# Patient Record
Sex: MALE | Race: WHITE | NOT HISPANIC OR LATINO | Employment: OTHER | URBAN - METROPOLITAN AREA
[De-identification: names, ages, dates, MRNs, and addresses within clinical notes are randomized per-mention and may not be internally consistent; named-entity substitution may affect disease eponyms.]

---

## 2017-01-09 RX ORDER — ATORVASTATIN CALCIUM 10 MG/1
TABLET, FILM COATED ORAL
Qty: 30 TABLET | Refills: 11 | Status: SHIPPED | OUTPATIENT
Start: 2017-01-09 | End: 2018-01-14 | Stop reason: SDUPTHER

## 2017-02-17 ENCOUNTER — OFFICE VISIT (OUTPATIENT)
Dept: OPHTHALMOLOGY | Facility: CLINIC | Age: 67
End: 2017-02-17
Payer: MEDICARE

## 2017-02-17 ENCOUNTER — CLINICAL SUPPORT (OUTPATIENT)
Dept: OPHTHALMOLOGY | Facility: CLINIC | Age: 67
End: 2017-02-17
Payer: MEDICARE

## 2017-02-17 DIAGNOSIS — H47.012 NAION (NON-ARTERITIC ANTERIOR ISCHEMIC OPTIC NEUROPATHY), LEFT EYE: Primary | ICD-10-CM

## 2017-02-17 DIAGNOSIS — H53.412 CENTRAL SCOTOMA, LEFT EYE: ICD-10-CM

## 2017-02-17 PROCEDURE — 92083 EXTENDED VISUAL FIELD XM: CPT | Mod: PBBFAC | Performed by: OPHTHALMOLOGY

## 2017-02-17 PROCEDURE — 99212 OFFICE O/P EST SF 10 MIN: CPT | Mod: PBBFAC | Performed by: OPHTHALMOLOGY

## 2017-02-17 PROCEDURE — 99213 OFFICE O/P EST LOW 20 MIN: CPT | Mod: S$PBB,,, | Performed by: OPHTHALMOLOGY

## 2017-02-17 PROCEDURE — 99999 PR PBB SHADOW E&M-EST. PATIENT-LVL II: CPT | Mod: PBBFAC,,, | Performed by: OPHTHALMOLOGY

## 2017-02-17 NOTE — MR AVS SNAPSHOT
[facility] - Ophthalmology  [address]  [facility] [address]  Phone: [phone]  Fax: [phone]                  Anton Christiansen   2017 11:30 AM   Office Visit    Description:  Male : 1950   Provider:  Ismael Brewster MD   Department:  [facility] - Ophthalmology           Reason for Visit     Concerns About Ocular Health           Diagnoses this Visit        Comments    NAION (non-arteritic anterior ischemic optic neuropathy), left eye    -  Primary     Central scotoma, left eye                To Do List           Goals (5 Years of Data)     None      Follow-Up and Disposition     Return if symptoms worsen or fail to improve.      [facility] Call     [facility] On Call Nurse Care Line -  Assistance  Registered nurses in the [facility] On Call Center provide clinical advisement, health education, appointment booking, and other advisory services.  Call for this free service at [phone].             Medications           Message regarding Medications     Verify the changes and/or additions to your medication regime listed below are the same as discussed with your clinician today.  If any of these changes or additions are incorrect, please notify your healthcare provider.             Verify that the below list of medications is an accurate representation of the medications you are currently taking.  If none reported, the list may be blank. If incorrect, please contact your healthcare provider. Carry this list with you in case of emergency.           Current Medications     ascorbic acid (VITAMIN C) 500 MG tablet Take 500 mg by mouth once daily.    atorvastatin (LIPITOR) 10 MG tablet take 1 tablet by mouth once daily    budesonide (RINOCORT AQUA) 32 mcg/actuation nasal spray 1 spray (32 mcg total) by Nasal route once daily.    carvedilol (COREG) 12.5 MG tablet Take 1 tablet (12.5 mg total) by mouth 2 (two) times daily.    cholecalciferol, vitamin D3, 3,000 unit Tab Take 1 tablet  by mouth once daily.     fish oil-omega-3 fatty acids 300-1,000 mg capsule Take 2 g by mouth once daily.    saw palmetto 80 MG capsule Take 80 mg by mouth 2 (two) times daily.    vitamin E 100 UNIT capsule Take 100 Units by mouth once daily.           Clinical Reference Information           Allergies as of 2/17/2017     Cephalexin    Ace Inhibitors    Cialis [Tadalafil]    Proscar [Finasteride]    Vicodin [Hydrocodone-acetaminophen]      Immunizations Administered on Date of Encounter - 2/17/2017     None      Orders Placed During Today's Visit      Normal Orders This Visit    Arroyo Visual Field - OU - Extended - Both Eyes       Instructions    Return as needed.       Language Assistance Services     ATTENTION: Language assistance services are available, free of charge. Please call [phone].      ATENCIÓN: Si johannla shannon, tiene a pearl disposición servicios gratuitos de asistencia lingüística. Llame al [phone].     CHÚ Ý: N?u b?n nói Ti?ng Vi?t, có các d?ch v? h? tr? ngôn ng? mi?n phí dành cho b?n. G?i s? [phone].         [facility] complies with applicable Federal civil rights laws and does not discriminate on the basis of race, color, national origin, age, disability, or sex.

## 2017-02-17 NOTE — PROGRESS NOTES
HPI     Concerns About Ocular Health    Additional comments: NAION           Comments   DLS:12/06/2016 Narcisa  Patient here for 2 months follow up NAION and Review HVF.  Pt states OU vision seem to be gradually improving. OS shadow seem to be   getting better.  No pain.    I have personally interviewed the patient, reviewed the history and   examined the patient and agree with the technician's exam.       Last edited by Ismael Brewster MD on 2/17/2017 11:57 AM. (History)        ROS     Positive for: Genitourinary, Musculoskeletal, Cardiovascular, Eyes    Negative for: Constitutional, Gastrointestinal, Neurological, Skin, HENT,   Endocrine, Respiratory, Psychiatric, Allergic/Imm, Heme/Lymph    Last edited by Ismael Brewster MD on 2/17/2017 11:57 AM. (History)        Assessment /Plan     For exam results, see Encounter Report.    NAION (non-arteritic anterior ischemic optic neuropathy), left eye  -     Arroyo Visual Field - OU - Extended - Both Eyes    Central scotoma, left eye  -     Arroyo Visual Field - OU - Extended - Both Eyes      Mr. Christiansen is still noting gradual slight improvement in his visual quality in his left eye. The acute NAION event has ended. He will return to me as requested.

## 2017-02-20 NOTE — PROGRESS NOTES
Rel/fix/coop. Good -[facility]    Assessment /Plan     For exam results, see Encounter Report.    There are no diagnoses linked to this encounter.

## 2017-03-29 ENCOUNTER — TELEPHONE (OUTPATIENT)
Dept: INFECTIOUS DISEASES | Facility: CLINIC | Age: 67
End: 2017-03-29

## 2017-03-29 DIAGNOSIS — N41.1 CHRONIC PROSTATITIS: ICD-10-CM

## 2017-03-29 DIAGNOSIS — E78.5 HYPERLIPIDEMIA, UNSPECIFIED HYPERLIPIDEMIA TYPE: Chronic | ICD-10-CM

## 2017-03-29 DIAGNOSIS — R97.20 ELEVATED PSA: ICD-10-CM

## 2017-03-29 DIAGNOSIS — I10 ESSENTIAL HYPERTENSION: Primary | Chronic | ICD-10-CM

## 2017-03-29 DIAGNOSIS — N20.0 KIDNEY STONE: Chronic | ICD-10-CM

## 2017-03-29 NOTE — TELEPHONE ENCOUNTER
----- Message from Kimberly Culp LPN sent at 3/28/2017  8:50 PM CDT -----      ----- Message -----     From: Candy Nunez MA     Sent: 3/28/2017   3:28 PM       To: Lyndsey Piedra MA    Pt wants to do blood work before he comes to his appointment on 4/10

## 2017-04-10 ENCOUNTER — OFFICE VISIT (OUTPATIENT)
Dept: OPTOMETRY | Facility: CLINIC | Age: 67
End: 2017-04-10
Payer: MEDICARE

## 2017-04-10 ENCOUNTER — LAB VISIT (OUTPATIENT)
Dept: LAB | Facility: HOSPITAL | Age: 67
End: 2017-04-10
Attending: INTERNAL MEDICINE
Payer: MEDICARE

## 2017-04-10 ENCOUNTER — OFFICE VISIT (OUTPATIENT)
Dept: INFECTIOUS DISEASES | Facility: CLINIC | Age: 67
End: 2017-04-10
Payer: MEDICARE

## 2017-04-10 VITALS
DIASTOLIC BLOOD PRESSURE: 90 MMHG | HEART RATE: 64 BPM | SYSTOLIC BLOOD PRESSURE: 153 MMHG | BODY MASS INDEX: 25.11 KG/M2 | HEIGHT: 69 IN | WEIGHT: 169.56 LBS | TEMPERATURE: 99 F

## 2017-04-10 DIAGNOSIS — I10 ESSENTIAL HYPERTENSION: Chronic | ICD-10-CM

## 2017-04-10 DIAGNOSIS — N20.0 KIDNEY STONE: Chronic | ICD-10-CM

## 2017-04-10 DIAGNOSIS — Z13.5 GLAUCOMA SCREENING: ICD-10-CM

## 2017-04-10 DIAGNOSIS — I25.10 ARTERIOSCLEROTIC CORONARY ARTERY DISEASE: Primary | ICD-10-CM

## 2017-04-10 DIAGNOSIS — H52.13 MYOPIC ASTIGMATISM OF BOTH EYES: ICD-10-CM

## 2017-04-10 DIAGNOSIS — Z96.1 PSEUDOPHAKIA OF BOTH EYES: ICD-10-CM

## 2017-04-10 DIAGNOSIS — H47.012 NAION (NON-ARTERITIC ANTERIOR ISCHEMIC OPTIC NEUROPATHY), LEFT EYE: ICD-10-CM

## 2017-04-10 DIAGNOSIS — N41.1 CHRONIC PROSTATITIS: ICD-10-CM

## 2017-04-10 DIAGNOSIS — H52.203 MYOPIC ASTIGMATISM OF BOTH EYES: ICD-10-CM

## 2017-04-10 DIAGNOSIS — H47.012 NAION (NON-ARTERITIC ANTERIOR ISCHEMIC OPTIC NEUROPATHY), LEFT EYE: Primary | ICD-10-CM

## 2017-04-10 DIAGNOSIS — E78.5 HYPERLIPIDEMIA, UNSPECIFIED HYPERLIPIDEMIA TYPE: Chronic | ICD-10-CM

## 2017-04-10 DIAGNOSIS — R97.20 ELEVATED PSA: ICD-10-CM

## 2017-04-10 LAB
ALBUMIN SERPL BCP-MCNC: 3.8 G/DL
ALP SERPL-CCNC: 84 U/L
ALT SERPL W/O P-5'-P-CCNC: 32 U/L
ANION GAP SERPL CALC-SCNC: 9 MMOL/L
AST SERPL-CCNC: 27 U/L
BASOPHILS # BLD AUTO: 0.05 K/UL
BASOPHILS NFR BLD: 0.9 %
BILIRUB SERPL-MCNC: 1 MG/DL
BUN SERPL-MCNC: 14 MG/DL
CALCIUM SERPL-MCNC: 9.9 MG/DL
CHLORIDE SERPL-SCNC: 106 MMOL/L
CHOLEST/HDLC SERPL: 3.8 {RATIO}
CO2 SERPL-SCNC: 24 MMOL/L
COMPLEXED PSA SERPL-MCNC: 5.8 NG/ML
CREAT SERPL-MCNC: 1 MG/DL
DIFFERENTIAL METHOD: NORMAL
EOSINOPHIL # BLD AUTO: 0.2 K/UL
EOSINOPHIL NFR BLD: 3.9 %
ERYTHROCYTE [DISTWIDTH] IN BLOOD BY AUTOMATED COUNT: 13.7 %
ERYTHROCYTE [SEDIMENTATION RATE] IN BLOOD BY WESTERGREN METHOD: 11 MM/HR
EST. GFR  (AFRICAN AMERICAN): >60 ML/MIN/1.73 M^2
EST. GFR  (NON AFRICAN AMERICAN): >60 ML/MIN/1.73 M^2
GLUCOSE SERPL-MCNC: 95 MG/DL
HCT VFR BLD AUTO: 42 %
HDL/CHOLESTEROL RATIO: 26.2 %
HDLC SERPL-MCNC: 145 MG/DL
HDLC SERPL-MCNC: 38 MG/DL
HGB BLD-MCNC: 14.2 G/DL
LDLC SERPL CALC-MCNC: 79.6 MG/DL
LYMPHOCYTES # BLD AUTO: 1.6 K/UL
LYMPHOCYTES NFR BLD: 27.3 %
MCH RBC QN AUTO: 30 PG
MCHC RBC AUTO-ENTMCNC: 33.8 %
MCV RBC AUTO: 89 FL
MONOCYTES # BLD AUTO: 0.7 K/UL
MONOCYTES NFR BLD: 12.7 %
NEUTROPHILS # BLD AUTO: 3.2 K/UL
NEUTROPHILS NFR BLD: 55 %
NONHDLC SERPL-MCNC: 107 MG/DL
PLATELET # BLD AUTO: 248 K/UL
PMV BLD AUTO: 10.5 FL
POTASSIUM SERPL-SCNC: 4.9 MMOL/L
PROT SERPL-MCNC: 7.1 G/DL
RBC # BLD AUTO: 4.74 M/UL
SODIUM SERPL-SCNC: 139 MMOL/L
TRIGL SERPL-MCNC: 137 MG/DL
WBC # BLD AUTO: 5.83 K/UL

## 2017-04-10 PROCEDURE — 99213 OFFICE O/P EST LOW 20 MIN: CPT | Mod: PBBFAC,27 | Performed by: INTERNAL MEDICINE

## 2017-04-10 PROCEDURE — 99999 PR PBB SHADOW E&M-EST. PATIENT-LVL III: CPT | Mod: PBBFAC,,, | Performed by: INTERNAL MEDICINE

## 2017-04-10 PROCEDURE — 92015 DETERMINE REFRACTIVE STATE: CPT | Mod: ,,, | Performed by: OPTOMETRIST

## 2017-04-10 PROCEDURE — 99215 OFFICE O/P EST HI 40 MIN: CPT | Mod: S$PBB,,, | Performed by: INTERNAL MEDICINE

## 2017-04-10 PROCEDURE — 99999 PR PBB SHADOW E&M-EST. PATIENT-LVL III: CPT | Mod: PBBFAC,,, | Performed by: OPTOMETRIST

## 2017-04-10 PROCEDURE — 92014 COMPRE OPH EXAM EST PT 1/>: CPT | Mod: S$PBB,,, | Performed by: OPTOMETRIST

## 2017-04-10 NOTE — MR AVS SNAPSHOT
[facility] - Infectious Diseases  [address] [address]  Phone: [phone]  Fax: [phone]                  Anton Christiansen   4/10/2017 11:30 AM   Office Visit    Description:  Male : 1950   Provider:  Ken Norris MD   Department:  [facility] - Infectious Diseases           Reason for Visit     Annual Exam           Diagnoses this Visit        Comments    Arteriosclerotic coronary artery disease    -  Primary            To Do List           Future Appointments        Provider Department Dept Phone    2017 1:30 PM AUDIOGRAM, AUDIO [facility] Audiology [phone]    2017 2:45 PM Crow Baum III, MD [facility] Otorhinolaryngology [phone]    2017 10:20 AM Thomas Quinteros MD [facility] - Urology 4th Floor [phone]    2017 9:00 AM Susanne Stewart MD [facility] - Dermatology [phone]      Goals (5 Years of Data)     None      [facility]     [facility] On Call Nurse Care Line -  Assistance  Unless otherwise directed by your provider, please contact [facility], our nurse care line that is available for  assistance.     Registered nurses in the [facility] Call Center provide: appointment scheduling, clinical advisement, health education, and other advisory services.  Call: [phone] (toll free)               Medications           Message regarding Medications     Verify the changes and/or additions to your medication regime listed below are the same as discussed with your clinician today.  If any of these changes or additions are incorrect, please notify your healthcare provider.             Verify that the below list of medications is an accurate representation of the medications you are currently taking.  If none reported, the list may be blank. If incorrect, please contact your healthcare provider. Carry this list with you in case of emergency.           Current Medications     ascorbic acid (VITAMIN C) 500 MG tablet  "Take 500 mg by mouth once daily.    atorvastatin (LIPITOR) 10 MG tablet take 1 tablet by mouth once daily    budesonide (RINOCORT AQUA) 32 mcg/actuation nasal spray 1 spray (32 mcg total) by Nasal route once daily.    carvedilol (COREG) 12.5 MG tablet Take 1 tablet (12.5 mg total) by mouth 2 (two) times daily.    cholecalciferol, vitamin D3, 3,000 unit Tab Take 1 tablet by mouth once daily.     fish oil-omega-3 fatty acids 300-1,000 mg capsule Take 2 g by mouth once daily.    saw palmetto 80 MG capsule Take 80 mg by mouth 2 (two) times daily.    vitamin E 100 UNIT capsule Take 100 Units by mouth once daily.           Clinical Reference Information           Your Vitals Were     BP Pulse Temp Height Weight BMI    153/90 64 98.6 °F (37 °C) (Oral) 5' 9" (1.753 m) 76.9 kg (169 lb 8.5 oz) 25.04 kg/m2      Blood Pressure          Most Recent Value    BP  (!)  153/90      Allergies as of 4/10/2017     Cephalexin    Ace Inhibitors    Cialis [Tadalafil]    Proscar [Finasteride]    Vicodin [Hydrocodone-acetaminophen]      Immunizations Administered on Date of Encounter - 4/10/2017     None      Orders Placed During Today's Visit     Future Labs/Procedures Expected by Expires    EKG 12-lead  As directed 4/10/2018      Language Assistance Services     ATTENTION: Language assistance services are available, free of charge. Please call [phone].      ATENCIÓN: Si habla español, tiene a pearl disposición servicios gratuitos de asistencia lingüística. Llame al [phone].     [facility] Ý: N?u b?n nói Ti?ng Vi?t, có các d?ch v? h? tr? ngôn ng? mi?n phí dành cho b?n. G?i s? [phone].         [facility] complies with applicable Federal civil rights laws and does not discriminate on the basis of race, color, national origin, age, disability, or sex.        "

## 2017-04-10 NOTE — PROGRESS NOTES
HPI     Patient states he has been seeing pretty well out of his right eye. He   says he has been having problems with his optic nerve in his left eye   which prevents him from seeing as well. (+)occasional floaters OS. Patient   is being followed by Dr. Brewster for NAION        Last edited by Tracey Westbrook on 4/10/2017  8:16 AM.         Assessment /Plan     For exam results, see Encounter Report.    NAION (non-arteritic anterior ischemic optic neuropathy), left eye  -Stable, slight subjective improvement  -Pt requests at home test to check if vision is getting better   - and amsler (not necessary)    Pseudophakia of both eyes  -Clear, centered    Glaucoma screening  -Monitor with annual eye exam and IOP check    Myopic astigmatism of both eyes  Eyeglass Final Rx     Eyeglass Final Rx      Sphere Cylinder Axis Add   Right [place] +0.75 180 +2.50   Left -0.50 +1.25 175 +2.50       Type:  PAL    Expiration Date:  4/11/2018                  RTC 1 yr

## 2017-04-10 NOTE — PROGRESS NOTES
Subjective:      Patient ID: Anton Christiansen is a 66 y.o. male.    Chief Complaint:Annual Exam      History of Present Illness    Hypertension  Pt still on carvedilol 12.5 mg bid. He checks his BP at home and gets 120s-130s/ 70s. Has higher readings in office.  Cuff brought in last year to validate accuracy of home readings.        Elevated PSA  Followed by Dr. Quinteros for elevated PSA and renal stones. Will schedule visit with him.    Lab Results        PSA 5.8 4/ 10/ 2017    PSA 6.0 (H) 05/20/2014    PSA 5.81 (H) 06/04/2013    PSA 10.78 (H) 05/28/2013      Arteriosclerotic coronary artery disease  Had positive coronary calcium score in 2008 placing him at 25-50%ile. Has been on low dose atorvastatin since with great lipid control:  Lab Results   Component Value Date    CHOL 145 04/10/2017    CHOL 131 03/23/2016    CHOL 168 04/08/2015     Lab Results   Component Value Date    HDL 38 (L) 04/10/2017    HDL 34 (L) 03/23/2016    HDL 41 04/08/2015     Lab Results   Component Value Date    LDLCALC 79.6 04/10/2017    LDLCALC 80.2 03/23/2016    LDLCALC 100.4 04/08/2015     Lab Results   Component Value Date    TRIG 137 04/10/2017    TRIG 84 03/23/2016    TRIG 133 04/08/2015     Lab Results   Component Value Date    CHOLHDL 26.2 04/10/2017    CHOLHDL 26.0 03/23/2016    CHOLHDL 24.4 04/08/2015          Recurrent nephrolithiasis  Had problems with renal stones necessitating hospitalization last year. Last renal stone study 9/18/15   showed:  Stable 0.5-cm distal right ureter calculus with persistent proximal right dilatation and worsening right perinephric fat stranding.   Last sx of colic were 2015.  He is hydrating regularly and consistently        The laboratory studies were reviewed and discussed with the pt.  Last colonoscopy was 2014 and a rescreening interval of 3-5 years was recommended.          Review of Systems   Constitution: Negative for chills, decreased appetite, fever, weakness, malaise/fatigue, night sweats,  weight gain and weight loss.   HENT: Negative for congestion, ear pain, headaches, hearing loss, hoarse voice, sore throat and tinnitus.    Eyes: Negative for blurred vision, redness and visual disturbance.   Cardiovascular: Negative for chest pain, leg swelling and palpitations.   Respiratory: Negative for cough, hemoptysis, shortness of breath and sputum production.    Hematologic/Lymphatic: Negative for adenopathy. Does not bruise/bleed easily.   Skin: Negative for dry skin, itching, rash and suspicious lesions.   Musculoskeletal: Negative for back pain, joint pain, myalgias and neck pain.   Gastrointestinal: Negative for abdominal pain, constipation, diarrhea, heartburn, nausea and vomiting.   Genitourinary: Negative for dysuria, flank pain, frequency, hematuria, hesitancy and urgency.   Neurological: Negative for dizziness, numbness and paresthesias.   Psychiatric/Behavioral: Negative for depression and memory loss. The patient does not have insomnia and is not nervous/anxious.      Objective:   Physical Exam   Constitutional: He is oriented to person, place, and time. He appears well-developed and well-nourished.   HENT:   Head: Normocephalic and atraumatic.   Right Ear: External ear normal.   Left Ear: External ear normal.   Mouth/Throat: Oropharynx is clear and moist.   Eyes: Conjunctivae and EOM are normal. Pupils are equal, round, and reactive to light.   Neck: Normal range of motion. Neck supple. No thyromegaly present.   Cardiovascular: Normal rate, regular rhythm and normal heart sounds.    No murmur heard.  Pulmonary/Chest: Effort normal and breath sounds normal. He has no wheezes. He has no rales.   Abdominal: Soft. Bowel sounds are normal. He exhibits no mass. There is no tenderness. There is no rebound.   Musculoskeletal: Normal range of motion.   Lymphadenopathy:     He has no cervical adenopathy.   Neurological: He is alert and oriented to person, place, and time.   Skin: Skin is warm and dry.    Many seborrheic keratoses noted on back/chest   Psychiatric: He has a normal mood and affect. His behavior is normal.   Vitals reviewed.    Assessment:       1. Arteriosclerotic coronary artery disease    2. Essential hypertension    3. Kidney stone    4. NAION (non-arteritic anterior ischemic optic neuropathy), left eye          Plan:        1. Continue presenet meds   2. See Victor M Quinteros and Terry in f/u   3. EKG today

## 2017-04-10 NOTE — MR AVS SNAPSHOT
[facility]  [address]  [facility] [address]  Phone: [phone]  Fax: [phone]                  Anton Christiansen   4/10/2017 8:00 AM   Office Visit    Description:  Male : 1950   Provider:  Regino Dias OD   Department:  [facility]           Reason for Visit     Concerns About Ocular Health           Diagnoses this Visit        Comments    NAION (non-arteritic anterior ischemic optic neuropathy), left eye    -  Primary     Pseudophakia of both eyes         Glaucoma screening         Myopic astigmatism of both eyes                To Do List           Future Appointments        Provider Department Dept Phone    4/10/2017 11:30 AM Ken Norris MD [facility] - Infectious Diseases [phone]      Goals (5 Years of Data)     None      Follow-Up and Disposition     Return in about 1 year (around 4/10/2018).      [facility] On Call     [facility] On Call Nurse Care Line -  Assistance  Unless otherwise directed by your provider, please contact [facility], our nurse care line that is available for  assistance.     Registered nurses in the [facility] Call Center provide: appointment scheduling, clinical advisement, health education, and other advisory services.  Call: [phone] (toll free)               Medications           Message regarding Medications     Verify the changes and/or additions to your medication regime listed below are the same as discussed with your clinician today.  If any of these changes or additions are incorrect, please notify your healthcare provider.             Verify that the below list of medications is an accurate representation of the medications you are currently taking.  If none reported, the list may be blank. If incorrect, please contact your healthcare provider. Carry this list with you in case of emergency.           Current Medications     ascorbic acid (VITAMIN C) 500 MG tablet Take 500 mg by mouth once daily.     atorvastatin (LIPITOR) 10 MG tablet take 1 tablet by mouth once daily    budesonide (RINOCORT AQUA) 32 mcg/actuation nasal spray 1 spray (32 mcg total) by Nasal route once daily.    carvedilol (COREG) 12.5 MG tablet Take 1 tablet (12.5 mg total) by mouth 2 (two) times daily.    cholecalciferol, vitamin D3, 3,000 unit Tab Take 1 tablet by mouth once daily.     fish oil-omega-3 fatty acids 300-1,000 mg capsule Take 2 g by mouth once daily.    saw palmetto 80 MG capsule Take 80 mg by mouth 2 (two) times daily.    vitamin E 100 UNIT capsule Take 100 Units by mouth once daily.           Clinical Reference Information           Allergies as of 4/10/2017     Cephalexin    Ace Inhibitors    Cialis [Tadalafil]    Proscar [Finasteride]    Vicodin [Hydrocodone-acetaminophen]      Immunizations Administered on Date of Encounter - 4/10/2017     None      Language Assistance Services     ATTENTION: Language assistance services are available, free of charge. Please call [phone].      ATENCIÓN: Si luis maunel ortega, tiene a pearl disposición servicios gratuitos de asistencia lingüística. Llame al [phone].     [facility] Ý: N?u b?n nói Ti?ng Vi?t, có các d?ch v? h? tr? ngôn ng? mi?n phí dành cho b?n. G?i s? [phone].         [facility] complies with applicable Federal civil rights laws and does not discriminate on the basis of race, color, national origin, age, disability, or sex.

## 2017-05-11 ENCOUNTER — OFFICE VISIT (OUTPATIENT)
Dept: UROLOGY | Facility: CLINIC | Age: 67
End: 2017-05-11
Payer: MEDICARE

## 2017-05-11 VITALS
SYSTOLIC BLOOD PRESSURE: 155 MMHG | WEIGHT: 169.56 LBS | HEART RATE: 68 BPM | HEIGHT: 69 IN | BODY MASS INDEX: 25.11 KG/M2 | DIASTOLIC BLOOD PRESSURE: 80 MMHG

## 2017-05-11 DIAGNOSIS — N52.9 ED (ERECTILE DYSFUNCTION) OF ORGANIC ORIGIN: ICD-10-CM

## 2017-05-11 DIAGNOSIS — R97.20 ELEVATED PSA: ICD-10-CM

## 2017-05-11 DIAGNOSIS — N30.10 IC (INTERSTITIAL CYSTITIS): ICD-10-CM

## 2017-05-11 DIAGNOSIS — N41.1 CHRONIC PROSTATITIS: Primary | ICD-10-CM

## 2017-05-11 PROCEDURE — 99213 OFFICE O/P EST LOW 20 MIN: CPT | Mod: PBBFAC | Performed by: UROLOGY

## 2017-05-11 PROCEDURE — 99215 OFFICE O/P EST HI 40 MIN: CPT | Mod: S$PBB,,, | Performed by: UROLOGY

## 2017-05-11 PROCEDURE — 99999 PR PBB SHADOW E&M-EST. PATIENT-LVL III: CPT | Mod: PBBFAC,,, | Performed by: UROLOGY

## 2017-05-11 RX ORDER — SILDENAFIL 100 MG/1
100 TABLET, FILM COATED ORAL DAILY PRN
Qty: 4 TABLET | Refills: 12 | Status: SHIPPED | OUTPATIENT
Start: 2017-05-11 | End: 2018-06-12

## 2017-05-11 NOTE — MR AVS SNAPSHOT
[facility] Urology 4th Floor  [address]  [facility] [address]  Phone: [phone]                  Anton TUCKER Елена   2017 3:20 PM   Office Visit    Description:  Male : 1950   Provider:  Thomas Quinteros MD   Department:  [facility] Urolog 4th Floor           Reason for Visit     Follow-up           Diagnoses this Visit        Comments    Chronic prostatitis    -  Primary     IC (interstitial cystitis)         ED (erectile dysfunction) of organic origin         Elevated PSA                To Do List           Future Appointments        Provider Department Dept Phone    2017 1:30 PM AUDIOGRAM, AUDIO [facility] Audiology [phone]    2017 2:45 PM Crow Baum III, MD [facility] Otorhinolaryngology [phone]    2017 9:00 AM Susanne Stewart MD [facility] Dermatology [phone]      Goals (5 Years of Data)     None      Follow-Up and Disposition     Return in about 1 year (around 2018) for PSA.       These Medications        Disp Refills Start End    sildenafil (VIAGRA) 100 MG tablet 4 tablet 12 2017 6/10/2017    Take 1 tablet (100 mg total) by mouth daily as needed for Erectile Dysfunction. - Oral    Pharmacy: [facility]  [place] [address]  #: [phone]         [facility]     [facility] Nurse Care Line -  Assistance  Unless otherwise directed by your provider, please contact [facility], our nurse care line that is available for  assistance.     Registered nurses in the [facility] Center provide: appointment scheduling, clinical advisement, health education, and other advisory services.  Call: [phone] (toll free)               Medications           Message regarding Medications     Verify the changes and/or additions to your medication regime listed below are the same as discussed with your clinician today.  If any of these changes or additions are incorrect, please notify your  "healthcare provider.        START taking these NEW medications        Refills    sildenafil (VIAGRA) 100 MG tablet 12    Sig: Take 1 tablet (100 mg total) by mouth daily as needed for Erectile Dysfunction.    Class: Normal    Route: Oral           Verify that the below list of medications is an accurate representation of the medications you are currently taking.  If none reported, the list may be blank. If incorrect, please contact your healthcare provider. Carry this list with you in case of emergency.           Current Medications     ascorbic acid (VITAMIN C) 500 MG tablet Take 500 mg by mouth once daily.    atorvastatin (LIPITOR) 10 MG tablet take 1 tablet by mouth once daily    carvedilol (COREG) 12.5 MG tablet Take 1 tablet (12.5 mg total) by mouth 2 (two) times daily.    cholecalciferol, vitamin D3, 3,000 unit Tab Take 1 tablet by mouth once daily.     fish oil-omega-3 fatty acids 300-1,000 mg capsule Take 2 g by mouth once daily.    saw palmetto 80 MG capsule Take 80 mg by mouth 2 (two) times daily.    vitamin E 100 UNIT capsule Take 100 Units by mouth once daily.    budesonide (RINOCORT AQUA) 32 mcg/actuation nasal spray 1 spray (32 mcg total) by Nasal route once daily.    sildenafil (VIAGRA) 100 MG tablet Take 1 tablet (100 mg total) by mouth daily as needed for Erectile Dysfunction.           Clinical Reference Information           Your Vitals Were     BP Pulse Height Weight BMI    155/80 68 5' 9" (1.753 m) 76.9 kg (169 lb 8.5 oz) 25.04 kg/m2      Blood Pressure          Most Recent Value    BP  (!)  155/80      Allergies as of 5/11/2017     Cephalexin    Ace Inhibitors    Cialis [Tadalafil]    Proscar [Finasteride]    Vicodin [Hydrocodone-acetaminophen]      Immunizations Administered on Date of Encounter - 5/11/2017     None      Orders Placed During Today's Visit     Future Labs/Procedures Expected by Expires    Prostate Specific Antigen, Diagnostic  5/11/2017 5/11/2018      Language Assistance " Services     ATTENTION: Language assistance services are available, free of charge. Please call [phone].      ATENCIÓN: Si habla español, tiene a pearl disposición servicios gratuitos de asistencia lingüística. Llame al [phone].     CHÚ Ý: N?u b?n nói Ti?ng Vi?t, có các d?ch v? h? tr? ngôn ng? mi?n phí dành cho b?n. G?i s? [phone].         [facility] 4th Floor complies with applicable Federal civil rights laws and does not discriminate on the basis of race, color, national origin, age, disability, or sex.

## 2017-05-11 NOTE — PROGRESS NOTES
Anton Christiansen is a 66 y.o. man who is here for the follow up.  Is interested in ED treatment.    Hx of elevated PSA, kidney stones, recurrent prostatitis.  He reports that ever since he has been avoiding caffeine, soda and tea, his urinary symptoms including urgency and discomfort got much better.  He asks whether or not orange juice is irritating his bladder.    hx of calcium oxalate stone.    Past Medical History:   Diagnosis Date    Allergy     seasonal    Arteriosclerotic coronary artery disease 4/10/2017    Basal cell carcinoma 07/14/15    right dorsal hand    BCC (basal cell carcinoma of skin) 4/2014    nasal tip s/p Mohs with forehead flap    Cataract     Family history of colon cancer     Hyperlipidemia     Hypertension     Nephrolithiasis     Prostatitis, chronic     Special screening for malignant neoplasms, colon 6/19/2014     Past Surgical History:   Procedure Laterality Date    CATARACT EXTRACTION W/  INTRAOCULAR LENS IMPLANT  12/20/12    OD    COLONOSCOPY  [id number]    Division and Inset  4/29/14    D&I Forehead Flap    EYE SURGERY      Pilonoidal Cyst      SKIN SURGERY  MOHS Repair    nose    TONSILLECTOMY       Social History   Substance Use Topics    Smoking status: Never Smoker    Smokeless tobacco: Never Used    Alcohol use No     Family History   Problem Relation Age of Onset    Cancer Mother 70     colon    Hypertension Mother     Cancer Father     No Known Problems Sister     No Known Problems Brother     No Known Problems Maternal Aunt     No Known Problems Maternal Uncle     No Known Problems Paternal Aunt     No Known Problems Paternal Uncle     No Known Problems Maternal Grandmother     No Known Problems Maternal Grandfather     No Known Problems Paternal Grandmother     No Known Problems Paternal Grandfather     Glaucoma Neg Hx     Macular degeneration Neg Hx     Amblyopia Neg Hx     Blindness Neg Hx     Cataracts Neg Hx     Diabetes Neg Hx      Retinal detachment Neg Hx     Strabismus Neg Hx     Stroke Neg Hx     Thyroid disease Neg Hx     Melanoma Neg Hx     Psoriasis Neg Hx     Lupus Neg Hx     Eczema Neg Hx     Acne Neg Hx      Allergy:  Review of patient's allergies indicates:   Allergen Reactions    Cephalexin Diarrhea    Ace inhibitors Other (See Comments)     Cough    Cialis [tadalafil] Other (See Comments)     Muscle pain      Proscar [finasteride] Other (See Comments)     Decreased libido     Vicodin [hydrocodone-acetaminophen] Nausea And Vomiting     Outpatient Encounter Prescriptions as of 5/11/2017   Medication Sig Dispense Refill    ascorbic acid (VITAMIN C) 500 MG tablet Take 500 mg by mouth once daily.      atorvastatin (LIPITOR) 10 MG tablet take 1 tablet by mouth once daily 30 tablet 11    carvedilol (COREG) 12.5 MG tablet Take 1 tablet (12.5 mg total) by mouth 2 (two) times daily. 60 tablet 11    cholecalciferol, vitamin D3, 3,000 unit Tab Take 1 tablet by mouth once daily.       fish oil-omega-3 fatty acids 300-1,000 mg capsule Take 2 g by mouth once daily.      saw palmetto 80 MG capsule Take 80 mg by mouth 2 (two) times daily.      vitamin E 100 UNIT capsule Take 100 Units by mouth once daily.      budesonide (RINOCORT AQUA) 32 mcg/actuation nasal spray 1 spray (32 mcg total) by Nasal route once daily. 8.6 g 3    sildenafil (VIAGRA) 100 MG tablet Take 1 tablet (100 mg total) by mouth daily as needed for Erectile Dysfunction. 4 tablet 12     No facility-administered encounter medications on file as of 5/11/2017.      Review of Systems   General ROS: GENERAL:  No weight gain or loss  SKIN:  No rashes or lacerations  HEAD:  No headaches  EYES:  No exophthalmos, jaundice or blindness  EARS:  No dizziness, tinnitus or hearing loss  NOSE:  No changes in smell  MOUTH & THROAT:  No dyskinetic movements or obvious goiter  CHEST:  No shortness of breath, hyperventilation or cough  CARDIOVASCULAR:  No tachycardia or chest  pain  ABDOMEN:  No nausea, vomiting, pain, constipation or diarrhea  URINARY:  No frequency, dysuria or sexual dysfunction  ENDOCRINE:  No polydipsia, polyuria  MUSCULOSKELETAL:  No pain or stiffness of the joints  NEUROLOGIC:  No weakness, sensory changes, seizures, confusion, memory loss, tremor or other abnormal movements  Physical Exam     Vitals:    05/11/17 1528   BP: (!) 155/80   Pulse: 68     General Appearance:  Alert, cooperative, no distress, appears stated age   Head:  Normocephalic, without obvious abnormality, atraumatic   Eyes:  PERRL, conjunctiva/corneas clear, EOM's intact, fundi benign, both eyes   Ears:  Normal TM's and external ear canals, both ears   Nose: Nares normal, septum midline, mucosa normal, no drainage or sinus tenderness   Throat: Lips, mucosa, and tongue normal; teeth and gums normal   Neck: Supple, symmetrical, trachea midline, no adenopathy, thyroid: not enlarged, symmetric, no tenderness/mass/nodules, no carotid bruit or JVD   Back:   Symmetric, no curvature, ROM normal, no CVA tenderness   Lungs:   Clear to auscultation bilaterally, respirations unlabored   Chest Wall:  No tenderness or deformity   Heart:  Regular rate and rhythm, S1, S2 normal, no murmur, rub or gallop   Abdomen:   Soft, non-tender, bowel sounds active all four quadrants,  no masses, no organomegaly of liver and spleen  No hernia noted   Genitalia:  Scrotum: no rash or lesion  Normal symmetric epididymis without masses  Normal vas palpated  Normal size, symmetric testicles with no masses   Normal urethral meatus with no discharge  Normal circumcised penis with no lesion   Rectal:  Normal perineum and anus upon inspection.  Normal tone, no masses or tenderness;   Extremities: Extremities normal, atraumatic, no cyanosis or edema   Pulses: 2+ and symmetric   Skin: Skin color, texture, turgor normal, no rashes or lesions   Lymph nodes: Cervical, supraclavicular, and axillary nodes normal   Neurologic: Normal      Prostate 40 grams smooth with no nodule or tenderness.    LABS:  Lab Results   Component Value Date    PSA 6.0 (H) 05/20/2014    PSA 5.81 (H) 06/04/2013    PSA 10.78 (H) 05/28/2013    PSA 5.55 (H) 12/05/2012    PSA 6.48 (H) 05/28/2012    PSA 4.08 (H) 05/24/2012    PSA 6.29 (H) 03/29/2012    PSA 5.42 (H) 11/17/2011    PSA 4.8 (H) 09/12/2011    PSA 5.5 (H) 05/17/2011    PSADIAG 5.8 (H) 04/10/2017    PSADIAG 5.6 (H) 06/14/2016    PSADIAG 5.7 (H) 03/23/2016    PSADIAG 5.4 (H) 06/15/2015    PSADIAG 10.2 (H) 04/08/2015     Results for orders placed or performed in visit on 04/10/17   Prostate Specific Antigen, Diagnostic   Result Value Ref Range    PSA DIAGNOSTIC 5.8 (H) 0.00 - 4.00 ng/mL   Results for orders placed or performed in visit on 06/14/16   Prostate Specific Antigen, Diagnostic   Result Value Ref Range    PSA DIAGNOSTIC 5.6 (H) 0.00 - 4.00 ng/mL   Results for orders placed or performed in visit on 03/23/16   Prostate Specific Antigen, Diagnostic   Result Value Ref Range    PSA DIAGNOSTIC 5.7 (H) 0.00 - 4.00 ng/mL     Lab Results   Component Value Date    CREATININE 1.0 04/10/2017    CREATININE 1.0 10/04/2016    CREATININE 1.0 03/23/2016     No results found for this or any previous visit.  Urine Culture, Routine   Date Value Ref Range Status   09/20/2015 No growth  Final     Radiology:  KUB   No stone seen    Assessment and Plan:  Anton TUCKER was seen today for follow-up.    Diagnoses and all orders for this visit:    Chronic prostatitis    IC (interstitial cystitis)    ED (erectile dysfunction) of organic origin  -     sildenafil (VIAGRA) 100 MG tablet; Take 1 tablet (100 mg total) by mouth daily as needed for Erectile Dysfunction.    Elevated PSA  -     Prostate Specific Antigen, Diagnostic; Future    avoid citric acid in order to minimize his bladder symptoms.  I encouraged high amount of citric acid intake in the past in order to minimize his kidney stone.  Gave him IC smart diet guideline and its  brochure.    Trial of viagra recommended.  Ciaslis resulted in muscle pain.  He can use [facility] in order to minimize the cost of pills.  I explained how erection occurs, common causes of ED, treatment options including oral mediations, vacuum erection devices(DARI), injection therapy, MUSE, and penile prostheses.     His PSA is stable. Will see him in 1 year with PSA.    I spent 40 minutes with the patient of which more than half was spent in direct consultation with the patient in regards to our treatment and plan.    Follow-up:  Return in about 1 year (around 5/11/2018) for PSA.

## 2017-06-05 ENCOUNTER — CLINICAL SUPPORT (OUTPATIENT)
Dept: AUDIOLOGY | Facility: CLINIC | Age: 67
End: 2017-06-05
Payer: MEDICARE

## 2017-06-05 ENCOUNTER — INITIAL CONSULT (OUTPATIENT)
Dept: OTOLARYNGOLOGY | Facility: CLINIC | Age: 67
End: 2017-06-05
Payer: MEDICARE

## 2017-06-05 VITALS
WEIGHT: 171.06 LBS | DIASTOLIC BLOOD PRESSURE: 84 MMHG | TEMPERATURE: 98 F | HEART RATE: 64 BPM | HEIGHT: 68 IN | BODY MASS INDEX: 25.92 KG/M2 | SYSTOLIC BLOOD PRESSURE: 171 MMHG

## 2017-06-05 DIAGNOSIS — H90.3 SENSORINEURAL HEARING LOSS, BILATERAL: Primary | ICD-10-CM

## 2017-06-05 DIAGNOSIS — H90.3 SENSORINEURAL HEARING LOSS, BILATERAL: ICD-10-CM

## 2017-06-05 DIAGNOSIS — H61.23 BILATERAL IMPACTED CERUMEN: Primary | ICD-10-CM

## 2017-06-05 PROCEDURE — 99999 PR PBB SHADOW E&M-EST. PATIENT-LVL III: CPT | Mod: PBBFAC,,, | Performed by: OTOLARYNGOLOGY

## 2017-06-05 PROCEDURE — 99213 OFFICE O/P EST LOW 20 MIN: CPT | Mod: PBBFAC,27 | Performed by: OTOLARYNGOLOGY

## 2017-06-05 PROCEDURE — 99213 OFFICE O/P EST LOW 20 MIN: CPT | Mod: 25,S$PBB,, | Performed by: OTOLARYNGOLOGY

## 2017-06-05 PROCEDURE — 69210 REMOVE IMPACTED EAR WAX UNI: CPT | Mod: S$PBB,,, | Performed by: OTOLARYNGOLOGY

## 2017-06-05 PROCEDURE — 99999 PR PBB SHADOW E&M-EST. PATIENT-LVL I: CPT | Mod: PBBFAC,,,

## 2017-06-05 PROCEDURE — 69210 REMOVE IMPACTED EAR WAX UNI: CPT | Mod: 50,PBBFAC | Performed by: OTOLARYNGOLOGY

## 2017-06-05 NOTE — PROGRESS NOTES
Subjective:       Patient ID: Anton Christiansen is a 66 y.o. male.    Chief Complaint: No chief complaint on file.    HPI: Mr. Ortega is a 66 year old CM who was referred here by Dr. Ken Norris for an ear cleaning procedure.  The patient admits to trying to flush out the cerumen from his ears unsuccessfully with a bulb syringe.  He denies any significant perceived hearing loss.    He denies a family history of hearing loss or personal history of ear trauma ear surgery or ear infections.  He denies a history of significant noise exposure or  service.    PMH: High blood pressure, basal cell carcinoma  PSH: Excision of basal cell carcinoma from nose 4/2015, status post cataract extraction  Family history: High blood pressure, problems with anesthesia, unspecified cancer  ALLERGIES: Cephalexin, ACE inhibitors, Cialis, proscar, Vicodin  Occupation: Retired; [employer] volunteer  Review of Systems   Ears: Positive for ringing in ear.    Nose:  Positive for postnasal drip.    Eyes:  Positive for visual change.   Other:  Positive for kidney problem, bladder problem, prostate disease and rash.    His medical problem list includes optic neuropathy of the left eye, hypertension, hyperlipidemia, kidney stone, chronic prostatitis, meningioma, Mohs defect of ala nasi, interstitial cystitis, meningioma      The patient completed an audiometric study performed by the [facility] audiology service after's ears were cleaned.  The study is duplicated below and the results are reviewed with the patient in detail  Objective:       Blood pressure 171/84 pulse 64 temperature 98.0 height 5 feet 8 inches weight 171 pounds  Gen.: Alert and oriented gentleman in no acute distress  Physical Exam   Constitutional: He is oriented to person, place, and time. He appears well-developed and well-nourished.   HENT:   Head: Normocephalic.   Right Ear: Hearing, tympanic membrane and ear canal normal. No drainage. No  foreign bodies. No mastoid tenderness. Tympanic membrane is not perforated. No decreased hearing is noted.   Left Ear: Hearing, tympanic membrane and ear canal normal. No drainage. No foreign bodies. No mastoid tenderness. Tympanic membrane is not perforated. No decreased hearing is noted.   Ears:    Nose: No nose lacerations, nasal deformity, septal deviation or nasal septal hematoma. No epistaxis. Right sinus exhibits no maxillary sinus tenderness and no frontal sinus tenderness. Left sinus exhibits no maxillary sinus tenderness and no frontal sinus tenderness.   Mouth/Throat: Uvula is midline, oropharynx is clear and moist and mucous membranes are normal. He does not have dentures. No oral lesions. No trismus in the jaw. No uvula swelling or dental caries. No oropharyngeal exudate or tonsillar abscesses.   Neck: No thyromegaly present.   Pulmonary/Chest: Effort normal. No stridor.   Lymphadenopathy:     He has no cervical adenopathy.   Neurological: He is alert and oriented to person, place, and time.   Skin: Rash noted.   Psychiatric: His behavior is normal.       Assessment:       1. Bilateral impacted cerumen    2. Sensorineural hearing loss, bilateral        Plan:     Large cerumen impactions removed from both eacs with suction  Audiometry reviewed; AD > AS sloping SNHL  Pt. is a candidate for hearing amplification for one or both ears   copy of audiogram/Judi's card/Rx to obtain hearing aid(s) provided  Yearly ear cleaning/audiometric monitoring encouraged

## 2017-06-05 NOTE — PATIENT INSTRUCTIONS
Large cerumen impactions removed from both eacs with suction  Audiometry reviewed; AD > AS sloping SNHL  Pt. is a candidate for hearing amplification for one or both ears   copy of audiogram/Judi's card/Rx to obtain hearing aid(s) provided  Yearly ear cleaning/audiometric monitoring encouraged

## 2017-06-05 NOTE — LETTER
June 6, 2017      Ken Norris MD  [address]  [facility] [address]           [facility] - Otorhinolaryngology  [address]  [facility] [address]  Phone: [phone]  Fax: [phone]          Patient: Anton Christiansen   MR Number: [id number]   YOB: 1950   Date of Visit: 6/5/2017       Dear Dr. Ken Norris:    Thank you for referring Anton Christiansen to me for evaluation. Attached you will find relevant portions of my assessment and plan of care.    If you have questions, please do not hesitate to call me. I look forward to following Anton Christiansen along with you.    Sincerely,    Crow Baum III, MD    Enclosure  CC:  No Recipients    If you would like to receive this communication electronically, please contact [email or web address] or [phone] to request more information on [facility] [employer] access.    For providers and/or their staff who would like to refer a patient to [facility], please contact us through our one-stop-shop provider referral line, [facility], at [phone].    If you feel you have received this communication in error or would no longer like to receive these types of communications, please e-mail [email or web address]

## 2017-06-07 ENCOUNTER — CLINICAL SUPPORT (OUTPATIENT)
Dept: AUDIOLOGY | Facility: CLINIC | Age: 67
End: 2017-06-07

## 2017-06-07 DIAGNOSIS — H90.3 SENSORINEURAL HEARING LOSS, BILATERAL: Primary | ICD-10-CM

## 2017-06-07 PROCEDURE — 99499 UNLISTED E&M SERVICE: CPT | Mod: S$GLB,,, | Performed by: OTOLARYNGOLOGY

## 2017-06-07 NOTE — PROGRESS NOTES
Mr. Christiansen was seen for a hearing aid consultation.  I discussed styles and technology in hearing aids.  I gave him a brochure on [employer] OPN hearing aids.  He seems interested in the basic digital aid but wants to discuss his options with his wife.  He will call when he is ready to order.

## 2017-07-21 ENCOUNTER — OFFICE VISIT (OUTPATIENT)
Dept: DERMATOLOGY | Facility: CLINIC | Age: 67
End: 2017-07-21
Payer: MEDICARE

## 2017-07-21 DIAGNOSIS — D18.00 ANGIOMA: ICD-10-CM

## 2017-07-21 DIAGNOSIS — Z12.83 SKIN CANCER SCREENING: ICD-10-CM

## 2017-07-21 DIAGNOSIS — D22.9 MULTIPLE BENIGN NEVI: ICD-10-CM

## 2017-07-21 DIAGNOSIS — L82.1 SK (SEBORRHEIC KERATOSIS): ICD-10-CM

## 2017-07-21 DIAGNOSIS — L57.0 AK (ACTINIC KERATOSIS): Primary | ICD-10-CM

## 2017-07-21 PROCEDURE — 1159F MED LIST DOCD IN RCRD: CPT | Mod: ,,, | Performed by: DERMATOLOGY

## 2017-07-21 PROCEDURE — 99999 PR PBB SHADOW E&M-EST. PATIENT-LVL II: CPT | Mod: PBBFAC,,, | Performed by: DERMATOLOGY

## 2017-07-21 PROCEDURE — 99212 OFFICE O/P EST SF 10 MIN: CPT | Mod: PBBFAC | Performed by: DERMATOLOGY

## 2017-07-21 PROCEDURE — 17000 DESTRUCT PREMALG LESION: CPT | Mod: S$PBB,,, | Performed by: DERMATOLOGY

## 2017-07-21 PROCEDURE — 17000 DESTRUCT PREMALG LESION: CPT | Mod: PBBFAC | Performed by: DERMATOLOGY

## 2017-07-21 PROCEDURE — 99213 OFFICE O/P EST LOW 20 MIN: CPT | Mod: 25,S$PBB,, | Performed by: DERMATOLOGY

## 2017-07-21 NOTE — PROGRESS NOTES
Subjective:       Patient ID:  Anton Christiansen is a 66 y.o. male who presents for   Chief Complaint   Patient presents with    Skin Check     UBSE     HPI  Interested in upper body skin check today.  Pt had a BCC excised from R dorsal hand in 8/15 by Dr. Haynes.  Also had a BCC of nasal tip s/p Mohs excision in 4/2014 with subsequent paramedian forehead flap repair.  No new or concerning spots on skin.    Review of Systems   Constitutional: Negative for fever, chills, weight loss, weight gain, fatigue, night sweats and malaise.   Skin: Positive for activity-related sunscreen use. Negative for daily sunscreen use and recent sunburn.   Hematologic/Lymphatic: Does not bruise/bleed easily.        Objective:    Physical Exam   Constitutional: He appears well-developed and well-nourished. No distress.   Neurological: He is alert and oriented to person, place, and time. He is not disoriented.   Psychiatric: He has a normal mood and affect.   Skin:   Areas Examined (abnormalities noted in diagram):   Head / Face Inspection Performed  Neck Inspection Performed  Chest / Axilla Inspection Performed  Abdomen Inspection Performed  Back Inspection Performed  RUE Inspected  LUE Inspection Performed                   Diagram Legend     Erythematous scaling macule/papule c/w actinic keratosis       Vascular papule c/w angioma      Pigmented verrucoid papule/plaque c/w seborrheic keratosis      Yellow umbilicated papule c/w sebaceous hyperplasia      Irregularly shaped tan macule c/w lentigo     1-2 mm smooth white papules consistent with Milia      Movable subcutaneous cyst with punctum c/w epidermal inclusion cyst      Subcutaneous movable cyst c/w pilar cyst      Firm pink to brown papule c/w dermatofibroma      Pedunculated fleshy papule(s) c/w skin tag(s)      Evenly pigmented macule c/w junctional nevus     Mildly variegated pigmented, slightly irregular-bordered macule c/w mildly atypical nevus      Flesh colored to  evenly pigmented papule c/w intradermal nevus       Pink pearly papule/plaque c/w basal cell carcinoma      Erythematous hyperkeratotic cursted plaque c/w SCC      Surgical scar with no sign of skin cancer recurrence      Open and closed comedones      Inflammatory papules and pustules      Verrucoid papule consistent consistent with wart     Erythematous eczematous patches and plaques     Dystrophic onycholytic nail with subungual debris c/w onychomycosis     Umbilicated papule    Erythematous-base heme-crusted tan verrucoid plaque consistent with inflamed seborrheic keratosis     Erythematous Silvery Scaling Plaque c/w Psoriasis     See annotation      Assessment / Plan:        AK (actinic keratosis)  Cryosurgery Procedure Note    Verbal consent from the patient is obtained and the patient is aware of the precancerous quality and need for treatment of these lesions. Liquid nitrogen cryosurgery is applied to the 1 actinic keratoses, as detailed in the physical exam, to produce a freeze injury. The patient is aware that blisters may form and is instructed on wound care with gentle cleansing and use of vaseline ointment to keep moist until healed. The patient is supplied a handout on cryosurgery and is instructed to call if lesions do not completely resolve.    SK (seborrheic keratosis)  These are benign inherited growths without a malignant potential. Reassurance given to patient. No treatment is necessary.   Treatment of benign, asymptomatic lesions may be considered cosmetic.  Warned about risk of hypo- or hyperpigmentation with treatment and risk of recurrence.    Angioma  This is a benign vascular lesion. Reassurance given. No treatment required. Treatment of benign, asymptomatic lesions may be considered cosmetic.    Multiple benign nevi  Benign-appearing on exam today. Counseled pt to monitor mole(s) and return to clinic if any changes noted or symptoms (bleeding, itching, pain, etc) noted. Brochure  provided.    Skin cancer screening  Upper body skin examination performed today including at least 6 points as noted in physical examination. No lesions suspicious for malignancy noted.  Patient instructed in importance of daily broad spectrum sunscreen use with spf at least 30. Sun avoidance and topical protection/protective clothing discussed.      Return in about 1 year (around 7/21/2018) for skin check or sooner for any concerns.

## 2017-07-21 NOTE — PATIENT INSTRUCTIONS
SEBORRHEIC KERATOSES        What causes seborrheic keratoses?    Seborrheic keratoses are harmless, common skin growths that first appear during adult life.  As time goes by, more growths appear.  Some persons have a very large number of them.  Seborrheic keratoses appear on both covered and uncovered parts of the body; they are not caused by sunlight.  The tendency to develop seborrheic keratoses is inherited.    Seborrheic keratoses are harmless and never become malignant.  They begin as slightly raised, light brown spots.  Gradually they thicken and take on a rough wartlike surface.  They slowly darken and may turn black.  These color changes are harmless.  Seborrheic keratoses are superficial and look as if they were stuck on the skin.  Persons who have had several seborrheic keratoses can usually recognize this type of benign growth.  However, if you are concerned or unsure about any growth, consult me.    Treatment    Seborrheic keratoses can easily be removed in the office.  The only reason for removing a seborrheic keratosis is your wish to get rid of it.      CRYOSURGERY      Your doctor has used a method called cryosurgery to treat your skin condition. Cryosurgery refers to the use of very cold substances to treat a variety of skin conditions such as warts, pre-skin cancers, molluscum contagiosum, sun spots, and several benign growths. The substance we use in cryosurgery is liquid nitrogen and is so cold (-195 degrees Celsius) that is burns when administered.     Following treatment in the office, the skin may immediately burn and become red. You may find the area around the lesion is affected as well. It is sometimes necessary to treat not only the lesion, but a small area of the surrounding normal skin to achieve a good response.     A blister, and even a blood filled blister, may form after treatment.   This is a normal response. If the blister is painful, it is acceptable to sterilize a needle and with  rubbing alcohol and gently pop the blister. It is important that you gently wash the area with soap and warm water as the blister fluid may contain wart virus if a wart was treated. Do no remove the roof of the blister.     The area treated can take anywhere from 1-3 weeks to heal. Healing time depends on the kind skin lesion treated, the location, and how aggressively the lesion was treated. It is recommended that the areas treated are covered with Vaseline or bacitracin ointment and a band-aid. If a band-aid is not practical, just ointment applied several times per day will do. Keeping these areas moist will speed the healing time.    Treatment with liquid nitrogen can leave a scar. In dark skin, it may be a light or dark scar, in light skin it may be a white or pink scar. These will generally fade with time, but may never go away completely.     If you have any concerns after your treatment, please feel free to call the office.       [address] [place] [address]/ [phone] FAX/ [email or web address]    Summer Sun Protection      The [facility] would like to remind you of the importance of sun protection all year round and particularly during the summer when the suns rays are the strongest. It has been proven that both acute and chronic sun exposure damages our cells and leads to skin cancer. Beyond skin cancer, the sun causes 90% of the symptoms of pre-mature skin aging, including wrinkles, lentigines (brown spots), and thin, easily bruised skin. Proper sun protection can help prevent these unwanted conditions.    Many patients report that the dont go in the sun. It has been shown that the average person receives 18 hours of incidental sun exposure per week during activities such as walking through parking lots, driving, or sitting next to windows. This accumulates to several bad sunburns per year!    In choosing sunscreen, you want one that protects against  both UVA and UVB rays. It is recommended that you use one of SPF 30 or higher. It is important to apply the sunscreen about 20 minutes prior to sun exposure. Most sunscreens are chemical sunscreens and a reaction must take place in the skin so that they are effective. If they are applied and then you are immediately exposed to the sun or start sweating, this reaction has not had time to take place and you are therefore unprotected. Sunscreen needs to be reapplied every 2 hours if you are participating in water sports or sweating. We recommend Elta MD or Neutrogena Ultra Sheer Dry Touch SPF 55 for daily use; however there are many options and it is most important for you to find one that you will use on a consistent basis.    If you have sensitive skin, you may do best with a sunscreen that contains only physical blockers such as titanium dioxide or zinc oxide. These are typically thicker and harder to apply, however they afford very good protection. Neutrogena Sensitive Skin, Blue Lizard Sensitive Skin (pink top) or Neutrogena Pure and Free are popular ones.     Aside from sunscreen, clothes with UV protection, wide brimmed hats, and sunglasses are other means of sun protection that we recommend.                        [facility] - DERMATOLOGY  [address]  [place] [address]  Dept: [phone]  Dept Fax: [phone]

## 2017-08-01 ENCOUNTER — PATIENT MESSAGE (OUTPATIENT)
Dept: INFECTIOUS DISEASES | Facility: CLINIC | Age: 67
End: 2017-08-01

## 2017-08-02 ENCOUNTER — PATIENT MESSAGE (OUTPATIENT)
Dept: INFECTIOUS DISEASES | Facility: CLINIC | Age: 67
End: 2017-08-02

## 2017-08-14 ENCOUNTER — PATIENT MESSAGE (OUTPATIENT)
Dept: INFECTIOUS DISEASES | Facility: CLINIC | Age: 67
End: 2017-08-14

## 2017-09-11 ENCOUNTER — HOSPITAL ENCOUNTER (OUTPATIENT)
Dept: RADIOLOGY | Facility: HOSPITAL | Age: 67
Discharge: HOME | End: 2017-09-11
Attending: NEUROLOGICAL SURGERY
Payer: MEDICARE

## 2017-09-11 DIAGNOSIS — D32.9 MENINGIOMA: ICD-10-CM

## 2017-09-11 LAB
CREAT SERPL-MCNC: 0.8 MG/DL (ref 0.5–1.4)
SAMPLE: NORMAL

## 2017-09-11 PROCEDURE — A9585 GADOBUTROL INJECTION: HCPCS | Performed by: NEUROLOGICAL SURGERY

## 2017-09-11 PROCEDURE — 25500020 PHARM REV CODE 255: Performed by: NEUROLOGICAL SURGERY

## 2017-09-11 PROCEDURE — 70553 MRI BRAIN STEM W/O & W/DYE: CPT | Mod: TC

## 2017-09-11 PROCEDURE — 70553 MRI BRAIN STEM W/O & W/DYE: CPT | Mod: 26,GC,, | Performed by: RADIOLOGY

## 2017-09-11 RX ORDER — GADOBUTROL 604.72 MG/ML
9 INJECTION INTRAVENOUS
Status: COMPLETED | OUTPATIENT
Start: 2017-09-11 | End: 2017-09-11

## 2017-09-11 RX ADMIN — GADOBUTROL 9 ML: 604.72 INJECTION INTRAVENOUS at 09:09

## 2017-09-12 ENCOUNTER — OFFICE VISIT (OUTPATIENT)
Dept: NEUROSURGERY | Facility: CLINIC | Age: 67
End: 2017-09-12
Payer: MEDICARE

## 2017-09-12 VITALS
HEIGHT: 69 IN | SYSTOLIC BLOOD PRESSURE: 169 MMHG | TEMPERATURE: 98 F | WEIGHT: 168.63 LBS | HEART RATE: 58 BPM | DIASTOLIC BLOOD PRESSURE: 92 MMHG | BODY MASS INDEX: 24.98 KG/M2

## 2017-09-12 DIAGNOSIS — D32.9 MENINGIOMA: Primary | ICD-10-CM

## 2017-09-12 PROCEDURE — 1159F MED LIST DOCD IN RCRD: CPT | Mod: ,,, | Performed by: NEUROLOGICAL SURGERY

## 2017-09-12 PROCEDURE — 99213 OFFICE O/P EST LOW 20 MIN: CPT | Mod: S$PBB,,, | Performed by: NEUROLOGICAL SURGERY

## 2017-09-12 PROCEDURE — 99999 PR PBB SHADOW E&M-EST. PATIENT-LVL III: CPT | Mod: PBBFAC,,, | Performed by: NEUROLOGICAL SURGERY

## 2017-09-12 PROCEDURE — 3080F DIAST BP >= 90 MM HG: CPT | Mod: ,,, | Performed by: NEUROLOGICAL SURGERY

## 2017-09-12 PROCEDURE — 99213 OFFICE O/P EST LOW 20 MIN: CPT | Mod: PBBFAC | Performed by: NEUROLOGICAL SURGERY

## 2017-09-12 PROCEDURE — 1126F AMNT PAIN NOTED NONE PRSNT: CPT | Mod: ,,, | Performed by: NEUROLOGICAL SURGERY

## 2017-09-12 PROCEDURE — 3077F SYST BP >= 140 MM HG: CPT | Mod: ,,, | Performed by: NEUROLOGICAL SURGERY

## 2017-09-12 NOTE — PROGRESS NOTES
"Subjective:    I, Destinee Brown, am scribing for, and in the presence of, Dr. Twin Neslon.     Patient ID: Anton Christiansen is a 66 y.o. male.    Chief Complaint: Follow-up    HPI Pt is a 65 yo male with a meningioma who presents today for 9 month FU. On 9/12/2017 the pt denies any current symptoms and states that he is doing well.     Review of Systems   Constitutional: Negative for activity change, fatigue and fever.   HENT: Negative for facial swelling.    Eyes: Negative.    Respiratory: Negative.    Cardiovascular: Negative.    Gastrointestinal: Negative for diarrhea, nausea and vomiting.   Genitourinary: Negative.    Musculoskeletal: Negative for back pain, joint swelling and myalgias.   Neurological: Negative for seizures, weakness, numbness and headaches.   Psychiatric/Behavioral: Negative.        Past Medical History:   Diagnosis Date    Allergy     seasonal    Arteriosclerotic coronary artery disease 4/10/2017    Basal cell carcinoma 07/14/15    right dorsal hand    BCC (basal cell carcinoma of skin) 4/2014    nasal tip s/p Mohs with forehead flap    Cataract     Family history of colon cancer     Hyperlipidemia     Hypertension     Nephrolithiasis     Prostatitis, chronic     Special screening for malignant neoplasms, colon 6/19/2014       Objective:     BP (!) 169/92   Pulse (!) 58   Temp 97.7 °F (36.5 °C) (Oral)   Ht 5' 9" (1.753 m)   Wt 76.5 kg (168 lb 10.4 oz)   BMI 24.91 kg/m²     Physical Exam   Constitutional: He is oriented to person, place, and time. He appears well-developed and well-nourished.   HENT:   Head: Normocephalic and atraumatic.   Neck: Neck supple.   Neurological: He is alert and oriented to person, place, and time. No cranial nerve deficit. He displays a negative Romberg sign. GCS eye subscore is 4. GCS verbal subscore is 5. GCS motor subscore is 6.       Imaging:  MRI Brain W WO Contrast 9/11/2017 shows stable 0.8 cm homogeneously enhancing extra-axial lesion " along the floor of the left anterior cranial fossa, most compatible with a meningioma.    MRI Brain 10/13/2016 shows extra-axial enhancing lesion along the left floor the intracranial fossa most compatible with meningioma.    I have personally reviewed the images with the pt.      I, Dr. Twin Nelson, personally performed the services described in this documentation as scribed by Destinee Brown in my presence, and it is both accurate and complete.    Assessment:       Meningioma.      Plan:   I have reviewed the MRI of brain with patient, informing the patient that there are no new changes since last MRI of brain October 2016. I will schedule the patient a 2 year FU with MRI of brain.

## 2017-09-12 NOTE — PATIENT INSTRUCTIONS
I have reviewed the MRI of brain with patient, informing the patient that there are no new changes since last MRI of brain October 2016. I will schedule the patient a 2 year FU with MRI of brain.

## 2017-09-13 ENCOUNTER — PATIENT MESSAGE (OUTPATIENT)
Dept: ADMINISTRATIVE | Facility: OTHER | Age: 67
End: 2017-09-13

## 2017-10-26 ENCOUNTER — PATIENT MESSAGE (OUTPATIENT)
Dept: DERMATOLOGY | Facility: CLINIC | Age: 67
End: 2017-10-26

## 2017-11-06 RX ORDER — CARVEDILOL 12.5 MG/1
TABLET ORAL
Qty: 60 TABLET | Refills: 11 | OUTPATIENT
Start: 2017-11-06

## 2017-11-17 RX ORDER — CARVEDILOL 12.5 MG/1
TABLET ORAL
Qty: 60 TABLET | Refills: 11 | Status: SHIPPED | OUTPATIENT
Start: 2017-11-17 | End: 2018-11-18 | Stop reason: SDUPTHER

## 2018-01-03 ENCOUNTER — OFFICE VISIT (OUTPATIENT)
Dept: DERMATOLOGY | Facility: CLINIC | Age: 68
End: 2018-01-03
Payer: MEDICARE

## 2018-01-03 DIAGNOSIS — L57.0 AK (ACTINIC KERATOSIS): Primary | ICD-10-CM

## 2018-01-03 DIAGNOSIS — L82.1 SK (SEBORRHEIC KERATOSIS): ICD-10-CM

## 2018-01-03 PROCEDURE — 17000 DESTRUCT PREMALG LESION: CPT | Mod: PBBFAC | Performed by: DERMATOLOGY

## 2018-01-03 PROCEDURE — 17003 DESTRUCT PREMALG LES 2-14: CPT | Mod: PBBFAC | Performed by: DERMATOLOGY

## 2018-01-03 PROCEDURE — 99999 PR PBB SHADOW E&M-EST. PATIENT-LVL II: CPT | Mod: PBBFAC,,, | Performed by: DERMATOLOGY

## 2018-01-03 PROCEDURE — 99212 OFFICE O/P EST SF 10 MIN: CPT | Mod: PBBFAC,25 | Performed by: DERMATOLOGY

## 2018-01-03 PROCEDURE — 17003 DESTRUCT PREMALG LES 2-14: CPT | Mod: S$PBB,,, | Performed by: DERMATOLOGY

## 2018-01-03 PROCEDURE — 99212 OFFICE O/P EST SF 10 MIN: CPT | Mod: 25,S$PBB,, | Performed by: DERMATOLOGY

## 2018-01-03 PROCEDURE — 17000 DESTRUCT PREMALG LESION: CPT | Mod: S$PBB,,, | Performed by: DERMATOLOGY

## 2018-01-03 NOTE — PROGRESS NOTES
Subjective:       Patient ID:  Anton Christiansen is a 67 y.o. male who presents for   Chief Complaint   Patient presents with    Spot     Face x 1 year, rough and sore, no prevoius tx     Spot  - Initial  Affected locations: face  Duration: 1 year  Signs and Symptoms: rough and sore.  Severity: mild  Timing: constant  Aggravated by: nothing  Relieving factors/Treatments tried: nothing  Improvement on treatment: no relief      Pt states he's noticed these recurrent scaly lesions on his face for about a year.    Pt had a BCC excised from R dorsal hand in 8/15 by Dr. Haynes.  Also had a BCC of nasal tip s/p Mohs excision in 4/2014 with subsequent paramedian forehead flap repair.    Review of Systems   Constitutional: Negative for fever, chills and fatigue.   Skin: Positive for daily sunscreen use.   Hematologic/Lymphatic: Does not bruise/bleed easily.        Objective:    Physical Exam   Constitutional: He appears well-developed and well-nourished.   Neurological: He is alert and oriented to person, place, and time.   Psychiatric: He has a normal mood and affect.   Skin:   Areas Examined (abnormalities noted in diagram):   Head / Face Inspection Performed            Pt declined skin exam of any other areas today.    Diagram Legend     Erythematous scaling macule/papule c/w actinic keratosis       Vascular papule c/w angioma      Pigmented verrucoid papule/plaque c/w seborrheic keratosis      Yellow umbilicated papule c/w sebaceous hyperplasia      Irregularly shaped tan macule c/w lentigo     1-2 mm smooth white papules consistent with Milia      Movable subcutaneous cyst with punctum c/w epidermal inclusion cyst      Subcutaneous movable cyst c/w pilar cyst      Firm pink to brown papule c/w dermatofibroma      Pedunculated fleshy papule(s) c/w skin tag(s)      Evenly pigmented macule c/w junctional nevus     Mildly variegated pigmented, slightly irregular-bordered macule c/w mildly atypical nevus      Flesh  colored to evenly pigmented papule c/w intradermal nevus       Pink pearly papule/plaque c/w basal cell carcinoma      Erythematous hyperkeratotic cursted plaque c/w SCC      Surgical scar with no sign of skin cancer recurrence      Open and closed comedones      Inflammatory papules and pustules      Verrucoid papule consistent consistent with wart     Erythematous eczematous patches and plaques     Dystrophic onycholytic nail with subungual debris c/w onychomycosis     Umbilicated papule    Erythematous-base heme-crusted tan verrucoid plaque consistent with inflamed seborrheic keratosis     Erythematous Silvery Scaling Plaque c/w Psoriasis     See annotation      Assessment / Plan:        AK (actinic keratosis)  Cryosurgery Procedure Note    Verbal consent from the patient is obtained and the patient is aware of the precancerous quality and need for treatment of these lesions. Liquid nitrogen cryosurgery is applied to the 3 actinic keratoses, as detailed in the physical exam, to produce a freeze injury. The patient is aware that blisters may form and is instructed on wound care with gentle cleansing and use of vaseline ointment to keep moist until healed. The patient is supplied a handout on cryosurgery and is instructed to call in 1 month if lesions do not completely resolve.    SK (seborrheic keratosis)  These are benign inherited growths without a malignant potential. Reassurance given to patient. No treatment is necessary.   Treatment of benign, asymptomatic lesions may be considered cosmetic.  Warned about risk of hypo- or hyperpigmentation with treatment and risk of recurrence.    rtc 6 months for skin check or sooner for any concerns

## 2018-01-15 RX ORDER — ATORVASTATIN CALCIUM 10 MG/1
TABLET, FILM COATED ORAL
Qty: 30 TABLET | Refills: 11 | Status: SHIPPED | OUTPATIENT
Start: 2018-01-15 | End: 2019-01-14 | Stop reason: SDUPTHER

## 2018-04-02 ENCOUNTER — TELEPHONE (OUTPATIENT)
Dept: INFECTIOUS DISEASES | Facility: CLINIC | Age: 68
End: 2018-04-02

## 2018-04-02 DIAGNOSIS — I25.10 ARTERIOSCLEROTIC CORONARY ARTERY DISEASE: Primary | ICD-10-CM

## 2018-04-02 DIAGNOSIS — E78.5 HYPERLIPIDEMIA, UNSPECIFIED HYPERLIPIDEMIA TYPE: Chronic | ICD-10-CM

## 2018-04-02 DIAGNOSIS — I10 ESSENTIAL HYPERTENSION: Chronic | ICD-10-CM

## 2018-04-02 DIAGNOSIS — R97.20 ELEVATED PSA: ICD-10-CM

## 2018-04-02 NOTE — TELEPHONE ENCOUNTER
----- Message from Lyndsey Piedra MA sent at 4/2/2018  2:02 PM CDT -----  Contact: pt [phone]  04/10 annual exam, please add orders.      ----- Message -----  From: Zoila Elam  Sent: 4/2/2018   1:45 PM  To: Myrna TUCKER Staff    Pt called requesting to schedule his annual ck with Dr Norris.  Pt stated that he normally does labs prior to his apt, orders are needed to schedule labs.    Pt requested for Dr Norris's nurse to schedule both labs and apt anytime next week in the morning.  Pt can be reached at [phone]    Thanks  flora

## 2018-04-10 ENCOUNTER — HOSPITAL ENCOUNTER (OUTPATIENT)
Dept: CARDIOLOGY | Facility: CLINIC | Age: 68
Discharge: HOME | End: 2018-04-10
Payer: MEDICARE

## 2018-04-10 ENCOUNTER — OFFICE VISIT (OUTPATIENT)
Dept: INFECTIOUS DISEASES | Facility: CLINIC | Age: 68
End: 2018-04-10
Payer: MEDICARE

## 2018-04-10 VITALS
HEART RATE: 61 BPM | DIASTOLIC BLOOD PRESSURE: 93 MMHG | HEIGHT: 69 IN | BODY MASS INDEX: 25.5 KG/M2 | WEIGHT: 172.19 LBS | SYSTOLIC BLOOD PRESSURE: 174 MMHG | TEMPERATURE: 98 F

## 2018-04-10 DIAGNOSIS — I25.10 ARTERIOSCLEROTIC CORONARY ARTERY DISEASE: ICD-10-CM

## 2018-04-10 DIAGNOSIS — I10 ESSENTIAL HYPERTENSION: Chronic | ICD-10-CM

## 2018-04-10 DIAGNOSIS — R97.20 ELEVATED PSA: ICD-10-CM

## 2018-04-10 DIAGNOSIS — E78.00 PURE HYPERCHOLESTEROLEMIA: Chronic | ICD-10-CM

## 2018-04-10 DIAGNOSIS — N41.1 CHRONIC PROSTATITIS: ICD-10-CM

## 2018-04-10 DIAGNOSIS — N20.0 KIDNEY STONE: Chronic | ICD-10-CM

## 2018-04-10 DIAGNOSIS — E04.1 THYROID NODULE: Primary | ICD-10-CM

## 2018-04-10 PROCEDURE — 99215 OFFICE O/P EST HI 40 MIN: CPT | Mod: S$PBB,,, | Performed by: INTERNAL MEDICINE

## 2018-04-10 PROCEDURE — 93005 ELECTROCARDIOGRAM TRACING: CPT | Mod: PBBFAC | Performed by: INTERNAL MEDICINE

## 2018-04-10 PROCEDURE — 99213 OFFICE O/P EST LOW 20 MIN: CPT | Mod: PBBFAC | Performed by: INTERNAL MEDICINE

## 2018-04-10 PROCEDURE — 93010 ELECTROCARDIOGRAM REPORT: CPT | Mod: S$PBB,,, | Performed by: INTERNAL MEDICINE

## 2018-04-10 PROCEDURE — 99999 PR PBB SHADOW E&M-EST. PATIENT-LVL III: CPT | Mod: PBBFAC,,, | Performed by: INTERNAL MEDICINE

## 2018-04-10 NOTE — PROGRESS NOTES
Subjective:      Patient ID: Anton Christiansen is a 67 y.o. male.    Chief Complaint:  Follow up on multiple medical problems      History of Present Illness    Hypertension  Pt still on carvedilol 12.5 mg bid. He checks his BP at home and gets 120s-130s/ 70s. Has higher readings in office.  Cuff brought in last year to validate accuracy of home readings.        Elevated PSA  Followed by Dr. Quinteros for elevated PSA and renal stones. Will schedule visit with him.    Lab Results   PSA 11.7 4/10/2018    PSA 5.8 4/10/2017    PSA 6.0 (H) 05/20/2014    PSA 5.81 (H) 06/04/2013    PSA 10.78 (H) 05/28/2013     Hypercholesterolemia  On atorvastatin 10 mg. Lipid control is good:    Lab Results   Component Value Date    CHOL 157 04/10/2018    CHOL 145 04/10/2017    CHOL 131 03/23/2016     Lab Results   Component Value Date    HDL 40 04/10/2018    HDL 38 (L) 04/10/2017    HDL 34 (L) 03/23/2016     Lab Results   Component Value Date    LDLCALC 86.6 04/10/2018    LDLCALC 79.6 04/10/2017    LDLCALC 80.2 03/23/2016     Lab Results   Component Value Date    TRIG 152 (H) 04/10/2018    TRIG 137 04/10/2017    TRIG 84 03/23/2016     Lab Results   Component Value Date    CHOLHDL 25.5 04/10/2018    CHOLHDL 26.2 04/10/2017    CHOLHDL 26.0 03/23/2016             Recurrent nephrolithiasis  Had problems with renal stones necessitating hospitalization last year. Last renal stone study 9/18/15 showed:  Stable 0.5-cm distal right ureter calculus with persistent proximal right dilatation and worsening right perinephric fat stranding.   Last sx of colic were 2015.  He is hydrating regularly and consistently        The laboratory studies were reviewed and discussed with the pt.  Last colonoscopy was 2014 and a rescreening interval of 3-5 years (2019)was recommended.      Review of Systems   Constitution: Negative for chills, decreased appetite, fever, weakness, malaise/fatigue, night sweats, weight gain and weight loss.   HENT: Negative for  congestion, ear pain, hearing loss, hoarse voice, sore throat and tinnitus.    Eyes: Negative for blurred vision, redness and visual disturbance.   Cardiovascular: Negative for chest pain, leg swelling and palpitations.   Respiratory: Negative for cough, hemoptysis, shortness of breath and sputum production.    Hematologic/Lymphatic: Negative for adenopathy. Does not bruise/bleed easily.   Skin: Negative for dry skin, itching, rash and suspicious lesions.   Musculoskeletal: Positive for back pain. Negative for joint pain, myalgias and neck pain.   Gastrointestinal: Negative for abdominal pain, constipation, diarrhea, heartburn, nausea and vomiting.   Genitourinary: Negative for dysuria, flank pain, frequency, hematuria, hesitancy and urgency.   Neurological: Negative for dizziness, headaches, numbness and paresthesias.   Psychiatric/Behavioral: Negative for depression and memory loss. The patient does not have insomnia and is not nervous/anxious.      Objective:   Physical Exam   Constitutional: He is oriented to person, place, and time. He appears well-developed and well-nourished.   HENT:   Head: Normocephalic and atraumatic.   Right Ear: External ear normal.   Left Ear: External ear normal.   Mouth/Throat: Oropharynx is clear and moist.   Eyes: Conjunctivae and EOM are normal. Pupils are equal, round, and reactive to light.   Neck: Normal range of motion. Neck supple. No thyromegaly present.   Right thyroid nodule noted.   Cardiovascular: Normal rate, regular rhythm and normal heart sounds.    No murmur heard.  Pulmonary/Chest: Effort normal and breath sounds normal. He has no wheezes. He has no rales.   Abdominal: Soft. Bowel sounds are normal. He exhibits no mass. There is no tenderness. There is no rebound.   Musculoskeletal: Normal range of motion.   Lymphadenopathy:     He has no cervical adenopathy.   Neurological: He is alert and oriented to person, place, and time.   Skin: Skin is warm and dry.    Psychiatric: He has a normal mood and affect. His behavior is normal.   Vitals reviewed.    Assessment:       1. Thyroid nodule    2. Elevated PSA    3. Chronic prostatitis    4. Kidney stone    5. Arteriosclerotic coronary artery disease    6. Pure hypercholesterolemia    7. Essential hypertension          Plan:        1. Continue present meds   2. appt with Dr. Quinteros re rise in PSA   3. Thyroid ultrasound and possibly refer to endocrine

## 2018-04-12 ENCOUNTER — HOSPITAL ENCOUNTER (OUTPATIENT)
Dept: RADIOLOGY | Facility: HOSPITAL | Age: 68
Discharge: HOME | End: 2018-04-12
Attending: INTERNAL MEDICINE
Payer: MEDICARE

## 2018-04-12 DIAGNOSIS — E04.1 THYROID NODULE: ICD-10-CM

## 2018-04-12 PROCEDURE — 76536 US EXAM OF HEAD AND NECK: CPT | Mod: 26,,, | Performed by: RADIOLOGY

## 2018-04-12 PROCEDURE — 76536 US EXAM OF HEAD AND NECK: CPT | Mod: TC

## 2018-04-16 ENCOUNTER — TELEPHONE (OUTPATIENT)
Dept: ENDOCRINOLOGY | Facility: CLINIC | Age: 68
End: 2018-04-16

## 2018-04-16 DIAGNOSIS — E04.2 GOITER, NONTOXIC, MULTINODULAR: Primary | ICD-10-CM

## 2018-04-19 ENCOUNTER — LAB VISIT (OUTPATIENT)
Dept: LAB | Facility: HOSPITAL | Age: 68
End: 2018-04-19
Attending: INTERNAL MEDICINE
Payer: MEDICARE

## 2018-04-19 DIAGNOSIS — E04.2 GOITER, NONTOXIC, MULTINODULAR: ICD-10-CM

## 2018-04-19 LAB — TSH SERPL DL<=0.005 MIU/L-ACNC: 2.52 UIU/ML

## 2018-04-19 PROCEDURE — 84443 ASSAY THYROID STIM HORMONE: CPT

## 2018-04-19 PROCEDURE — 36415 COLL VENOUS BLD VENIPUNCTURE: CPT

## 2018-04-24 ENCOUNTER — OFFICE VISIT (OUTPATIENT)
Dept: ENDOCRINOLOGY | Facility: CLINIC | Age: 68
End: 2018-04-24
Payer: MEDICARE

## 2018-04-24 VITALS
BODY MASS INDEX: 25.14 KG/M2 | HEIGHT: 69 IN | DIASTOLIC BLOOD PRESSURE: 80 MMHG | SYSTOLIC BLOOD PRESSURE: 154 MMHG | WEIGHT: 169.75 LBS

## 2018-04-24 DIAGNOSIS — E04.2 MULTIPLE THYROID NODULES: Primary | ICD-10-CM

## 2018-04-24 DIAGNOSIS — E78.00 PURE HYPERCHOLESTEROLEMIA: Chronic | ICD-10-CM

## 2018-04-24 PROCEDURE — 99999 PR PBB SHADOW E&M-EST. PATIENT-LVL III: CPT | Mod: PBBFAC,,, | Performed by: INTERNAL MEDICINE

## 2018-04-24 PROCEDURE — 99204 OFFICE O/P NEW MOD 45 MIN: CPT | Mod: S$PBB,,, | Performed by: INTERNAL MEDICINE

## 2018-04-24 PROCEDURE — 99213 OFFICE O/P EST LOW 20 MIN: CPT | Mod: PBBFAC | Performed by: INTERNAL MEDICINE

## 2018-04-24 NOTE — LETTER
April 24, 2018      Ken Norris MD  [address]  [facility] [address]           [facility] Endo/Diab/Metab  [address]  [facility] [address]  Phone: [phone]  Fax: [phone]          Patient: Anton Christiansen   MR Number: [id number]   YOB: 1950   Date of Visit: 4/24/2018       Dear Dr. Ken Norris:    Thank you for referring Anton Christiansen to me for evaluation. Attached you will find relevant portions of my assessment and plan of care.    If you have questions, please do not hesitate to call me. I look forward to following Anton Christiansen along with you.    Sincerely,    Zelalem Haney MD    Enclosure  CC:  No Recipients    If you would like to receive this communication electronically, please contact [email or web address] or [phone] to request more information on [employer] access.    For providers and/or their staff who would like to refer a patient to [facility], please contact us through our one-stop-shop provider referral line, [facility], at [phone].    If you feel you have received this communication in error or would no longer like to receive these types of communications, please e-mail [email or web address]

## 2018-05-08 ENCOUNTER — PATIENT MESSAGE (OUTPATIENT)
Dept: OPTOMETRY | Facility: CLINIC | Age: 68
End: 2018-05-08

## 2018-05-21 ENCOUNTER — OFFICE VISIT (OUTPATIENT)
Dept: OPTOMETRY | Facility: CLINIC | Age: 68
End: 2018-05-21
Payer: MEDICARE

## 2018-05-21 DIAGNOSIS — Z96.1 PSEUDOPHAKIA OF BOTH EYES: ICD-10-CM

## 2018-05-21 DIAGNOSIS — H52.13 MYOPIC ASTIGMATISM OF BOTH EYES: ICD-10-CM

## 2018-05-21 DIAGNOSIS — Z13.5 GLAUCOMA SCREENING: ICD-10-CM

## 2018-05-21 DIAGNOSIS — H47.012 NAION (NON-ARTERITIC ANTERIOR ISCHEMIC OPTIC NEUROPATHY), LEFT EYE: Primary | ICD-10-CM

## 2018-05-21 DIAGNOSIS — H52.203 MYOPIC ASTIGMATISM OF BOTH EYES: ICD-10-CM

## 2018-05-21 PROCEDURE — 99999 PR PBB SHADOW E&M-EST. PATIENT-LVL III: CPT | Mod: PBBFAC,,, | Performed by: OPTOMETRIST

## 2018-05-21 PROCEDURE — 92014 COMPRE OPH EXAM EST PT 1/>: CPT | Mod: S$PBB,,, | Performed by: OPTOMETRIST

## 2018-05-21 PROCEDURE — 99213 OFFICE O/P EST LOW 20 MIN: CPT | Mod: PBBFAC | Performed by: OPTOMETRIST

## 2018-05-21 PROCEDURE — 92015 DETERMINE REFRACTIVE STATE: CPT | Mod: ,,, | Performed by: OPTOMETRIST

## 2018-05-21 NOTE — PROGRESS NOTES
HPI     Concerns About Ocular Health    Additional comments: Eye exam            Comments   Patient is in for continued eye care, annual eye exam. DEE 04/10/2017.  Annual follow up NAION Left eye, pt feels Eye has more of a 'cloud' and   getting worse  Scratched OD, needs new refraction    (-) blurry vision  (-) flashes of light   (-) floaters   (-) pain          Last edited by Regino Dias, OD on 5/21/2018 10:11 AM. (History)            Assessment /Plan     For exam results, see Encounter Report.    NAION (non-arteritic anterior ischemic optic neuropathy), left eye  -     [place] Visual Field - OU - Extended - Both Eyes; Future  -     Ambulatory Referral to Ophthalmology  -Visual progression since last annual  -[facility] same day    Glaucoma screening  -Monitor with annual eye exam and IOP check    Pseudophakia of both eyes  -clear, centered    Myopic astigmatism of both eyes  Eyeglass Final Rx     Eyeglass Final Rx       Sphere Cylinder Axis Dist VA Add    Right [place] +0.75 180 20/20 +2.50    Left [place] +0.75 175 20/400 +2.50    Type:  PAL    Expiration Date:  5/22/2019                  RTC 1 yr

## 2018-05-24 ENCOUNTER — OFFICE VISIT (OUTPATIENT)
Dept: OPHTHALMOLOGY | Facility: CLINIC | Age: 68
End: 2018-05-24
Payer: MEDICARE

## 2018-05-24 ENCOUNTER — CLINICAL SUPPORT (OUTPATIENT)
Dept: OPHTHALMOLOGY | Facility: CLINIC | Age: 68
End: 2018-05-24
Payer: MEDICARE

## 2018-05-24 DIAGNOSIS — H47.012 NAION (NON-ARTERITIC ANTERIOR ISCHEMIC OPTIC NEUROPATHY), LEFT EYE: ICD-10-CM

## 2018-05-24 DIAGNOSIS — H53.412 CENTRAL SCOTOMA, LEFT EYE: Primary | ICD-10-CM

## 2018-05-24 PROCEDURE — 92083 EXTENDED VISUAL FIELD XM: CPT | Mod: PBBFAC | Performed by: OPHTHALMOLOGY

## 2018-05-24 PROCEDURE — 92083 EXTENDED VISUAL FIELD XM: CPT | Mod: 26,S$PBB,, | Performed by: OPHTHALMOLOGY

## 2018-05-24 PROCEDURE — 92014 COMPRE OPH EXAM EST PT 1/>: CPT | Mod: S$PBB,,, | Performed by: OPHTHALMOLOGY

## 2018-05-24 PROCEDURE — 99999 PR PBB SHADOW E&M-EST. PATIENT-LVL II: CPT | Mod: PBBFAC,,, | Performed by: OPHTHALMOLOGY

## 2018-05-24 PROCEDURE — 99212 OFFICE O/P EST SF 10 MIN: CPT | Mod: PBBFAC | Performed by: OPHTHALMOLOGY

## 2018-05-24 PROCEDURE — 92083 EXTENDED VISUAL FIELD XM: CPT | Mod: PBBFAC

## 2018-05-24 NOTE — PROGRESS NOTES
HPI     Pt was last seen with Dr. Brewster on 2/17/17.     Pt was referred back to Dr. Brewster from Dr. Dias to evaluate the OS. Pt   states that he still has the cloud in OS but since last visit it has   gotten lighter and he can see through it but still cant distinguish   letters. Pt reports that when he seen Dr. Dias (5/21/18) he was told he   didn't read as well as his previous visit. Pt states OD is doing well and   vision is stable. Pt denies any signs of flashes or floaters OU at this   time. No pain or discomfort OU. Pt confirms using tears PRN OU.     I have personally interviewed the patient, reviewed the history and   examined the patient and agree with the technician's exam.      Last edited by Ismael Brewster MD on 5/24/2018 10:19 AM. (History)            Assessment /Plan     For exam results, see Encounter Report.    Central scotoma, left eye    NAION (non-arteritic anterior ischemic optic neuropathy), left eye      I found no difference in optic disc appearance or visual field defect in Mr. Christiansen's left eye today compared to earlier studies. No further diagnostic testing is indicated. Mr. Christiansen will return to Dr. Dias for continued eye care and to me as requested.

## 2018-06-01 ENCOUNTER — HOSPITAL ENCOUNTER (OUTPATIENT)
Dept: ENDOCRINOLOGY | Facility: CLINIC | Age: 68
Discharge: HOME | End: 2018-06-01
Attending: INTERNAL MEDICINE
Payer: MEDICARE

## 2018-06-01 DIAGNOSIS — E04.2 MULTIPLE THYROID NODULES: ICD-10-CM

## 2018-06-01 PROCEDURE — 10022 US FINE NEEDLE ASPIRATION WITH IMAGING: CPT | Mod: ,,, | Performed by: INTERNAL MEDICINE

## 2018-06-01 PROCEDURE — 88173 CYTOPATH EVAL FNA REPORT: CPT | Mod: 26,,, | Performed by: PATHOLOGY

## 2018-06-01 PROCEDURE — 76942 ECHO GUIDE FOR BIOPSY: CPT | Mod: ,,, | Performed by: INTERNAL MEDICINE

## 2018-06-01 PROCEDURE — 88173 CYTOPATH EVAL FNA REPORT: CPT | Performed by: PATHOLOGY

## 2018-06-05 ENCOUNTER — PATIENT MESSAGE (OUTPATIENT)
Dept: ENDOCRINOLOGY | Facility: CLINIC | Age: 68
End: 2018-06-05

## 2018-06-05 DIAGNOSIS — E04.1 THYROID NODULE: Primary | ICD-10-CM

## 2018-06-05 NOTE — TELEPHONE ENCOUNTER
Please call Mr Christiansen,     Let him know the aspiration results demonstrate insufficient for biopsy. This occurs about 20% of the time.   Please let him know we usually recommend to repeat the biopsy in 4 - 6 weeks.     I will reorder the fine  Needle aspiration.   Thank you.   SD

## 2018-06-05 NOTE — TELEPHONE ENCOUNTER
Spoke to patient and let him know that Dr Braun would like him to know that the aspiration results demonstrate insufficient for biopsy. This occurs about 20% of the time.   Dr Braun said that they  usually recommend to repeat the biopsy in 4 - 6 weeks.      I will reorder the fine  Needle aspiration.   Thank you.

## 2018-06-06 ENCOUNTER — PATIENT MESSAGE (OUTPATIENT)
Dept: ENDOCRINOLOGY | Facility: CLINIC | Age: 68
End: 2018-06-06

## 2018-06-11 ENCOUNTER — PATIENT MESSAGE (OUTPATIENT)
Dept: UROLOGY | Facility: CLINIC | Age: 68
End: 2018-06-11

## 2018-06-12 ENCOUNTER — OFFICE VISIT (OUTPATIENT)
Dept: UROLOGY | Facility: CLINIC | Age: 68
End: 2018-06-12
Payer: MEDICARE

## 2018-06-12 ENCOUNTER — PATIENT MESSAGE (OUTPATIENT)
Dept: ENDOCRINOLOGY | Facility: CLINIC | Age: 68
End: 2018-06-12

## 2018-06-12 VITALS
DIASTOLIC BLOOD PRESSURE: 88 MMHG | BODY MASS INDEX: 25.77 KG/M2 | HEIGHT: 69 IN | SYSTOLIC BLOOD PRESSURE: 157 MMHG | HEART RATE: 69 BPM | WEIGHT: 174 LBS

## 2018-06-12 DIAGNOSIS — R97.20 ELEVATED PSA: ICD-10-CM

## 2018-06-12 DIAGNOSIS — E04.2 MULTIPLE THYROID NODULES: Primary | ICD-10-CM

## 2018-06-12 DIAGNOSIS — N41.1 CHRONIC PROSTATITIS: ICD-10-CM

## 2018-06-12 DIAGNOSIS — N20.0 KIDNEY STONE: Primary | Chronic | ICD-10-CM

## 2018-06-12 PROCEDURE — 99213 OFFICE O/P EST LOW 20 MIN: CPT | Mod: PBBFAC | Performed by: UROLOGY

## 2018-06-12 PROCEDURE — 99999 PR PBB SHADOW E&M-EST. PATIENT-LVL III: CPT | Mod: PBBFAC,,, | Performed by: UROLOGY

## 2018-06-12 PROCEDURE — 99214 OFFICE O/P EST MOD 30 MIN: CPT | Mod: S$PBB,,, | Performed by: UROLOGY

## 2018-06-12 RX ORDER — LIDOCAINE HYDROCHLORIDE 20 MG/ML
JELLY TOPICAL ONCE
Status: CANCELLED | OUTPATIENT
Start: 2018-06-12 | End: 2018-06-12

## 2018-06-12 RX ORDER — LIDOCAINE HYDROCHLORIDE 10 MG/ML
10 INJECTION INFILTRATION; PERINEURAL ONCE
Status: CANCELLED | OUTPATIENT
Start: 2018-06-12 | End: 2018-06-12

## 2018-06-12 RX ORDER — CIPROFLOXACIN 500 MG/1
500 TABLET ORAL 2 TIMES DAILY
Qty: 6 TABLET | Refills: 0 | Status: SHIPPED | OUTPATIENT
Start: 2018-06-12 | End: 2018-06-15

## 2018-06-12 NOTE — PROGRESS NOTES
Anton Christiansen is a 67 y.o. man who is here for evaluation of elevated PSA.  His recent PSA was elevated to 11.7 from 5.7 last year.  Hx of elevated PSA, kidney stones, recurrent prostatitis, and ED.  He reports that ever since he has been avoiding caffeine, soda and tea, his urinary symptoms including urgency and discomfort got much better.   hx of calcium oxalate stone.  No family hx of prostate cancer reported.    Past Medical History:   Diagnosis Date    Allergy     seasonal    Arteriosclerotic coronary artery disease 4/10/2017    Basal cell carcinoma 07/14/15    right dorsal hand    BCC (basal cell carcinoma of skin) 4/2014    nasal tip s/p Mohs with forehead flap    Cataract     Family history of colon cancer     Hyperlipidemia     Hypertension     Multiple thyroid nodules 4/24/2018    Nephrolithiasis     Prostatitis, chronic     Special screening for malignant neoplasms, colon 6/19/2014     Past Surgical History:   Procedure Laterality Date    CATARACT EXTRACTION W/  INTRAOCULAR LENS IMPLANT  12/20/12    OD    COLONOSCOPY  [id number]    Division and Inset  4/29/14    D&I Forehead Flap    EYE SURGERY      Pilonoidal Cyst      SKIN SURGERY  MOHS Repair    nose    TONSILLECTOMY       Social History   Substance Use Topics    Smoking status: Never Smoker    Smokeless tobacco: Never Used    Alcohol use No     Family History   Problem Relation Age of Onset    Cancer Mother 70        colon    Hypertension Mother     Cancer Father     No Known Problems Sister     No Known Problems Brother     No Known Problems Maternal Aunt     No Known Problems Maternal Uncle     No Known Problems Paternal Aunt     No Known Problems Paternal Uncle     No Known Problems Maternal Grandmother     No Known Problems Maternal Grandfather     No Known Problems Paternal Grandmother     No Known Problems Paternal Grandfather     Glaucoma Neg Hx     Macular degeneration Neg Hx     Amblyopia Neg Hx      Blindness Neg Hx     Cataracts Neg Hx     Diabetes Neg Hx     Retinal detachment Neg Hx     Strabismus Neg Hx     Stroke Neg Hx     Thyroid disease Neg Hx     Melanoma Neg Hx     Psoriasis Neg Hx     Lupus Neg Hx     Eczema Neg Hx     Acne Neg Hx     Thyroid cancer Neg Hx      Allergy:  Review of patient's allergies indicates:   Allergen Reactions    Cephalexin Diarrhea    Ace inhibitors Other (See Comments)     Cough    Cialis [tadalafil] Other (See Comments)     Muscle pain      Proscar [finasteride] Other (See Comments)     Decreased libido     Vicodin [hydrocodone-acetaminophen] Nausea And Vomiting     Outpatient Encounter Prescriptions as of 6/12/2018   Medication Sig Dispense Refill    ascorbic acid (VITAMIN C) 500 MG tablet Take 500 mg by mouth once daily.      atorvastatin (LIPITOR) 10 MG tablet take 1 tablet by mouth once daily 30 tablet 11    budesonide (RINOCORT AQUA) 32 mcg/actuation nasal spray 1 spray (32 mcg total) by Nasal route once daily. 8.6 g 3    carvedilol (COREG) 12.5 MG tablet take 1 tablet by mouth twice a day 60 tablet 11    cholecalciferol, vitamin D3, 3,000 unit Tab Take 1 tablet by mouth once daily.       ciprofloxacin HCl (CIPRO) 500 MG tablet Take 1 tablet (500 mg total) by mouth 2 (two) times daily. 6 tablet 0    fish oil-omega-3 fatty acids 300-1,000 mg capsule Take 2 g by mouth once daily.      saw palmetto 80 MG capsule Take 80 mg by mouth 2 (two) times daily.      sodium phosphates (FLEET ENEMA) 19-7 gram/118 mL Enem Place 1 enema rectally once. 1 enema 1    vitamin E 100 UNIT capsule Take 100 Units by mouth once daily.      [DISCONTINUED] sildenafil (VIAGRA) 100 MG tablet Take 1 tablet (100 mg total) by mouth daily as needed for Erectile Dysfunction. 4 tablet 12     No facility-administered encounter medications on file as of 6/12/2018.      Review of Systems   General ROS: GENERAL:  No weight gain or loss  SKIN:  No rashes or lacerations  HEAD:  No  headaches  EYES:  No exophthalmos, jaundice or blindness  EARS:  No dizziness, tinnitus or hearing loss  NOSE:  No changes in smell  MOUTH & THROAT:  No dyskinetic movements or obvious goiter  CHEST:  No shortness of breath, hyperventilation or cough  CARDIOVASCULAR:  No tachycardia or chest pain  ABDOMEN:  No nausea, vomiting, pain, constipation or diarrhea  URINARY:  No frequency, dysuria or sexual dysfunction  ENDOCRINE:  No polydipsia, polyuria  MUSCULOSKELETAL:  No pain or stiffness of the joints  NEUROLOGIC:  No weakness, sensory changes, seizures, confusion, memory loss, tremor or other abnormal movements  Physical Exam     Vitals:    06/12/18 1404   BP: (!) 157/88   Pulse: 69     General Appearance:  Alert, cooperative, no distress, appears stated age   Head:  Normocephalic, without obvious abnormality, atraumatic   Eyes:  PERRL, conjunctiva/corneas clear, EOM's intact, fundi benign, both eyes   Ears:  Normal TM's and external ear canals, both ears   Nose: Nares normal, septum midline, mucosa normal, no drainage or sinus tenderness   Throat: Lips, mucosa, and tongue normal; teeth and gums normal   Neck: Supple, symmetrical, trachea midline, no adenopathy, thyroid: not enlarged, symmetric, no tenderness/mass/nodules, no carotid bruit or JVD   Back:   Symmetric, no curvature, ROM normal, no CVA tenderness   Lungs:   Clear to auscultation bilaterally, respirations unlabored   Chest Wall:  No tenderness or deformity   Heart:  Regular rate and rhythm, S1, S2 normal, no murmur, rub or gallop   Abdomen:   Soft, non-tender, bowel sounds active all four quadrants,  no masses, no organomegaly of liver and spleen  No hernia noted   Genitalia:  Scrotum: no rash or lesion  Normal symmetric epididymis without masses  Normal vas palpated  Normal size, symmetric testicles with no masses   Normal urethral meatus with no discharge  Normal circumcised penis with no lesion   Rectal:  Normal perineum and anus upon  inspection.  Normal tone, no masses or tenderness;   Extremities: Extremities normal, atraumatic, no cyanosis or edema   Pulses: 2+ and symmetric   Skin: Skin color, texture, turgor normal, no rashes or lesions   Lymph nodes: Cervical, supraclavicular, and axillary nodes normal   Neurologic: Normal     Prostate 40 grams smooth with no nodule or tenderness.    LABS:  Lab Results   Component Value Date    PSA 6.0 (H) 05/20/2014    PSA 5.81 (H) 06/04/2013    PSA 10.78 (H) 05/28/2013    PSADIAG 11.7 (H) 04/10/2018    PSADIAG 11.7 (H) 04/10/2018    PSADIAG 5.8 (H) 04/10/2017     Results for orders placed or performed in visit on 04/10/18   Prostate Specific Antigen, Diagnostic   Result Value Ref Range    PSA DIAGNOSTIC 11.7 (H) 0.00 - 4.00 ng/mL   Prostate Specific Antigen, Diagnostic   Result Value Ref Range    PSA DIAGNOSTIC 11.7 (H) 0.00 - 4.00 ng/mL   Results for orders placed or performed in visit on 04/10/17   Prostate Specific Antigen, Diagnostic   Result Value Ref Range    PSA DIAGNOSTIC 5.8 (H) 0.00 - 4.00 ng/mL     Lab Results   Component Value Date    CREATININE 0.9 04/10/2018    CREATININE 1.0 04/10/2017    CREATININE 1.0 10/04/2016     No results found for this or any previous visit.  Urine Culture, Routine   Date Value Ref Range Status   09/20/2015 No growth  Final     Radiology:  [facility]   No stone seen    Assessment and Plan:  Anton TUCKER was seen today for annual exam.    Diagnoses and all orders for this visit:    Kidney stone    Elevated PSA  -     Prostate Specific Antigen, Diagnostic; Future  -     ciprofloxacin HCl (CIPRO) 500 MG tablet; Take 1 tablet (500 mg total) by mouth 2 (two) times daily.  -     sodium phosphates (FLEET ENEMA) 19-7 gram/118 mL Enem; Place 1 enema rectally once.  -     Transrectal Ultrasound w/ Biopsy; Future  -     Tissue Specimen To Pathology, Urology; Standing    Chronic prostatitis    Other orders  -     lidocaine HCL 2% jelly; Apply topically once.  -     lidocaine HCL 10 mg/ml  (1%) injection 10 mL; 10 mLs by Infiltration route once.    his PSA is much elevated from his baseline.  Per his request, will repeat his PSA today.  Will schedule him for TRUS bx of prostate.  The patient will be scheduled for a prostate biopsy.  The risks and benefits of the procedure were discussed with the patient in detail.  The risks include but are not limited to bleeding, infection, pain, bloody ejaculation, and need for further procedures.  The patient was told to stop all blood thinners at least one week prior to the procedure.  The patient will do a fleets enema the AM of the biopsy and take antibiotics beginning the PM prior to the procedure.      I spent 25 minutes with the patient of which more than half was spent in direct consultation with the patient in regards to our treatment and plan.    Follow-up:  Follow-up for TRUS bx of prostate.

## 2018-06-13 ENCOUNTER — PATIENT MESSAGE (OUTPATIENT)
Dept: ENDOCRINOLOGY | Facility: CLINIC | Age: 68
End: 2018-06-13

## 2018-06-26 ENCOUNTER — HOSPITAL ENCOUNTER (OUTPATIENT)
Dept: UROLOGY | Facility: HOSPITAL | Age: 68
Discharge: HOME | End: 2018-06-26
Attending: UROLOGY
Payer: MEDICARE

## 2018-06-26 VITALS
BODY MASS INDEX: 25.01 KG/M2 | RESPIRATION RATE: 18 BRPM | HEIGHT: 69 IN | HEART RATE: 64 BPM | WEIGHT: 168.88 LBS | TEMPERATURE: 98 F | DIASTOLIC BLOOD PRESSURE: 100 MMHG | SYSTOLIC BLOOD PRESSURE: 187 MMHG

## 2018-06-26 DIAGNOSIS — N13.8 BPH WITH URINARY OBSTRUCTION: Primary | ICD-10-CM

## 2018-06-26 DIAGNOSIS — N40.1 BPH WITH URINARY OBSTRUCTION: Primary | ICD-10-CM

## 2018-06-26 DIAGNOSIS — R97.20 ELEVATED PSA: ICD-10-CM

## 2018-06-26 PROCEDURE — 55700 PR BIOPSY OF PROSTATE,NEEDLE/PUNCH: CPT | Mod: ,,, | Performed by: UROLOGY

## 2018-06-26 PROCEDURE — 88305 TISSUE EXAM BY PATHOLOGIST: CPT | Mod: 26,,, | Performed by: PATHOLOGY

## 2018-06-26 PROCEDURE — 55700 HC BIOPSY OF PROSTATE - NEEDLE OR PUNCH: CPT

## 2018-06-26 PROCEDURE — 76872 US TRANSRECTAL: CPT | Mod: 26,,, | Performed by: UROLOGY

## 2018-06-26 PROCEDURE — 88341 IMHCHEM/IMCYTCHM EA ADD ANTB: CPT | Mod: 26,,, | Performed by: PATHOLOGY

## 2018-06-26 PROCEDURE — 88342 IMHCHEM/IMCYTCHM 1ST ANTB: CPT | Mod: 26,,, | Performed by: PATHOLOGY

## 2018-06-26 PROCEDURE — 76872 US TRANSRECTAL: CPT

## 2018-06-26 PROCEDURE — 76942 ECHO GUIDE FOR BIOPSY: CPT

## 2018-06-26 PROCEDURE — 88305 TISSUE EXAM BY PATHOLOGIST: CPT | Performed by: PATHOLOGY

## 2018-06-26 PROCEDURE — 76942 ECHO GUIDE FOR BIOPSY: CPT | Mod: 26,59,, | Performed by: UROLOGY

## 2018-06-26 RX ORDER — GENTAMICIN SULFATE 40 MG/ML
160 INJECTION, SOLUTION INTRAMUSCULAR; INTRAVENOUS
Status: DISCONTINUED | OUTPATIENT
Start: 2018-06-26 | End: 2020-03-10

## 2018-06-26 RX ORDER — DUTASTERIDE 0.5 MG/1
0.5 CAPSULE, LIQUID FILLED ORAL DAILY
Qty: 30 CAPSULE | Refills: 11 | Status: SHIPPED | OUTPATIENT
Start: 2018-06-26 | End: 2018-09-19

## 2018-06-26 RX ORDER — LIDOCAINE HYDROCHLORIDE 20 MG/ML
JELLY TOPICAL
Status: COMPLETED | OUTPATIENT
Start: 2018-06-26 | End: 2018-06-26

## 2018-06-26 RX ORDER — LIDOCAINE HYDROCHLORIDE 10 MG/ML
10 INJECTION INFILTRATION; PERINEURAL ONCE
Status: COMPLETED | OUTPATIENT
Start: 2018-06-26 | End: 2018-06-26

## 2018-06-26 RX ADMIN — LIDOCAINE HYDROCHLORIDE 10 ML: 10 INJECTION INFILTRATION; PERINEURAL at 09:06

## 2018-06-26 RX ADMIN — LIDOCAINE HYDROCHLORIDE 20 ML: 20 JELLY TOPICAL at 09:06

## 2018-06-26 RX ADMIN — GENTAMICIN SULFATE 160 MG: 40 INJECTION, SOLUTION INTRAMUSCULAR; INTRAVENOUS at 09:06

## 2018-06-26 NOTE — INTERVAL H&P NOTE
The patient has been examined and the H&P has been reviewed:    Lab Results   Component Value Date    PSA 6.0 (H) 05/20/2014    PSA 5.81 (H) 06/04/2013    PSA 10.78 (H) 05/28/2013    PSADIAG 10.2 (H) 06/12/2018    PSADIAG 11.7 (H) 04/10/2018    PSADIAG 11.7 (H) 04/10/2018       I concur with the findings and no changes have occurred since H&P was written.        There are no hospital problems to display for this patient.

## 2018-06-26 NOTE — H&P (VIEW-ONLY)
Anton Christiansen is a 67 y.o. man who is here for evaluation of elevated PSA.  His recent PSA was elevated to 11.7 from 5.7 last year.  Hx of elevated PSA, kidney stones, recurrent prostatitis, and ED.  He reports that ever since he has been avoiding caffeine, soda and tea, his urinary symptoms including urgency and discomfort got much better.   hx of calcium oxalate stone.  No family hx of prostate cancer reported.    Past Medical History:   Diagnosis Date    Allergy     seasonal    Arteriosclerotic coronary artery disease 4/10/2017    Basal cell carcinoma 07/14/15    right dorsal hand    BCC (basal cell carcinoma of skin) 4/2014    nasal tip s/p Mohs with forehead flap    Cataract     Family history of colon cancer     Hyperlipidemia     Hypertension     Multiple thyroid nodules 4/24/2018    Nephrolithiasis     Prostatitis, chronic     Special screening for malignant neoplasms, colon 6/19/2014     Past Surgical History:   Procedure Laterality Date    CATARACT EXTRACTION W/  INTRAOCULAR LENS IMPLANT  12/20/12    OD    COLONOSCOPY  [id number]    Division and Inset  4/29/14    D&I Forehead Flap    EYE SURGERY      Pilonoidal Cyst      SKIN SURGERY  MOHS Repair    nose    TONSILLECTOMY       Social History   Substance Use Topics    Smoking status: Never Smoker    Smokeless tobacco: Never Used    Alcohol use No     Family History   Problem Relation Age of Onset    Cancer Mother 70        colon    Hypertension Mother     Cancer Father     No Known Problems Sister     No Known Problems Brother     No Known Problems Maternal Aunt     No Known Problems Maternal Uncle     No Known Problems Paternal Aunt     No Known Problems Paternal Uncle     No Known Problems Maternal Grandmother     No Known Problems Maternal Grandfather     No Known Problems Paternal Grandmother     No Known Problems Paternal Grandfather     Glaucoma Neg Hx     Macular degeneration Neg Hx     Amblyopia Neg Hx      Blindness Neg Hx     Cataracts Neg Hx     Diabetes Neg Hx     Retinal detachment Neg Hx     Strabismus Neg Hx     Stroke Neg Hx     Thyroid disease Neg Hx     Melanoma Neg Hx     Psoriasis Neg Hx     Lupus Neg Hx     Eczema Neg Hx     Acne Neg Hx     Thyroid cancer Neg Hx      Allergy:  Review of patient's allergies indicates:   Allergen Reactions    Cephalexin Diarrhea    Ace inhibitors Other (See Comments)     Cough    Cialis [tadalafil] Other (See Comments)     Muscle pain      Proscar [finasteride] Other (See Comments)     Decreased libido     Vicodin [hydrocodone-acetaminophen] Nausea And Vomiting     Outpatient Encounter Prescriptions as of 6/12/2018   Medication Sig Dispense Refill    ascorbic acid (VITAMIN C) 500 MG tablet Take 500 mg by mouth once daily.      atorvastatin (LIPITOR) 10 MG tablet take 1 tablet by mouth once daily 30 tablet 11    budesonide (RINOCORT AQUA) 32 mcg/actuation nasal spray 1 spray (32 mcg total) by Nasal route once daily. 8.6 g 3    carvedilol (COREG) 12.5 MG tablet take 1 tablet by mouth twice a day 60 tablet 11    cholecalciferol, vitamin D3, 3,000 unit Tab Take 1 tablet by mouth once daily.       ciprofloxacin HCl (CIPRO) 500 MG tablet Take 1 tablet (500 mg total) by mouth 2 (two) times daily. 6 tablet 0    fish oil-omega-3 fatty acids 300-1,000 mg capsule Take 2 g by mouth once daily.      saw palmetto 80 MG capsule Take 80 mg by mouth 2 (two) times daily.      sodium phosphates (FLEET ENEMA) 19-7 gram/118 mL Enem Place 1 enema rectally once. 1 enema 1    vitamin E 100 UNIT capsule Take 100 Units by mouth once daily.      [DISCONTINUED] sildenafil (VIAGRA) 100 MG tablet Take 1 tablet (100 mg total) by mouth daily as needed for Erectile Dysfunction. 4 tablet 12     No facility-administered encounter medications on file as of 6/12/2018.      Review of Systems   General ROS: GENERAL:  No weight gain or loss  SKIN:  No rashes or lacerations  HEAD:  No  headaches  EYES:  No exophthalmos, jaundice or blindness  EARS:  No dizziness, tinnitus or hearing loss  NOSE:  No changes in smell  MOUTH & THROAT:  No dyskinetic movements or obvious goiter  CHEST:  No shortness of breath, hyperventilation or cough  CARDIOVASCULAR:  No tachycardia or chest pain  ABDOMEN:  No nausea, vomiting, pain, constipation or diarrhea  URINARY:  No frequency, dysuria or sexual dysfunction  ENDOCRINE:  No polydipsia, polyuria  MUSCULOSKELETAL:  No pain or stiffness of the joints  NEUROLOGIC:  No weakness, sensory changes, seizures, confusion, memory loss, tremor or other abnormal movements  Physical Exam     Vitals:    06/12/18 1404   BP: (!) 157/88   Pulse: 69     General Appearance:  Alert, cooperative, no distress, appears stated age   Head:  Normocephalic, without obvious abnormality, atraumatic   Eyes:  PERRL, conjunctiva/corneas clear, EOM's intact, fundi benign, both eyes   Ears:  Normal TM's and external ear canals, both ears   Nose: Nares normal, septum midline, mucosa normal, no drainage or sinus tenderness   Throat: Lips, mucosa, and tongue normal; teeth and gums normal   Neck: Supple, symmetrical, trachea midline, no adenopathy, thyroid: not enlarged, symmetric, no tenderness/mass/nodules, no carotid bruit or JVD   Back:   Symmetric, no curvature, ROM normal, no CVA tenderness   Lungs:   Clear to auscultation bilaterally, respirations unlabored   Chest Wall:  No tenderness or deformity   Heart:  Regular rate and rhythm, S1, S2 normal, no murmur, rub or gallop   Abdomen:   Soft, non-tender, bowel sounds active all four quadrants,  no masses, no organomegaly of liver and spleen  No hernia noted   Genitalia:  Scrotum: no rash or lesion  Normal symmetric epididymis without masses  Normal vas palpated  Normal size, symmetric testicles with no masses   Normal urethral meatus with no discharge  Normal circumcised penis with no lesion   Rectal:  Normal perineum and anus upon  inspection.  Normal tone, no masses or tenderness;   Extremities: Extremities normal, atraumatic, no cyanosis or edema   Pulses: 2+ and symmetric   Skin: Skin color, texture, turgor normal, no rashes or lesions   Lymph nodes: Cervical, supraclavicular, and axillary nodes normal   Neurologic: Normal     Prostate 40 grams smooth with no nodule or tenderness.    LABS:  Lab Results   Component Value Date    PSA 6.0 (H) 05/20/2014    PSA 5.81 (H) 06/04/2013    PSA 10.78 (H) 05/28/2013    PSADIAG 11.7 (H) 04/10/2018    PSADIAG 11.7 (H) 04/10/2018    PSADIAG 5.8 (H) 04/10/2017     Results for orders placed or performed in visit on 04/10/18   Prostate Specific Antigen, Diagnostic   Result Value Ref Range    PSA DIAGNOSTIC 11.7 (H) 0.00 - 4.00 ng/mL   Prostate Specific Antigen, Diagnostic   Result Value Ref Range    PSA DIAGNOSTIC 11.7 (H) 0.00 - 4.00 ng/mL   Results for orders placed or performed in visit on 04/10/17   Prostate Specific Antigen, Diagnostic   Result Value Ref Range    PSA DIAGNOSTIC 5.8 (H) 0.00 - 4.00 ng/mL     Lab Results   Component Value Date    CREATININE 0.9 04/10/2018    CREATININE 1.0 04/10/2017    CREATININE 1.0 10/04/2016     No results found for this or any previous visit.  Urine Culture, Routine   Date Value Ref Range Status   09/20/2015 No growth  Final     Radiology:  [facility]   No stone seen    Assessment and Plan:  Anton TUCKER was seen today for annual exam.    Diagnoses and all orders for this visit:    Kidney stone    Elevated PSA  -     Prostate Specific Antigen, Diagnostic; Future  -     ciprofloxacin HCl (CIPRO) 500 MG tablet; Take 1 tablet (500 mg total) by mouth 2 (two) times daily.  -     sodium phosphates (FLEET ENEMA) 19-7 gram/118 mL Enem; Place 1 enema rectally once.  -     Transrectal Ultrasound w/ Biopsy; Future  -     Tissue Specimen To Pathology, Urology; Standing    Chronic prostatitis    Other orders  -     lidocaine HCL 2% jelly; Apply topically once.  -     lidocaine HCL 10 mg/ml  (1%) injection 10 mL; 10 mLs by Infiltration route once.    his PSA is much elevated from his baseline.  Per his request, will repeat his PSA today.  Will schedule him for TRUS bx of prostate.  The patient will be scheduled for a prostate biopsy.  The risks and benefits of the procedure were discussed with the patient in detail.  The risks include but are not limited to bleeding, infection, pain, bloody ejaculation, and need for further procedures.  The patient was told to stop all blood thinners at least one week prior to the procedure.  The patient will do a fleets enema the AM of the biopsy and take antibiotics beginning the PM prior to the procedure.      I spent 25 minutes with the patient of which more than half was spent in direct consultation with the patient in regards to our treatment and plan.    Follow-up:  Follow-up for TRUS bx of prostate.

## 2018-06-26 NOTE — PROCEDURES
Procedure Date:  06/26/2018    Procedure:                                                                        Transrectal Ultrasound of the Prostate                                       Transrectal Ultrasound Guided Prostate Biopsy                                - With Pudendal Nerve Block                                                                                      Pre-OP Diagnosis:                                                                 Elevated PSA                                              Post-OP Diagnosis:                                                                Awaiting final pathology                                                Anesthesia:                                                                       Anesthesia Administered:                                                     Intrarectal instillation of 10 mL 2% lidocaine (Xylocaine) jelly.       Findings:                                                                         --- Transrectal Ultrasound of the Prostate ---                               Prostate measurements.                                                                                               PSA:   Lab Results   Component Value Date    PSA 6.0 (H) 05/20/2014    PSADIAG 10.2 (H) 06/12/2018            - Volume:100 gm.           PSA Density: 0.10                                                         no calcification in the prostate                          Description of Procedure:                                                         Informed Consent:                                                            - Risks, benefits and alternatives of procedure discussed with               patient and informed consent obtained.                                       Patient Position:                                                            - Left lateral.                                                              --- Transrectal Ultrasound of  the Prostate ---                               Instruments:                                                                 - [place].                                                           --- Transrectal U/S Biopsy ---                                               Instruments:                                                                 - Spring-loaded gun used for biopsy.                                         Procedure Details:                                                           Biopsy Core Details:                                                         - Twelve core(s) in total.                                                   - Cores Taken From: Right apex x 2, right mid prostate x 2, right            base x 2, left apex x 2, left mid prostate x 2 and left base x 2.            Estimated Blood Loss:                                                        - Minimal.                                                                   Pathology Specimen:                                                          - The specimen sent.                                                    Complications:                                                                    No immediate complications.                                             Post-OP Plan:                                                                                            Patient was discharged home in a stable condition.         Medications: finish off cipro given.         Will call him with the bx result.          If fever or unable to void, RTC or emergency room immediately.                                           RTC 6 months with PSA.        Start Avodart daily.

## 2018-06-26 NOTE — PATIENT INSTRUCTIONS

## 2018-06-29 ENCOUNTER — TELEPHONE (OUTPATIENT)
Dept: UROLOGY | Facility: CLINIC | Age: 68
End: 2018-06-29

## 2018-06-29 DIAGNOSIS — R97.20 ELEVATED PSA: Primary | ICD-10-CM

## 2018-06-29 NOTE — TELEPHONE ENCOUNTER
TRUS bx of prostate on 6/26/18 showed:    PSA 6.0 (H) 05/20/2014      PSADIAG 10.2 (H) 06/12/2018             - Volume:100 gm.           PSA Density: 0.10                                                         no calcification in the prostate      SPECIMEN  1) Prostate biopsy, left apex.  2) Prostate biopsy, left middle.  3) Prostate biopsy, left base.  4) Prostate biopsy, right apex.  5) Prostate biopsy, right middle.  6) Prostate biopsy, right base.    Supplemental Diagnosis  1. PROSTATE CORE BIOPSY SHOWING A SINGLE FOCUS SUSPICIOUS FOR ADENOCARCINOMA.  IMMUNOSTAINS FOR P63, HIGH MOLECULAR WEIGHT KERATIN AND AMACAR ARE NONCONTRIBUTORY  TO THE ABSENCE OF THIS SMALL FOCUS IN DEEPER LEVELS. CONTROLS STAIN APPROPRIATELY. DR. WALLS HAS REVIEWED THIS SPECIMEN AND CONCURS WITH THE DIAGNOSIS.    FINAL PATHOLOGIC DIAGNOSIS  1. DIAGNOSIS PENDING IMMUNOSTAINS  2-6. PROSTATE CORE BIOPSIES SHOWING BENIGN PROSTATIC TISSUE    Elevated PSA  -     MRI Prostate W W/O Contrast; Future; Expected date: 06/29/2018  -     Prostate Specific Antigen, Diagnostic; Future; Expected date: 06/29/2018      The result given to pt.  I will follow him with PSA and MRI of prostate in 3 months for consideration of UroNav fusion bx of prostate

## 2018-07-25 ENCOUNTER — OFFICE VISIT (OUTPATIENT)
Dept: DERMATOLOGY | Facility: CLINIC | Age: 68
End: 2018-07-25
Payer: MEDICARE

## 2018-07-25 DIAGNOSIS — Z12.83 SKIN CANCER SCREENING: ICD-10-CM

## 2018-07-25 DIAGNOSIS — L82.1 SK (SEBORRHEIC KERATOSIS): ICD-10-CM

## 2018-07-25 DIAGNOSIS — D18.00 ANGIOMA: ICD-10-CM

## 2018-07-25 DIAGNOSIS — D22.9 MULTIPLE BENIGN NEVI: ICD-10-CM

## 2018-07-25 DIAGNOSIS — L57.0 AK (ACTINIC KERATOSIS): Primary | ICD-10-CM

## 2018-07-25 PROCEDURE — 99212 OFFICE O/P EST SF 10 MIN: CPT | Mod: PBBFAC | Performed by: DERMATOLOGY

## 2018-07-25 PROCEDURE — 99999 PR PBB SHADOW E&M-EST. PATIENT-LVL II: CPT | Mod: PBBFAC,,, | Performed by: DERMATOLOGY

## 2018-07-25 PROCEDURE — 17003 DESTRUCT PREMALG LES 2-14: CPT | Mod: PBBFAC | Performed by: DERMATOLOGY

## 2018-07-25 PROCEDURE — 99213 OFFICE O/P EST LOW 20 MIN: CPT | Mod: 25,S$PBB,, | Performed by: DERMATOLOGY

## 2018-07-25 PROCEDURE — 17003 DESTRUCT PREMALG LES 2-14: CPT | Mod: S$PBB,,, | Performed by: DERMATOLOGY

## 2018-07-25 PROCEDURE — 17000 DESTRUCT PREMALG LESION: CPT | Mod: S$PBB,,, | Performed by: DERMATOLOGY

## 2018-07-25 PROCEDURE — 17000 DESTRUCT PREMALG LESION: CPT | Mod: PBBFAC | Performed by: DERMATOLOGY

## 2018-07-25 NOTE — PROGRESS NOTES
Subjective:       Patient ID:  Anton Christiansen is a 67 y.o. male who presents for   Chief Complaint   Patient presents with    Skin Check     ubse     HPI  Pt had a BCC excised from R dorsal hand in 8/15 by Dr. Haynes.  Also had a BCC of nasal tip s/p Mohs excision in 4/2014 with subsequent paramedian forehead flap repair.    Interested in upper body skin check today.  Denies any new, changing, or symptomatic lesions on the skin.    Review of Systems   Constitutional: Negative for fever, chills, fatigue and malaise.   Skin: Positive for activity-related sunscreen use. Negative for daily sunscreen use and tendency to form keloidal scars.   Hematologic/Lymphatic: Does not bruise/bleed easily.        Objective:    Physical Exam   Constitutional: He appears well-developed and well-nourished. No distress.   Neurological: He is alert and oriented to person, place, and time. He is not disoriented.   Psychiatric: He has a normal mood and affect.   Skin:   Areas Examined (abnormalities noted in diagram):   Scalp / Hair Palpated and Inspected  Head / Face Inspection Performed  Neck Inspection Performed  Chest / Axilla Inspection Performed  Abdomen Inspection Performed  Back Inspection Performed  RUE Inspected  LUE Inspection Performed  Nails and Digits Inspection Performed                   Diagram Legend     Erythematous scaling macule/papule c/w actinic keratosis       Vascular papule c/w angioma      Pigmented verrucoid papule/plaque c/w seborrheic keratosis      Yellow umbilicated papule c/w sebaceous hyperplasia      Irregularly shaped tan macule c/w lentigo     1-2 mm smooth white papules consistent with Milia      Movable subcutaneous cyst with punctum c/w epidermal inclusion cyst      Subcutaneous movable cyst c/w pilar cyst      Firm pink to brown papule c/w dermatofibroma      Pedunculated fleshy papule(s) c/w skin tag(s)      Evenly pigmented macule c/w junctional nevus     Mildly variegated pigmented,  slightly irregular-bordered macule c/w mildly atypical nevus      Flesh colored to evenly pigmented papule c/w intradermal nevus       Pink pearly papule/plaque c/w basal cell carcinoma      Erythematous hyperkeratotic cursted plaque c/w SCC      Surgical scar with no sign of skin cancer recurrence      Open and closed comedones      Inflammatory papules and pustules      Verrucoid papule consistent consistent with wart     Erythematous eczematous patches and plaques     Dystrophic onycholytic nail with subungual debris c/w onychomycosis     Umbilicated papule    Erythematous-base heme-crusted tan verrucoid plaque consistent with inflamed seborrheic keratosis     Erythematous Silvery Scaling Plaque c/w Psoriasis     See annotation      Assessment / Plan:        AK (actinic keratosis)  Cryosurgery Procedure Note    Verbal consent from the patient is obtained including, but not limited to, risk of hypopigmentation/hyperpigmentation, scar, recurrence of lesion. The patient is aware of the precancerous quality and need for treatment of these lesions. Liquid nitrogen cryosurgery is applied to the 4 actinic keratoses, as detailed in the physical exam, to produce a freeze injury. The patient is aware that blisters may form and is instructed on wound care with gentle cleansing and use of vaseline ointment to keep moist until healed. The patient is supplied a handout on cryosurgery and is instructed to call if lesions do not completely resolve.    Angioma  This is a benign vascular lesion. Reassurance given. No treatment required. Treatment of benign, asymptomatic lesions may be considered cosmetic.    SK (seborrheic keratosis)  These are benign inherited growths without a malignant potential. Reassurance given to patient. No treatment is necessary.   Treatment of benign, asymptomatic lesions may be considered cosmetic.  Warned about risk of hypo- or hyperpigmentation with treatment and risk of recurrence.    Multiple benign  nevi  Benign-appearing on exam today. Counseled pt to monitor mole(s) and return to clinic if any changes noted or symptoms (bleeding, itching, pain, etc) noted. Brochure provided.    Skin cancer screening  Upper body skin examination performed today including at least 6 points as noted in physical examination. No lesions suspicious for malignancy noted.  Patient instructed in importance of daily broad spectrum sunscreen use with spf at least 30. Sun avoidance and topical protection/protective clothing discussed.      Follow-up in about 1 year (around 7/25/2019) for skin check or sooner for any concerns.

## 2018-08-28 ENCOUNTER — PATIENT MESSAGE (OUTPATIENT)
Dept: ADMINISTRATIVE | Facility: OTHER | Age: 68
End: 2018-08-28

## 2018-08-30 ENCOUNTER — PATIENT MESSAGE (OUTPATIENT)
Dept: ADMINISTRATIVE | Facility: OTHER | Age: 68
End: 2018-08-30

## 2018-09-11 ENCOUNTER — PATIENT OUTREACH (OUTPATIENT)
Dept: OTHER | Facility: OTHER | Age: 68
End: 2018-09-11

## 2018-09-11 NOTE — PROGRESS NOTES
"Last 5 Patient Entered Readings                                      Current 30 Day Average: 129/78     Recent Readings 9/10/2018 9/10/2018 9/10/2018 9/9/2018 9/9/2018    SBP (mmHg) 132 142 143 118 129    DBP (mmHg) 75 82 86 74 80    Pulse 66 68 69 61 60          Digital Medicine: Health  Introduction    Mr. Anton Christiansen is a 67 y.o. male who is newly enrolled in the Digital Medicine Hypertension Program.     Patient prefers a 30 days supply      HYPERTENSION:  Explained that we expect patient to obtain several blood pressures per week at random times of day.   Our goal is to get  BP to consistently below 130/80mmHg and make the process convenient so patient can avoid extra trips to the office. Getting your blood pressure below 130/80mmHg (definition of control) will reduce your risk for heart attack, kidney failure, stroke and death (as well as kidney failure, eye disease, & dementia).     Patient is meeting the goal already. Current BP average 129/78 mmHg.  When asked what the patient thinks is causing BP to be elevated, he states: "white coat syndrome."  Instructed patient not to allow anyone else to use phone and BP cuff.       Discussed appropriate BP measuring technique:  Before taking your blood pressure, find a quiet place. You will need to listen for your heartbeat.  Roll up the sleeve on your left arm or remove any tight-sleeved clothing, if needed. (It's best to take your blood pressure from your left arm if you are right-handed.You can use the other arm if you have been told by your health care provider to do so.)  Rest in a chair next to a table for 5 to 10 minutes. (Your left arm should rest comfortably at heart level.)  Sit up straight with your back against the chair, legs uncrossed and on the ground.  Rest your forearm on the table with the palm of your hand facing up.  You should not talk, read the newspaper, or watch television during this process.  Take BP medication(s) 1 hour or more " "before taking BP reading(s).       Lifestyle Assessment:    Current Dietary Habits(i.e. low sodium, food labels, dining out): Pt reports that his wife cooks for him and she is aware that he must monitor his salt intake.  Pt does admit that they go out to eat "at least x1-2/wk."  Will send resources to e-mail.    Exercise: Pt reports "going to the gym x3/wk, walks and does weight machines."  Alcohol/Tobacco: Deferred.  Medication Adherence: has been compliant with the medicaiton regimen  Other goals: Deferred.    Reviewed AHA/AACE recommendations:  Limit sodium intake to <2000mg/day. Pt acknowledged.     Reviewed the importance of self-monitoring, medication adherence, and that the health  can be used as a resource for lifestyle modifications to help reduce or maintain a healthy lifestyle.  Reviewed that the Digital Medicine team is not available for emergencies and instructed the patient to call 911 or [facility] ([phone] or [phone]) if one arises.  Patient and I agreed that he will continue to monitor blood pressure and sodium intake and continue to remain adherent to medications.   I will plan to follow-up with the patient in 3 weeks.     Tera PARSONS Digital Medicine Health   [phone]  "

## 2018-09-11 NOTE — LETTER
Gisel Bird, PharmD  [address] [place] [address]     Dear Anton Christiansen,    Welcome to the [facility] Digital [facility] Program!           My name is Gisel Bird PharmD and I am your dedicated Digital Medicine clinician.  As an expert in medication management, I will help ensure that the medications you are taking continue to provide you with the intended benefits.        I am Tera Cervantes and I will be your health  for the duration of the program.  My  job is to help you identify lifestyle changes to improve your blood pressure control.  We will talk about nutrition, exercise, and other ways that you may be able to adjust your current habits to better your health. Together, we will work to improve your overall health and encourage you to meet your goals for a healthier lifestyle.    What we expect from YOU:    You will need to take blood pressure readings multiple times a week and no less than one reading per week.   It is important that you take your measurements at different times during the day, when possible.     What you should expect from your Digital Medicine Care Team:   We will provide you with education about high blood pressure, including lifestyle changes that could help you to control your blood pressure.   We will review your weekly readings and provide you with monthly blood pressure progress reports after you have been in the program for more than 30 days.   We will send monthly progress reports on your blood pressure control to your physician so they can follow along with your progress as well.    You will be able to reach me by phone at [phone] or through your [facility] account by clicking my name under Care Team on the right side of the home screen.    I look forward to working with you to achieve your blood pressure goals!    Sincerely,  Gisel Bird PharmD  Your personal clinician    Please visit  [email or web address] to learn more about high blood pressure and what you can do lower your blood pressure.                                                                                           Anton Christiansen  [address] [address]

## 2018-09-17 ENCOUNTER — PATIENT MESSAGE (OUTPATIENT)
Dept: INFECTIOUS DISEASES | Facility: CLINIC | Age: 68
End: 2018-09-17

## 2018-09-17 DIAGNOSIS — R40.0 DROWSINESS: Primary | ICD-10-CM

## 2018-09-19 ENCOUNTER — OFFICE VISIT (OUTPATIENT)
Dept: SLEEP MEDICINE | Facility: CLINIC | Age: 68
End: 2018-09-19
Payer: MEDICARE

## 2018-09-19 VITALS
SYSTOLIC BLOOD PRESSURE: 159 MMHG | WEIGHT: 170.81 LBS | BODY MASS INDEX: 25.3 KG/M2 | DIASTOLIC BLOOD PRESSURE: 89 MMHG | HEIGHT: 69 IN

## 2018-09-19 DIAGNOSIS — G47.30 SLEEP APNEA, UNSPECIFIED TYPE: Primary | ICD-10-CM

## 2018-09-19 PROCEDURE — 99213 OFFICE O/P EST LOW 20 MIN: CPT | Mod: PBBFAC,PO | Performed by: PSYCHIATRY & NEUROLOGY

## 2018-09-19 PROCEDURE — 99999 PR PBB SHADOW E&M-EST. PATIENT-LVL III: CPT | Mod: PBBFAC,,, | Performed by: PSYCHIATRY & NEUROLOGY

## 2018-09-19 PROCEDURE — 99214 OFFICE O/P EST MOD 30 MIN: CPT | Mod: S$PBB,ICN,, | Performed by: PSYCHIATRY & NEUROLOGY

## 2018-09-19 NOTE — PATIENT INSTRUCTIONS
SLEEP LAB ([facility] or [facility]) will contact you to schedulethe sleep study. Their number is [phone] (ext 2). Please call them if you do not hear from them in 10 business days from now.  The [facility] is located on 7th floor of the [facility]; [facility] is located in [facility].    SLEEP CLINIC (my assistant) will call you when the sleep study results are ready - if you have not heard from us by 2 weeks from the date of the study, please call [phone] (ext 1) or you can use [facility] to contact me.    You are advised to abstain from driving should you feel sleepy or drowsy.
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
## 2018-09-19 NOTE — LETTER
September 23, 2018      Ken Norris MD  [address]  [facility] [address]           [facility]  [address] [address]  Phone: [phone]  Fax: [phone]          Patient: Anton Christiansen   MR Number: [id number]   YOB: 1950   Date of Visit: 9/19/2018       Dear Dr. Ken Norris:    Thank you for referring Anton Christiansen to me for evaluation. Attached you will find relevant portions of my assessment and plan of care.    If you have questions, please do not hesitate to call me. I look forward to following Anton Christiansen along with you.    Sincerely,    Sonam Strong MD    Enclosure  CC:  No Recipients    If you would like to receive this communication electronically, please contact [email or web address] or [phone] to request more information on [facility] access.    For providers and/or their staff who would like to refer a patient to [facility], please contact us through our one-stop-shop provider referral line, [facility], at [phone].    If you feel you have received this communication in error or would no longer like to receive these types of communications, please e-mail [email or web address]

## 2018-09-21 ENCOUNTER — TELEPHONE (OUTPATIENT)
Dept: SLEEP MEDICINE | Facility: OTHER | Age: 68
End: 2018-09-21

## 2018-09-26 ENCOUNTER — PATIENT OUTREACH (OUTPATIENT)
Dept: OTHER | Facility: OTHER | Age: 68
End: 2018-09-26
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
## 2018-09-27 ENCOUNTER — HOSPITAL ENCOUNTER (OUTPATIENT)
Dept: SLEEP MEDICINE | Facility: OTHER | Age: 68
Discharge: HOME | End: 2018-09-27
Attending: PSYCHIATRY & NEUROLOGY
Payer: MEDICARE

## 2018-09-27 DIAGNOSIS — G47.30 SLEEP APNEA, UNSPECIFIED TYPE: ICD-10-CM

## 2018-09-27 DIAGNOSIS — G47.33 OSA (OBSTRUCTIVE SLEEP APNEA): ICD-10-CM

## 2018-09-27 PROCEDURE — 95810 POLYSOM 6/> YRS 4/> PARAM: CPT | Mod: 26,,, | Performed by: PSYCHIATRY & NEUROLOGY

## 2018-09-27 PROCEDURE — 95810 POLYSOM 6/> YRS 4/> PARAM: CPT

## 2018-09-28 NOTE — PROGRESS NOTES
[facility]. The procedure was explained to the patient, and all questions were asked an answered prior to the start of the study.The patient did not meet split night criteria.    A significant number of sleep disordered breathing events were noted during the night. Snoring was noted to be mild to moderate. PLM's appeared with arouslas and position changes. All sleep stages appeared to have been noted. The patient stated he had a hard time trying to sleep with all of the wires. An end of the night instruction sheet was giving to the patient upon leaving the lab.

## 2018-10-02 ENCOUNTER — PATIENT OUTREACH (OUTPATIENT)
Dept: OTHER | Facility: OTHER | Age: 68
End: 2018-10-02

## 2018-10-02 NOTE — PROGRESS NOTES
"Last 5 Patient Entered Readings                                      Current 30 Day Average: 127/74     Recent Readings 10/1/2018 10/1/2018 10/1/2018 9/30/2018 9/30/2018    SBP (mmHg) 130 136 139 117 115    DBP (mmHg) 68 69 73 69 69    Pulse 69 69 67 59 59          Digital Medicine: Health  Follow Up    Patient reports doing a sleep study.  He states "he has been sleeping much better" and attributes his lower BP readings to him being more rested.    Lifestyle Modifications:    1.Dietary Modifications (Sodium intake <2,000mg/day, food labels, dining out):   Pt states he is still "doing what he always has done, not eating a lot of salt." I encouraged pt to keep up the good work.    2.Physical Activity:   Pt reports staying within his normal routine of going to the gym x3/wk.    3.Medication Therapy:   Patient has been compliant with the medication regimen.    4.Patient has the following medication side effects/concerns:  None.  (Frequency/Alleviating factors/Precipitating factors, etc.)     Follow up with Mr. Anton Christiansen completed. No further questions or concerns. Will continue to follow up to achieve health goals.  Patient is currently at goal, 127/74 mmHg does not exceed <130/80 mmHg.  "

## 2018-10-03 ENCOUNTER — TELEPHONE (OUTPATIENT)
Dept: SLEEP MEDICINE | Facility: CLINIC | Age: 68
End: 2018-10-03

## 2018-10-03 NOTE — TELEPHONE ENCOUNTER
Scheduled patient for follow up with Jack to discuss sleep results in more detail. Scheduled for 10/5.

## 2018-10-05 ENCOUNTER — OFFICE VISIT (OUTPATIENT)
Dept: SLEEP MEDICINE | Facility: CLINIC | Age: 68
End: 2018-10-05
Payer: MEDICARE

## 2018-10-05 VITALS
DIASTOLIC BLOOD PRESSURE: 98 MMHG | HEART RATE: 72 BPM | WEIGHT: 173.06 LBS | SYSTOLIC BLOOD PRESSURE: 150 MMHG | BODY MASS INDEX: 25.63 KG/M2 | HEIGHT: 69 IN

## 2018-10-05 DIAGNOSIS — G47.33 OSA (OBSTRUCTIVE SLEEP APNEA): Primary | ICD-10-CM

## 2018-10-05 PROCEDURE — 99213 OFFICE O/P EST LOW 20 MIN: CPT | Mod: PBBFAC | Performed by: INTERNAL MEDICINE

## 2018-10-05 PROCEDURE — 99214 OFFICE O/P EST MOD 30 MIN: CPT | Mod: S$PBB,,, | Performed by: INTERNAL MEDICINE

## 2018-10-05 PROCEDURE — 99999 PR PBB SHADOW E&M-EST. PATIENT-LVL III: CPT | Mod: PBBFAC,,, | Performed by: INTERNAL MEDICINE
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
## 2018-10-05 NOTE — PATIENT INSTRUCTIONS
Sleep Hygiene Practices    1. Try going to bed only when you are drowsy.    ?    2. If you are unable to fall asleep or stay asleep, leave your bedroom and engage in a quiet activity elsewhere. Do not permit yourself to fall asleep outside the bedroom. Return to bed when and only when you are sleepy. Repeat this process as often as necessary throughout night.    3. Maintain regular wake-up time, even on days off work & weekends    4. Use your bedroom for sleep and sex    5. Avoid napping during the daytime. If daytime sleepiness becomes overwhelming, limit nap time to a single nap of less than 1hr, no later than 3pm.    6. Distract your mind. Avoid clock watching. Lying in bed unable to sleep and frustrated needs to be avoided. Try reading or watching a videotape or listening to books on tape. It may be necessary to go into another room to do these.    7. Avoid caffeine within 4-6hrs of bedtime    8. Avoid use of nicotine close to bedtime    9. do not drink alcoholic beverages within 4-6hrs of bedtime    10. While a light snack before bedtime can help promote sound sleep, avoid large meals.    11. Obtain regular exercise, but avoid strenuous exercise within 4hrs of bedtime    12. Minimize light, noise, and extremes in temperature in the bedroom.    13. Precautions: The patient was advised to abstain from driving should they feel sleepy or drowsy.  ?    Will advise to start using nasal saline every day one - two squirt each nostril if that does not work  Use Flonase 1-2 sprays in each nostril at bedtime to help with nasal congestion. You need to use every day for at least a month to determine if it will be beneficial for you. Watch for nose bleeds. If this occurs, stop the medication and call the office.    [phone] OPTION 3 EXT 13528 . [place]      * This information is provided had been directly obtained from the American Academy of [employer] wellness booklet on sleep hygiene. ([facility]  [place], [address] [place] [address]

## 2018-10-12 ENCOUNTER — HOSPITAL ENCOUNTER (OUTPATIENT)
Dept: RADIOLOGY | Facility: HOSPITAL | Age: 68
Discharge: HOME | End: 2018-10-12
Attending: UROLOGY
Payer: MEDICARE

## 2018-10-12 DIAGNOSIS — R97.20 ELEVATED PSA: ICD-10-CM

## 2018-10-12 LAB
CREAT SERPL-MCNC: 1 MG/DL (ref 0.5–1.4)
SAMPLE: NORMAL

## 2018-10-12 PROCEDURE — A9585 GADOBUTROL INJECTION: HCPCS | Performed by: UROLOGY

## 2018-10-12 PROCEDURE — 25500020 PHARM REV CODE 255: Performed by: UROLOGY

## 2018-10-12 PROCEDURE — 72197 MRI PELVIS W/O & W/DYE: CPT | Mod: TC

## 2018-10-12 PROCEDURE — 72197 MRI PELVIS W/O & W/DYE: CPT | Mod: 26,,, | Performed by: RADIOLOGY

## 2018-10-12 RX ORDER — GADOBUTROL 604.72 MG/ML
8 INJECTION INTRAVENOUS
Status: COMPLETED | OUTPATIENT
Start: 2018-10-12 | End: 2018-10-12

## 2018-10-12 RX ADMIN — GADOBUTROL 8 ML: 604.72 INJECTION INTRAVENOUS at 08:10

## 2018-10-19 ENCOUNTER — OFFICE VISIT (OUTPATIENT)
Dept: UROLOGY | Facility: CLINIC | Age: 68
End: 2018-10-19
Payer: MEDICARE

## 2018-10-19 VITALS
HEIGHT: 69 IN | BODY MASS INDEX: 25.62 KG/M2 | SYSTOLIC BLOOD PRESSURE: 174 MMHG | WEIGHT: 173 LBS | DIASTOLIC BLOOD PRESSURE: 93 MMHG | HEART RATE: 56 BPM

## 2018-10-19 DIAGNOSIS — N52.9 ED (ERECTILE DYSFUNCTION) OF ORGANIC ORIGIN: ICD-10-CM

## 2018-10-19 DIAGNOSIS — R97.20 ELEVATED PSA: Primary | ICD-10-CM

## 2018-10-19 PROCEDURE — 99215 OFFICE O/P EST HI 40 MIN: CPT | Mod: S$PBB,,, | Performed by: UROLOGY

## 2018-10-19 PROCEDURE — 99999 PR PBB SHADOW E&M-EST. PATIENT-LVL IV: CPT | Mod: PBBFAC,,, | Performed by: UROLOGY

## 2018-10-19 PROCEDURE — 99214 OFFICE O/P EST MOD 30 MIN: CPT | Mod: PBBFAC | Performed by: UROLOGY

## 2018-10-19 RX ORDER — GENTAMICIN SULFATE 40 MG/ML
160 INJECTION, SOLUTION INTRAMUSCULAR; INTRAVENOUS ONCE
Status: COMPLETED | OUTPATIENT
Start: 2018-10-19 | End: 2018-11-20

## 2018-10-19 RX ORDER — CIPROFLOXACIN 500 MG/1
500 TABLET ORAL 2 TIMES DAILY
Qty: 6 TABLET | Refills: 0 | Status: SHIPPED | OUTPATIENT
Start: 2018-10-19 | End: 2018-10-22

## 2018-10-19 RX ORDER — LIDOCAINE HYDROCHLORIDE 10 MG/ML
10 INJECTION INFILTRATION; PERINEURAL ONCE
Status: CANCELLED | OUTPATIENT
Start: 2018-10-19 | End: 2018-10-19

## 2018-10-19 RX ORDER — DIAZEPAM 5 MG/1
5 TABLET ORAL
Qty: 3 TABLET | Refills: 0 | Status: SHIPPED | OUTPATIENT
Start: 2018-10-19 | End: 2020-09-23

## 2018-10-19 RX ORDER — LIDOCAINE HYDROCHLORIDE 20 MG/ML
JELLY TOPICAL ONCE
Status: CANCELLED | OUTPATIENT
Start: 2018-10-19 | End: 2018-10-19

## 2018-10-19 NOTE — PATIENT INSTRUCTIONS
The patient will be scheduled for a prostate biopsy.    The risks and benefits of the procedure were discussed with the patient in detail.  The risks include but are not limited to bleeding, infection, pain, bloody ejaculation, and need for further procedures.    The patient was told to stop all blood thinners at least one week prior to the procedure.    The patient will do a fleets enema the AM of the biopsy and take antibiotics beginning the PM prior to the procedure.

## 2018-10-19 NOTE — LETTER
October 19, 2018      Ken Norris MD  [address]  [facility] [address]           [facility] - Urology 4th Floor  [address]  [facility] [address]  Phone: [phone]          Patient: Anton Christiansen   MR Number: [id number]   YOB: 1950   Date of Visit: 10/19/2018       Dear Dr. Ken Norris:    Thank you for referring Anton Christiansen to me for evaluation. Attached you will find relevant portions of my assessment and plan of care.    If you have questions, please do not hesitate to call me. I look forward to following Anton Christiansen along with you.    Sincerely,    Thomas Quinteros MD    Enclosure  CC:  No Recipients    If you would like to receive this communication electronically, please contact [email or web address] or [phone] to request more information on [employer] Link access.    For providers and/or their staff who would like to refer a patient to [facility], please contact us through our one-stop-shop provider referral line, [facility], at [phone].    If you feel you have received this communication in error or would no longer like to receive these types of communications, please e-mail [email or web address]

## 2018-10-19 NOTE — PROGRESS NOTES
CC:  MRI of prostate, elevated PSA.    Anton Christiansen is a 68 y.o. man who is here for evaluation of elevated PSA.  His recent PSA was elevated to 11.7 from 5.7 last year.  Hx of elevated PSA, kidney stones, recurrent prostatitis, and ED.  He reports that ever since he has been avoiding caffeine, soda and tea, his urinary symptoms including urgency and discomfort got much better.   hx of calcium oxalate stone.  No family hx of prostate cancer reported.    Findings:        6/26/18                                                                 --- Transrectal Ultrasound of the Prostate ---                               Prostate measurements.                                                                                               PSA:   Lab Results   Component Value Date    PSA 6.0 (H) 05/20/2014    PSADIAG 10.2 (H) 06/12/2018            - Volume:100 gm.           PSA Density: 0.10                                                         no calcification in the prostate        Pathology  SPECIMEN  1) Prostate biopsy, left apex.  2) Prostate biopsy, left middle.  3) Prostate biopsy, left base.  4) Prostate biopsy, right apex.  5) Prostate biopsy, right middle.  6) Prostate biopsy, right base.  Supplemental Diagnosis  1. PROSTATE CORE BIOPSY SHOWING A SINGLE FOCUS SUSPICIOUS FOR ADENOCARCINOMA.  IMMUNOSTAINS FOR P63, HIGH MOLECULAR WEIGHT KERATIN AND AMACAR ARE NONCONTRIBUTORY  TO THE ABSENCE OF THIS SMALL FOCUS IN DEEPER LEVELS. CONTROLS STAIN APPROPRIATELY.     Following his prostate bx, I started him on Avodart for his LUTS.  He reports that his voiding has improved.  He is here with PSA and MRI of prostate.    Past Medical History:   Diagnosis Date    Allergy     seasonal    Arteriosclerotic coronary artery disease 4/10/2017    Basal cell carcinoma 07/14/15    right dorsal hand    BCC (basal cell carcinoma of skin) 4/2014    nasal tip s/p Mohs with forehead flap    Cataract     Family history of colon  cancer     Hyperlipidemia     Hypertension     Multiple thyroid nodules 4/24/2018    Nephrolithiasis     Prostatitis, chronic     Special screening for malignant neoplasms, colon 6/19/2014     Past Surgical History:   Procedure Laterality Date    CATARACT EXTRACTION W/  INTRAOCULAR LENS IMPLANT  12/20/12    OD    CLOSURE, MOHS PROCEDURE DEFECT Left 4/8/2014    Performed by German Velásquez III, MD at [facility] OR 2ND FLR    COLONOSCOPY  [id number]    colonoscopy N/A 6/19/2014    Performed by Stefan Weiner MD at [facility] ENDO (4TH FLR)    CYSTOSCOPY WITH STENT PLACEMENT Right 9/20/2015    Performed by Sofiya Marie MD at [facility] OR 1ST FLR    Division and Inset  4/29/14    D&I Forehead Flap    EYE SURGERY      INSERTION, IOL PROSTHESIS Left 1/8/2013    Performed by Miley San MD at [facility] OR 1ST FLR    INSERTION, IOL PROSTHESIS Right 12/20/2012    Performed by Miley San MD at [facility] OR 1ST FLR    PHACOEMULSIFICATION, CATARACT Left 1/8/2013    Performed by Miley San MD at [facility] OR 1ST FLR    PHACOEMULSIFICATION, CATARACT Right 12/20/2012    Performed by Miley San MD at [facility] OR 1ST FLR    Pilonoidal Cyst      PYELOGRAM-RETROGRADE Right 9/20/2015    Performed by Sofiya Marie MD at [facility] OR 1ST FLR    REVISION, PROCEDURE INVOLVING FLAP GRAFT Bilateral 4/29/2014    Performed by German Velásquez III, MD at [facility] OR 2ND FLR    SKIN SURGERY  MOHS Repair    nose    TONSILLECTOMY       Social History     Tobacco Use    Smoking status: Never Smoker    Smokeless tobacco: Never Used   Substance Use Topics    Alcohol use: No    Drug use: No     Family History   Problem Relation Age of Onset    Cancer Mother 70        colon    Hypertension Mother     Cancer Father     No Known Problems Sister     No Known Problems Brother     No Known Problems Maternal Aunt     No Known Problems Maternal Uncle     No Known Problems Paternal Aunt     No Known Problems Paternal Uncle     No  Known Problems Maternal Grandmother     No Known Problems Maternal Grandfather     No Known Problems Paternal Grandmother     No Known Problems Paternal Grandfather     Glaucoma Neg Hx     Macular degeneration Neg Hx     Amblyopia Neg Hx     Blindness Neg Hx     Cataracts Neg Hx     Diabetes Neg Hx     Retinal detachment Neg Hx     Strabismus Neg Hx     Stroke Neg Hx     Thyroid disease Neg Hx     Melanoma Neg Hx     Psoriasis Neg Hx     Lupus Neg Hx     Eczema Neg Hx     Acne Neg Hx     Thyroid cancer Neg Hx      Allergy:  Review of patient's allergies indicates:   Allergen Reactions    Cephalexin Diarrhea    Ace inhibitors Other (See Comments)     Cough    Cialis [tadalafil] Other (See Comments)     Muscle pain      Proscar [finasteride] Other (See Comments)     Decreased libido     Vicodin [hydrocodone-acetaminophen] Nausea And Vomiting     Outpatient Encounter Medications as of 10/19/2018   Medication Sig Dispense Refill    ascorbic acid (VITAMIN C) 500 MG tablet Take 500 mg by mouth once daily.      atorvastatin (LIPITOR) 10 MG tablet take 1 tablet by mouth once daily 30 tablet 11    carvedilol (COREG) 12.5 MG tablet take 1 tablet by mouth twice a day 60 tablet 11    cholecalciferol, vitamin D3, 3,000 unit Tab Take 1 tablet by mouth once daily.       dutasteride (AVODART) 0.5 mg capsule Take 1 capsule (0.5 mg total) by mouth once daily. 30 capsule 11    fish oil-omega-3 fatty acids 300-1,000 mg capsule Take 2 g by mouth once daily.      saw palmetto 80 MG capsule Take 80 mg by mouth 2 (two) times daily.      vitamin E 100 UNIT capsule Take 100 Units by mouth once daily.       Facility-Administered Encounter Medications as of 10/19/2018   Medication Dose Route Frequency Provider Last Rate Last Dose    gentamicin injection 160 mg  160 mg Intramuscular Q12H Thomas Quinteros MD   160 mg at 06/26/18 0950     Review of Systems   General ROS: GENERAL:  No weight gain or loss  SKIN:  No  rashes or lacerations  HEAD:  No headaches  EYES:  No exophthalmos, jaundice or blindness  EARS:  No dizziness, tinnitus or hearing loss  NOSE:  No changes in smell  MOUTH & THROAT:  No dyskinetic movements or obvious goiter  CHEST:  No shortness of breath, hyperventilation or cough  CARDIOVASCULAR:  No tachycardia or chest pain  ABDOMEN:  No nausea, vomiting, pain, constipation or diarrhea  URINARY:  No frequency, dysuria or sexual dysfunction  ENDOCRINE:  No polydipsia, polyuria  MUSCULOSKELETAL:  No pain or stiffness of the joints  NEUROLOGIC:  No weakness, sensory changes, seizures, confusion, memory loss, tremor or other abnormal movements  Physical Exam     Vitals:    10/19/18 0817   BP: (!) 174/93   Pulse: (!) 56     General Appearance:  Alert, cooperative, no distress, appears stated age   Head:  Normocephalic, without obvious abnormality, atraumatic   Eyes:  PERRL, conjunctiva/corneas clear, EOM's intact, fundi benign, both eyes   Ears:  Normal TM's and external ear canals, both ears   Nose: Nares normal, septum midline, mucosa normal, no drainage or sinus tenderness   Throat: Lips, mucosa, and tongue normal; teeth and gums normal   Neck: Supple, symmetrical, trachea midline, no adenopathy, thyroid: not enlarged, symmetric, no tenderness/mass/nodules, no carotid bruit or JVD   Back:   Symmetric, no curvature, ROM normal, no CVA tenderness   Lungs:   Clear to auscultation bilaterally, respirations unlabored   Chest Wall:  No tenderness or deformity   Heart:  Regular rate and rhythm, S1, S2 normal, no murmur, rub or gallop   Abdomen:   Soft, non-tender, bowel sounds active all four quadrants,  no masses, no organomegaly of liver and spleen  No hernia noted   Genitalia:  Scrotum: no rash or lesion  Normal symmetric epididymis without masses  Normal vas palpated  Normal size, symmetric testicles with no masses   Normal urethral meatus with no discharge  Normal circumcised penis with no lesion   Rectal:  Normal  perineum and anus upon inspection.  Normal tone, no masses or tenderness;   Extremities: Extremities normal, atraumatic, no cyanosis or edema   Pulses: 2+ and symmetric   Skin: Skin color, texture, turgor normal, no rashes or lesions   Lymph nodes: Cervical, supraclavicular, and axillary nodes normal   Neurologic: Normal     Prostate 40 grams smooth with no nodule or tenderness.    LABS:  Lab Results   Component Value Date    PSA 6.0 (H) 05/20/2014    PSA 5.81 (H) 06/04/2013    PSA 10.78 (H) 05/28/2013    PSADIAG 6.8 (H) 10/12/2018    PSADIAG 10.2 (H) 06/12/2018    PSADIAG 11.7 (H) 04/10/2018    PSADIAG 11.7 (H) 04/10/2018     Results for orders placed or performed in visit on 10/12/18   Prostate Specific Antigen, Diagnostic   Result Value Ref Range    PSA DIAGNOSTIC 6.8 (H) 0.00 - 4.00 ng/mL   Results for orders placed or performed in visit on 06/12/18   Prostate Specific Antigen, Diagnostic   Result Value Ref Range    PSA DIAGNOSTIC 10.2 (H) 0.00 - 4.00 ng/mL   Results for orders placed or performed in visit on 04/10/18   Prostate Specific Antigen, Diagnostic   Result Value Ref Range    PSA DIAGNOSTIC 11.7 (H) 0.00 - 4.00 ng/mL     Lab Results   Component Value Date    CREATININE 0.9 04/10/2018    CREATININE 1.0 04/10/2017    CREATININE 1.0 10/04/2016     No results found for this or any previous visit.  Urine Culture, Routine   Date Value Ref Range Status   09/20/2015 No growth  Final     Radiology:  MRI of prostate 10/12/18    FINDINGS:  Prostate: The prostate measures 6.1 x 5.1 x 5.2cm corresponding to a computed volume of 84.85cc.    Peripheral zone: Focal areas of highT1 and low T2 signal intensity compatible with post biopsy hemorrhage limiting assessment.    Lesion#1    Location: side:Left region:[place] zone:Posterior peripheral zone laterally.    Greatest dimension: 0.9cm    T2-WI: T2 SI:Homogeneous moderate hypointensity; score 4 or 5.    DWI/ADC: DWI:Focal moderately hypointense on ADC and moderately  hyperintense on high b-value DWI; score 4.    DCE: DCE:Negative    Extraprostatic extension: Absent    PI-RADS assessment category: 4    Location: side:Right region:[place] zone:Posterior peripheral zone laterally.    Greatest dimension: 0.7cm    T2-WI: T2 SI:Linear wedge-shaped hypointensity; score 2.    DWI/ADC: DWI:Focal moderately hypointense on ADC and moderately hyperintense on high b-value DWI; score 4.    DCE: DCE:Negative.    Extraprostatic extension: Absent    PI-RADS assessment category: 4    Transition zone:    TZ abnormalities:Benign prostatic hyperplasia without focal    abnormalities suspicious for cancer.    Neurovascular bundle: Unremarkable.    Seminal vesicles:    SV invasion:Unremarkable    Adjacent Organ Involvement: There is no focal bladder wall thickening. There is no rectal involvement.    Lymphadenopathy: none    Other Findings: Limited intrapelvic evaluation shows no significant abnormalities.    Assessment and Plan:  Anton TUCKER was seen today for results and prostate check.    Diagnoses and all orders for this visit:    Elevated PSA    ED (erectile dysfunction) of organic origin    I reviewed his MRI of prostate and its findings in detail.  Since his MRI showed PIRADS 4 to 5 lesion, will do the prostate bx to confirm the presence and type of possible cancer.  Will plan UroNav bx of prostate.    The risks and benefits of the procedure were discussed with the patient in detail.  The risks include but are not limited to bleeding, infection, pain, bloody ejaculation, and need for further procedures.    The patient was told to stop all blood thinners at least one week prior to the procedure.    The patient will do a fleets enema the AM of the biopsy and take antibiotics beginning the PM prior to the procedure.      I spent 40 minutes with the patient of which more than half was spent in direct consultation with the patient in regards to our treatment and plan.    Follow-up:  Follow-up for UroNav bx  of prostate.

## 2018-10-24 ENCOUNTER — PATIENT MESSAGE (OUTPATIENT)
Dept: SLEEP MEDICINE | Facility: CLINIC | Age: 68
End: 2018-10-24

## 2018-11-05 ENCOUNTER — PATIENT MESSAGE (OUTPATIENT)
Dept: SLEEP MEDICINE | Facility: CLINIC | Age: 68
End: 2018-11-05

## 2018-11-06 ENCOUNTER — TELEPHONE (OUTPATIENT)
Dept: SLEEP MEDICINE | Facility: CLINIC | Age: 68
End: 2018-11-06

## 2018-11-06 ENCOUNTER — PATIENT OUTREACH (OUTPATIENT)
Dept: OTHER | Facility: OTHER | Age: 68
End: 2018-11-06

## 2018-11-06 NOTE — TELEPHONE ENCOUNTER
Called [facility] and spoke with Dawood. She states they do not have any record of order from Dr. Santiago.     Reprinted PSG, office note prior to PSG, patient demopgraphics and insurance info, and order  for supplies. Confirmed fax # with Lucy and sent to  [phone].    Message sent via patient portal advising Mr. Christiansen of this.

## 2018-11-06 NOTE — TELEPHONE ENCOUNTER
----- Message from Lee Santiago MD sent at 11/6/2018 10:27 AM CST -----  Hi All,          Kindly call for [facility] [[id number]] [facility] equipment, there contact info is [phone] and fax if [phone]  Address is [address] [place] [address]. To touch base why they have not contacted the patient for his CPAP machine, [facility] had send the papers to them since they don't take care of Medicare. If they need any thing on our end kindly get it resolved ASAP.       Thanks,          Lee Santiago MD, FACP  Phone: [phone]  Fax: [phone]

## 2018-11-06 NOTE — PROGRESS NOTES
"Last 5 Patient Entered Readings                                      Current 30 Day Average: 123/71     Recent Readings 11/5/2018 11/5/2018 11/5/2018 11/4/2018 11/4/2018    SBP (mmHg) 118 122 117 120 118    DBP (mmHg) 66 66 68 64 61    Pulse 60 61 61 62 61            Digital Medicine: Health  Follow Up    11/6: Left voicemail to follow up with Mr. Anton Christiansen.  Current BP average 123/71 mmHg is at goal, <130/80 mmHg.  Pt called back      Digital Medicine: Health  Follow Up    Pt reports he has white coat syndrome and is going to have a Biopsy next week, he states "his doctor told him to take valium to help with the anxiety".     Pt reports "he been doing some deep breathing when taking his BP readings and he seems to think it has helped lower the readings." I encouraged pt to continue doing that as the deep breathing is a very helpful relaxation technique.    Pt is wondering about reducing/getting off of some medications.  I will reach out to [facility] to see about contacting pt on this matter.  I encouraged pt to keep up his medication regimen and keep up lifestyle changes.  Pt reports that he is "ok with waiting" to see if he can reduce medications.       Lifestyle Modifications:    1.Dietary Modifications (Sodium intake <2,000mg/day, food labels, dining out):   Pt reports he is trying to eat a little less sugar and starting to portion his meals.  I encouraged pt that was great idea and would only help him be healthier.  Pt denies needing any other help with nutrition at this time.    2.Physical Activity:   Pt reports continuing to keep up with his exercise routine. Pt denies needing any other help with activity at this time.    3.Medication Therapy:   Patient has been compliant with the medication regimen.    4.Patient has the following medication side effects/concerns: None  (Frequency/Alleviating factors/Precipitating factors, etc.)     Follow up with Mr. Anton Christiansen completed. No " further questions or concerns. Will continue to follow up to achieve health goals.  Patient is currently at goal, 123/71 mmHg does not exceed <130/80 mmHg.

## 2018-11-19 RX ORDER — CARVEDILOL 12.5 MG/1
TABLET ORAL
Qty: 60 TABLET | Refills: 11 | Status: SHIPPED | OUTPATIENT
Start: 2018-11-19 | End: 2019-12-01 | Stop reason: SDUPTHER

## 2018-11-20 ENCOUNTER — PROCEDURE VISIT (OUTPATIENT)
Dept: UROLOGY | Facility: CLINIC | Age: 68
End: 2018-11-20
Payer: MEDICARE

## 2018-11-20 VITALS
HEART RATE: 69 BPM | HEIGHT: 69 IN | RESPIRATION RATE: 18 BRPM | WEIGHT: 171.06 LBS | BODY MASS INDEX: 25.34 KG/M2 | TEMPERATURE: 98 F | SYSTOLIC BLOOD PRESSURE: 177 MMHG | DIASTOLIC BLOOD PRESSURE: 88 MMHG

## 2018-11-20 DIAGNOSIS — R97.20 ELEVATED PSA: ICD-10-CM

## 2018-11-20 PROCEDURE — 96372 THER/PROPH/DIAG INJ SC/IM: CPT | Mod: PBBFAC

## 2018-11-20 PROCEDURE — 88305 TISSUE EXAM BY PATHOLOGIST: CPT | Mod: 26,,, | Performed by: PATHOLOGY

## 2018-11-20 PROCEDURE — 76872 US TRANSRECTAL: CPT | Mod: PBBFAC | Performed by: UROLOGY

## 2018-11-20 PROCEDURE — 76942 ECHO GUIDE FOR BIOPSY: CPT | Mod: PBBFAC,59 | Performed by: UROLOGY

## 2018-11-20 PROCEDURE — 55700 PR BIOPSY OF PROSTATE,NEEDLE/PUNCH: CPT | Mod: PBBFAC | Performed by: UROLOGY

## 2018-11-20 PROCEDURE — 76942 ECHO GUIDE FOR BIOPSY: CPT | Mod: 26,59,S$PBB, | Performed by: UROLOGY

## 2018-11-20 PROCEDURE — 76872 US TRANSRECTAL: CPT | Mod: 26,S$PBB,, | Performed by: UROLOGY

## 2018-11-20 PROCEDURE — 88305 TISSUE EXAM BY PATHOLOGIST: CPT | Mod: 59 | Performed by: PATHOLOGY

## 2018-11-20 PROCEDURE — 55700 TRANSRECTAL ULTRASOUND W/ BIOPSY: CPT | Mod: PBBFAC | Performed by: UROLOGY

## 2018-11-20 RX ORDER — LIDOCAINE HYDROCHLORIDE 10 MG/ML
10 INJECTION INFILTRATION; PERINEURAL ONCE
Status: COMPLETED | OUTPATIENT
Start: 2018-11-20 | End: 2018-11-20

## 2018-11-20 RX ORDER — LIDOCAINE HYDROCHLORIDE 20 MG/ML
JELLY TOPICAL
Status: COMPLETED | OUTPATIENT
Start: 2018-11-20 | End: 2018-11-20

## 2018-11-20 RX ORDER — LIDOCAINE HYDROCHLORIDE 20 MG/ML
JELLY TOPICAL ONCE
Status: DISCONTINUED | OUTPATIENT
Start: 2018-11-20 | End: 2020-03-10

## 2018-11-20 RX ADMIN — GENTAMICIN SULFATE 160 MG: 40 INJECTION, SOLUTION INTRAMUSCULAR; INTRAVENOUS at 01:11

## 2018-11-20 RX ADMIN — LIDOCAINE HYDROCHLORIDE 10 ML: 10 INJECTION INFILTRATION; PERINEURAL at 01:11

## 2018-11-20 RX ADMIN — LIDOCAINE HYDROCHLORIDE: 20 JELLY TOPICAL at 12:11

## 2018-11-20 NOTE — PATIENT INSTRUCTIONS

## 2018-11-20 NOTE — PROCEDURES
Transrectal Ultrasound w/ Biopsy  Date/Time: 11/20/2018 1:23 PM  Performed by: Thomas Quinteros MD  Authorized by: Thomas Quinteros MD     Total Biopsies:  0     Procedure Date:  11/20/2018    Procedure:   UroNav MRI/US fusion biopsy of the prostate                                                                     Transrectal Ultrasound of the Prostate                                       Transrectal Ultrasound Guided Prostate Biopsy                                - With Pudendal Nerve Block                                                                                      Pre-OP Diagnosis:                                                                 Elevated PSA, prostate lesions                                              Post-OP Diagnosis:                                                                Awaiting final pathology                                                Anesthesia:                                                                       Anesthesia Administered:                                                     Intrarectal instillation of 10 mL 2% lidocaine (Xylocaine) jelly.       Findings:                                                                         --- Transrectal Ultrasound of the Prostate ---                               Prostate measurements.                                                                                               PSA: Lab Results       Component                Value               Date                       PSA                      6.0 (H)             05/20/2014                 PSADIAG                  6.8 (H)             10/12/2018                   - Volume:73 gm.           PSA Density: 0.09                                                         no calcification in the prostate    Lesion #1: 0.9 cm left apex lateral peripheral zone lesion presumably represents recently found prostatic adenocarcinoma on biopsy.    Lesion #2: 0.7 cm right apex lateral  peripheral zone lesion.  This could be related to post biopsy hemorrhage however an additional focus of malignancy cannot be excluded.                           Description of Procedure:                                                         Informed Consent:                                                            - Risks, benefits and alternatives of procedure discussed with               patient and informed consent obtained.                                       Patient Position:                                                            - Left lateral.                                                              --- Transrectal Ultrasound of the Prostate ---                               Instruments:                                                                 - [place].                                                           --- Transrectal U/S Biopsy ---                                               Instruments:                                                                 - Spring-loaded gun used for biopsy.                                         Procedure Details:                                                           MRI imaging of the prostate was preloaded to the UroNav machine.         US of prostate was performed and its image was super-imposed with that of MRI.  The target lesions identified and the biopsies were obtained using US guided UroNav guidance.    Biopsy Core Details:                                                         - Twelve core(s) in total.                                                   - Cores Taken From: Right apex x 2, right mid prostate x 2, right            base x 2, left apex x 2, left mid prostate x 2 and left base x 2.  Lesion; 4 cores obtained from each lesion             Estimated Blood Loss:                                                        - Minimal.                                                                   Pathology Specimen:                                                           - The specimen sent.                                                    Complications:                                                                    No immediate complications.                                             Post-OP Plan:                                                                                            Patient was discharged home in a stable condition.         Medications: finish off cipro given.         Will call him with the bx result.          If fever or unable to void, RTC or emergency room immediately.                                           RTC 6 months with PSA.             Addendum:  Both lesions noted quite peripherally at each apex.

## 2018-11-21 ENCOUNTER — PATIENT MESSAGE (OUTPATIENT)
Dept: NEUROLOGY | Facility: CLINIC | Age: 68
End: 2018-11-21

## 2018-11-27 ENCOUNTER — TELEPHONE (OUTPATIENT)
Dept: UROLOGY | Facility: CLINIC | Age: 68
End: 2018-11-27

## 2018-11-27 DIAGNOSIS — C61 PROSTATE CANCER: Primary | ICD-10-CM

## 2018-11-28 NOTE — TELEPHONE ENCOUNTER
UroNav bx of prostate 11/20/18    Findings:                                                                         --- Transrectal Ultrasound of the Prostate ---                               Prostate measurements.                                                                                                          PSA: Lab Results       Component                Value               Date                       PSA                      6.0 (H)             05/20/2014                 PSADIAG                  6.8 (H)             10/12/2018                   - Volume:73 gm.           PSA Density: 0.09                                                         no calcification in the prostate     Lesion #1: 0.9 cm left apex lateral peripheral zone lesion presumably represents recently found prostatic adenocarcinoma on biopsy.     Lesion #2: 0.7 cm right apex lateral peripheral zone lesion.  This could be related to post biopsy hemorrhage however an additional focus of malignancy cannot be excluded.                         SPECIMEN  1) Prostate, left apex.  2) Prostate, left middle.  3) Prostate, left base.  4) Prostate, right apex.  5) Prostate, right middle.  6) Prostate, right base.  7) Prostate, target lesion #1.  8) Prostate, target lesion #2.  FINAL PATHOLOGIC DIAGNOSIS  PROSTATE BIOPSIES 1, 2, 3, 4, 5, 6, 7, AND 8:  NO CARCINOMA IDENTIFIED    Prostate cancer  -     Prostate Specific Antigen, Diagnostic; Future; Expected date: 11/27/2018    The result info left in his cell phone message.  Will see him in 6 months with PSA.

## 2018-11-29 ENCOUNTER — PATIENT MESSAGE (OUTPATIENT)
Dept: UROLOGY | Facility: CLINIC | Age: 68
End: 2018-11-29

## 2018-12-04 ENCOUNTER — PATIENT OUTREACH (OUTPATIENT)
Dept: OTHER | Facility: OTHER | Age: 68
End: 2018-12-04

## 2018-12-04 NOTE — PROGRESS NOTES
"Last 5 Patient Entered Readings                                      Current 30 Day Average: 121/69     Recent Readings 12/3/2018 12/3/2018 12/3/2018 12/2/2018 12/2/2018    SBP (mmHg) 128 133 135 117 129    DBP (mmHg) 73 71 72 60 69    Pulse 62 61 60 65 65          Digital Medicine: Health  Follow Up    Patient reports with the cold weather, "he was taking his BP readings with a long sleeve shirt on". I encouraged pt to not take his readings over a shirt and make sure that the cuff is on his bare arm.  Pt verbally understood.      Lifestyle Modifications:    1.Dietary Modifications (Sodium intake <2,000mg/day, food labels, dining out):   Pt reports no changes in diet. Pt denies needing any other help with nutrition at this time.    2.Physical Activity:   Pt reports no changes in activity. Pt denies needing any other help with exercise at this time.    3.Medication Therapy:   Patient has been compliant with the medication regimen.    4.Patient has the following medication side effects/concerns: None  (Frequency/Alleviating factors/Precipitating factors, etc.)     Follow up with Mr. Anton Christiansen completed. No further questions or concerns. Will continue to follow up to achieve health goals.  Patient is currently at goal, 121/69 mmHg does not exceed <130/80 mmHg.  BP is controlled.       "

## 2019-01-04 ENCOUNTER — OFFICE VISIT (OUTPATIENT)
Dept: SLEEP MEDICINE | Facility: CLINIC | Age: 69
End: 2019-01-04
Payer: MEDICARE

## 2019-01-04 VITALS
HEIGHT: 69 IN | DIASTOLIC BLOOD PRESSURE: 85 MMHG | BODY MASS INDEX: 25.8 KG/M2 | WEIGHT: 174.19 LBS | SYSTOLIC BLOOD PRESSURE: 148 MMHG | HEART RATE: 59 BPM

## 2019-01-04 DIAGNOSIS — G47.33 OSA (OBSTRUCTIVE SLEEP APNEA): Primary | ICD-10-CM

## 2019-01-04 PROCEDURE — 99214 PR OFFICE/OUTPT VISIT, EST, LEVL IV, 30-39 MIN: ICD-10-PCS | Mod: S$PBB,,, | Performed by: INTERNAL MEDICINE

## 2019-01-04 PROCEDURE — 99213 OFFICE O/P EST LOW 20 MIN: CPT | Mod: PBBFAC | Performed by: INTERNAL MEDICINE

## 2019-01-04 PROCEDURE — 99999 PR PBB SHADOW E&M-EST. PATIENT-LVL III: CPT | Mod: PBBFAC,,, | Performed by: INTERNAL MEDICINE

## 2019-01-04 PROCEDURE — 99214 OFFICE O/P EST MOD 30 MIN: CPT | Mod: S$PBB,,, | Performed by: INTERNAL MEDICINE

## 2019-01-04 PROCEDURE — 99999 PR PBB SHADOW E&M-EST. PATIENT-LVL III: ICD-10-PCS | Mod: PBBFAC,,, | Performed by: INTERNAL MEDICINE

## 2019-01-04 RX ORDER — DUTASTERIDE 0.5 MG/1
CAPSULE, LIQUID FILLED ORAL
Refills: 10 | COMMUNITY
Start: 2018-12-16 | End: 2019-06-11 | Stop reason: SDUPTHER

## 2019-01-04 NOTE — PATIENT INSTRUCTIONS
Sleep Hygiene Practices    1. Try going to bed only when you are drowsy.    ?    2. If you are unable to fall asleep or stay asleep, leave your bedroom and engage in a quiet activity elsewhere. Do not permit yourself to fall asleep outside the bedroom. Return to bed when and only when you are sleepy. Repeat this process as often as necessary throughout night.    3. Maintain regular wake-up time, even on days off work & weekends    4. Use your bedroom for sleep and sex    5. Avoid napping during the daytime. If daytime sleepiness becomes overwhelming, limit nap time to a single nap of less than 1hr, no later than 3pm.    6. Distract your mind. Avoid clock watching. Lying in bed unable to sleep and frustrated needs to be avoided. Try reading or watching a videotape or listening to books on tape. It may be necessary to go into another room to do these.    7. Avoid caffeine within 4-6hrs of bedtime    8. Avoid use of nicotine close to bedtime    9. do not drink alcoholic beverages within 4-6hrs of bedtime    10. While a light snack before bedtime can help promote sound sleep, avoid large meals.    11. Obtain regular exercise, but avoid strenuous exercise within 4hrs of bedtime    12. Minimize light, noise, and extremes in temperature in the bedroom.    13. Precautions: The patient was advised to abstain from driving should they feel sleepy or drowsy.  ?      Will advise to start using nasal saline every day one - two squirt each nostril if that does not work  Use Flonase 1-2 sprays in each nostril at bedtime to help with nasal congestion. You need to use every day for at least a month to determine if it will be beneficial for you. Watch for nose bleeds. If this occurs, stop the medication and call the office.      * This information is provided had been directly obtained from the American Academy of [employer] wellness booklet on sleep hygiene. ([facility], [address] [place] [address]
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
## 2019-01-08 ENCOUNTER — PATIENT OUTREACH (OUTPATIENT)
Dept: OTHER | Facility: OTHER | Age: 69
End: 2019-01-08

## 2019-01-08 NOTE — PROGRESS NOTES
"Last 5 Patient Entered Readings                                      Current 30 Day Average: 121/72     Recent Readings 1/5/2019 1/5/2019 1/5/2019 1/3/2019 1/3/2019    SBP (mmHg) 123 117 110 132 139    DBP (mmHg) 68 65 62 75 75    Pulse 59 59 57 62 62        Digital Medicine: Health  Follow Up    Encounter was brief  Patient states he recently started using a "CPAP machine" to help him sleep at night. He is still getting used to the machine and its use.   BP is well controlled.    Lifestyle Modifications:    1.Dietary Modifications (Sodium intake <2,000mg/day, food labels, dining out):   Pt denies needing any other help with nutrition at this time.    2.Physical Activity:   Pt denies needing any other help with activity at this time.    3.Medication Therapy:   Patient has been compliant with the medication regimen.    4.Patient has the following medication side effects/concerns: None  (Frequency/Alleviating factors/Precipitating factors, etc.)     Follow up with Mr. Anton Christiansen completed. No further questions or concerns. Will continue to follow up to achieve health goals.  Patient is currently at goal, 121/72 mmHg does not exceed <130/80 mmHg.        "

## 2019-01-14 ENCOUNTER — PATIENT OUTREACH (OUTPATIENT)
Dept: OTHER | Facility: OTHER | Age: 69
End: 2019-01-14

## 2019-01-14 RX ORDER — ATORVASTATIN CALCIUM 10 MG/1
TABLET, FILM COATED ORAL
Qty: 30 TABLET | Refills: 0 | Status: SHIPPED | OUTPATIENT
Start: 2019-01-14 | End: 2019-02-08 | Stop reason: SDUPTHER

## 2019-01-14 NOTE — PROGRESS NOTES
Last 5 Patient Entered Readings                                      Current 30 Day Average: 124/70     Recent Readings 1/12/2019 1/12/2019 1/12/2019 1/9/2019 1/9/2019    SBP (mmHg) 130 131 131 123 122    DBP (mmHg) 73 73 73 72 69    Pulse 56 56 56 59 59        Patient's BP average is controlled based on 2017 ACC/AHA HTN guidelines of goal BP <130/80.      Mr. Christiansen is doing well. He has had SBPs in the 130s occasionally, but overall average is at goal. He has no other questions or concerns.     Patient's health , Tera Cervantes, will be following up every 4-6 weeks. I will continue to monitor regularly and will follow up in 4-6 months, sooner if BP begins to trend upward or downward.    Patient has my contact information and knows to call with any concerns or clinical changes.     Current HTN regimen:  Hypertension Medications             carvedilol (COREG) 12.5 MG tablet TAKE 1 TABLET BY MOUTH TWICE DAILY

## 2019-02-12 RX ORDER — ATORVASTATIN CALCIUM 10 MG/1
TABLET, FILM COATED ORAL
Qty: 90 TABLET | Refills: 3 | Status: SHIPPED | OUTPATIENT
Start: 2019-02-12 | End: 2020-01-31

## 2019-02-19 ENCOUNTER — PATIENT OUTREACH (OUTPATIENT)
Dept: OTHER | Facility: OTHER | Age: 69
End: 2019-02-19

## 2019-02-19 NOTE — PROGRESS NOTES
"Last 5 Patient Entered Readings                                      Current 30 Day Average: 122/69     Recent Readings 2/14/2019 2/14/2019 2/14/2019 2/9/2019 2/9/2019    SBP (mmHg) 115 120 123 128 125    DBP (mmHg) 69 72 71 71 74    Pulse 63 63 63 58 59          Digital Medicine: Health  Follow Up    Encounter was brief  B is well controlled.   Patient reports continuing to get used to his CPAP machine and states "he is sleeping better".    Lifestyle Modifications:    1.Dietary Modifications (Sodium intake <2,000mg/day, food labels, dining out):   Pt denies needing any other help with nutrition at this time.    2.Physical Activity:   Pt denies needing any other help with activity at this time.    3.Medication Therapy:   Patient has been compliant with the medication regimen.    4.Patient has the following medication side effects/concerns: None  (Frequency/Alleviating factors/Precipitating factors, etc.)     Follow up with Mr. Anton Christiansen completed. No further questions or concerns. Will continue to follow up to achieve health goals.  Patient is currently at goal, 122/69 mmHg does not exceed <130/80 mmHg.\    "

## 2019-03-07 ENCOUNTER — TELEPHONE (OUTPATIENT)
Dept: INFECTIOUS DISEASES | Facility: CLINIC | Age: 69
End: 2019-03-07

## 2019-03-07 DIAGNOSIS — D32.9 MENINGIOMA: ICD-10-CM

## 2019-03-07 DIAGNOSIS — E78.00 PURE HYPERCHOLESTEROLEMIA: Chronic | ICD-10-CM

## 2019-03-07 DIAGNOSIS — R97.20 ELEVATED PSA: ICD-10-CM

## 2019-03-07 DIAGNOSIS — I25.10 ARTERIOSCLEROTIC CORONARY ARTERY DISEASE: Primary | ICD-10-CM

## 2019-03-07 DIAGNOSIS — I10 ESSENTIAL HYPERTENSION: Chronic | ICD-10-CM

## 2019-03-07 NOTE — TELEPHONE ENCOUNTER
----- Message from Lyndsey Piedra MA sent at 3/7/2019  4:20 PM CST -----  Contact: pt  Add orders for annual exam       ----- Message -----  From: Olya Beverly  Sent: 3/7/2019   4:17 PM  To: Myrna TUCKER Staff    Pt needs labs scheduled for yearly physical   Mon thru thurs   In morning     At main     Thanks

## 2019-04-05 ENCOUNTER — OFFICE VISIT (OUTPATIENT)
Dept: SLEEP MEDICINE | Facility: CLINIC | Age: 69
End: 2019-04-05
Payer: MEDICARE

## 2019-04-05 VITALS
HEIGHT: 69 IN | DIASTOLIC BLOOD PRESSURE: 91 MMHG | BODY MASS INDEX: 25.83 KG/M2 | SYSTOLIC BLOOD PRESSURE: 161 MMHG | WEIGHT: 174.38 LBS | HEART RATE: 59 BPM

## 2019-04-05 DIAGNOSIS — G47.33 OSA (OBSTRUCTIVE SLEEP APNEA): Primary | ICD-10-CM

## 2019-04-05 PROCEDURE — 99214 PR OFFICE/OUTPT VISIT, EST, LEVL IV, 30-39 MIN: ICD-10-PCS | Mod: S$PBB,,, | Performed by: INTERNAL MEDICINE

## 2019-04-05 PROCEDURE — 99999 PR PBB SHADOW E&M-EST. PATIENT-LVL III: ICD-10-PCS | Mod: PBBFAC,,, | Performed by: INTERNAL MEDICINE

## 2019-04-05 PROCEDURE — 99214 OFFICE O/P EST MOD 30 MIN: CPT | Mod: S$PBB,,, | Performed by: INTERNAL MEDICINE

## 2019-04-05 PROCEDURE — 99213 OFFICE O/P EST LOW 20 MIN: CPT | Mod: PBBFAC | Performed by: INTERNAL MEDICINE

## 2019-04-05 PROCEDURE — 99999 PR PBB SHADOW E&M-EST. PATIENT-LVL III: CPT | Mod: PBBFAC,,, | Performed by: INTERNAL MEDICINE

## 2019-04-05 NOTE — PATIENT INSTRUCTIONS
RemZzzs Padded Nasal CPAP Mask Liners (30-day Supply)    Contour CPAPMax Pillow 2.0      Sleep Hygiene Practices    1. Try going to bed only when you are drowsy.    ?    2. If you are unable to fall asleep or stay asleep, leave your bedroom and engage in a quiet activity elsewhere. Do not permit yourself to fall asleep outside the bedroom. Return to bed when and only when you are sleepy. Repeat this process as often as necessary throughout night.    3. Maintain regular wake-up time, even on days off work & weekends    4. Use your bedroom for sleep and sex    5. Avoid napping during the daytime. If daytime sleepiness becomes overwhelming, limit nap time to a single nap of less than 1hr, no later than 3pm.    6. Distract your mind. Avoid clock watching. Lying in bed unable to sleep and frustrated needs to be avoided. Try reading or watching a videotape or listening to books on tape. It may be necessary to go into another room to do these.    7. Avoid caffeine within 4-6hrs of bedtime    8. Avoid use of nicotine close to bedtime    9. do not drink alcoholic beverages within 4-6hrs of bedtime    10. While a light snack before bedtime can help promote sound sleep, avoid large meals.    11. Obtain regular exercise, but avoid strenuous exercise within 4hrs of bedtime    12. Minimize light, noise, and extremes in temperature in the bedroom.    13. Precautions: The patient was advised to abstain from driving should they feel sleepy or drowsy.  ?  suggested to use Claritin when not using  Flonase.     * This information is provided had been directly obtained from the American Academy of Sleep [employer] wellness booklet on sleep hygiene. ([facility], [address] [place] [address]
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
## 2019-04-11 ENCOUNTER — OFFICE VISIT (OUTPATIENT)
Dept: INFECTIOUS DISEASES | Facility: CLINIC | Age: 69
End: 2019-04-11
Payer: MEDICARE

## 2019-04-11 ENCOUNTER — CLINICAL SUPPORT (OUTPATIENT)
Dept: INFECTIOUS DISEASES | Facility: CLINIC | Age: 69
End: 2019-04-11
Payer: MEDICARE

## 2019-04-11 ENCOUNTER — HOSPITAL ENCOUNTER (OUTPATIENT)
Dept: CARDIOLOGY | Facility: CLINIC | Age: 69
Discharge: HOME | End: 2019-04-11
Attending: INTERNAL MEDICINE
Payer: MEDICARE

## 2019-04-11 ENCOUNTER — TELEPHONE (OUTPATIENT)
Dept: DERMATOLOGY | Facility: CLINIC | Age: 69
End: 2019-04-11

## 2019-04-11 VITALS
BODY MASS INDEX: 26.02 KG/M2 | HEART RATE: 60 BPM | DIASTOLIC BLOOD PRESSURE: 91 MMHG | TEMPERATURE: 98 F | WEIGHT: 175.69 LBS | SYSTOLIC BLOOD PRESSURE: 152 MMHG | HEIGHT: 69 IN

## 2019-04-11 DIAGNOSIS — E78.00 PURE HYPERCHOLESTEROLEMIA: Chronic | ICD-10-CM

## 2019-04-11 DIAGNOSIS — I25.10 ARTERIOSCLEROTIC CORONARY ARTERY DISEASE: ICD-10-CM

## 2019-04-11 DIAGNOSIS — N20.0 KIDNEY STONE: Chronic | ICD-10-CM

## 2019-04-11 DIAGNOSIS — I10 ESSENTIAL HYPERTENSION: Chronic | ICD-10-CM

## 2019-04-11 DIAGNOSIS — G47.33 OSA (OBSTRUCTIVE SLEEP APNEA): ICD-10-CM

## 2019-04-11 DIAGNOSIS — R97.20 ELEVATED PSA: Primary | ICD-10-CM

## 2019-04-11 DIAGNOSIS — Z00.00 PREVENTATIVE HEALTH CARE: ICD-10-CM

## 2019-04-11 DIAGNOSIS — E04.2 MULTIPLE THYROID NODULES: ICD-10-CM

## 2019-04-11 DIAGNOSIS — D32.9 MENINGIOMA: ICD-10-CM

## 2019-04-11 DIAGNOSIS — N41.1 CHRONIC PROSTATITIS: ICD-10-CM

## 2019-04-11 DIAGNOSIS — Z12.11 COLON CANCER SCREENING: ICD-10-CM

## 2019-04-11 PROCEDURE — 99215 PR OFFICE/OUTPT VISIT, EST, LEVL V, 40-54 MIN: ICD-10-PCS | Mod: S$PBB,,, | Performed by: INTERNAL MEDICINE

## 2019-04-11 PROCEDURE — 99215 OFFICE O/P EST HI 40 MIN: CPT | Mod: S$PBB,,, | Performed by: INTERNAL MEDICINE

## 2019-04-11 PROCEDURE — 99999 PR PBB SHADOW E&M-EST. PATIENT-LVL III: ICD-10-PCS | Mod: PBBFAC,,, | Performed by: INTERNAL MEDICINE

## 2019-04-11 PROCEDURE — 93010 ELECTROCARDIOGRAM REPORT: CPT | Mod: S$PBB,,, | Performed by: INTERNAL MEDICINE

## 2019-04-11 PROCEDURE — 93010 EKG 12-LEAD: ICD-10-PCS | Mod: S$PBB,,, | Performed by: INTERNAL MEDICINE

## 2019-04-11 PROCEDURE — 99999 PR PBB SHADOW E&M-EST. PATIENT-LVL III: CPT | Mod: PBBFAC,,, | Performed by: INTERNAL MEDICINE

## 2019-04-11 PROCEDURE — 93005 ELECTROCARDIOGRAM TRACING: CPT | Mod: PBBFAC | Performed by: INTERNAL MEDICINE

## 2019-04-11 PROCEDURE — 99213 OFFICE O/P EST LOW 20 MIN: CPT | Mod: PBBFAC | Performed by: INTERNAL MEDICINE

## 2019-04-11 PROCEDURE — 90750 HZV VACC RECOMBINANT IM: CPT | Mod: PBBFAC

## 2019-04-11 NOTE — TELEPHONE ENCOUNTER
----- Message from Lyndsey Piedra MA sent at 4/11/2019 11:08 AM CDT -----  Patient seen Dr. Norris today, he needs a follow-up with Dr. Stewart. Please call patient to schedule this.

## 2019-04-11 NOTE — PROGRESS NOTES
Mr. Christiansen received initial dose Shingrix vaccine and tolerated it well. He left the unit in NAD.

## 2019-04-11 NOTE — PROGRESS NOTES
Subjective:      Patient ID: Anton Christiansen is a 68 y.o. male.    Chief Complaint:   Followup on multiple medical problems         History of Present Illness    Thyroid nodules  Pt saw Dr. Haney who did FNA. It had QNS to analyze. He will see Dr. Haney again to discuss repeat FNA. He is not anxious to do it again.    Hypertension  Pt still on carvedilol 12.5 mg bid. He checks his BP at home and gets 120s-130s/ 70s. Has higher readings in office.  Cuff brought in last year to validate accuracy of home readings.        Elevated PSA  Followed by Dr. Quinteros for elevated PSA and renal stones. Has had drop in PSA since on avodart:        PSA                               4.2                                                                  4/11/19   PSA                               6.8                                                                 10/12/18   PSA                             10.2                                                                 6/12/2018      PSA 11.7 4/10/2018     PSA 5.8 4/10/2017     PSA 6.0 (H) 05/20/2014     PSA 5.81 (H) 06/04/2013     PSA 10.78 (H) 05/28/2013      Hypercholesterolemia  On atorvastatin 10 mg. Lipid control is good:    Lab Results   Component Value Date    CHOL 142 04/11/2019    CHOL 157 04/10/2018    CHOL 145 04/10/2017     Lab Results   Component Value Date    HDL 39 (L) 04/11/2019    HDL 40 04/10/2018    HDL 38 (L) 04/10/2017     Lab Results   Component Value Date    LDLCALC 77.2 04/11/2019    LDLCALC 86.6 04/10/2018    LDLCALC 79.6 04/10/2017     Lab Results   Component Value Date    TRIG 129 04/11/2019    TRIG 152 (H) 04/10/2018    TRIG 137 04/10/2017     Lab Results   Component Value Date    CHOLHDL 27.5 04/11/2019    CHOLHDL 25.5 04/10/2018    CHOLHDL 26.2 04/10/2017        ASHA   was dx with ASHA and has done much better since using CPAP. Daytime drowsiness has resolved     Recurrent nephrolithiasis  Had problems with renal stones necessitating hospitalization  last year. Last renal stone study 9/18/15 showed:  Stable 0.5-cm distal right ureter calculus with persistent proximal right dilatation and worsening right perinephric fat stranding.   Last sx of colic were 2015.  He is hydrating regularly and consistently        The laboratory studies were reviewed and discussed with the pt.  Last colonoscopy was 2014 and a rescreening interval of 5 years (2019)was recommended.      Review of Systems   Constitution: Negative for chills, decreased appetite, fever, malaise/fatigue, night sweats, weight gain and weight loss.   HENT: Negative for congestion, ear pain, hearing loss, hoarse voice, sore throat and tinnitus.    Eyes: Negative for blurred vision, redness and visual disturbance.   Cardiovascular: Negative for chest pain, leg swelling and palpitations.   Respiratory: Negative for cough, hemoptysis, shortness of breath and sputum production.    Hematologic/Lymphatic: Negative for adenopathy. Does not bruise/bleed easily.   Skin: Negative for dry skin, itching, rash and suspicious lesions.   Musculoskeletal: Negative for back pain, joint pain, myalgias and neck pain.   Gastrointestinal: Negative for abdominal pain, constipation, diarrhea, heartburn, nausea and vomiting.   Genitourinary: Negative for dysuria, flank pain, frequency, hematuria, hesitancy and urgency.   Neurological: Negative for dizziness, headaches, numbness, paresthesias and weakness.   Psychiatric/Behavioral: Negative for depression and memory loss. The patient does not have insomnia and is not nervous/anxious.      Objective:   Physical Exam   Constitutional: He is oriented to person, place, and time. He appears well-developed and well-nourished.   Weight gain   HENT:   Head: Normocephalic and atraumatic.   Right Ear: External ear normal.   Left Ear: External ear normal.   Mouth/Throat: Oropharynx is clear and moist.   Eyes: Pupils are equal, round, and reactive to light. Conjunctivae and EOM are normal.    Neck: Normal range of motion. Neck supple. No thyromegaly present.   Cardiovascular: Normal rate, regular rhythm and normal heart sounds.   No murmur heard.  Pulmonary/Chest: Effort normal and breath sounds normal. He has no wheezes. He has no rales.   Abdominal: Soft. Bowel sounds are normal. He exhibits no mass. There is no tenderness. There is no rebound.   Musculoskeletal: Normal range of motion.   Lymphadenopathy:     He has no cervical adenopathy.   Neurological: He is alert and oriented to person, place, and time.   Skin: Skin is warm and dry.   Psychiatric: He has a normal mood and affect. His behavior is normal.   Vitals reviewed.    Assessment:       1. Elevated PSA    2. Preventative health care    3. Colon cancer screening    4. Essential hypertension    5. Pure hypercholesterolemia    6. Chronic prostatitis    7. Kidney stone    8. Multiple thyroid nodules    9. ASHA (obstructive sleep apnea)          Plan:        1. Screening colonoscopy   2.  See Dr. KOFFI Stewart and [place] in follow up   3. Shingrix series to start today   4. Continue present meds   5. Weight loss attempt by caloric restriction

## 2019-04-16 ENCOUNTER — PATIENT OUTREACH (OUTPATIENT)
Dept: OTHER | Facility: OTHER | Age: 69
End: 2019-04-16

## 2019-04-16 NOTE — PROGRESS NOTES
"Last 5 Patient Entered Readings                                      Current 30 Day Average: 122/70     Recent Readings 4/15/2019 4/15/2019 4/15/2019 4/14/2019 4/14/2019    SBP (mmHg) 124 124 130 127 146    DBP (mmHg) 74 73 78 74 75    Pulse 56 56 56 64 63          Digital Medicine: Health  Follow Up    Encounter was brief.  BP is well controlled.  Patient reports he saw his doctor for his yearly check up last week and reports "everything was good".     Lifestyle Modifications:    1.Dietary Modifications (Sodium intake <2,000mg/day, food labels, dining out):   Pt denies needing any other help with nutrition at this time.    2.Physical Activity:   Pt denies needing any other help with activity at this time.    3.Medication Therapy:   Patient has been compliant with the medication regimen.    4.Patient has the following medication side effects/concerns: None  (Frequency/Alleviating factors/Precipitating factors, etc.)     Follow up with Mr. Anton Christiansen completed. No further questions or concerns. Will continue to follow up to achieve health goals.  Patient is currently at goal, 122/70 mmHg does not exceed <130/80 mmHg.    "

## 2019-04-18 DIAGNOSIS — Z12.11 SPECIAL SCREENING FOR MALIGNANT NEOPLASMS, COLON: Primary | ICD-10-CM

## 2019-04-18 RX ORDER — POLYETHYLENE GLYCOL 3350, SODIUM SULFATE ANHYDROUS, SODIUM BICARBONATE, SODIUM CHLORIDE, POTASSIUM CHLORIDE 236; 22.74; 6.74; 5.86; 2.97 G/4L; G/4L; G/4L; G/4L; G/4L
4 POWDER, FOR SOLUTION ORAL ONCE
Qty: 4000 ML | Refills: 0 | Status: SHIPPED | OUTPATIENT
Start: 2019-04-18 | End: 2019-04-18

## 2019-04-22 ENCOUNTER — PATIENT MESSAGE (OUTPATIENT)
Dept: ADMINISTRATIVE | Facility: OTHER | Age: 69
End: 2019-04-22

## 2019-05-13 ENCOUNTER — PATIENT MESSAGE (OUTPATIENT)
Dept: ADMINISTRATIVE | Facility: OTHER | Age: 69
End: 2019-05-13

## 2019-05-15 ENCOUNTER — OFFICE VISIT (OUTPATIENT)
Dept: DERMATOLOGY | Facility: CLINIC | Age: 69
End: 2019-05-15
Payer: MEDICARE

## 2019-05-15 DIAGNOSIS — D48.5 NEOPLASM OF UNCERTAIN BEHAVIOR OF SKIN: Primary | ICD-10-CM

## 2019-05-15 DIAGNOSIS — D22.9 MULTIPLE BENIGN NEVI: ICD-10-CM

## 2019-05-15 DIAGNOSIS — Z12.83 SKIN CANCER SCREENING: ICD-10-CM

## 2019-05-15 DIAGNOSIS — D18.00 ANGIOMA: ICD-10-CM

## 2019-05-15 DIAGNOSIS — L82.1 SK (SEBORRHEIC KERATOSIS): ICD-10-CM

## 2019-05-15 DIAGNOSIS — L57.0 AK (ACTINIC KERATOSIS): ICD-10-CM

## 2019-05-15 DIAGNOSIS — D48.9 NEOPLASM OF UNCERTAIN BEHAVIOR: ICD-10-CM

## 2019-05-15 PROCEDURE — 11102 TANGNTL BX SKIN SINGLE LES: CPT | Mod: S$PBB,,, | Performed by: DERMATOLOGY

## 2019-05-15 PROCEDURE — 99999 PR PBB SHADOW E&M-EST. PATIENT-LVL III: CPT | Mod: PBBFAC,,, | Performed by: DERMATOLOGY

## 2019-05-15 PROCEDURE — 17000 DESTRUCT PREMALG LESION: CPT | Mod: 59,S$PBB,, | Performed by: DERMATOLOGY

## 2019-05-15 PROCEDURE — 99213 PR OFFICE/OUTPT VISIT, EST, LEVL III, 20-29 MIN: ICD-10-PCS | Mod: 25,S$PBB,, | Performed by: DERMATOLOGY

## 2019-05-15 PROCEDURE — 88305 TISSUE EXAM BY PATHOLOGIST: CPT | Performed by: PATHOLOGY

## 2019-05-15 PROCEDURE — 17000 DESTRUCT PREMALG LESION: CPT | Mod: PBBFAC | Performed by: DERMATOLOGY

## 2019-05-15 PROCEDURE — 17000 PR DESTRUCTION(LASER SURGERY,CRYOSURGERY,CHEMOSURGERY),PREMALIGNANT LESIONS,FIRST LESION: ICD-10-PCS | Mod: 59,S$PBB,, | Performed by: DERMATOLOGY

## 2019-05-15 PROCEDURE — 99213 OFFICE O/P EST LOW 20 MIN: CPT | Mod: 25,S$PBB,, | Performed by: DERMATOLOGY

## 2019-05-15 PROCEDURE — 11102 PR TANGENTIAL BIOPSY, SKIN, SINGLE LESION: ICD-10-PCS | Mod: S$PBB,,, | Performed by: DERMATOLOGY

## 2019-05-15 PROCEDURE — 99213 OFFICE O/P EST LOW 20 MIN: CPT | Mod: PBBFAC | Performed by: DERMATOLOGY

## 2019-05-15 PROCEDURE — 88305 TISSUE SPECIMEN TO PATHOLOGY, DERMATOLOGY: ICD-10-PCS | Mod: 26,,, | Performed by: PATHOLOGY

## 2019-05-15 PROCEDURE — 99999 PR PBB SHADOW E&M-EST. PATIENT-LVL III: ICD-10-PCS | Mod: PBBFAC,,, | Performed by: DERMATOLOGY

## 2019-05-15 PROCEDURE — 88305 TISSUE EXAM BY PATHOLOGIST: CPT | Mod: 26,,, | Performed by: PATHOLOGY

## 2019-05-15 PROCEDURE — 11102 TANGNTL BX SKIN SINGLE LES: CPT | Mod: 59,PBBFAC | Performed by: DERMATOLOGY

## 2019-05-15 NOTE — PATIENT INSTRUCTIONS
Summer Sun Protection      The [facility] would like to remind you of the importance of sun protection all year round and particularly during the summer when the suns rays are the strongest. It has been proven that both acute and chronic sun exposure damages our cells and leads to skin cancer. Beyond skin cancer, the sun causes 90% of the symptoms of pre-mature skin aging, including wrinkles, lentigines (brown spots), and thin, easily bruised skin. Proper sun protection can help prevent these unwanted conditions.    Many patients report that the dont go in the sun. It has been shown that the average person receives 18 hours of incidental sun exposure per week during activities such as walking through parking lots, driving, or sitting next to windows. This accumulates to several bad sunburns per year!    In choosing sunscreen, you want one that protects against both UVA and UVB rays. It is recommended that you use one of SPF 30 or higher. It is important to apply the sunscreen about 20 minutes prior to sun exposure. Most sunscreens are chemical sunscreens and a reaction must take place in the skin so that they are effective. If they are applied and then you are immediately exposed to the sun or start sweating, this reaction has not had time to take place and you are therefore unprotected. Sunscreen needs to be reapplied every 2 hours if you are participating in water sports or sweating. We recommend Elta MD or Neutrogena Ultra Sheer Dry Touch SPF 55 for daily use; however there are many options and it is most important for you to find one that you will use on a consistent basis.    If you have sensitive skin, you may do best with a sunscreen that contains only physical blockers such as titanium dioxide or zinc oxide. These are typically thicker and harder to apply, however they afford very good protection. Neutrogena Sensitive Skin, Blue Lizard Sensitive Skin (pink top) or Neutrogena Pure  and Free are popular ones.     Aside from sunscreen, clothes with UV protection, wide brimmed hats, and sunglasses are other means of sun protection that we recommend.                        [facility] - DERMATOLOGY  [address]  [place] [address]  Dept: [phone]  Dept Fax: [phone]                                                                               CRYOSURGERY      Your doctor has used a method called cryosurgery to treat your skin condition. Cryosurgery refers to the use of very cold substances to treat a variety of skin conditions such as warts, pre-skin cancers, molluscum contagiosum, sun spots, and several benign growths. The substance we use in cryosurgery is liquid nitrogen and is so cold (-195 degrees Celsius) that is burns when administered.     Following treatment in the office, the skin may immediately burn and become red. You may find the area around the lesion is affected as well. It is sometimes necessary to treat not only the lesion, but a small area of the surrounding normal skin to achieve a good response.     A blister, and even a blood filled blister, may form after treatment.   This is a normal response. If the blister is painful, it is acceptable to sterilize a needle and with rubbing alcohol and gently pop the blister. It is important that you gently wash the area with soap and warm water as the blister fluid may contain wart virus if a wart was treated. Do no remove the roof of the blister.     The area treated can take anywhere from 1-3 weeks to heal. Healing time depends on the kind skin lesion treated, the location, and how aggressively the lesion was treated. It is recommended that the areas treated are covered with Vaseline or bacitracin ointment and a band-aid. If a band-aid is not practical, just ointment applied several times per day will do. Keeping these areas moist will speed the healing time.    Treatment with liquid  "nitrogen can leave a scar. In dark skin, it may be a light or dark scar, in light skin it may be a white or pink scar. These will generally fade with time, but may never go away completely.     If you have any concerns after your treatment, please feel free to call the office.       [address] [place] [address]/ [phone] FAX/ [email or web address]     Shave Biopsy Wound Care    Your doctor has performed a shave biopsy today.  A band aid and vaseline ointment has been placed over the site.  This should remain in place for 24 hours.  It is recommended that you keep the area dry for the first 24 hours.  After 24 hours, you may remove the band aid and wash the area with warm soap and water and apply Vaseline jelly.  Many patients prefer to use Neosporin or Bacitracin ointment.  This is acceptable; however, know that you can develop an allergy to this medication even if you have used it safely for years.  It is important to keep the area moist.  Letting it dry out and get air slows healing time, and will worsen the scar.  Band aid is optional after first 24 hours.      If you notice increasing redness, tenderness, pain, or yellow drainage at the biopsy site, please notify your doctor.  These are signs of an infection.    If your biopsy site is bleeding, apply firm pressure for 15 minutes straight.  Repeat for another 15 minutes, if it is still bleeding.   If the surgical site continues to bleed, then please contact your doctor.      For [facility] users:   You will receive a [facility] notification after the pathologist has finished reviewing your biopsy specimen. Pathology results, however, will not be released online so you will see a "no content" message. Once your dermatologist reviews and clinically correlates your biopsy results, you will either receive a letter in the mail with the results of a phone call from your doctor's office if further explanation or treatment is warranted. "       [address] [place] [address]/ [phone] FAX/ [email or web address]

## 2019-05-15 NOTE — PROGRESS NOTES
Subjective:       Patient ID:  Anton Christiansen is a 68 y.o. male who presents for   Chief Complaint   Patient presents with    Skin Check     Pt here today for UBSE, h/o BCC     HPI  Pt had a BCC excised from R dorsal hand in 8/15 by Dr. Haynes.  Also had a BCC of nasal tip s/p Mohs excision in 4/2014 with subsequent paramedian forehead flap repair.     Interested in upper body skin check today.  Denies any new, changing, or symptomatic lesions on the skin.  Pt has noticed a soreness of L nipple when he presses on it x a couple weeks to a month. No discharge. No itching or rash in that area.    Review of Systems   Constitutional: Negative for fever, chills, weight loss, weight gain, fatigue, night sweats and malaise.   Skin: Positive for activity-related sunscreen use. Negative for daily sunscreen use.   Hematologic/Lymphatic: Bruises/bleeds easily.        Objective:    Physical Exam   Constitutional: He appears well-developed and well-nourished. No distress.   Neurological: He is alert and oriented to person, place, and time. He is not disoriented.   Psychiatric: He has a normal mood and affect.   Skin:   Areas Examined (abnormalities noted in diagram):   Scalp / Hair Palpated and Inspected  Head / Face Inspection Performed  Neck Inspection Performed  Chest / Axilla Inspection Performed  Abdomen Inspection Performed  Back Inspection Performed  RUE Inspected  LUE Inspection Performed  Nails and Digits Inspection Performed                       Diagram Legend     Erythematous scaling macule/papule c/w actinic keratosis       Vascular papule c/w angioma      Pigmented verrucoid papule/plaque c/w seborrheic keratosis      Yellow umbilicated papule c/w sebaceous hyperplasia      Irregularly shaped tan macule c/w lentigo     1-2 mm smooth white papules consistent with Milia      Movable subcutaneous cyst with punctum c/w epidermal inclusion cyst      Subcutaneous movable cyst c/w pilar cyst      Firm pink to brown  papule c/w dermatofibroma      Pedunculated fleshy papule(s) c/w skin tag(s)      Evenly pigmented macule c/w junctional nevus     Mildly variegated pigmented, slightly irregular-bordered macule c/w mildly atypical nevus      Flesh colored to evenly pigmented papule c/w intradermal nevus       Pink pearly papule/plaque c/w basal cell carcinoma      Erythematous hyperkeratotic cursted plaque c/w SCC      Surgical scar with no sign of skin cancer recurrence      Open and closed comedones      Inflammatory papules and pustules      Verrucoid papule consistent consistent with wart     Erythematous eczematous patches and plaques     Dystrophic onycholytic nail with subungual debris c/w onychomycosis     Umbilicated papule    Erythematous-base heme-crusted tan verrucoid plaque consistent with inflamed seborrheic keratosis     Erythematous Silvery Scaling Plaque c/w Psoriasis     See annotation      Assessment / Plan:      Neoplasm of uncertain behavior of skin  Shave biopsy procedure note:    Shave biopsy performed after verbal consent including risk of infection, scar, recurrence, need for additional treatment of site. Area prepped with alcohol, anesthetized with approximately 1.0cc of 1% lidocaine with epinephrine. Lesional tissue shaved with razor blade. Hemostasis achieved with application of aluminum chloride followed by hyfrecation. No complications. Dressing applied. Wound care explained.    -     Tissue Specimen To Pathology, Dermatology  Pathology Orders:     Normal Orders This Visit    Tissue Specimen To Pathology, Dermatology     Questions:    Directional Terms:  Other(comment)    Clinical Information:  r/o verruca vs. SCC vs. other Comment - shave    Specific Site:  L neck          AK (actinic keratosis)  Cryosurgery Procedure Note    Verbal consent from the patient is obtained including, but not limited to, risk of hypopigmentation/hyperpigmentation, scar, recurrence of lesion. The patient is aware of the  precancerous quality and need for treatment of these lesions. Liquid nitrogen cryosurgery is applied to the 1 actinic keratoses, as detailed in the physical exam, to produce a freeze injury. The patient is aware that blisters may form and is instructed on wound care with gentle cleansing and use of vaseline ointment to keep moist until healed. The patient is supplied a handout on cryosurgery and is instructed to call if lesions do not completely resolve.    SK (seborrheic keratosis)  These are benign inherited growths without a malignant potential. Reassurance given to patient. No treatment is necessary.   Treatment of benign, asymptomatic lesions may be considered cosmetic.  Warned about risk of hypo- or hyperpigmentation with treatment and risk of recurrence.    Angioma  This is a benign vascular lesion. Reassurance given. No treatment required. Treatment of benign, asymptomatic lesions may be considered cosmetic.    Multiple benign nevi  Benign-appearing on exam today. Counseled pt to monitor mole(s) and return to clinic if any changes noted or symptoms (bleeding, itching, pain, etc) noted. Brochure provided.    Neoplasm of uncertain behavior- L nipple area  Counseled pt that I don't see any dermatologic concerns in the affected area on his L nipple/areola, and I don't feel any SC masses, but I warned him that males can get breast cancer too, so I suggested he relay his concerns to his PCP if they persist so he can be adequately screened for cancer.    Skin cancer screening  Upper body skin examination performed today including at least 6 points as noted in physical examination. Suspicious lesions noted above.  Patient instructed in importance of daily broad spectrum sunscreen use with spf at least 30. Sun avoidance and topical protection/protective clothing discussed.    Follow up in about 1 year (around 5/15/2020) for skin check or sooner pending biopsy results.

## 2019-05-20 ENCOUNTER — ANESTHESIA (OUTPATIENT)
Dept: ENDOSCOPY | Facility: HOSPITAL | Age: 69
End: 2019-05-20
Payer: MEDICARE

## 2019-05-20 ENCOUNTER — ANESTHESIA EVENT (OUTPATIENT)
Dept: ENDOSCOPY | Facility: HOSPITAL | Age: 69
End: 2019-05-20
Payer: MEDICARE

## 2019-05-20 ENCOUNTER — HOSPITAL ENCOUNTER (OUTPATIENT)
Facility: HOSPITAL | Age: 69
Discharge: HOME | End: 2019-05-20
Attending: INTERNAL MEDICINE | Admitting: INTERNAL MEDICINE
Payer: MEDICARE

## 2019-05-20 VITALS
DIASTOLIC BLOOD PRESSURE: 72 MMHG | HEART RATE: 70 BPM | OXYGEN SATURATION: 100 % | TEMPERATURE: 98 F | SYSTOLIC BLOOD PRESSURE: 158 MMHG | WEIGHT: 175 LBS | BODY MASS INDEX: 27.47 KG/M2 | HEIGHT: 67 IN | RESPIRATION RATE: 18 BRPM

## 2019-05-20 DIAGNOSIS — Z12.11 COLON CANCER SCREENING: Primary | ICD-10-CM

## 2019-05-20 PROCEDURE — E9220 PRA ENDO ANESTHESIA: HCPCS | Mod: PT,,, | Performed by: NURSE ANESTHETIST, CERTIFIED REGISTERED

## 2019-05-20 PROCEDURE — 27201012 HC FORCEPS, HOT/COLD, DISP: Performed by: INTERNAL MEDICINE

## 2019-05-20 PROCEDURE — 45380 COLONOSCOPY AND BIOPSY: CPT | Mod: 59,,, | Performed by: INTERNAL MEDICINE

## 2019-05-20 PROCEDURE — 25000003 PHARM REV CODE 250: Performed by: INTERNAL MEDICINE

## 2019-05-20 PROCEDURE — 45380 PR COLONOSCOPY,BIOPSY: ICD-10-PCS | Mod: 59,,, | Performed by: INTERNAL MEDICINE

## 2019-05-20 PROCEDURE — 45380 COLONOSCOPY AND BIOPSY: CPT | Performed by: INTERNAL MEDICINE

## 2019-05-20 PROCEDURE — 88305 TISSUE EXAM BY PATHOLOGIST: CPT | Mod: 59 | Performed by: PATHOLOGY

## 2019-05-20 PROCEDURE — 45385 COLONOSCOPY W/LESION REMOVAL: CPT | Mod: PT,GC,, | Performed by: INTERNAL MEDICINE

## 2019-05-20 PROCEDURE — 37000008 HC ANESTHESIA 1ST 15 MINUTES: Performed by: INTERNAL MEDICINE

## 2019-05-20 PROCEDURE — 88305 TISSUE EXAM BY PATHOLOGIST: CPT | Mod: 26,,, | Performed by: PATHOLOGY

## 2019-05-20 PROCEDURE — E9220 PRA ENDO ANESTHESIA: ICD-10-PCS | Mod: PT,,, | Performed by: NURSE ANESTHETIST, CERTIFIED REGISTERED

## 2019-05-20 PROCEDURE — 45385 COLONOSCOPY W/LESION REMOVAL: CPT | Performed by: INTERNAL MEDICINE

## 2019-05-20 PROCEDURE — 63600175 PHARM REV CODE 636 W HCPCS: Performed by: NURSE ANESTHETIST, CERTIFIED REGISTERED

## 2019-05-20 PROCEDURE — 45385 PR COLONOSCOPY,REMV LESN,SNARE: ICD-10-PCS | Mod: PT,GC,, | Performed by: INTERNAL MEDICINE

## 2019-05-20 PROCEDURE — 27200997: Performed by: INTERNAL MEDICINE

## 2019-05-20 PROCEDURE — 88305 TISSUE SPECIMEN TO PATHOLOGY - SURGERY: ICD-10-PCS | Mod: 26,,, | Performed by: PATHOLOGY

## 2019-05-20 PROCEDURE — 27201089 HC SNARE, DISP (ANY): Performed by: INTERNAL MEDICINE

## 2019-05-20 PROCEDURE — 37000009 HC ANESTHESIA EA ADD 15 MINS: Performed by: INTERNAL MEDICINE

## 2019-05-20 RX ORDER — PROPOFOL 10 MG/ML
INJECTION, EMULSION INTRAVENOUS
Status: DISCONTINUED | OUTPATIENT
Start: 2019-05-20 | End: 2019-05-20

## 2019-05-20 RX ORDER — SODIUM CHLORIDE 9 MG/ML
INJECTION, SOLUTION INTRAVENOUS CONTINUOUS
Status: DISCONTINUED | OUTPATIENT
Start: 2019-05-20 | End: 2019-05-20 | Stop reason: HOSPADM

## 2019-05-20 RX ORDER — PROPOFOL 10 MG/ML
INJECTION, EMULSION INTRAVENOUS CONTINUOUS PRN
Status: DISCONTINUED | OUTPATIENT
Start: 2019-05-20 | End: 2019-05-20

## 2019-05-20 RX ORDER — LIDOCAINE HCL/PF 100 MG/5ML
SYRINGE (ML) INTRAVENOUS
Status: DISCONTINUED | OUTPATIENT
Start: 2019-05-20 | End: 2019-05-20

## 2019-05-20 RX ADMIN — SODIUM CHLORIDE: 0.9 INJECTION, SOLUTION INTRAVENOUS at 11:05

## 2019-05-20 RX ADMIN — LIDOCAINE HYDROCHLORIDE 60 MG: 20 INJECTION, SOLUTION INTRAVENOUS at 11:05

## 2019-05-20 RX ADMIN — PROPOFOL 70 MG: 10 INJECTION, EMULSION INTRAVENOUS at 11:05

## 2019-05-20 RX ADMIN — PROPOFOL 200 MCG/KG/MIN: 10 INJECTION, EMULSION INTRAVENOUS at 11:05

## 2019-05-20 RX ADMIN — SODIUM CHLORIDE: 0.9 INJECTION, SOLUTION INTRAVENOUS at 10:05

## 2019-05-20 NOTE — ANESTHESIA PREPROCEDURE EVALUATION
05/20/2019  Anton Christiansen is a 68 y.o., male.  Past Medical History:   Diagnosis Date    Allergy     seasonal    Arteriosclerotic coronary artery disease 4/10/2017    Basal cell carcinoma 07/14/15    right dorsal hand    BCC (basal cell carcinoma of skin) 4/2014    nasal tip s/p Mohs with forehead flap    Cataract     Family history of colon cancer     Hyperlipidemia     Hypertension     Multiple thyroid nodules 4/24/2018    Nephrolithiasis     Prostatitis, chronic     Special screening for malignant neoplasms, colon 6/19/2014     Past Surgical History:   Procedure Laterality Date    CATARACT EXTRACTION W/  INTRAOCULAR LENS IMPLANT  12/20/12    OD    CLOSURE, MOHS PROCEDURE DEFECT Left 4/8/2014    Performed by German Vel[facility]quez III, MD at [facility] OR 2ND FLR    COLONOSCOPY  [id number]    colonoscopy N/A 6/19/2014    Performed by Stefan Weiner MD at [facility] ENDO (4TH FLR)    CYSTOSCOPY WITH STENT PLACEMENT Right 9/20/2015    Performed by Sofiya Marie MD at [facility] OR 1ST FLR    Division and Inset  4/29/14    D&I Forehead Flap    EYE SURGERY      INSERTION, IOL PROSTHESIS Left 1/8/2013    Performed by Miley San MD at [facility] OR 1ST FLR    INSERTION, IOL PROSTHESIS Right 12/20/2012    Performed by Miley San MD at [facility] OR 1ST FLR    PHACOEMULSIFICATION, CATARACT Left 1/8/2013    Performed by Miley San MD at [facility] OR 1ST FLR    PHACOEMULSIFICATION, CATARACT Right 12/20/2012    Performed by Miley San MD at [facility] OR 1ST FLR    Pilonoidal Cyst      PYELOGRAM-RETROGRADE Right 9/20/2015    Performed by Sofiya [facility] MD at [facility] OR 1ST FLR    REVISION, FLAP GRAFT PROCEDURE Bilateral 4/29/2014    Performed by German Velásquez III, MD at [facility] OR 2ND FLR    SKIN SURGERY  MOHS Repair    nose    TONSILLECTOMY       Patient Active Problem List   Diagnosis     Hypertension    Hyperlipidemia    Kidney stone    Chronic prostatitis    Elevated PSA    Status post cataract extraction    Mohs defect of ala nasi    ED (erectile dysfunction) of organic origin    Urinary retention    Special screening for malignant neoplasms, colon    AK (actinic keratosis)    NAION (non-arteritic anterior ischemic optic neuropathy), left eye    Central scotoma, left eye    Meningioma    Arteriosclerotic coronary artery disease    IC (interstitial cystitis)    Multiple thyroid nodules    ASHA (obstructive sleep apnea)     There is no height or weight on file to calculate BMI.  2D Echo:  No results found for this or any previous visit.    Please See ROS/PMH and Active Problem List above      Anesthesia Evaluation    I have reviewed the Patient Summary Reports.    I have reviewed the Nursing Notes.      Review of Systems         Anesthesia Plan  Type of Anesthesia, risks & benefits discussed:  Anesthesia Type:  general  Patient's Preference:   Intra-op Monitoring Plan:   Intra-op Monitoring Plan Comments:   Post Op Pain Control Plan:   Post Op Pain Control Plan Comments:   Induction:   IV  Beta Blocker:  Patient is not currently on a Beta-Blocker (No further documentation required).       Informed Consent: Patient understands risks and agrees with Anesthesia plan.  Questions answered. Anesthesia consent signed with patient.  ASA Score: 2     Day of Surgery Review of History & Physical:  There are no significant changes.          Ready For Surgery From Anesthesia Perspective.

## 2019-05-20 NOTE — TRANSFER OF CARE
"Anesthesia Transfer of Care Note    Patient: Anton Christiansen    Procedure(s) Performed: Procedure(s) (LRB):  COLONOSCOPY (N/A)    Patient location: PACU    Anesthesia Type: general    Transport from OR: Transported from OR on 2-3 L/min O2 by NC with adequate spontaneous ventilation    Post pain: adequate analgesia    Post assessment: no apparent anesthetic complications    Post vital signs: stable    Level of consciousness: sedated    Nausea/Vomiting: no nausea/vomiting    Complications: none    Transfer of care protocol was followed      Last vitals:   Visit Vitals  BP (!) 166/90   Pulse 67   Temp 37.2 °C (99 °F) (Temporal)   Resp 16   Ht 5' 7" (1.702 m)   Wt 79.4 kg (175 lb)   SpO2 98%   BMI 27.41 kg/m²     "

## 2019-05-20 NOTE — H&P
Short Stay Endoscopy History and Physical    PCP - Ken Norris MD    Procedure - Colonoscopy  ASA - per anesthesia  Mallampati - per anesthesia  History of Anesthesia problems - no  Family history Anesthesia problems - no   Plan of anesthesia - General    HPI:  68 year old male with a history of HTN and HLD who presents for a colonoscopy.    ROS:  Constitutional: No fevers, chills, No weight loss  CV: No chest pain  Pulm: No cough, No shortness of breath  GI: see HPI  Derm: No rash    Medical History:  has a past medical history of Allergy, Arteriosclerotic coronary artery disease (4/10/2017), Basal cell carcinoma (07/14/15), BCC (basal cell carcinoma of skin) (4/2014), Cataract, Family history of colon cancer, Hyperlipidemia, Hypertension, Multiple thyroid nodules (4/24/2018), Nephrolithiasis, ASHA on CPAP, Prostatitis, chronic, and Special screening for malignant neoplasms, colon (6/19/2014).    Surgical History:  has a past surgical history that includes Tonsillectomy; Pilonoidal Cyst; Cataract extraction w/  intraocular lens implant (12/20/12); Colonoscopy (060509); Eye surgery; Skin surgery (MOHS Repair); and Division and Inset (4/29/14).    Family History: family history includes Cancer in his father; Cancer (age of onset: 70) in his mother; Hypertension in his mother; No Known Problems in his brother, maternal aunt, maternal grandfather, maternal grandmother, maternal uncle, paternal aunt, paternal grandfather, paternal grandmother, paternal uncle, and sister.. Otherwise no colon cancer, inflammatory bowel disease, or GI malignancies.    Social History:  reports that he has never smoked. He has never used smokeless tobacco. He reports that he does not drink alcohol or use drugs.    Review of patient's allergies indicates:   Allergen Reactions    Cephalexin Diarrhea    Ace inhibitors Other (See Comments)     Cough    Cialis [tadalafil] Other (See Comments)     Muscle pain      Proscar  [finasteride] Other (See Comments)     Decreased libido     Vicodin [hydrocodone-acetaminophen] Nausea And Vomiting       Medications:   Facility-Administered Medications Prior to Admission   Medication Dose Route Frequency Provider Last Rate Last Dose    gentamicin injection 160 mg  160 mg Intramuscular Q12H Thomas Quinteros MD   160 mg at 06/26/18 0950    lidocaine HCL 2% jelly   Topical (Top) Once Thomas Quinteros MD         Medications Prior to Admission   Medication Sig Dispense Refill Last Dose    ascorbic acid (VITAMIN C) 500 MG tablet Take 500 mg by mouth once daily.   Past Week at Unknown time    atorvastatin (LIPITOR) 10 MG tablet TAKE 1 TABLET BY MOUTH EVERY DAY 90 tablet 3 5/20/2019 at Unknown time    carvedilol (COREG) 12.5 MG tablet TAKE 1 TABLET BY MOUTH TWICE DAILY 60 tablet 11 5/20/2019 at Unknown time    cholecalciferol, vitamin D3, 3,000 unit Tab Take 1 tablet by mouth once daily.    Past Week at Unknown time    dutasteride (AVODART) 0.5 mg capsule TK ONE C PO  QD  10 5/20/2019 at Unknown time    fish oil-omega-3 fatty acids 300-1,000 mg capsule Take 2 g by mouth once daily.   Past Week at Unknown time    saw palmetto 80 MG capsule Take 80 mg by mouth 2 (two) times daily.   Past Week at Unknown time    vitamin E 100 UNIT capsule Take 100 Units by mouth once daily.   Past Week at Unknown time    diazePAM (VALIUM) 5 MG tablet Take 1 tablet (5 mg total) by mouth as needed for Anxiety. 3 tablet 0 More than a month at Unknown time         Physical Exam:    Vital Signs:   Vitals:    05/20/19 1106   BP: (!) 166/90   Pulse: 67   Resp: 16   Temp: 99 °F (37.2 °C)       General Appearance: Well appearing in no acute distress  Eyes:    No scleral icterus  ENT: Neck supple, Lips, mucosa, and tongue normal; teeth and gums normal  Lungs: CTA bilaterally  Heart:  S1, S2 normal, no murmurs heard  Abdomen: Soft, non tender, non distended with positive bowel sounds. No hepatosplenomegaly, ascites, or  mass.  Extremities: 2+ pulses, no clubbing, cyanosis or edema  Skin: No rash      Labs:  Lab Results   Component Value Date    WBC 6.21 04/11/2019    HGB 14.8 04/11/2019    HCT 44.6 04/11/2019     04/11/2019    CHOL 142 04/11/2019    TRIG 129 04/11/2019    HDL 39 (L) 04/11/2019    ALT 29 04/11/2019    AST 23 04/11/2019     04/11/2019    K 4.9 04/11/2019     04/11/2019    CREATININE 0.9 04/11/2019    BUN 20 04/11/2019    CO2 26 04/11/2019    TSH 2.853 04/11/2019    PSA 6.0 (H) 05/20/2014       I have explained the risks and benefits of endoscopy procedures to the patient including but not limited to bleeding, perforation, infection, and death.      Zahida Sweeney M.D.  Gastroenterology Fellow, PGY-V  Pager: [phone]  [facility]

## 2019-05-20 NOTE — [employer] PATIENT INSTRUCTIONS
Discharge Summary/Instructions after an Endoscopic Procedure  Patient Name: Anton Christiansen  Patient MRN: [id number]  Patient YOB: 1950  Monday, May 20, 2019  Artemio Greer MD  RESTRICTIONS:  During your procedure today, you received medications for sedation.  These   medications may affect your judgment, balance and coordination.  Therefore,   for 24 hours, you have the following restrictions:   - DO NOT drive a car, operate machinery, make legal/financial decisions,   sign important papers or drink alcohol.    ACTIVITY:  Today: no heavy lifting, straining or running due to procedural   sedation/anesthesia.  The following day: return to full activity including work.  DIET:  Eat and drink normally unless instructed otherwise.     TREATMENT FOR COMMON SIDE EFFECTS:  - Mild abdominal pain, nausea, belching, bloating or excessive gas:  rest,   eat lightly and use a heating pad.  - Sore Throat: treat with throat lozenges and/or gargle with warm salt   water.  - Because air was used during the procedure, expelling large amounts of air   from your rectum or belching is normal.  - If a bowel prep was taken, you may not have a bowel movement for 1-3 days.    This is normal.  SYMPTOMS TO WATCH FOR AND REPORT TO YOUR PHYSICIAN:  1. Abdominal pain or bloating, other than gas cramps.  2. Chest pain.  3. Back pain.  4. Signs of infection such as: chills or fever occurring within 24 hours   after the procedure.  5. Rectal bleeding, which would show as bright red, maroon, or black stools.   (A tablespoon of blood from the rectum is not serious, especially if   hemorrhoids are present.)  6. Vomiting.  7. Weakness or dizziness.  GO DIRECTLY TO THE NEAREST EMERGENCY ROOM IF YOU HAVE ANY OF THE FOLLOWING:      Difficulty breathing              Chills and/or fever over 101 F   Persistent vomiting and/or vomiting blood   Severe abdominal pain   Severe chest pain   Black, tarry stools   Bleeding- more than one tablespoon   Any  other symptom or condition that you feel may need urgent attention  Your doctor recommends these additional instructions:  If any biopsies were taken, your doctors clinic will contact you in 1 to 2   weeks with any results.  - Patient has a contact number available for emergencies.  The signs and   symptoms of potential delayed complications were discussed with the   patient.  Return to normal activities tomorrow.  Written discharge   instructions were provided to the patient.   - Discharge patient to home.   - Await pathology results.   - Repeat colonoscopy in 5 years for surveillance.   - The findings and recommendations were discussed with the designated   responsible adult.  For questions, problems or results please call your physician - Artemio Greer MD at Work:  [phone].  [facility], EMERGENCY ROOM PHONE NUMBER: [phone]  IF A COMPLICATION OR EMERGENCY SITUATION ARISES AND YOU ARE UNABLE TO REACH   YOUR PHYSICIAN - GO DIRECTLY TO THE EMERGENCY ROOM.  Artemio Greer MD  5/20/2019 11:58:26 AM  This report has been verified and signed electronically.  [employer]

## 2019-05-20 NOTE — ANESTHESIA POSTPROCEDURE EVALUATION
Anesthesia Post Evaluation    Patient: Anton Christiansen    Procedure(s) Performed: Procedure(s) (LRB):  COLONOSCOPY (N/A)    Final Anesthesia Type: general  Patient location during evaluation: GI PACU  Patient participation: Yes- Able to Participate  Level of consciousness: awake and alert  Post-procedure vital signs: reviewed and stable  Pain management: adequate  Airway patency: patent  PONV status at discharge: No PONV  Anesthetic complications: no      Cardiovascular status: blood pressure returned to baseline  Respiratory status: unassisted  Hydration status: euvolemic  Follow-up not needed.          Vitals Value Taken Time   /57 5/20/2019 12:02 PM   Temp 36.7 °C (98.1 °F) 5/20/2019 12:02 PM   Pulse 66 5/20/2019 12:02 PM   Resp 16 5/20/2019 12:02 PM   SpO2 96 % 5/20/2019 12:02 PM         No case tracking events are documented in the log.      Pain/Pura Score: Pura Score: 8 (5/20/2019 12:02 PM)

## 2019-05-27 ENCOUNTER — TELEPHONE (OUTPATIENT)
Dept: ENDOSCOPY | Facility: HOSPITAL | Age: 69
End: 2019-05-27

## 2019-06-11 ENCOUNTER — PATIENT MESSAGE (OUTPATIENT)
Dept: INFECTIOUS DISEASES | Facility: CLINIC | Age: 69
End: 2019-06-11

## 2019-06-11 ENCOUNTER — PATIENT OUTREACH (OUTPATIENT)
Dept: OTHER | Facility: OTHER | Age: 69
End: 2019-06-11

## 2019-06-11 RX ORDER — DUTASTERIDE 0.5 MG/1
CAPSULE, LIQUID FILLED ORAL
Qty: 30 CAPSULE | Refills: 10 | Status: SHIPPED | OUTPATIENT
Start: 2019-06-11 | End: 2019-06-14 | Stop reason: SDUPTHER

## 2019-06-11 NOTE — PROGRESS NOTES
Last 5 Patient Entered Readings                                      Current 30 Day Average: 126/73     Recent Readings 6/8/2019 6/8/2019 6/8/2019 6/6/2019 6/6/2019    SBP (mmHg) 129 118 136 135 134    DBP (mmHg) 73 73 77 71 73    Pulse 59 59 58 60 60        Digital Medicine: Health  Follow Up    Encounter consisted of talking about taking BP readings.  Patient reports he has been taking 3 readings, 1-2 minutes apart.  Instructed patient to wait 5-10 minutes before re-taking his readings.  Patient acknowledged.     Lifestyle Modifications:    1.Dietary Modifications (Sodium intake <2,000mg/day, food labels, dining out):   Deferred    2.Physical Activity:   Deferred    3.Medication Therapy:   Patient has been compliant with the medication regimen.  Patient reports he forgot 2-3 days to take his evening dose.  Reminded patient to set an alarm to remember to take his medications.    4.Patient has the following medication side effects/concerns: None  (Frequency/Alleviating factors/Precipitating factors, etc.)     Follow up with Mr. Anton Christiansen completed. No further questions or concerns. Will continue to follow up to achieve health goals.  Patient is currently at goal, 126/73 mmHg does not exceed <130/80 mmHg.

## 2019-06-14 ENCOUNTER — OFFICE VISIT (OUTPATIENT)
Dept: UROLOGY | Facility: CLINIC | Age: 69
End: 2019-06-14
Payer: MEDICARE

## 2019-06-14 ENCOUNTER — CLINICAL SUPPORT (OUTPATIENT)
Dept: INFECTIOUS DISEASES | Facility: CLINIC | Age: 69
End: 2019-06-14
Payer: MEDICARE

## 2019-06-14 VITALS
HEIGHT: 69 IN | DIASTOLIC BLOOD PRESSURE: 94 MMHG | WEIGHT: 176.13 LBS | HEART RATE: 60 BPM | SYSTOLIC BLOOD PRESSURE: 180 MMHG | BODY MASS INDEX: 26.09 KG/M2

## 2019-06-14 DIAGNOSIS — N13.8 BPH WITH URINARY OBSTRUCTION: ICD-10-CM

## 2019-06-14 DIAGNOSIS — N30.10 IC (INTERSTITIAL CYSTITIS): ICD-10-CM

## 2019-06-14 DIAGNOSIS — R97.20 ELEVATED PSA: Primary | ICD-10-CM

## 2019-06-14 DIAGNOSIS — N52.9 ED (ERECTILE DYSFUNCTION) OF ORGANIC ORIGIN: ICD-10-CM

## 2019-06-14 DIAGNOSIS — Z00.00 PREVENTATIVE HEALTH CARE: ICD-10-CM

## 2019-06-14 DIAGNOSIS — N40.1 BPH WITH URINARY OBSTRUCTION: ICD-10-CM

## 2019-06-14 PROCEDURE — 99999 PR PBB SHADOW E&M-EST. PATIENT-LVL IV: CPT | Mod: PBBFAC,,, | Performed by: UROLOGY

## 2019-06-14 PROCEDURE — 99214 PR OFFICE/OUTPT VISIT, EST, LEVL IV, 30-39 MIN: ICD-10-PCS | Mod: S$PBB,,, | Performed by: UROLOGY

## 2019-06-14 PROCEDURE — 99214 OFFICE O/P EST MOD 30 MIN: CPT | Mod: PBBFAC | Performed by: UROLOGY

## 2019-06-14 PROCEDURE — 99999 PR PBB SHADOW E&M-EST. PATIENT-LVL IV: ICD-10-PCS | Mod: PBBFAC,,, | Performed by: UROLOGY

## 2019-06-14 PROCEDURE — 90471 IMMUNIZATION ADMIN: CPT | Mod: PBBFAC

## 2019-06-14 PROCEDURE — 99214 OFFICE O/P EST MOD 30 MIN: CPT | Mod: S$PBB,,, | Performed by: UROLOGY

## 2019-06-14 PROCEDURE — 90750 HZV VACC RECOMBINANT IM: CPT | Mod: PBBFAC

## 2019-06-14 RX ORDER — DUTASTERIDE 0.5 MG/1
CAPSULE, LIQUID FILLED ORAL
Qty: 30 CAPSULE | Refills: 11 | Status: SHIPPED | OUTPATIENT
Start: 2019-06-14 | End: 2020-06-19 | Stop reason: SDUPTHER

## 2019-06-14 RX ORDER — SILDENAFIL 100 MG/1
100 TABLET, FILM COATED ORAL DAILY PRN
Qty: 10 TABLET | Refills: 12 | Status: SHIPPED | OUTPATIENT
Start: 2019-06-14 | End: 2021-10-05

## 2019-06-14 NOTE — PATIENT INSTRUCTIONS
Lab Results   Component Value Date    PSA 6.0 (H) 05/20/2014    PSA 5.81 (H) 06/04/2013    PSA 10.78 (H) 05/28/2013    PSADIAG 4.2 (H) 04/11/2019    PSADIAG 6.8 (H) 10/12/2018    PSADIAG 10.2 (H) 06/12/2018

## 2019-06-14 NOTE — PROGRESS NOTES
CC: enlarged prostate with  elevated PSA.    Anton Christiansen is a 68 y.o. man who is here for evaluation of elevated PSA.  His recent PSA was elevated to 11.7 from 5.7 last year.  Hx of elevated PSA, kidney stones, recurrent prostatitis, and ED.  He reports that ever since he has been avoiding caffeine, soda and tea, his urinary symptoms including urgency and discomfort got much better.   hx of calcium oxalate stone.  No family hx of prostate cancer reported.    1. UroNav bx of prostate 11/20/18     Findings:                                                                         --- Transrectal Ultrasound of the Prostate ---                               Prostate measurements.                                                                                                          PSA: Lab Results       Component                Value               Date                       PSA                      6.0 (H)             05/20/2014                 PSADIAG                  6.8 (H)             10/12/2018                   - Volume:73 gm.           PSA Density: 0.09                                                         no calcification in the prostate     Lesion #1: 0.9 cm left apex lateral peripheral zone lesion presumably represents recently found prostatic adenocarcinoma on biopsy.     Lesion #2: 0.7 cm right apex lateral peripheral zone lesion.  This could be related to post biopsy hemorrhage however an additional focus of malignancy cannot be excluded.      SPECIMEN  1) Prostate, left apex.  2) Prostate, left middle.  3) Prostate, left base.  4) Prostate, right apex.  5) Prostate, right middle.  6) Prostate, right base.  7) Prostate, target lesion #1.  8) Prostate, target lesion #2.  FINAL PATHOLOGIC DIAGNOSIS  PROSTATE BIOPSIES 1, 2, 3, 4, 5, 6, 7, AND 8:  NO CARCINOMA IDENTIFIED    2. TRUS bx of prostate 6/26/18  Findings:                                                                      --- Transrectal  Ultrasound of the Prostate ---                               Prostate measurements.                                                                                               PSA:   Lab Results   Component Value Date    PSA 6.0 (H) 05/20/2014    PSADIAG 10.2 (H) 06/12/2018            - Volume:100 gm.           PSA Density: 0.10                                                         no calcification in the prostate        Pathology  SPECIMEN  1) Prostate biopsy, left apex.  2) Prostate biopsy, left middle.  3) Prostate biopsy, left base.  4) Prostate biopsy, right apex.  5) Prostate biopsy, right middle.  6) Prostate biopsy, right base.  Supplemental Diagnosis  1. PROSTATE CORE BIOPSY SHOWING A SINGLE FOCUS SUSPICIOUS FOR ADENOCARCINOMA.  IMMUNOSTAINS FOR P63, HIGH MOLECULAR WEIGHT KERATIN AND AMACAR ARE NONCONTRIBUTORY  TO THE ABSENCE OF THIS SMALL FOCUS IN DEEPER LEVELS. CONTROLS STAIN APPROPRIATELY.     Following his prostate bx, I started him on Avodart for his LUTS back in 11/2018.  He reports that his voiding has improved. However, it affected his erectile function.  He would like to get a treatment for his ED.  Also he has noticed certain food and drinks affected his urinary symptoms of frequency, urgency, and dysuria.  He is here with PSA.    Past Medical History:   Diagnosis Date    Allergy     seasonal    Arteriosclerotic coronary artery disease 4/10/2017    Basal cell carcinoma 07/14/15    right dorsal hand    BCC (basal cell carcinoma of skin) 4/2014    nasal tip s/p Mohs with forehead flap    Cataract     Family history of colon cancer     Hyperlipidemia     Hypertension     Multiple thyroid nodules 4/24/2018    Nephrolithiasis     ASHA on CPAP     Prostatitis, chronic     Special screening for malignant neoplasms, colon 6/19/2014     Past Surgical History:   Procedure Laterality Date    CATARACT EXTRACTION W/  INTRAOCULAR LENS IMPLANT  12/20/12    OD    CLOSURE, MOHS PROCEDURE DEFECT  Left 4/8/2014    Performed by German Velásquez III, MD at [facility] OR 2ND FLR    COLONOSCOPY  [id number]    COLONOSCOPY N/A 5/20/2019    Performed by Artemio Greer MD at [facility] ENDO (4TH FLR)    colonoscopy N/A 6/19/2014    Performed by Stefan Weiner MD at [facility] ENDO (4TH FLR)    CYSTOSCOPY WITH STENT PLACEMENT Right 9/20/2015    Performed by Sofiya Marie MD at [facility] OR 1ST FLR    Division and Inset  4/29/14    D&I Forehead Flap    EYE SURGERY      INSERTION, IOL PROSTHESIS Left 1/8/2013    Performed by Miley Sna MD at [facility] OR 1ST FLR    INSERTION, IOL PROSTHESIS Right 12/20/2012    Performed by Miley San MD at [facility] OR 1ST FLR    PHACOEMULSIFICATION, CATARACT Left 1/8/2013    Performed by Miley San MD at [facility] OR 1ST FLR    PHACOEMULSIFICATION, CATARACT Right 12/20/2012    Performed by Miley San MD at [facility] OR 1ST FLR    Pilonoidal Cyst      PYELOGRAM-RETROGRADE Right 9/20/2015    Performed by Sofiya Marie MD at [facility] OR 1ST FLR    REVISION, PROCEDURE INVOLVING FLAP GRAFT Bilateral 4/29/2014    Performed by German Velásquez III, MD at [facility] OR 2ND FLR    SKIN SURGERY  MOHS Repair    nose    TONSILLECTOMY       Social History     Tobacco Use    Smoking status: Never Smoker    Smokeless tobacco: Never Used   Substance Use Topics    Alcohol use: No    Drug use: No     Family History   Problem Relation Age of Onset    Cancer Mother 70        colon    Hypertension Mother     Cancer Father     No Known Problems Sister     No Known Problems Brother     No Known Problems Maternal Aunt     No Known Problems Maternal Uncle     No Known Problems Paternal Aunt     No Known Problems Paternal Uncle     No Known Problems Maternal Grandmother     No Known Problems Maternal Grandfather     No Known Problems Paternal Grandmother     No Known Problems Paternal Grandfather     Glaucoma Neg Hx     Macular degeneration Neg Hx     Amblyopia Neg Hx     Blindness Neg Hx      Cataracts Neg Hx     Diabetes Neg Hx     Retinal detachment Neg Hx     Strabismus Neg Hx     Stroke Neg Hx     Thyroid disease Neg Hx     Melanoma Neg Hx     Psoriasis Neg Hx     Lupus Neg Hx     Eczema Neg Hx     Acne Neg Hx     Thyroid cancer Neg Hx      Allergy:  Review of patient's allergies indicates:   Allergen Reactions    Cephalexin Diarrhea    Ace inhibitors Other (See Comments)     Cough    Cialis [tadalafil] Other (See Comments)     Muscle pain      Proscar [finasteride] Other (See Comments)     Decreased libido     Vicodin [hydrocodone-acetaminophen] Nausea And Vomiting     Outpatient Encounter Medications as of 6/14/2019   Medication Sig Dispense Refill    ascorbic acid (VITAMIN C) 500 MG tablet Take 500 mg by mouth once daily.      atorvastatin (LIPITOR) 10 MG tablet TAKE 1 TABLET BY MOUTH EVERY DAY 90 tablet 3    carvedilol (COREG) 12.5 MG tablet TAKE 1 TABLET BY MOUTH TWICE DAILY 60 tablet 11    cholecalciferol, vitamin D3, 3,000 unit Tab Take 1 tablet by mouth once daily.       diazePAM (VALIUM) 5 MG tablet Take 1 tablet (5 mg total) by mouth as needed for Anxiety. 3 tablet 0    dutasteride (AVODART) 0.5 mg capsule TK ONE C PO  QD 30 capsule 11    fish oil-omega-3 fatty acids 300-1,000 mg capsule Take 2 g by mouth once daily.      saw palmetto 80 MG capsule Take 80 mg by mouth 2 (two) times daily.      vitamin E 100 UNIT capsule Take 100 Units by mouth once daily.      [DISCONTINUED] dutasteride (AVODART) 0.5 mg capsule TK ONE C PO  QD 30 capsule 10    sildenafil (VIAGRA) 100 MG tablet Take 1 tablet (100 mg total) by mouth daily as needed. 10 tablet 12     Facility-Administered Encounter Medications as of 6/14/2019   Medication Dose Route Frequency Provider Last Rate Last Dose    gentamicin injection 160 mg  160 mg Intramuscular Q12H Thomas Quinteros MD   160 mg at 06/26/18 0950    lidocaine HCL 2% jelly   Topical (Top) Once Thomas Quinteros MD         Review of Systems    General ROS: GENERAL:  No weight gain or loss  SKIN:  No rashes or lacerations  HEAD:  No headaches  EYES:  No exophthalmos, jaundice or blindness  EARS:  No dizziness, tinnitus or hearing loss  NOSE:  No changes in smell  MOUTH & THROAT:  No dyskinetic movements or obvious goiter  CHEST:  No shortness of breath, hyperventilation or cough  CARDIOVASCULAR:  No tachycardia or chest pain  ABDOMEN:  No nausea, vomiting, pain, constipation or diarrhea  URINARY:  No frequency, dysuria or sexual dysfunction  ENDOCRINE:  No polydipsia, polyuria  MUSCULOSKELETAL:  No pain or stiffness of the joints  NEUROLOGIC:  No weakness, sensory changes, seizures, confusion, memory loss, tremor or other abnormal movements  Physical Exam     Vitals:    06/14/19 0906   BP: (!) 180/94   Pulse: 60     General Appearance:  Alert, cooperative, no distress, appears stated age   Head:  Normocephalic, without obvious abnormality, atraumatic   Eyes:  PERRL, conjunctiva/corneas clear, EOM's intact, fundi benign, both eyes   Ears:  Normal TM's and external ear canals, both ears   Nose: Nares normal, septum midline, mucosa normal, no drainage or sinus tenderness   Throat: Lips, mucosa, and tongue normal; teeth and gums normal   Neck: Supple, symmetrical, trachea midline, no adenopathy, thyroid: not enlarged, symmetric, no tenderness/mass/nodules, no carotid bruit or JVD   Back:   Symmetric, no curvature, ROM normal, no CVA tenderness   Lungs:   Clear to auscultation bilaterally, respirations unlabored   Chest Wall:  No tenderness or deformity   Heart:  Regular rate and rhythm, S1, S2 normal, no murmur, rub or gallop   Abdomen:   Soft, non-tender, bowel sounds active all four quadrants,  no masses, no organomegaly of liver and spleen  No hernia noted   Genitalia:  Scrotum: no rash or lesion  Normal symmetric epididymis without masses  Normal vas palpated  Normal size, symmetric testicles with no masses   Normal urethral meatus with no  discharge  Normal circumcised penis with no lesion   Rectal:  Normal perineum and anus upon inspection.  Normal tone, no masses or tenderness;   Extremities: Extremities normal, atraumatic, no cyanosis or edema   Pulses: 2+ and symmetric   Skin: Skin color, texture, turgor normal, no rashes or lesions   Lymph nodes: Cervical, supraclavicular, and axillary nodes normal   Neurologic: Normal     Prostate 40 grams smooth with no nodule or tenderness.    LABS:  Lab Results   Component Value Date    PSA 6.0 (H) 05/20/2014    PSA 5.81 (H) 06/04/2013    PSA 10.78 (H) 05/28/2013    PSADIAG 4.2 (H) 04/11/2019    PSADIAG 6.8 (H) 10/12/2018    PSADIAG 10.2 (H) 06/12/2018     Lab Results   Component Value Date    CREATININE 0.9 04/11/2019    CREATININE 0.9 04/10/2018    CREATININE 1.0 04/10/2017     No results found for this or any previous visit.  Urine Culture, Routine   Date Value Ref Range Status   09/20/2015 No growth  Final     Assessment and Plan:  Elevated PSA    BPH with urinary obstruction  -     dutasteride (AVODART) 0.5 mg capsule; TK ONE C PO  QD  Dispense: 30 capsule; Refill: 11    ED (erectile dysfunction) of organic origin  -     sildenafil (VIAGRA) 100 MG tablet; Take 1 tablet (100 mg total) by mouth daily as needed.  Dispense: 10 tablet; Refill: 12    IC (interstitial cystitis)    continue IC smart diet avoiding bladder irritants.    So far UroNav bx of prostate x 2, showed no malignancy.  Started Avodart after negative bx back in 11/2018.  His PSA has responded favorably.  Will continue avodart.  Refills given.  Will follow up with serial PSA.    For ED resulted from taking Avodart, will try Viagra.  Detailed instructions given.    I spent 40 minutes with the patient of which more than half was spent in direct consultation with the patient in regards to our treatment and plan.      Follow-up:  Follow up in about 5 months (around 11/14/2019) for PSA.

## 2019-07-01 ENCOUNTER — PATIENT OUTREACH (OUTPATIENT)
Dept: OTHER | Facility: OTHER | Age: 69
End: 2019-07-01

## 2019-07-01 NOTE — PROGRESS NOTES
Last 5 Patient Entered Readings                                      Current 30 Day Average: 127/74     Recent Readings 6/30/2019 6/30/2019 6/30/2019 6/30/2019 6/30/2019    SBP (mmHg) 135 143 157 142 132    DBP (mmHg) 80 86 83 74 78    Pulse 61 62 61 63 62        Per 30 day average, 127/74 mmHg, patient's BP is at goal.     Mr. Christiansen checks multiple BP readings in a row. Explained that this is not warranted. Advised he wait 10 minutes between repeat readings if he is questioning the accuracy. Encouraged him to rest fully, prior to checking BP and to only check it once.     Patient's health , Tera Cervantes, will be following up. I will continue to monitor regularly and will follow up in 4-6 months, sooner if BP begins to trend upward or downward.    Asked patient to call or message with questions or concerns.     Current HTN regimen:  Hypertension Medications             carvedilol (COREG) 12.5 MG tablet TAKE 1 TABLET BY MOUTH TWICE DAILY

## 2019-07-05 ENCOUNTER — OFFICE VISIT (OUTPATIENT)
Dept: SLEEP MEDICINE | Facility: CLINIC | Age: 69
End: 2019-07-05
Payer: MEDICARE

## 2019-07-05 VITALS
DIASTOLIC BLOOD PRESSURE: 87 MMHG | WEIGHT: 173.31 LBS | HEART RATE: 63 BPM | SYSTOLIC BLOOD PRESSURE: 157 MMHG | HEIGHT: 69 IN | BODY MASS INDEX: 25.67 KG/M2

## 2019-07-05 DIAGNOSIS — G47.33 OSA (OBSTRUCTIVE SLEEP APNEA): Primary | ICD-10-CM

## 2019-07-05 PROCEDURE — 99999 PR PBB SHADOW E&M-EST. PATIENT-LVL III: ICD-10-PCS | Mod: PBBFAC,,, | Performed by: INTERNAL MEDICINE

## 2019-07-05 PROCEDURE — 99999 PR PBB SHADOW E&M-EST. PATIENT-LVL III: CPT | Mod: PBBFAC,,, | Performed by: INTERNAL MEDICINE

## 2019-07-05 PROCEDURE — 99213 OFFICE O/P EST LOW 20 MIN: CPT | Mod: S$PBB,,, | Performed by: INTERNAL MEDICINE

## 2019-07-05 PROCEDURE — 99213 OFFICE O/P EST LOW 20 MIN: CPT | Mod: PBBFAC | Performed by: INTERNAL MEDICINE

## 2019-07-05 PROCEDURE — 99213 PR OFFICE/OUTPT VISIT, EST, LEVL III, 20-29 MIN: ICD-10-PCS | Mod: S$PBB,,, | Performed by: INTERNAL MEDICINE

## 2019-07-05 NOTE — PATIENT INSTRUCTIONS
Sleep Hygiene Practices    1. Try going to bed only when you are drowsy.    ?    2. If you are unable to fall asleep or stay asleep, leave your bedroom and engage in a quiet activity elsewhere. Do not permit yourself to fall asleep outside the bedroom. Return to bed when and only when you are sleepy. Repeat this process as often as necessary throughout night.    3. Maintain regular wake-up time, even on days off work & weekends    4. Use your bedroom for sleep and sex    5. Avoid napping during the daytime. If daytime sleepiness becomes overwhelming, limit nap time to a single nap of less than 1hr, no later than 3pm.    6. Distract your mind. Avoid clock watching. Lying in bed unable to sleep and frustrated needs to be avoided. Try reading or watching a videotape or listening to books on tape. It may be necessary to go into another room to do these.    7. Avoid caffeine within 4-6hrs of bedtime    8. Avoid use of nicotine close to bedtime    9. do not drink alcoholic beverages within 4-6hrs of bedtime    10. While a light snack before bedtime can help promote sound sleep, avoid large meals.    11. Obtain regular exercise, but avoid strenuous exercise within 4hrs of bedtime    12. Minimize light, noise, and extremes in temperature in the bedroom.    13. Precautions: The patient was advised to abstain from driving should they feel sleepy or drowsy.  ?    * This information is provided had been directly obtained from the [employer] wellness booklet on sleep hygiene. ([facility], [address] [place] [address]
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
## 2019-08-06 ENCOUNTER — PATIENT OUTREACH (OUTPATIENT)
Dept: OTHER | Facility: OTHER | Age: 69
End: 2019-08-06

## 2019-08-06 ENCOUNTER — NURSE TRIAGE (OUTPATIENT)
Dept: ADMINISTRATIVE | Facility: CLINIC | Age: 69
End: 2019-08-06

## 2019-08-06 NOTE — PROGRESS NOTES
"Last 5 Patient Entered Readings                                      Current 30 Day Average: 132/78     Recent Readings 8/5/2019 8/5/2019 8/3/2019 8/2/2019 8/1/2019    SBP (mmHg) 126 140 129 123 136    DBP (mmHg) 71 76 69 70 81    Pulse 66 69 63 65 60        Digital Medicine: Health  Follow Up    Patient reports he knows his BP has been higher as of late.  Patient attributes his elevated readings to "stopping his vitamins".  Patient reports he started his vitamins again and it seems to be coming down.  Patient reports thinking it was his blood pressure machine.  Patient went to the [facility] and received a new machine.  Patient is happy with receiving a new machine.  Patient reports his first reading is elevated.  Encouraged patient to wait 5-10 minutes and take another reading.  Patient verbalized understanding.     Lifestyle Modifications:    1.Dietary Modifications (Sodium intake <2,000mg/day, food labels, dining out):   Deferred    2.Physical Activity:   Patient reports he has continued to stay active.  Gave praises to patient.     3.Medication Therapy:   Patient has been compliant with the medication regimen.    4.Patient has the following medication side effects/concerns: None  (Frequency/Alleviating factors/Precipitating factors, etc.)     Follow up with Mr. Anton Christiansen completed. No further questions or concerns. Will continue to follow up to achieve health goals.  Patient is currently not at goal, 132/78 mmHg does exceed <130/80 mmHg.      "

## 2019-08-07 NOTE — TELEPHONE ENCOUNTER
Reason for Disposition   [1] Breast pain AND [2] cause is not known    Protocols used: BREAST SYMPTOMS-A-AH

## 2019-08-08 ENCOUNTER — TELEPHONE (OUTPATIENT)
Dept: PHYSICAL MEDICINE AND REHAB | Facility: CLINIC | Age: 69
End: 2019-08-08

## 2019-08-08 ENCOUNTER — TELEPHONE (OUTPATIENT)
Dept: NEUROSURGERY | Facility: CLINIC | Age: 69
End: 2019-08-08

## 2019-08-08 DIAGNOSIS — D32.9 MENINGIOMA: Primary | ICD-10-CM

## 2019-08-08 NOTE — TELEPHONE ENCOUNTER
----- Message from Braxton Valadez sent at 8/8/2019  2:39 PM CDT -----  Contact: Patient @ [phone]  Patient is schedule on 9-24th the MRI is needed, pt is expecting an update

## 2019-08-22 ENCOUNTER — OFFICE VISIT (OUTPATIENT)
Dept: OPTOMETRY | Facility: CLINIC | Age: 69
End: 2019-08-22
Payer: MEDICARE

## 2019-08-22 DIAGNOSIS — Z96.1 PSEUDOPHAKIA OF BOTH EYES: ICD-10-CM

## 2019-08-22 DIAGNOSIS — H52.203 MYOPIC ASTIGMATISM OF BOTH EYES: ICD-10-CM

## 2019-08-22 DIAGNOSIS — Z13.5 GLAUCOMA SCREENING: ICD-10-CM

## 2019-08-22 DIAGNOSIS — H52.13 MYOPIC ASTIGMATISM OF BOTH EYES: ICD-10-CM

## 2019-08-22 DIAGNOSIS — H47.012 NAION (NON-ARTERITIC ANTERIOR ISCHEMIC OPTIC NEUROPATHY), LEFT EYE: Primary | ICD-10-CM

## 2019-08-22 PROCEDURE — 92015 PR REFRACTION: ICD-10-PCS | Mod: ,,, | Performed by: OPTOMETRIST

## 2019-08-22 PROCEDURE — 99213 OFFICE O/P EST LOW 20 MIN: CPT | Mod: PBBFAC | Performed by: OPTOMETRIST

## 2019-08-22 PROCEDURE — 99999 PR PBB SHADOW E&M-EST. PATIENT-LVL III: ICD-10-PCS | Mod: PBBFAC,,, | Performed by: OPTOMETRIST

## 2019-08-22 PROCEDURE — 92014 PR EYE EXAM, EST PATIENT,COMPREHESV: ICD-10-PCS | Mod: S$PBB,,, | Performed by: OPTOMETRIST

## 2019-08-22 PROCEDURE — 99999 PR PBB SHADOW E&M-EST. PATIENT-LVL III: CPT | Mod: PBBFAC,,, | Performed by: OPTOMETRIST

## 2019-08-22 PROCEDURE — 92015 DETERMINE REFRACTIVE STATE: CPT | Mod: ,,, | Performed by: OPTOMETRIST

## 2019-08-22 PROCEDURE — 92014 COMPRE OPH EXAM EST PT 1/>: CPT | Mod: S$PBB,,, | Performed by: OPTOMETRIST

## 2019-08-22 NOTE — PROGRESS NOTES
HPI     Patient here today for routine exam. Patient denies any VA changes. No   f/f.  NAION Left eye, 'cloud in vision' has lightened and seems to be slightly   better  ATs ou prn    Last edited by Regino Dias, OD on 8/22/2019  8:35 AM. (History)            Assessment /Plan     For exam results, see Encounter Report.    NAION (non-arteritic anterior ischemic optic neuropathy), left eye  -Stable with previous, perceived improvement     Glaucoma screening  -Monitor with annual eye exam and IOP check    Pseudophakia of both eyes  -clear, centered    Myopic astigmatism of both eyes  -optional  Eyeglass Final Rx     Eyeglass Final Rx       Sphere Cylinder Axis Dist VA Add    Right [place] +0.75 180 20/20 +2.50    Left [place] +0.50 175 20/100 +2.50    Type:  PAL    Expiration Date:  8/22/2020                  RTC 1 yr

## 2019-08-24 ENCOUNTER — PATIENT MESSAGE (OUTPATIENT)
Dept: ADMINISTRATIVE | Facility: OTHER | Age: 69
End: 2019-08-24

## 2019-09-10 ENCOUNTER — PATIENT MESSAGE (OUTPATIENT)
Dept: NEUROSURGERY | Facility: CLINIC | Age: 69
End: 2019-09-10

## 2019-10-01 ENCOUNTER — TELEPHONE (OUTPATIENT)
Dept: NEUROSURGERY | Facility: CLINIC | Age: 69
End: 2019-10-01

## 2019-10-01 ENCOUNTER — HOSPITAL ENCOUNTER (OUTPATIENT)
Dept: RADIOLOGY | Facility: HOSPITAL | Age: 69
Discharge: HOME | End: 2019-10-01
Attending: NEUROLOGICAL SURGERY
Payer: MEDICARE

## 2019-10-01 ENCOUNTER — OFFICE VISIT (OUTPATIENT)
Dept: NEUROSURGERY | Facility: CLINIC | Age: 69
End: 2019-10-01
Payer: MEDICARE

## 2019-10-01 VITALS
WEIGHT: 177.5 LBS | HEART RATE: 65 BPM | SYSTOLIC BLOOD PRESSURE: 161 MMHG | BODY MASS INDEX: 26.21 KG/M2 | TEMPERATURE: 99 F | DIASTOLIC BLOOD PRESSURE: 84 MMHG

## 2019-10-01 DIAGNOSIS — D32.9 MENINGIOMA: ICD-10-CM

## 2019-10-01 DIAGNOSIS — D32.9 MENINGIOMA: Primary | ICD-10-CM

## 2019-10-01 LAB
CREAT SERPL-MCNC: 1 MG/DL (ref 0.5–1.4)
SAMPLE: NORMAL

## 2019-10-01 PROCEDURE — 70553 MRI BRAIN STEM W/O & W/DYE: CPT | Mod: 26,,, | Performed by: RADIOLOGY

## 2019-10-01 PROCEDURE — 25500020 PHARM REV CODE 255: Performed by: NEUROLOGICAL SURGERY

## 2019-10-01 PROCEDURE — A9585 GADOBUTROL INJECTION: HCPCS | Performed by: NEUROLOGICAL SURGERY

## 2019-10-01 PROCEDURE — 70553 MRI BRAIN STEM W/O & W/DYE: CPT | Mod: TC

## 2019-10-01 PROCEDURE — 99999 PR PBB SHADOW E&M-EST. PATIENT-LVL III: ICD-10-PCS | Mod: PBBFAC,,, | Performed by: NEUROLOGICAL SURGERY

## 2019-10-01 PROCEDURE — 70553 MRI BRAIN W WO CONTRAST: ICD-10-PCS | Mod: 26,,, | Performed by: RADIOLOGY

## 2019-10-01 PROCEDURE — 99213 OFFICE O/P EST LOW 20 MIN: CPT | Mod: PBBFAC,25 | Performed by: NEUROLOGICAL SURGERY

## 2019-10-01 PROCEDURE — 99214 PR OFFICE/OUTPT VISIT, EST, LEVL IV, 30-39 MIN: ICD-10-PCS | Mod: S$PBB,,, | Performed by: NEUROLOGICAL SURGERY

## 2019-10-01 PROCEDURE — 99214 OFFICE O/P EST MOD 30 MIN: CPT | Mod: S$PBB,,, | Performed by: NEUROLOGICAL SURGERY

## 2019-10-01 PROCEDURE — 99999 PR PBB SHADOW E&M-EST. PATIENT-LVL III: CPT | Mod: PBBFAC,,, | Performed by: NEUROLOGICAL SURGERY

## 2019-10-01 RX ORDER — GADOBUTROL 604.72 MG/ML
8 INJECTION INTRAVENOUS
Status: COMPLETED | OUTPATIENT
Start: 2019-10-01 | End: 2019-10-01

## 2019-10-01 RX ADMIN — GADOBUTROL 8 ML: 604.72 INJECTION INTRAVENOUS at 09:10

## 2019-10-01 NOTE — PROGRESS NOTES
Subjective:   I, Lynn Jaramillo, attest that this documentation has been prepared under the direction and in the presence of Twin Nelson MD.     Patient ID: Anton Christiansen is a 68 y.o. male     Chief Complaint: Follow-up      HPI  MrBruno Christiansen is a pleasant 68 y.o. gentleman with a meningioma who presents today for his 2 year follow up with MRI Brain. At the time of the last office visit, on 09/12/2017, the pt had no complaints.     He states he has been doing well in the interim and has no complaints at this time.    Review of Systems   Constitutional: Negative for activity change, appetite change, fatigue, fever and unexpected weight change.   HENT: Negative for facial swelling.    Eyes: Negative.    Respiratory: Negative.    Cardiovascular: Negative.    Gastrointestinal: Negative for diarrhea, nausea and vomiting.   Endocrine: Negative.    Genitourinary: Negative.    Musculoskeletal: Negative for back pain, joint swelling, myalgias and neck pain.   Neurological: Negative for dizziness, weakness, numbness and headaches.   Psychiatric/Behavioral: Negative.       Past Medical History:   Diagnosis Date    Allergy     seasonal    Arteriosclerotic coronary artery disease 4/10/2017    Basal cell carcinoma 07/14/15    right dorsal hand    BCC (basal cell carcinoma of skin) 4/2014    nasal tip s/p Mohs with forehead flap    Cataract     Family history of colon cancer     Hyperlipidemia     Hypertension     Multiple thyroid nodules 4/24/2018    Nephrolithiasis     ASHA on CPAP     Prostatitis, chronic     Special screening for malignant neoplasms, colon 6/19/2014       Objective:      Vitals:    10/01/19 1051   BP: (!) 161/84   Pulse: 65   Temp: 98.6 °F (37 °C)      Physical Exam   Constitutional: He is oriented to person, place, and time. He appears well-nourished.   HENT:   Head: Normocephalic and atraumatic.   Neck: Neck supple.   Neurological: He is alert and oriented to person, place, and  time. No cranial nerve deficit. He displays a negative Romberg sign. GCS eye subscore is 4. GCS verbal subscore is 5. GCS motor subscore is 6.        IMAGING:  MRI Brain W WO Contrast (10/01/2019) shows a meningioma along the floor of the anterior cranial fossa that is stable when compared to prior study.    I have personally reviewed the images with the pt.      I, Dr. Twin Nelson, personally performed the services described in this documentation. All medical record entries made by the scribe, Lynn Jaramillo, were at my direction and in my presence.  I have reviewed the chart and agree that the record reflects my personal performance and is accurate and complete. Twin Nelson MD. 10/01/2019    Assessment:       Meningioma.     Plan:   I have personally reviewed the MRI Brain with the pt which  shows a meningioma along the floor of the anterior cranial fossa that is stable when compared to prior study.    I will schedule the patient for 2 year follow up with MRI Brain.

## 2019-10-16 ENCOUNTER — PATIENT MESSAGE (OUTPATIENT)
Dept: ADMINISTRATIVE | Facility: OTHER | Age: 69
End: 2019-10-16

## 2019-10-22 ENCOUNTER — PATIENT OUTREACH (OUTPATIENT)
Dept: OTHER | Facility: OTHER | Age: 69
End: 2019-10-22

## 2019-10-22 NOTE — PROGRESS NOTES
"Digital Medicine: Health  Follow-Up  Patient denies any other concerns at this time.           Follow Up  Follow-up reason(s): reading review      Patient states his BP has been recently elevated and is unsure as to what is causing it.  Patient reports it always goes up and down.  Verified that patient is regularly charging his machine.         Diet:       Patient reports he has gained a few pounds from "eating a little more lately".  Reminded patient to continue to monitor his salt intake.     Intervention(s): portion control and reducing sodium intake      INTERVENTION(S)  recommended diet modifications, recommend physical activity and encouragement/support      There are no preventive care reminders to display for this patient.    Last 5 Patient Entered Readings                                      Current 30 Day Average: 130/78     Recent Readings 10/18/2019 10/18/2019 10/15/2019 10/15/2019 10/9/2019    SBP (mmHg) 135 147 132 141 130    DBP (mmHg) 84 89 83 79 71    Pulse 58 58 62 60 65                "

## 2019-11-26 ENCOUNTER — LAB VISIT (OUTPATIENT)
Dept: LAB | Facility: HOSPITAL | Age: 69
End: 2019-11-26
Attending: UROLOGY
Payer: MEDICARE

## 2019-11-26 DIAGNOSIS — C61 PROSTATE CANCER: ICD-10-CM

## 2019-11-26 LAB — COMPLEXED PSA SERPL-MCNC: 6.9 NG/ML (ref 0–4)

## 2019-11-26 PROCEDURE — 84153 ASSAY OF PSA TOTAL: CPT

## 2019-11-26 PROCEDURE — 36415 COLL VENOUS BLD VENIPUNCTURE: CPT

## 2019-12-01 RX ORDER — CARVEDILOL 12.5 MG/1
TABLET ORAL
Qty: 60 TABLET | Refills: 11 | Status: SHIPPED | OUTPATIENT
Start: 2019-12-01 | End: 2020-09-02

## 2019-12-03 ENCOUNTER — OFFICE VISIT (OUTPATIENT)
Dept: UROLOGY | Facility: CLINIC | Age: 69
End: 2019-12-03
Payer: MEDICARE

## 2019-12-03 VITALS
SYSTOLIC BLOOD PRESSURE: 158 MMHG | BODY MASS INDEX: 26.55 KG/M2 | WEIGHT: 179.25 LBS | HEART RATE: 73 BPM | HEIGHT: 69 IN | DIASTOLIC BLOOD PRESSURE: 85 MMHG

## 2019-12-03 DIAGNOSIS — N13.8 BPH WITH URINARY OBSTRUCTION: ICD-10-CM

## 2019-12-03 DIAGNOSIS — R97.20 ELEVATED PSA: Primary | ICD-10-CM

## 2019-12-03 DIAGNOSIS — N40.1 BPH WITH URINARY OBSTRUCTION: ICD-10-CM

## 2019-12-03 PROCEDURE — 1126F PR PAIN SEVERITY QUANTIFIED, NO PAIN PRESENT: ICD-10-PCS | Mod: ,,, | Performed by: UROLOGY

## 2019-12-03 PROCEDURE — 99999 PR PBB SHADOW E&M-EST. PATIENT-LVL IV: ICD-10-PCS | Mod: PBBFAC,,, | Performed by: UROLOGY

## 2019-12-03 PROCEDURE — 99214 OFFICE O/P EST MOD 30 MIN: CPT | Mod: S$PBB,,, | Performed by: UROLOGY

## 2019-12-03 PROCEDURE — 1126F AMNT PAIN NOTED NONE PRSNT: CPT | Mod: ,,, | Performed by: UROLOGY

## 2019-12-03 PROCEDURE — 99214 PR OFFICE/OUTPT VISIT, EST, LEVL IV, 30-39 MIN: ICD-10-PCS | Mod: S$PBB,,, | Performed by: UROLOGY

## 2019-12-03 PROCEDURE — 1159F MED LIST DOCD IN RCRD: CPT | Mod: ,,, | Performed by: UROLOGY

## 2019-12-03 PROCEDURE — 1159F PR MEDICATION LIST DOCUMENTED IN MEDICAL RECORD: ICD-10-PCS | Mod: ,,, | Performed by: UROLOGY

## 2019-12-03 PROCEDURE — 99214 OFFICE O/P EST MOD 30 MIN: CPT | Mod: PBBFAC | Performed by: UROLOGY

## 2019-12-03 PROCEDURE — 99999 PR PBB SHADOW E&M-EST. PATIENT-LVL IV: CPT | Mod: PBBFAC,,, | Performed by: UROLOGY

## 2019-12-03 NOTE — PROGRESS NOTES
CC: enlarged prostate (100 g) with  elevated PSA.    Anton Christiansen is a 69 y.o. man who is here for evaluation of elevated PSA.  His recent PSA was elevated to 11.7 from 5.7 last year.  Hx of elevated PSA, kidney stones, recurrent prostatitis, and ED.  He reports that ever since he has been avoiding caffeine, soda and tea, his urinary symptoms including urgency and discomfort got much better.   hx of calcium oxalate stone.  No family hx of prostate cancer reported.    1. UroNav bx of prostate 11/20/18     Findings:                                                                         --- Transrectal Ultrasound of the Prostate ---                               Prostate measurements.                                                                                                          PSA: Lab Results       Component                Value               Date                       PSA                      6.0 (H)             05/20/2014                 PSADIAG                  6.8 (H)             10/12/2018                   - Volume:73 gm.           PSA Density: 0.09                                                         no calcification in the prostate     Lesion #1: 0.9 cm left apex lateral peripheral zone lesion presumably represents recently found prostatic adenocarcinoma on biopsy.     Lesion #2: 0.7 cm right apex lateral peripheral zone lesion.  This could be related to post biopsy hemorrhage however an additional focus of malignancy cannot be excluded.      SPECIMEN  1) Prostate, left apex.  2) Prostate, left middle.  3) Prostate, left base.  4) Prostate, right apex.  5) Prostate, right middle.  6) Prostate, right base.  7) Prostate, target lesion #1.  8) Prostate, target lesion #2.  FINAL PATHOLOGIC DIAGNOSIS  PROSTATE BIOPSIES 1, 2, 3, 4, 5, 6, 7, AND 8:  NO CARCINOMA IDENTIFIED    2. TRUS bx of prostate 6/26/18  Findings:                                                                      ---  Transrectal Ultrasound of the Prostate ---                               Prostate measurements.                                                                                               PSA:   Lab Results   Component Value Date    PSA 6.0 (H) 05/20/2014    PSADIAG 10.2 (H) 06/12/2018            - Volume:100 gm.           PSA Density: 0.10                                                         no calcification in the prostate        Pathology  SPECIMEN  1) Prostate biopsy, left apex.  2) Prostate biopsy, left middle.  3) Prostate biopsy, left base.  4) Prostate biopsy, right apex.  5) Prostate biopsy, right middle.  6) Prostate biopsy, right base.  Supplemental Diagnosis  1. PROSTATE CORE BIOPSY SHOWING A SINGLE FOCUS SUSPICIOUS FOR ADENOCARCINOMA.  IMMUNOSTAINS FOR P63, HIGH MOLECULAR WEIGHT KERATIN AND AMACAR ARE NONCONTRIBUTORY  TO THE ABSENCE OF THIS SMALL FOCUS IN DEEPER LEVELS. CONTROLS STAIN APPROPRIATELY.     Following his prostate bx, I started him on Avodart for his LUTS back in 11/2018.  He reports that his voiding has improved. However, it affected his erectile function.  He would like to get a treatment for his ED.  Also he has noticed certain food and drinks affected his urinary symptoms of frequency, urgency, and dysuria.  He is here with PSA.    Past Medical History:   Diagnosis Date    Allergy     seasonal    Arteriosclerotic coronary artery disease 4/10/2017    Basal cell carcinoma 07/14/15    right dorsal hand    BCC (basal cell carcinoma of skin) 4/2014    nasal tip s/p Mohs with forehead flap    Cataract     Family history of colon cancer     Hyperlipidemia     Hypertension     Multiple thyroid nodules 4/24/2018    Nephrolithiasis     ASHA on CPAP     Prostatitis, chronic     Special screening for malignant neoplasms, colon 6/19/2014     Past Surgical History:   Procedure Laterality Date    CATARACT EXTRACTION W/  INTRAOCULAR LENS IMPLANT  12/20/12    OD    COLONOSCOPY  [id number]     COLONOSCOPY N/A 5/20/2019    Procedure: COLONOSCOPY;  Surgeon: Artemio Greer MD;  Location: [facility] ([address] [place] [address]);  Service: Endoscopy;  Laterality: N/A;    Division and Inset  4/29/14    D&I Forehead Flap    EYE SURGERY      Pilonoidal Cyst      SKIN SURGERY  MOHS Repair    nose    TONSILLECTOMY       Social History     Tobacco Use    Smoking status: Never Smoker    Smokeless tobacco: Never Used   Substance Use Topics    Alcohol use: No    Drug use: No     Family History   Problem Relation Age of Onset    Cancer Mother 70        colon    Hypertension Mother     Cancer Father     No Known Problems Sister     No Known Problems Brother     No Known Problems Maternal Aunt     No Known Problems Maternal Uncle     No Known Problems Paternal Aunt     No Known Problems Paternal Uncle     No Known Problems Maternal Grandmother     No Known Problems Maternal Grandfather     No Known Problems Paternal Grandmother     No Known Problems Paternal Grandfather     Glaucoma Neg Hx     Macular degeneration Neg Hx     Amblyopia Neg Hx     Blindness Neg Hx     Cataracts Neg Hx     Diabetes Neg Hx     Retinal detachment Neg Hx     Strabismus Neg Hx     Stroke Neg Hx     Thyroid disease Neg Hx     Melanoma Neg Hx     Psoriasis Neg Hx     Lupus Neg Hx     Eczema Neg Hx     Acne Neg Hx     Thyroid cancer Neg Hx      Allergy:  Review of patient's allergies indicates:   Allergen Reactions    Cephalexin Diarrhea    Ace inhibitors Other (See Comments)     Cough    Cialis [tadalafil] Other (See Comments)     Muscle pain      Proscar [finasteride] Other (See Comments)     Decreased libido     Vicodin [hydrocodone-acetaminophen] Nausea And Vomiting     Outpatient Encounter Medications as of 12/3/2019   Medication Sig Dispense Refill    ascorbic acid (VITAMIN C) 500 MG tablet Take 500 mg by mouth once daily.      atorvastatin (LIPITOR) 10 MG tablet TAKE 1 TABLET BY MOUTH EVERY DAY 90 tablet 3     carvedilol (COREG) 12.5 MG tablet TAKE 1 TABLET BY MOUTH TWICE DAILY 60 tablet 11    cholecalciferol, vitamin D3, 3,000 unit Tab Take 1 tablet by mouth once daily.       dutasteride (AVODART) 0.5 mg capsule TK ONE C PO  QD 30 capsule 11    fish oil-omega-3 fatty acids 300-1,000 mg capsule Take 2 g by mouth once daily.      saw palmetto 80 MG capsule Take 80 mg by mouth 2 (two) times daily.      diazePAM (VALIUM) 5 MG tablet Take 1 tablet (5 mg total) by mouth as needed for Anxiety. (Patient not taking: Reported on 12/3/2019) 3 tablet 0    sildenafil (VIAGRA) 100 MG tablet Take 1 tablet (100 mg total) by mouth daily as needed. (Patient not taking: Reported on 12/3/2019) 10 tablet 12    vitamin E 100 UNIT capsule Take 100 Units by mouth once daily.       Facility-Administered Encounter Medications as of 12/3/2019   Medication Dose Route Frequency Provider Last Rate Last Dose    gentamicin injection 160 mg  160 mg Intramuscular Q12H Thomas Quinteros MD   160 mg at 06/26/18 0950    lidocaine HCL 2% jelly   Topical (Top) Once Thomas Quinteros MD         Review of Systems   General ROS: GENERAL:  No weight gain or loss  SKIN:  No rashes or lacerations  HEAD:  No headaches  EYES:  No exophthalmos, jaundice or blindness  EARS:  No dizziness, tinnitus or hearing loss  NOSE:  No changes in smell  MOUTH & THROAT:  No dyskinetic movements or obvious goiter  CHEST:  No shortness of breath, hyperventilation or cough  CARDIOVASCULAR:  No tachycardia or chest pain  ABDOMEN:  No nausea, vomiting, pain, constipation or diarrhea  URINARY:  No frequency, dysuria or sexual dysfunction  ENDOCRINE:  No polydipsia, polyuria  MUSCULOSKELETAL:  No pain or stiffness of the joints  NEUROLOGIC:  No weakness, sensory changes, seizures, confusion, memory loss, tremor or other abnormal movements  Physical Exam     Vitals:    12/03/19 1530   BP: (!) 158/85   Pulse: 73     General Appearance:  Alert, cooperative, no distress, appears stated age    Head:  Normocephalic, without obvious abnormality, atraumatic   Eyes:  PERRL, conjunctiva/corneas clear, EOM's intact, fundi benign, both eyes   Ears:  Normal TM's and external ear canals, both ears   Nose: Nares normal, septum midline, mucosa normal, no drainage or sinus tenderness   Throat: Lips, mucosa, and tongue normal; teeth and gums normal   Neck: Supple, symmetrical, trachea midline, no adenopathy, thyroid: not enlarged, symmetric, no tenderness/mass/nodules, no carotid bruit or JVD   Back:   Symmetric, no curvature, ROM normal, no CVA tenderness   Lungs:   Clear to auscultation bilaterally, respirations unlabored   Chest Wall:  No tenderness or deformity   Heart:  Regular rate and rhythm, S1, S2 normal, no murmur, rub or gallop   Abdomen:   Soft, non-tender, bowel sounds active all four quadrants,  no masses, no organomegaly of liver and spleen  No hernia noted   Genitalia:  Scrotum: no rash or lesion  Normal symmetric epididymis without masses  Normal vas palpated  Normal size, symmetric testicles with no masses   Normal urethral meatus with no discharge  Normal circumcised penis with no lesion   Rectal:  Normal perineum and anus upon inspection.  Normal tone, no masses or tenderness;   Extremities: Extremities normal, atraumatic, no cyanosis or edema   Pulses: 2+ and symmetric   Skin: Skin color, texture, turgor normal, no rashes or lesions   Lymph nodes: Cervical, supraclavicular, and axillary nodes normal   Neurologic: Normal     Prostate 40 grams smooth with no nodule or tenderness.    LABS:  Lab Results   Component Value Date    PSA 6.0 (H) 05/20/2014    PSA 5.81 (H) 06/04/2013    PSA 10.78 (H) 05/28/2013    PSADIAG 6.9 (H) 11/26/2019    PSADIAG 4.2 (H) 04/11/2019    PSADIAG 6.8 (H) 10/12/2018     Lab Results   Component Value Date    CREATININE 0.9 04/11/2019    CREATININE 0.9 04/10/2018    CREATININE 1.0 04/10/2017     No results found for this or any previous visit.  Urine Culture, Routine   Date  Value Ref Range Status   09/20/2015 No growth  Final     Assessment and Plan:  Elevated PSA  -     Prostate Specific Antigen, Diagnostic; Future; Expected date: 12/03/2019  -     Prostate Specific Antigen, Diagnostic; Future; Expected date: 12/03/2019    BPH with urinary obstruction  -     Prostate Specific Antigen, Diagnostic; Future; Expected date: 12/03/2019    continue IC smart diet avoiding bladder irritants.    So far UroNav bx of prostate x 2, showed no malignancy.  continue Avodart which was started after negative bx back in 11/2018.  Will follow up with serial PSA.    AUA sx score 6/35    For ED resulted from taking Avodart, will try Viagra.  Detailed instructions given.      I spent 25 minutes with the patient of which more than half was spent in direct consultation with the patient in regards to our treatment and plan.        Follow-up:  Follow up in about 6 months (around 6/3/2020) for PSA.

## 2019-12-03 NOTE — PATIENT INSTRUCTIONS
Lab Results   Component Value Date    PSA 6.0 (H) 05/20/2014    PSA 5.81 (H) 06/04/2013    PSA 10.78 (H) 05/28/2013    PSADIAG 6.9 (H) 11/26/2019    PSADIAG 4.2 (H) 04/11/2019    PSADIAG 6.8 (H) 10/12/2018

## 2019-12-31 ENCOUNTER — PATIENT OUTREACH (OUTPATIENT)
Dept: OTHER | Facility: OTHER | Age: 69
End: 2019-12-31

## 2020-01-30 NOTE — PROGRESS NOTES
Digital Medicine: Health  Follow-Up    Patient denies any other concerns at this time.           Follow Up  Follow-up reason(s): reading review      Patient reports he is doing well.       INTERVENTION(S)  encouragement/support and denied further coaching    PLAN  continue monitoring      There are no preventive care reminders to display for this patient.    Last 5 Patient Entered Readings                                      Current 30 Day Average: 127/71     Recent Readings 1/29/2020 1/29/2020 1/23/2020 1/21/2020 1/14/2020    SBP (mmHg) 127 135 126 136 114    DBP (mmHg) 64 72 68 78 64    Pulse 58 56 60 57 64            Diet Screening   No change to diet.      Physical Activity Screening   No change to exercise routine.

## 2020-01-31 RX ORDER — ATORVASTATIN CALCIUM 10 MG/1
TABLET, FILM COATED ORAL
Qty: 90 TABLET | Refills: 3 | Status: SHIPPED | OUTPATIENT
Start: 2020-01-31 | End: 2020-12-14 | Stop reason: SDUPTHER

## 2020-03-02 ENCOUNTER — PATIENT MESSAGE (OUTPATIENT)
Dept: INFECTIOUS DISEASES | Facility: CLINIC | Age: 70
End: 2020-03-02

## 2020-03-03 ENCOUNTER — PATIENT MESSAGE (OUTPATIENT)
Dept: ADMINISTRATIVE | Facility: OTHER | Age: 70
End: 2020-03-03

## 2020-03-06 ENCOUNTER — PATIENT MESSAGE (OUTPATIENT)
Dept: INFECTIOUS DISEASES | Facility: CLINIC | Age: 70
End: 2020-03-06

## 2020-03-09 ENCOUNTER — TELEPHONE (OUTPATIENT)
Dept: INFECTIOUS DISEASES | Facility: CLINIC | Age: 70
End: 2020-03-09

## 2020-03-09 DIAGNOSIS — N20.0 KIDNEY STONE: Chronic | ICD-10-CM

## 2020-03-09 DIAGNOSIS — R97.20 ELEVATED PSA: ICD-10-CM

## 2020-03-09 DIAGNOSIS — D32.9 MENINGIOMA: ICD-10-CM

## 2020-03-09 DIAGNOSIS — E78.00 PURE HYPERCHOLESTEROLEMIA: Primary | Chronic | ICD-10-CM

## 2020-03-09 DIAGNOSIS — E04.2 MULTIPLE THYROID NODULES: ICD-10-CM

## 2020-03-09 DIAGNOSIS — I10 ESSENTIAL HYPERTENSION: Chronic | ICD-10-CM

## 2020-03-09 NOTE — TELEPHONE ENCOUNTER
----- Message from Lily Sosa sent at 3/9/2020  1:23 PM CDT -----  Need lab orders for his visit tomorrow

## 2020-03-10 ENCOUNTER — TELEPHONE (OUTPATIENT)
Dept: INFECTIOUS DISEASES | Facility: CLINIC | Age: 70
End: 2020-03-10

## 2020-03-10 ENCOUNTER — OFFICE VISIT (OUTPATIENT)
Dept: INFECTIOUS DISEASES | Facility: CLINIC | Age: 70
End: 2020-03-10
Payer: MEDICARE

## 2020-03-10 ENCOUNTER — HOSPITAL ENCOUNTER (OUTPATIENT)
Dept: CARDIOLOGY | Facility: CLINIC | Age: 70
Discharge: HOME | End: 2020-03-10
Payer: MEDICARE

## 2020-03-10 VITALS
TEMPERATURE: 98 F | SYSTOLIC BLOOD PRESSURE: 139 MMHG | WEIGHT: 180.56 LBS | DIASTOLIC BLOOD PRESSURE: 84 MMHG | HEART RATE: 55 BPM | HEIGHT: 69 IN | BODY MASS INDEX: 26.74 KG/M2

## 2020-03-10 DIAGNOSIS — E04.2 MULTIPLE THYROID NODULES: ICD-10-CM

## 2020-03-10 DIAGNOSIS — E78.00 PURE HYPERCHOLESTEROLEMIA: Chronic | ICD-10-CM

## 2020-03-10 DIAGNOSIS — N40.1 BPH WITH URINARY OBSTRUCTION: ICD-10-CM

## 2020-03-10 DIAGNOSIS — I25.10 ARTERIOSCLEROTIC CORONARY ARTERY DISEASE: ICD-10-CM

## 2020-03-10 DIAGNOSIS — N13.8 BPH WITH URINARY OBSTRUCTION: ICD-10-CM

## 2020-03-10 DIAGNOSIS — I10 HYPERTENSION, UNSPECIFIED TYPE: ICD-10-CM

## 2020-03-10 DIAGNOSIS — G47.33 OSA (OBSTRUCTIVE SLEEP APNEA): ICD-10-CM

## 2020-03-10 DIAGNOSIS — I10 HYPERTENSION, UNSPECIFIED TYPE: Primary | ICD-10-CM

## 2020-03-10 DIAGNOSIS — E04.1 THYROID NODULE: ICD-10-CM

## 2020-03-10 DIAGNOSIS — N20.0 KIDNEY STONE: Chronic | ICD-10-CM

## 2020-03-10 DIAGNOSIS — D32.9 MENINGIOMA: ICD-10-CM

## 2020-03-10 PROCEDURE — 99999 PR PBB SHADOW E&M-EST. PATIENT-LVL III: CPT | Mod: PBBFAC,,, | Performed by: INTERNAL MEDICINE

## 2020-03-10 PROCEDURE — 99213 OFFICE O/P EST LOW 20 MIN: CPT | Mod: PBBFAC,25 | Performed by: INTERNAL MEDICINE

## 2020-03-10 PROCEDURE — 93010 EKG 12-LEAD: ICD-10-PCS | Mod: S$PBB,,, | Performed by: INTERNAL MEDICINE

## 2020-03-10 PROCEDURE — 93005 ELECTROCARDIOGRAM TRACING: CPT | Mod: PBBFAC | Performed by: INTERNAL MEDICINE

## 2020-03-10 PROCEDURE — 93010 ELECTROCARDIOGRAM REPORT: CPT | Mod: S$PBB,,, | Performed by: INTERNAL MEDICINE

## 2020-03-10 PROCEDURE — 99999 PR PBB SHADOW E&M-EST. PATIENT-LVL III: ICD-10-PCS | Mod: PBBFAC,,, | Performed by: INTERNAL MEDICINE

## 2020-03-10 PROCEDURE — 99215 OFFICE O/P EST HI 40 MIN: CPT | Mod: S$PBB,,, | Performed by: INTERNAL MEDICINE

## 2020-03-10 PROCEDURE — 99215 PR OFFICE/OUTPT VISIT, EST, LEVL V, 40-54 MIN: ICD-10-PCS | Mod: S$PBB,,, | Performed by: INTERNAL MEDICINE

## 2020-03-10 NOTE — TELEPHONE ENCOUNTER
----- Message from Zelalem Haney MD sent at 3/10/2020  9:29 AM CDT -----  It looks like the nodule was almost completely cystic at the time of the last FNA making it very low risk, and the reason it was non-diagnostic. I would recommend repeating the ultrasound to make sure this hasn't changed.     -Zelalem  ----- Message -----  From: Ken Norris MD  Sent: 3/10/2020   9:24 AM CDT  To: MD Zelalem Loving   you saw him in 2018 and he had a FNA which was QNS. He has not followed up. Should he have another FNA or just an ultrasound?  Laureano

## 2020-03-10 NOTE — PROGRESS NOTES
Subjective:      Patient ID: Anton Christiansen is a 69 y.o. male.    Chief Complaint:  Follow up on multiple medical problems      History of Present Illness    Thyroid nodules  Pt saw Dr. Haney who did FNA. It was QNS to analyze. Dr. Haney recommended today that I repeat the ultrasound to see if there was any change.     Hypertension  Pt still on carvedilol 12.5 mg bid. He is on the [facility] digital HTN program and BP is well controlled.        Elevated PSA  Followed by Dr. Quinteros for elevated PSA and renal stones. Has had drop in PSA since on avodart:        PSA    3.8 03/10/2020   PSA                               4.2                                                                  4/11/19   PSA                               6.8                                                                 10/12/18   PSA                             10.2                                                                 6/12/2018      PSA 11.7 4/10/2018     PSA 5.8 4/10/2017     PSA 6.0 (H) 05/20/2014     PSA 5.81 (H) 06/04/2013     PSA 10.78 (H) 05/28/2013      Hypercholesterolemia  On atorvastatin 10 mg. Lipid control is good:    Lab Results   Component Value Date    CHOL 146 03/10/2020    CHOL 142 04/11/2019    CHOL 157 04/10/2018     Lab Results   Component Value Date    HDL 39 (L) 03/10/2020    HDL 39 (L) 04/11/2019    HDL 40 04/10/2018     Lab Results   Component Value Date    LDLCALC 75.6 03/10/2020    LDLCALC 77.2 04/11/2019    LDLCALC 86.6 04/10/2018     Lab Results   Component Value Date    TRIG 157 (H) 03/10/2020    TRIG 129 04/11/2019    TRIG 152 (H) 04/10/2018     Lab Results   Component Value Date    CHOLHDL 26.7 03/10/2020    CHOLHDL 27.5 04/11/2019    CHOLHDL 25.5 04/10/2018      He exercises regularly at [employer].     ASHA   was dx with ASHA and has done much better since using CPAP. Daytime drowsiness has resolved     Recurrent nephrolithiasis    Last renal stone study 9/18/15 showed:  Stable 0.5-cm distal right ureter  calculus with persistent proximal right dilatation and worsening right perinephric fat stranding.   Last sx of colic were 2015.  He is hydrating regularly and consistently   Last visit with Dr. Quinteros was Dec 2019,     The laboratory studies were reviewed and discussed with the pt.  Last colonoscopy was 2019 and a rescreening interval of 5 years (2024)was recommended.         Review of Systems   Constitution: Negative for chills, decreased appetite, fever, malaise/fatigue, night sweats, weight gain and weight loss.   HENT: Negative for congestion, ear pain, hearing loss, hoarse voice, sore throat and tinnitus.    Eyes: Negative for blurred vision, redness and visual disturbance.   Cardiovascular: Negative for chest pain, leg swelling and palpitations.   Respiratory: Negative for cough, hemoptysis, shortness of breath and sputum production.    Hematologic/Lymphatic: Negative for adenopathy. Does not bruise/bleed easily.   Skin: Negative for dry skin, itching, rash and suspicious lesions.   Musculoskeletal: Negative for back pain, joint pain, myalgias and neck pain.   Gastrointestinal: Negative for abdominal pain, constipation, diarrhea, heartburn, nausea and vomiting.   Genitourinary: Negative for dysuria, flank pain, frequency, hematuria, hesitancy and urgency.   Neurological: Negative for dizziness, headaches, numbness, paresthesias and weakness.   Psychiatric/Behavioral: Negative for depression and memory loss. The patient does not have insomnia and is not nervous/anxious.      Objective:   Physical Exam   Constitutional: He is oriented to person, place, and time. He appears well-developed and well-nourished.   HENT:   Head: Normocephalic and atraumatic.   Right Ear: External ear normal.   Left Ear: External ear normal.   Mouth/Throat: Oropharynx is clear and moist.   Eyes: Pupils are equal, round, and reactive to light. Conjunctivae and EOM are normal.   Neck: Normal range of motion. Neck supple. No thyromegaly  present.   Cardiovascular: Normal rate, regular rhythm and normal heart sounds.   No murmur heard.  Pulmonary/Chest: Effort normal and breath sounds normal. He has no wheezes. He has no rales.   Abdominal: Soft. Bowel sounds are normal. He exhibits no mass. There is no tenderness. There is no rebound.   Musculoskeletal: Normal range of motion.   Lymphadenopathy:     He has no cervical adenopathy.   Neurological: He is alert and oriented to person, place, and time.   Skin: Skin is warm and dry.   Psychiatric: He has a normal mood and affect. His behavior is normal.   Vitals reviewed.    Assessment:       1. Hypertension, unspecified type    2. Thyroid nodule    3. Arteriosclerotic coronary artery disease    4. Pure hypercholesterolemia    5. BPH with urinary obstruction    6. Kidney stone    7. Multiple thyroid nodules    8. Meningioma    9. ASHA (obstructive sleep apnea)          Plan:         1. Continue present meds    2. Thyroid ultrasound

## 2020-03-13 ENCOUNTER — HOSPITAL ENCOUNTER (OUTPATIENT)
Dept: RADIOLOGY | Facility: HOSPITAL | Age: 70
Discharge: HOME | End: 2020-03-13
Attending: INTERNAL MEDICINE
Payer: MEDICARE

## 2020-03-13 DIAGNOSIS — E04.1 THYROID NODULE: ICD-10-CM

## 2020-03-13 PROCEDURE — 76536 US EXAM OF HEAD AND NECK: CPT | Mod: 26,,, | Performed by: RADIOLOGY

## 2020-03-13 PROCEDURE — 76536 US EXAM OF HEAD AND NECK: CPT | Mod: TC

## 2020-03-13 PROCEDURE — 76536 US SOFT TISSUE HEAD NECK THYROID: ICD-10-PCS | Mod: 26,,, | Performed by: RADIOLOGY

## 2020-03-17 ENCOUNTER — PATIENT MESSAGE (OUTPATIENT)
Dept: INFECTIOUS DISEASES | Facility: CLINIC | Age: 70
End: 2020-03-17

## 2020-04-09 ENCOUNTER — PATIENT OUTREACH (OUTPATIENT)
Dept: OTHER | Facility: OTHER | Age: 70
End: 2020-04-09

## 2020-04-21 ENCOUNTER — PATIENT MESSAGE (OUTPATIENT)
Dept: ADMINISTRATIVE | Facility: OTHER | Age: 70
End: 2020-04-21

## 2020-04-22 ENCOUNTER — PATIENT MESSAGE (OUTPATIENT)
Dept: UROLOGY | Facility: CLINIC | Age: 70
End: 2020-04-22

## 2020-04-22 DIAGNOSIS — R97.20 ELEVATED PSA: Primary | ICD-10-CM

## 2020-04-22 NOTE — PROGRESS NOTES
We can see him in 1 year with PSA     Elevated PSA  -     Prostate Specific Antigen, Diagnostic; Future; Expected date: 04/22/2020

## 2020-04-23 ENCOUNTER — TELEPHONE (OUTPATIENT)
Dept: SLEEP MEDICINE | Facility: CLINIC | Age: 70
End: 2020-04-23

## 2020-04-23 NOTE — TELEPHONE ENCOUNTER
----- Message from Sugey Townsend sent at 4/23/2020 10:03 AM CDT -----  Contact: KASIER BUSH [[id number]]  Name of Who is Calling: KAISER BUSH [[id number]]      What is the request in detail: Pt is calling to speak to staff in regards to scheduling an appointment .... Please call to further assist .       Can the clinic reply by [facility]:  N       What Number to [facility] Back [facility]: [phone]

## 2020-05-07 NOTE — PROGRESS NOTES
"Digital Medicine: Health  Follow-Up    Patient denies any other concerns at this time.           Follow Up  Follow-up reason(s): reading review          INTERVENTION(S)  recommended diet modifications and encouragement/support    PLAN  continue monitoring      There are no preventive care reminders to display for this patient.    Last 5 Patient Entered Readings                                      Current 30 Day Average: 126/68     Recent Readings 5/5/2020 4/27/2020 4/22/2020 4/14/2020 4/6/2020    SBP (mmHg) 129 121 127 128 129    DBP (mmHg) 71 65 63 72 72    Pulse 62 66 62 65 65            Diet Screening       Patient reports he has been "eating too much and gained a few pounds".  Encouraged patient to get back to monitoring salt intake and to monitor his portion sizes.      Intervention(s): portion control    Physical Activity Screening   When asked if exercising, patient responded: yes7 day(s) a week.      Patient participates in the following activities: walking      "

## 2020-05-11 ENCOUNTER — TELEPHONE (OUTPATIENT)
Dept: SLEEP MEDICINE | Facility: CLINIC | Age: 70
End: 2020-05-11

## 2020-05-11 ENCOUNTER — OFFICE VISIT (OUTPATIENT)
Dept: SLEEP MEDICINE | Facility: CLINIC | Age: 70
End: 2020-05-11
Payer: MEDICARE

## 2020-05-11 DIAGNOSIS — G47.33 OSA (OBSTRUCTIVE SLEEP APNEA): Primary | ICD-10-CM

## 2020-05-11 PROCEDURE — 99999 PR PBB SHADOW E&M-EST. PATIENT-LVL II: CPT | Mod: PBBFAC,,, | Performed by: INTERNAL MEDICINE

## 2020-05-11 PROCEDURE — 99999 PR PBB SHADOW E&M-EST. PATIENT-LVL II: ICD-10-PCS | Mod: PBBFAC,,, | Performed by: INTERNAL MEDICINE

## 2020-05-11 PROCEDURE — 99212 OFFICE O/P EST SF 10 MIN: CPT | Mod: PBBFAC | Performed by: INTERNAL MEDICINE

## 2020-05-11 PROCEDURE — 99442 PR PHYSICIAN TELEPHONE EVALUATION 11-20 MIN: ICD-10-PCS | Mod: 95,S$PBB,, | Performed by: INTERNAL MEDICINE

## 2020-05-11 PROCEDURE — 99442 PR PHYSICIAN TELEPHONE EVALUATION 11-20 MIN: CPT | Mod: 95,S$PBB,, | Performed by: INTERNAL MEDICINE

## 2020-05-11 NOTE — PROGRESS NOTES
Subjective:       Patient ID: Anton Christiansen is a 69 y.o. male.    Chief Complaint: No chief complaint on file.    HPI   Anton Christiansen returns for PAP follow up for compliance documentation    Anton Christiansen has a history of severe Obstructive Sleep Apnea diagnosed in 10/2018 (AHI 37).   The last PAP titration was NA with AHI NA at NA cm H20.   Device is 2 year(s) old.   Current setting 5-20 cm H20.   DME is [facility].      Anton Christiansen is using CPAP 100% of nights and averages 7 hours and 36 minutes.   Device use greater than 4 hours is 99,4%.   Patient does not have problems with the pressure setting.   Mask interface issues are not experienced.   A full face mask interface is used.   The patient does not experience side effects from therapy.   The patient experiences the following benefits of therapy:  more rested, reduced morbidity, reduced accident risk, reduced mortality, reduced cardiovascular risk and less sleep disruption   There are not equipment issues.   Mean pressure 8.8, average peak pressure 12.2 and 90th percentile pressure 10.7.   Estimated AHI 2.6.        Scheduled Meds:  Continuous Infusions:  PRN Meds:.           Importance of PAP compliance discussed.   Care and use of equipment discussed.   Download and relevant sleep studies reviewed with patient    Discussed therapeutic goals for positive airway pressure therapy(CPAP or BiPAP).  Ideal is usage 100% of nights for at least 6 hours per night.  Minimum usage is 70% of night for at least 4 hours per night used    The patient was given open opportunity to ask questions and/or express concerns about treatment plan.   All questions/concerns were discussed.            Review of Systems    Objective:      Physical Exam    Assessment:       1. ASHA (obstructive sleep apnea)        Plan:       Replacement supplies ordered.   Follow up 1 year.       The patient location is: home  The chief complaint leading to consultation is:  compliance documentation  Visit type: audio only  Total time spent with patient: 12 minutes  Each patient to whom he or she provides medical services by telemedicine is:  (1) informed of the relationship between the physician and patient and the respective role of any other health care provider with respect to management of the patient; and (2) notified that he or she may decline to receive medical services by telemedicine and may withdraw from such care at any time.    Notes: see above.

## 2020-05-11 NOTE — TELEPHONE ENCOUNTER
----- Message from Kathleen Bautista MD sent at 5/11/2020  8:38 AM CDT -----  Please check patient in-did audio visit

## 2020-05-17 ENCOUNTER — PATIENT MESSAGE (OUTPATIENT)
Dept: INTERNAL MEDICINE | Facility: CLINIC | Age: 70
End: 2020-05-17

## 2020-05-27 ENCOUNTER — OFFICE VISIT (OUTPATIENT)
Dept: INTERNAL MEDICINE | Facility: CLINIC | Age: 70
End: 2020-05-27
Payer: MEDICARE

## 2020-05-27 VITALS
TEMPERATURE: 99 F | DIASTOLIC BLOOD PRESSURE: 82 MMHG | WEIGHT: 181.38 LBS | HEART RATE: 66 BPM | SYSTOLIC BLOOD PRESSURE: 146 MMHG | BODY MASS INDEX: 26.79 KG/M2

## 2020-05-27 DIAGNOSIS — M62.08 DIASTASIS RECTI: Primary | ICD-10-CM

## 2020-05-27 PROCEDURE — 99212 OFFICE O/P EST SF 10 MIN: CPT | Mod: S$PBB,,, | Performed by: INTERNAL MEDICINE

## 2020-05-27 PROCEDURE — 99999 PR PBB SHADOW E&M-EST. PATIENT-LVL III: ICD-10-PCS | Mod: PBBFAC,,, | Performed by: INTERNAL MEDICINE

## 2020-05-27 PROCEDURE — 99999 PR PBB SHADOW E&M-EST. PATIENT-LVL III: CPT | Mod: PBBFAC,,, | Performed by: INTERNAL MEDICINE

## 2020-05-27 PROCEDURE — 99212 PR OFFICE/OUTPT VISIT, EST, LEVL II, 10-19 MIN: ICD-10-PCS | Mod: S$PBB,,, | Performed by: INTERNAL MEDICINE

## 2020-05-27 PROCEDURE — 99213 OFFICE O/P EST LOW 20 MIN: CPT | Mod: PBBFAC | Performed by: INTERNAL MEDICINE

## 2020-05-27 NOTE — PROGRESS NOTES
Subjective:       Patient ID: Anton Christiansen is a 69 y.o. male.    Chief Complaint: abdominal swelling    HPI   Over a month ago noted swelling in midline of abdomen when he increased intraabdominal pressure. No pain, just concerned that it was a change from usual.    Review of Systems   Constitution: Positive for weight gain. Negative for chills, decreased appetite, fever, malaise/fatigue, night sweats and weight loss.   HENT: Negative for congestion, ear pain, hearing loss, hoarse voice, sore throat and tinnitus.    Eyes: Negative for blurred vision, redness and visual disturbance.   Cardiovascular: Negative for chest pain, leg swelling and palpitations.   Respiratory: Negative for cough, hemoptysis, shortness of breath and sputum production.    Hematologic/Lymphatic: Negative for adenopathy. Does not bruise/bleed easily.   Skin: Negative for dry skin, itching, rash and suspicious lesions.   Musculoskeletal: Negative for back pain, joint pain, myalgias and neck pain.   Gastrointestinal: Negative for abdominal pain, constipation, diarrhea, heartburn, nausea and vomiting.   Genitourinary: Negative for dysuria, flank pain, frequency, hematuria, hesitancy and urgency.   Neurological: Negative for dizziness, headaches, numbness, paresthesias and weakness.   Psychiatric/Behavioral: Negative for depression and memory loss. The patient does not have insomnia and is not nervous/anxious.        Objective:      Physical Exam   Constitutional: He appears well-developed and well-nourished. No distress.   Abdominal:   Diastasis recti present       Assessment:       1. Diastasis recti        Plan:        reassurred

## 2020-06-19 DIAGNOSIS — N13.8 BPH WITH URINARY OBSTRUCTION: ICD-10-CM

## 2020-06-19 DIAGNOSIS — N40.1 BPH WITH URINARY OBSTRUCTION: ICD-10-CM

## 2020-06-22 RX ORDER — DUTASTERIDE 0.5 MG/1
CAPSULE, LIQUID FILLED ORAL
Qty: 30 CAPSULE | Refills: 11 | Status: SHIPPED | OUTPATIENT
Start: 2020-06-22 | End: 2021-05-11 | Stop reason: SDUPTHER

## 2020-07-08 ENCOUNTER — PATIENT OUTREACH (OUTPATIENT)
Dept: OTHER | Facility: OTHER | Age: 70
End: 2020-07-08

## 2020-07-08 NOTE — LETTER
August 13, 2020     Anton Christiansen  [address] [address]       Dear Anton TUCKER,    Welcome to [facility] [employer]! Our goal is to make care effective, proactive and convenient by using data you send us from home to better treat your chronic conditions.              My name is Valarie Collins, and I am your dedicated Digital Medicine clinician. As an expert in medication management, I will help ensure that the medications you are taking continue to provide the intended benefits and help you reach your goals. You can reach me directly at [phone] or by sending me a message directly through your [facility] account.      I am Gabriela Delvalle and I will be your health . My job is to help you identify lifestyle changes to improve your disease control. We will talk about nutrition, exercise, and other ways you may be able to adjust your current habits to better your health. Additionally, we will help ensure you are completing the tests and screenings that are necessary to help manage your conditions. You can reach me directly at [phone] or by sending me a message directly through your [facility] account.    Most importantly, YOU are at the center of this team. Together, we will work to improve your overall health and encourage you to meet your goals for a healthier lifestyle.     What we expect from YOU:  · Please take frequent home blood pressure measurements. We ask that you take at least 1 blood pressure reading per week, but more information will better help us get you know you. Be sure you rest for a few minutes before taking the reading in a quiet, comfortable place.     Be available to receive phone calls or [employer] messages, when appropriate, from your care team. Please let us know if there are any specific days or times that work best for us to reach you via phone.     Complete routine tests and screenings. Dont worry, we will help keep you on track!           What  you should expect from your Digital Medicine Care Team:   We will work with you to create a personalized plan of care and provide you with encouragement and education, including regarding lifestyle changes, that could help you manage your disease states.     We will adjust your current medications, if needed, and continue to monitor your long-term progress.     We will provide you and your physician with monthly progress reports after you have been in the program for more than 30 days.     We will send you reminders through [employer] and text messages to help ensure you do not miss any testing deadlines to help manage your disease states.    You will be able to reach us by phone or through your [facility] account by clicking our names under Care Team on the right side of the home screen.    I look forward to working with you to achieve your blood pressure goals!    We look forward to working with you to help manage your health,    Sincerely,    Your Digital Medicine Team    Please visit our websites to learn more:   · Hypertension: [email or web address]      Remember, we are not available for emergencies. If you have an emergency, please contact your doctors office directly or call [facility] on-call ([phone] or [phone]) or [phone].

## 2020-07-16 ENCOUNTER — OFFICE VISIT (OUTPATIENT)
Dept: ENDOCRINOLOGY | Facility: CLINIC | Age: 70
End: 2020-07-16
Payer: MEDICARE

## 2020-07-16 DIAGNOSIS — E04.2 MULTIPLE THYROID NODULES: Primary | ICD-10-CM

## 2020-07-16 PROCEDURE — 99213 PR OFFICE/OUTPT VISIT, EST, LEVL III, 20-29 MIN: ICD-10-PCS | Mod: 95,,, | Performed by: INTERNAL MEDICINE

## 2020-07-16 PROCEDURE — 99213 OFFICE O/P EST LOW 20 MIN: CPT | Mod: 95,,, | Performed by: INTERNAL MEDICINE

## 2020-07-16 NOTE — ASSESSMENT & PLAN NOTE
--patient with multiple thyroid nodules  --no risk factors for thyroid cancer  --no compressive symptoms  --TSH WNL  --Independently reviewed ultrasound images  --has multiple subcentimeter left lobe nodules that do not meet FNA criteria  --Right lobe nodule predominately cystic and underwent FNA and drainage in 2018 with unsatisfactory results  --The right lobe nodule is smaller now, but has a slightly larger solid component with possible microcalcifications  --I recommend that he undergo repeat FNA of the right lobe nodule

## 2020-07-16 NOTE — PROGRESS NOTES
Subjective:      Patient ID: Anton Christiansen is a 69 y.o. male.    Chief Complaint:  Thyroid Nodule    The patient location is: Home  The chief complaint leading to consultation is: thyroid nodule    Visit type: audiovisual    Face to Face time with patient: 15  20 minutes of total time spent on the encounter, which includes face to face time and non-face to face time preparing to see the patient (eg, review of tests), Obtaining and/or reviewing separately obtained history, Documenting clinical information in the electronic or other health record, Independently interpreting results (not separately reported) and communicating results to the patient/family/caregiver, or Care coordination (not separately reported).     Each patient to whom he or she provides medical services by telemedicine is:  (1) informed of the relationship between the physician and patient and the respective role of any other health care provider with respect to management of the patient; and (2) notified that he or she may decline to receive medical services by telemedicine and may withdraw from such care at any time.      History of Present Illness  Mr. Christiansen presents for evaluation and management of thyroid nodules. Last visit 4/2018.     Has active history of HTN, HLP, ASHA and thyroid nodules.      First noted to have thyroid nodules on recent visit with Dr. Norris. Was found to have a thyroid nodule on exam which was confirmed with ultrasound.      Denies family history of thyroid cancer.  TSH WNL.  No personal history of head or neck irradiation.     Denies dysphagia, hoarseness or orthopnea.     Previous thyroid FNA results:  FINAL PATHOLOGIC DIAGNOSIS  Right thyroid, nodule, fine-needle aspiration:  [place] [facility] Thyroid Cytology Category: Unsatisfactory.  Specimen consists prominently of blood with inadequate number of follicular epithelial cells present for  evaluation. Abundant colloid is not identified. Therefore, the  specimen is classified as unsatisfactory. Correlate  clinically.     Most recent thyroid USS dated 3/13/2020:  1. Decreased size of mixed cystic and solid nodule in the right thyroid lobe, though with increased calcification, which meets TI-RADS 4 category.  Note is made of a prior biopsy of this lesion with inconclusive results.  This nodule would meet criteria for repeat FNA, if clinically warranted.  2. Three subcentimeter hypoechoic nodules noted in the left thyroid lobe, none of which meet criteria for FNA or dedicated follow-up.         Review of Systems   Constitutional: Negative for chills and fever.   Gastrointestinal: Negative for nausea and vomiting.       Objective:   Physical Exam  Constitutional:       Appearance: Normal appearance. He is not ill-appearing.   Neurological:      Mental Status: He is alert.       BP Readings from Last 3 Encounters:   05/27/20 (!) 146/82   03/10/20 139/84   12/03/19 (!) 158/85     Wt Readings from Last 1 Encounters:   05/27/20 1426 82.3 kg (181 lb 6.4 oz)       There is no height or weight on file to calculate BMI.    Lab Review:   No results found for: HGBA1C  Lab Results   Component Value Date    CHOL 146 03/10/2020    HDL 39 (L) 03/10/2020    LDLCALC 75.6 03/10/2020    TRIG 157 (H) 03/10/2020    CHOLHDL 26.7 03/10/2020     Lab Results   Component Value Date     03/10/2020    K 4.5 03/10/2020     03/10/2020    CO2 25 03/10/2020    GLU 94 03/10/2020    BUN 17 03/10/2020    CREATININE 1.1 03/10/2020    CALCIUM 10.0 03/10/2020    PROT 7.2 03/10/2020    ALBUMIN 3.9 03/10/2020    BILITOT 2.0 (H) 03/10/2020    ALKPHOS 69 03/10/2020    AST 23 03/10/2020    ALT 37 03/10/2020    ANIONGAP 7 (L) 03/10/2020    ESTGFRAFRICA >60.0 03/10/2020    EGFRNONAA >60.0 03/10/2020    TSH 3.187 03/10/2020         Assessment and Plan     Multiple thyroid nodules  --patient with multiple thyroid nodules  --no risk factors for thyroid cancer  --no compressive symptoms  --TSH  WNL  --Independently reviewed ultrasound images  --has multiple subcentimeter left lobe nodules that do not meet FNA criteria  --Right lobe nodule predominately cystic and underwent FNA and drainage in 2018 with unsatisfactory results  --The right lobe nodule is smaller now, but has a slightly larger solid component with possible microcalcifications  --I recommend that he undergo repeat FNA of the right lobe nodule    Zelalem Haney M.D. Staff Endocrinology

## 2020-08-07 ENCOUNTER — HOSPITAL ENCOUNTER (OUTPATIENT)
Dept: ENDOCRINOLOGY | Facility: CLINIC | Age: 70
Discharge: HOME | End: 2020-08-07
Attending: INTERNAL MEDICINE
Payer: MEDICARE

## 2020-08-07 ENCOUNTER — PATIENT OUTREACH (OUTPATIENT)
Dept: OTHER | Facility: OTHER | Age: 70
End: 2020-08-07

## 2020-08-07 DIAGNOSIS — E04.1 THYROID NODULE: Primary | ICD-10-CM

## 2020-08-07 DIAGNOSIS — E04.2 MULTIPLE THYROID NODULES: ICD-10-CM

## 2020-08-07 PROCEDURE — 88173 CYTOPATH EVAL FNA REPORT: CPT | Performed by: PATHOLOGY

## 2020-08-07 PROCEDURE — 10005 US FINE NEEDLE ASPIRATION THYROID, FIRST LESION: ICD-10-PCS | Mod: ,,, | Performed by: INTERNAL MEDICINE

## 2020-08-07 PROCEDURE — 88173 CYTOPATH EVAL FNA REPORT: CPT | Mod: 26,,, | Performed by: PATHOLOGY

## 2020-08-07 PROCEDURE — 10005 FNA BX W/US GDN 1ST LES: CPT | Mod: ,,, | Performed by: INTERNAL MEDICINE

## 2020-08-07 PROCEDURE — 88173 PR  INTERPRETATION OF FNA SMEAR: ICD-10-PCS | Mod: 26,,, | Performed by: PATHOLOGY

## 2020-08-07 NOTE — PROGRESS NOTES
Digital Medicine: Clinician Follow-Up    Called patient for routine follow up regarding HDMP (Hypertension Digital Medicine Program)    HPI  Patients BP average is currently 123/77 mmHg.    Patient denies s/s of hypotension (lightheadedness, dizziness, nausea, fatigue) associated with low readings. Instructed patient to inform me if this occurs, patient confirms understanding.    Patient denies s/s of hypertension (SOB, CP, severe headaches, changes in vision) associated with high readings. Instructed patient to go to the ED if BP >180/110 and accompanied by hypertensive s/s, patient confirms understanding.      The history is provided by the patient.   Follow-up reason(s): routine follow up.     Hypertension    Readings are trending down due to medication adherence.       Patient is not experiencing signs/symptoms of hypotension.  Patient is not experiencing signs/symptoms of hypertension.          Last 5 Patient Entered Readings                                      Current 30 Day Average: 123/77     Recent Readings 8/3/2020 7/27/2020 7/20/2020 7/14/2020 7/13/2020    SBP (mmHg) 127 125 109 119 137    DBP (mmHg) 83 75 72 73 78    Pulse 64 62 65 70 66               Depression Screening  Did not address depression screening.    Sleep Apnea Screening    Did not address sleep apnea screening.     Medication Affordability Screening  Did not address medication affordability screening.     Medication Adherence-Medication adherence was asssessed.    Patient reported missing medication: more than once a week and he states he misses 1-2 doses a week.    Adherence tools used: Pill box    His adherence has improved since he started using his pill pack      ASSESSMENT(S)  Patients BP average is 123/77 mmHg, which is at goal. Patient's BP goal is less than or equal to 130/80 per 2017 ACC/AHA Hypertension Guidelines.      Hypertension Plan  Continue current therapy.  Recommended adherence tool. Counseled on importance of  adherence  Provided patient education.  Patient asked if beet juice lowers blood pressure. Informed him that 8.4 oz a day has been shown to lower blood pressure by an average of 4-5 points.    Will follow up in a few months, sooner if needed.     Addressed any questions or concerns and patient has my contact information if needed prior to next outreach. Patient verbalizes understanding.            There are no preventive care reminders to display for this patient.      Hypertension Medications             carvedilol (COREG) 12.5 MG tablet TAKE 1 TABLET BY MOUTH TWICE DAILY

## 2020-08-11 LAB — FINAL PATHOLOGIC DIAGNOSIS: ABNORMAL

## 2020-08-12 ENCOUNTER — PATIENT MESSAGE (OUTPATIENT)
Dept: ENDOCRINOLOGY | Facility: CLINIC | Age: 70
End: 2020-08-12

## 2020-08-12 DIAGNOSIS — E04.2 MULTIPLE THYROID NODULES: Primary | ICD-10-CM

## 2020-08-13 NOTE — PROGRESS NOTES
Digital Medicine: Health  Follow-Up    The history is provided by the patient.             Reason for review: Blood pressure at goal        Topics Covered on Call: Cpap machine.    Additional Follow-up details: Patient stated he is doing well and is happy with most of his readings. Patient does sometimes take BP readings back to back. Recommended waiting at least 5 minutes between readings.        Diet-no change to diet    No change to diet.  Patient reports eating or drinking the following: Patient stated he started using some beet chews. He has not noticed any difference in his BP readings.      Substance, Sleep, Stress-No change  stress-not assessed  Details:  Intervention(s):    Sleep-assessed  Details:Patient uses a cpap. He wonders if his BP is higher after a night of poor sleep.  Intervention(s):    Alcohol -not assessed  Details:  Intervention(s):    Tobacco-Not Assessed  Details:  Intervention(s):          Provided patient education.  Reviewed Device Techniques.     Addressed any questions or concerns and patient has my contact information if needed prior to next outreach. Patient verbalizes understanding.      Explained the importance of self-monitoring and medication adherence. Encouraged the patient to communicate with their health  for lifestyle modifications to help improve or maintain a healthy lifestyle.        Sent link to [facility] Digital Medicine webpages and my contact information via [employer] for future questions.        Explained to the patient that the Digital Medicine team is not available for emergencies. Advised patient call [facility] On [facility] ([phone] or [phone]) or 911 if needed.       There are no preventive care reminders to display for this patient.    Last 5 Patient Entered Readings                                      Current 30 Day Average: 122/76     Recent Readings 8/10/2020 8/10/2020 8/3/2020 7/27/2020 7/20/2020    SBP (mmHg) 128 138 127 125 109    DBP (mmHg)  78 72 83 75 72    Pulse 66 62 64 62 65

## 2020-09-23 ENCOUNTER — CLINICAL SUPPORT (OUTPATIENT)
Dept: INTERNAL MEDICINE | Facility: CLINIC | Age: 70
End: 2020-09-23
Payer: MEDICARE

## 2020-09-23 ENCOUNTER — HOSPITAL ENCOUNTER (OUTPATIENT)
Dept: RADIOLOGY | Facility: HOSPITAL | Age: 70
Discharge: HOME | End: 2020-09-23
Attending: INTERNAL MEDICINE
Payer: MEDICARE

## 2020-09-23 ENCOUNTER — OFFICE VISIT (OUTPATIENT)
Dept: INTERNAL MEDICINE | Facility: CLINIC | Age: 70
End: 2020-09-23
Payer: MEDICARE

## 2020-09-23 VITALS
TEMPERATURE: 98 F | HEART RATE: 54 BPM | BODY MASS INDEX: 25.75 KG/M2 | SYSTOLIC BLOOD PRESSURE: 144 MMHG | WEIGHT: 174.38 LBS | DIASTOLIC BLOOD PRESSURE: 83 MMHG

## 2020-09-23 DIAGNOSIS — D32.9 MENINGIOMA: ICD-10-CM

## 2020-09-23 DIAGNOSIS — R42 DIZZINESS: Primary | ICD-10-CM

## 2020-09-23 DIAGNOSIS — R42 VERTIGO: ICD-10-CM

## 2020-09-23 DIAGNOSIS — R42 DIZZINESS: ICD-10-CM

## 2020-09-23 LAB
ANION GAP SERPL CALC-SCNC: 10 MMOL/L (ref 8–16)
BASOPHILS # BLD AUTO: 0.04 K/UL (ref 0–0.2)
BASOPHILS NFR BLD: 0.6 % (ref 0–1.9)
BUN SERPL-MCNC: 18 MG/DL (ref 8–23)
CALCIUM SERPL-MCNC: 10 MG/DL (ref 8.7–10.5)
CHLORIDE SERPL-SCNC: 104 MMOL/L (ref 95–110)
CO2 SERPL-SCNC: 24 MMOL/L (ref 23–29)
CREAT SERPL-MCNC: 1.1 MG/DL (ref 0.5–1.4)
DIFFERENTIAL METHOD: ABNORMAL
EOSINOPHIL # BLD AUTO: 0.1 K/UL (ref 0–0.5)
EOSINOPHIL NFR BLD: 2.2 % (ref 0–8)
ERYTHROCYTE [DISTWIDTH] IN BLOOD BY AUTOMATED COUNT: 13.2 % (ref 11.5–14.5)
EST. GFR  (AFRICAN AMERICAN): >60 ML/MIN/1.73 M^2
EST. GFR  (NON AFRICAN AMERICAN): >60 ML/MIN/1.73 M^2
GLUCOSE SERPL-MCNC: 119 MG/DL (ref 70–110)
HCT VFR BLD AUTO: 46.1 % (ref 40–54)
HGB BLD-MCNC: 15.2 G/DL (ref 14–18)
IMM GRANULOCYTES # BLD AUTO: 0.02 K/UL (ref 0–0.04)
IMM GRANULOCYTES NFR BLD AUTO: 0.3 % (ref 0–0.5)
LYMPHOCYTES # BLD AUTO: 1.1 K/UL (ref 1–4.8)
LYMPHOCYTES NFR BLD: 17.5 % (ref 18–48)
MCH RBC QN AUTO: 30.6 PG (ref 27–31)
MCHC RBC AUTO-ENTMCNC: 33 G/DL (ref 32–36)
MCV RBC AUTO: 93 FL (ref 82–98)
MONOCYTES # BLD AUTO: 0.4 K/UL (ref 0.3–1)
MONOCYTES NFR BLD: 6.8 % (ref 4–15)
NEUTROPHILS # BLD AUTO: 4.7 K/UL (ref 1.8–7.7)
NEUTROPHILS NFR BLD: 72.6 % (ref 38–73)
NRBC BLD-RTO: 0 /100 WBC
PLATELET # BLD AUTO: 249 K/UL (ref 150–350)
PMV BLD AUTO: 10.2 FL (ref 9.2–12.9)
POTASSIUM SERPL-SCNC: 4.6 MMOL/L (ref 3.5–5.1)
RBC # BLD AUTO: 4.96 M/UL (ref 4.6–6.2)
SODIUM SERPL-SCNC: 138 MMOL/L (ref 136–145)
WBC # BLD AUTO: 6.44 K/UL (ref 3.9–12.7)

## 2020-09-23 PROCEDURE — A9585 GADOBUTROL INJECTION: HCPCS | Performed by: INTERNAL MEDICINE

## 2020-09-23 PROCEDURE — 99213 OFFICE O/P EST LOW 20 MIN: CPT | Mod: PBBFAC,25,27 | Performed by: INTERNAL MEDICINE

## 2020-09-23 PROCEDURE — 99214 PR OFFICE/OUTPT VISIT, EST, LEVL IV, 30-39 MIN: ICD-10-PCS | Mod: S$PBB,,, | Performed by: INTERNAL MEDICINE

## 2020-09-23 PROCEDURE — 99999 PR PBB SHADOW E&M-EST. PATIENT-LVL III: ICD-10-PCS | Mod: PBBFAC,,, | Performed by: INTERNAL MEDICINE

## 2020-09-23 PROCEDURE — 99999 PR PBB SHADOW E&M-EST. PATIENT-LVL I: CPT | Mod: PBBFAC,,,

## 2020-09-23 PROCEDURE — 85025 COMPLETE CBC W/AUTO DIFF WBC: CPT

## 2020-09-23 PROCEDURE — 70553 MRI BRAIN STEM W/O & W/DYE: CPT | Mod: 26,,, | Performed by: RADIOLOGY

## 2020-09-23 PROCEDURE — 25500020 PHARM REV CODE 255: Performed by: INTERNAL MEDICINE

## 2020-09-23 PROCEDURE — 99214 OFFICE O/P EST MOD 30 MIN: CPT | Mod: S$PBB,,, | Performed by: INTERNAL MEDICINE

## 2020-09-23 PROCEDURE — 99999 PR PBB SHADOW E&M-EST. PATIENT-LVL III: CPT | Mod: PBBFAC,,, | Performed by: INTERNAL MEDICINE

## 2020-09-23 PROCEDURE — 99211 OFF/OP EST MAY X REQ PHY/QHP: CPT | Mod: PBBFAC,25

## 2020-09-23 PROCEDURE — 70553 MRI BRAIN W WO CONTRAST: ICD-10-PCS | Mod: 26,,, | Performed by: RADIOLOGY

## 2020-09-23 PROCEDURE — 99999 PR PBB SHADOW E&M-EST. PATIENT-LVL I: ICD-10-PCS | Mod: PBBFAC,,,

## 2020-09-23 PROCEDURE — 70553 MRI BRAIN STEM W/O & W/DYE: CPT | Mod: TC

## 2020-09-23 PROCEDURE — 80048 BASIC METABOLIC PNL TOTAL CA: CPT

## 2020-09-23 RX ORDER — MECLIZINE HYDROCHLORIDE 25 MG/1
25 TABLET ORAL 3 TIMES DAILY PRN
Qty: 30 TABLET | Refills: 2 | Status: SHIPPED | OUTPATIENT
Start: 2020-09-23 | End: 2022-08-22 | Stop reason: SDUPTHER

## 2020-09-23 RX ORDER — GADOBUTROL 604.72 MG/ML
8 INJECTION INTRAVENOUS
Status: COMPLETED | OUTPATIENT
Start: 2020-09-23 | End: 2020-09-23

## 2020-09-23 RX ADMIN — GADOBUTROL 8 ML: 604.72 INJECTION INTRAVENOUS at 07:09

## 2020-09-24 NOTE — PROGRESS NOTES
"Subjective:       Patient ID: Anton Christiansen is a 69 y.o. male.    Chief Complaint: Dizziness      HPI:  Dizziness starting this am while driving. Felt "like back of car was moving downwards". Not like "passing out". Did have to pull over car for a few minutes. No sob, cp, palps. No tinnitus or hearing loss. Did feel a transient mild nausea. On questioning, does admit to slight dizziness occasionally in the mornings after getting out of bed but this feeling was slightly different. Hx of meningioma followed by Neurosurg. Last MRI 2019 was stable with plan for 2 yr f/u. Cerebral volume loss with enlarged ventricles and old right cerebellar infarct noted at that time. Denies any stroke like symptoms. No speech difficulty, weakness or sensation changes. Hx of HTN. On Coreg 12.5 bid. Mildly elevated bp in office which is typical for him. Several normal bp's at home recently.     Review of Systems   Constitutional: Negative for fatigue and fever.   HENT: Negative for hearing loss and tinnitus.    Eyes: Negative for visual disturbance.   Respiratory: Negative for cough and shortness of breath.    Cardiovascular: Negative for chest pain, palpitations and leg swelling.   Gastrointestinal: Positive for nausea. Negative for abdominal pain, diarrhea and vomiting.   Genitourinary: Negative for difficulty urinating.   Musculoskeletal: Negative for neck pain.   Skin: Negative for wound.   Neurological: Positive for dizziness. Negative for seizures, syncope, facial asymmetry, speech difficulty, weakness, numbness and headaches.   Psychiatric/Behavioral: Negative for confusion.       Past Medical History:   Diagnosis Date    Allergy     seasonal    Arteriosclerotic coronary artery disease 4/10/2017    Basal cell carcinoma 07/14/15    right dorsal hand    BCC (basal cell carcinoma of skin) 4/2014    nasal tip s/p Mohs with forehead flap    Cataract     Family history of colon cancer     Hyperlipidemia     Hypertension  "    Meningioma     Multiple thyroid nodules 4/24/2018    Nephrolithiasis     ASHA on CPAP     Prostatitis, chronic     Special screening for malignant neoplasms, colon 6/19/2014         Current Outpatient Medications:     ascorbic acid (VITAMIN C) 500 MG tablet, Take 500 mg by mouth once daily., Disp: , Rfl:     atorvastatin (LIPITOR) 10 MG tablet, TAKE 1 TABLET BY MOUTH EVERY DAY, Disp: 90 tablet, Rfl: 3    carvediloL (COREG) 12.5 MG tablet, TAKE 1 TABLET BY MOUTH TWICE DAILY, Disp: 60 tablet, Rfl: 11    cholecalciferol, vitamin D3, 3,000 unit Tab, Take 1 tablet by mouth once daily. , Disp: , Rfl:     dutasteride (AVODART) 0.5 mg capsule, TK ONE C PO  QD, Disp: 30 capsule, Rfl: 11    fish oil-omega-3 fatty acids 300-1,000 mg capsule, Take 2 g by mouth once daily., Disp: , Rfl:     saw palmetto 80 MG capsule, Take 80 mg by mouth 2 (two) times daily., Disp: , Rfl:     sildenafil (VIAGRA) 100 MG tablet, Take 1 tablet (100 mg total) by mouth daily as needed., Disp: 10 tablet, Rfl: 12    vitamin E 100 UNIT capsule, Take 100 Units by mouth once daily., Disp: , Rfl:       Past Surgical History:   Procedure Laterality Date    CATARACT EXTRACTION W/  INTRAOCULAR LENS IMPLANT  12/20/12    OD    COLONOSCOPY  060509    COLONOSCOPY N/A 5/20/2019    Procedure: COLONOSCOPY;  Surgeon: Artemio Greer MD;  Location: [address]);  Service: Endoscopy;  Laterality: N/A;    Division and Inset  4/29/14    D&I Forehead Flap    EYE SURGERY      Pilonoidal Cyst      SKIN SURGERY  MOHS Repair    nose    TONSILLECTOMY         Family History   Problem Relation Age of Onset    Cancer Mother 70        colon    Hypertension Mother     Cancer Father     No Known Problems Sister     No Known Problems Brother     No Known Problems Maternal Aunt     No Known Problems Maternal Uncle     No Known Problems Paternal Aunt     No Known Problems Paternal Uncle     No Known Problems Maternal Grandmother     No Known  Problems Maternal Grandfather     No Known Problems Paternal Grandmother     No Known Problems Paternal Grandfather     Glaucoma Neg Hx     Macular degeneration Neg Hx     Amblyopia Neg Hx     Blindness Neg Hx     Cataracts Neg Hx     Diabetes Neg Hx     Retinal detachment Neg Hx     Strabismus Neg Hx     Stroke Neg Hx     Thyroid disease Neg Hx     Melanoma Neg Hx     Psoriasis Neg Hx     Lupus Neg Hx     Eczema Neg Hx     Acne Neg Hx     Thyroid cancer Neg Hx        Social History     Tobacco Use    Smoking status: Never Smoker    Smokeless tobacco: Never Used   Substance Use Topics    Alcohol use: No    Drug use: No         Objective:      Vitals:    09/23/20 1203   BP: (!) 154/81   Pulse: (!) 54   Temp: 98.4 °F (36.9 °C)     Repeat /83    Physical Exam  Constitutional:       General: He is not in acute distress.     Appearance: Normal appearance. He is not ill-appearing or diaphoretic.   HENT:      Head: Normocephalic and atraumatic.      Right Ear: Tympanic membrane normal. There is no impacted cerumen.      Left Ear: Tympanic membrane normal. There is no impacted cerumen.   Eyes:      Extraocular Movements: Extraocular movements intact.      Conjunctiva/sclera: Conjunctivae normal.      Pupils: Pupils are equal, round, and reactive to light.   Neck:      Vascular: No carotid bruit.   Cardiovascular:      Rate and Rhythm: Normal rate and regular rhythm.      Heart sounds: Normal heart sounds. No murmur.   Pulmonary:      Effort: Pulmonary effort is normal. No respiratory distress.      Breath sounds: Normal breath sounds. No wheezing, rhonchi or rales.   Abdominal:      General: There is no distension.      Palpations: Abdomen is soft.      Tenderness: There is no abdominal tenderness.   Musculoskeletal: Normal range of motion.      Right lower leg: No edema.      Left lower leg: No edema.   Skin:     General: Skin is warm and dry.      Findings: No lesion.   Neurological:       Mental Status: He is alert and oriented to person, place, and time.      Cranial Nerves: No cranial nerve deficit.      Sensory: No sensory deficit.      Motor: No weakness.      Coordination: Coordination normal.      Gait: Gait normal.      Deep Tendon Reflexes: Reflexes normal.      Comments: Arab-Hallpike performed. No nystagmus. Dizziness noted with maneuver, left > right. Mild dizziness present for several minutes afterwards. No lightheadedness noted upon quickly standing from seated position prior to Qian-Hallpike testing.   Psychiatric:         Mood and Affect: Mood normal.         Behavior: Behavior normal.         Thought Content: Thought content normal.         Judgment: Judgment normal.         Recent Results (from the past 2016 hour(s))   Cytology-FNA Non-Radiology Clinician Performed w/o on site    Collection Time: 08/07/20  1:32 PM   Result Value Ref Range    Final Pathologic Diagnosis (A)      [place] System Thyroid Cytology Category:  Unsatisfactory  Virtually  acellular specimen  Other findings and comments:  Colloid present.     Basic metabolic panel    Collection Time: 09/23/20 12:55 PM   Result Value Ref Range    Sodium 138 136 - 145 mmol/L    Potassium 4.6 3.5 - 5.1 mmol/L    Chloride 104 95 - 110 mmol/L    CO2 24 23 - 29 mmol/L    Glucose 119 (H) 70 - 110 mg/dL    BUN, Bld 18 8 - 23 mg/dL    Creatinine 1.1 0.5 - 1.4 mg/dL    Calcium 10.0 8.7 - 10.5 mg/dL    Anion Gap 10 8 - 16 mmol/L    eGFR if African American >60.0 >60 mL/min/1.73 m^2    eGFR if non African American >60.0 >60 mL/min/1.73 m^2   CBC auto differential    Collection Time: 09/23/20 12:55 PM   Result Value Ref Range    WBC 6.44 3.90 - 12.70 K/uL    RBC 4.96 4.60 - 6.20 M/uL    Hemoglobin 15.2 14.0 - 18.0 g/dL    Hematocrit 46.1 40.0 - 54.0 %    Mean Corpuscular Volume 93 82 - 98 fL    Mean Corpuscular Hemoglobin 30.6 27.0 - 31.0 pg    Mean Corpuscular Hemoglobin Conc 33.0 32.0 - 36.0 g/dL    RDW 13.2 11.5 - 14.5 %    Platelets 249  150 - 350 K/uL    MPV 10.2 9.2 - 12.9 fL    Immature Granulocytes 0.3 0.0 - 0.5 %    Gran # (ANC) 4.7 1.8 - 7.7 K/uL    Immature Grans (Abs) 0.02 0.00 - 0.04 K/uL    Lymph # 1.1 1.0 - 4.8 K/uL    Mono # 0.4 0.3 - 1.0 K/uL    Eos # 0.1 0.0 - 0.5 K/uL    Baso # 0.04 0.00 - 0.20 K/uL    nRBC 0 0 /100 WBC    Gran% 72.6 38.0 - 73.0 %    Lymph% 17.5 (L) 18.0 - 48.0 %    Mono% 6.8 4.0 - 15.0 %    Eosinophil% 2.2 0.0 - 8.0 %    Basophil% 0.6 0.0 - 1.9 %    Differential Method Automated           Assessment/Plan:     1) Vertigo, likely BPPV, less likely vestibular neuritis - CBC and CMP unremarkable immediately following visit. MRI brain w/ contrast performed and shows stable findings with addition of second small meningioma. Overall, no concerning acute findings. Spoke this evening and he confirms resolution of symptoms. Discussed avoiding abrupt changes in head position and being very deliberate with movements, particularly in am and while driving. Antivert prn persistent or frequent episodes. Consider Vestibular Rehab if recurrent or persistent.

## 2020-10-06 ENCOUNTER — PATIENT OUTREACH (OUTPATIENT)
Dept: ADMINISTRATIVE | Facility: OTHER | Age: 70
End: 2020-10-06

## 2020-10-08 ENCOUNTER — OFFICE VISIT (OUTPATIENT)
Dept: DERMATOLOGY | Facility: CLINIC | Age: 70
End: 2020-10-08
Payer: MEDICARE

## 2020-10-08 ENCOUNTER — PATIENT MESSAGE (OUTPATIENT)
Dept: ADMINISTRATIVE | Facility: OTHER | Age: 70
End: 2020-10-08

## 2020-10-08 DIAGNOSIS — B36.0 TINEA VERSICOLOR: ICD-10-CM

## 2020-10-08 DIAGNOSIS — D48.9 NEOPLASM OF UNCERTAIN BEHAVIOR: ICD-10-CM

## 2020-10-08 DIAGNOSIS — Z12.83 SKIN CANCER SCREENING: ICD-10-CM

## 2020-10-08 DIAGNOSIS — L57.0 AK (ACTINIC KERATOSIS): ICD-10-CM

## 2020-10-08 DIAGNOSIS — L82.1 SK (SEBORRHEIC KERATOSIS): Primary | ICD-10-CM

## 2020-10-08 DIAGNOSIS — D22.9 MULTIPLE BENIGN NEVI: ICD-10-CM

## 2020-10-08 DIAGNOSIS — D18.00 HEMANGIOMA, UNSPECIFIED SITE: ICD-10-CM

## 2020-10-08 PROCEDURE — 17000 DESTRUCT PREMALG LESION: CPT | Mod: S$PBB,59,ICN, | Performed by: PATHOLOGY

## 2020-10-08 PROCEDURE — 88305 TISSUE EXAM BY PATHOLOGIST: ICD-10-PCS | Mod: 26,,, | Performed by: PATHOLOGY

## 2020-10-08 PROCEDURE — 11102 PR TANGENTIAL BIOPSY, SKIN, SINGLE LESION: ICD-10-PCS | Mod: S$PBB,ICN,, | Performed by: PATHOLOGY

## 2020-10-08 PROCEDURE — 99214 PR OFFICE/OUTPT VISIT, EST, LEVL IV, 30-39 MIN: ICD-10-PCS | Mod: 25,S$PBB,ICN, | Performed by: PATHOLOGY

## 2020-10-08 PROCEDURE — 11102 TANGNTL BX SKIN SINGLE LES: CPT | Mod: S$PBB,ICN,, | Performed by: PATHOLOGY

## 2020-10-08 PROCEDURE — 17003 DESTRUCT PREMALG LES 2-14: CPT | Mod: S$PBB,ICN,, | Performed by: PATHOLOGY

## 2020-10-08 PROCEDURE — 88305 TISSUE EXAM BY PATHOLOGIST: CPT | Performed by: PATHOLOGY

## 2020-10-08 PROCEDURE — 88305 TISSUE EXAM BY PATHOLOGIST: CPT | Mod: 26,,, | Performed by: PATHOLOGY

## 2020-10-08 PROCEDURE — 17003 DESTRUCT PREMALG LES 2-14: CPT | Mod: PBBFAC | Performed by: PATHOLOGY

## 2020-10-08 PROCEDURE — 11102 TANGNTL BX SKIN SINGLE LES: CPT | Mod: PBBFAC | Performed by: PATHOLOGY

## 2020-10-08 PROCEDURE — 17003 DESTRUCTION, PREMALIGNANT LESIONS; SECOND THROUGH 14 LESIONS: ICD-10-PCS | Mod: S$PBB,ICN,, | Performed by: PATHOLOGY

## 2020-10-08 PROCEDURE — 99999 PR PBB SHADOW E&M-EST. PATIENT-LVL III: ICD-10-PCS | Mod: PBBFAC,,, | Performed by: PATHOLOGY

## 2020-10-08 PROCEDURE — 17000 PR DESTRUCTION(LASER SURGERY,CRYOSURGERY,CHEMOSURGERY),PREMALIGNANT LESIONS,FIRST LESION: ICD-10-PCS | Mod: S$PBB,59,ICN, | Performed by: PATHOLOGY

## 2020-10-08 PROCEDURE — 99999 PR PBB SHADOW E&M-EST. PATIENT-LVL III: CPT | Mod: PBBFAC,,, | Performed by: PATHOLOGY

## 2020-10-08 PROCEDURE — 17000 DESTRUCT PREMALG LESION: CPT | Mod: PBBFAC | Performed by: PATHOLOGY

## 2020-10-08 PROCEDURE — 99214 OFFICE O/P EST MOD 30 MIN: CPT | Mod: 25,S$PBB,ICN, | Performed by: PATHOLOGY

## 2020-10-08 PROCEDURE — 99213 OFFICE O/P EST LOW 20 MIN: CPT | Mod: PBBFAC | Performed by: PATHOLOGY

## 2020-10-08 RX ORDER — KETOCONAZOLE 20 MG/G
CREAM TOPICAL
Qty: 60 G | Refills: 3 | Status: SHIPPED | OUTPATIENT
Start: 2020-10-08 | End: 2021-10-05

## 2020-10-08 NOTE — PROGRESS NOTES
Subjective:       Patient ID:  Anton Christiansen is a 70 y.o. male who presents for   Chief Complaint   Patient presents with    Skin Check     ubse     HPI Pt Interested on Ubse today - noticed some itching and a small raised area just superior to prior graft scar on nose.     Derm Hx  Pt had a BCC excised from R dorsal hand in 8/15 by Dr. Haynes.  Also had a BCC of nasal tip s/p Mohs excision in 4/2014 with subsequent paramedian forehead flap repair.     Interested in upper body skin check today. Today, patient has no lesions of concern. Denies any other new, changing, growing, bleeding or painful skin lesions. Wears sunsceen when outdoors.     Review of Systems   Constitutional: Negative for fever, chills, weight loss, weight gain, fatigue, night sweats and malaise.   Respiratory: Negative for cough and shortness of breath.    Skin: Positive for itching, rash, daily sunscreen use and activity-related sunscreen use. Negative for wears hat.   Hematologic/Lymphatic: Does not bruise/bleed easily.        Objective:    Physical Exam   Constitutional: He appears well-developed and well-nourished. No distress.   Neurological: He is alert and oriented to person, place, and time. He is not disoriented.   Psychiatric: He has a normal mood and affect. He is not agitated.   Skin:   Areas Examined (abnormalities noted in diagram):   Scalp / Hair Palpated and Inspected  Head / Face Inspection Performed  Neck Inspection Performed  Chest / Axilla Inspection Performed  Abdomen Inspection Performed  Back Inspection Performed  RUE Inspected  LUE Inspection Performed  Nails and Digits Inspection Performed                   Diagram Legend     Erythematous scaling macule/papule c/w actinic keratosis       Vascular papule c/w angioma      Pigmented verrucoid papule/plaque c/w seborrheic keratosis      Yellow umbilicated papule c/w sebaceous hyperplasia      Irregularly shaped tan macule c/w lentigo     1-2 mm smooth white papules  consistent with Milia      Movable subcutaneous cyst with punctum c/w epidermal inclusion cyst      Subcutaneous movable cyst c/w pilar cyst      Firm pink to brown papule c/w dermatofibroma      Pedunculated fleshy papule(s) c/w skin tag(s)      Evenly pigmented macule c/w junctional nevus     Mildly variegated pigmented, slightly irregular-bordered macule c/w mildly atypical nevus      Flesh colored to evenly pigmented papule c/w intradermal nevus       Pink pearly papule/plaque c/w basal cell carcinoma      Erythematous hyperkeratotic cursted plaque c/w SCC      Surgical scar with no sign of skin cancer recurrence      Open and closed comedones      Inflammatory papules and pustules      Verrucoid papule consistent consistent with wart     Erythematous eczematous patches and plaques     Dystrophic onycholytic nail with subungual debris c/w onychomycosis     Umbilicated papule    Erythematous-base heme-crusted tan verrucoid plaque consistent with inflamed seborrheic keratosis     Erythematous Silvery Scaling Plaque c/w Psoriasis     See annotation      Assessment / Plan:      Pathology Orders:     Normal Orders This Visit    Specimen to Pathology, Dermatology     Comments:    Number of Specimens:->1  ------------------------->-------------------------  Spec 1 Procedure:->Biopsy  Spec 1 Clinical Impression:->erythematous indurated papule  with central dell to dorsal nose, DDx recurrent BCC vs  sebaceous hyperplasia vs other  Spec 1 Source:->dorsal nose    Questions:    Procedure Type: Dermatology and skin neoplasms    Number of Specimens: 1    ------------------------: -------------------------    Spec 1 Procedure: Biopsy    Spec 1 Clinical Impression: erythematous indurated papule with central dell to dorsal nose, DDx recurrent BCC vs sebaceous hyperplasia vs other    Spec 1 Source: dorsal nose    Clinical Information: 71 y/o M with a h/o BCC to dorsal nose now with a new indurated papule superior to BCC excision  scar        SK (seborrheic keratosis)  These are benign inherited growths without a malignant potential. Reassurance given to patient. No treatment is necessary.     Hemangioma, unspecified site  This is a benign vascular lesion. Reassurance given. No treatment required.     Multiple benign nevi  Discussed ABCDE's of nevi.  Monitor for new mole or moles that are becoming bigger, darker, irritated, or developing irregular borders. Brochure provided.    AK (actinic keratosis)  Cryosurgery Procedure Note    Verbal consent from the patient is obtained including, but not limited to, risk of hypopigmentation/hyperpigmentation, scar, recurrence of lesion. The patient is aware of the precancerous quality and need for treatment of these lesions. Liquid nitrogen cryosurgery is applied to the 5 actinic keratoses, as detailed in the physical exam, to produce a freeze injury. The patient is aware that blisters may form and is instructed on wound care with gentle cleansing and use of vaseline ointment to keep moist until healed. The patient is supplied a handout on cryosurgery and is instructed to call if lesions do not completely resolve.    Skin cancer screening  Upper body skin examination performed today including at least 6 points as noted in physical examination.   Patient instructed in importance in daily sun protection of at least spf 30. Sun avoidance and topical protection discussed.   Recommend  for daily use on face and neck.  Patient encouraged to wear hat for all outdoor exposure.   Also discussed sun protective clothing.      Neoplasm of uncertain behavior  -     Specimen to Pathology, Dermatology  Patient with erythematous indurated papule with central dell to superior border of prior BCC excision scar. DDx recurrent BCC vs sebaceous hyperplasia vs other.  Shave biopsy performed today. See procedure note below.  Will follow-up on pathology results    Shave biopsy procedure note:    Shave biopsy performed after  verbal consent including risk of infection, scar, recurrence, need for additional treatment of site. Area prepped with alcohol, anesthetized with approximately 1.0cc of 1% lidocaine with epinephrine. Lesional tissue shaved with razor blade. Hemostasis achieved with application of aluminum chloride followed by hyfrecation. No complications. Dressing applied. Wound care explained.      Tinea versicolor  -     ketoconazole (NIZORAL) 2 % cream; AAA bid  Dispense: 60 g; Refill: 3  Discussed the nature of the disorder  Start ketoconazole 2% cream as per above  Counseled patient to use OTC selsun blue as a body wash to affected areas           No follow-ups on file.

## 2020-10-13 LAB
FINAL PATHOLOGIC DIAGNOSIS: NORMAL
GROSS: NORMAL
MICROSCOPIC EXAM: NORMAL

## 2020-10-14 ENCOUNTER — PATIENT MESSAGE (OUTPATIENT)
Dept: DERMATOLOGY | Facility: CLINIC | Age: 70
End: 2020-10-14

## 2020-10-14 DIAGNOSIS — L57.0 AK (ACTINIC KERATOSIS): Primary | ICD-10-CM

## 2020-10-14 RX ORDER — FLUOROURACIL 50 MG/G
CREAM TOPICAL
Qty: 40 G | Refills: 1 | Status: SHIPPED | OUTPATIENT
Start: 2020-10-14 | End: 2021-10-05

## 2020-10-26 DIAGNOSIS — B36.0 TINEA VERSICOLOR: Primary | ICD-10-CM

## 2020-10-26 RX ORDER — FLUCONAZOLE 200 MG/1
TABLET ORAL
Qty: 4 TABLET | Refills: 0 | Status: SHIPPED | OUTPATIENT
Start: 2020-10-26 | End: 2021-03-17

## 2020-12-04 DIAGNOSIS — Z01.84 ANTIBODY RESPONSE EXAMINATION: ICD-10-CM

## 2020-12-07 ENCOUNTER — PATIENT OUTREACH (OUTPATIENT)
Dept: OTHER | Facility: OTHER | Age: 70
End: 2020-12-07

## 2020-12-14 RX ORDER — ATORVASTATIN CALCIUM 10 MG/1
10 TABLET, FILM COATED ORAL DAILY
Qty: 90 TABLET | Refills: 3 | Status: SHIPPED | OUTPATIENT
Start: 2020-12-14 | End: 2021-09-07 | Stop reason: SDUPTHER

## 2020-12-21 ENCOUNTER — PATIENT OUTREACH (OUTPATIENT)
Dept: OTHER | Facility: OTHER | Age: 70
End: 2020-12-21

## 2020-12-23 ENCOUNTER — IMMUNIZATION (OUTPATIENT)
Dept: INTERNAL MEDICINE | Facility: CLINIC | Age: 70
End: 2020-12-23
Payer: MEDICARE

## 2020-12-23 DIAGNOSIS — Z23 NEED FOR VACCINATION: ICD-10-CM

## 2020-12-23 PROCEDURE — 91300 COVID-19, MRNA, LNP-S, PF, 30 MCG/0.3 ML DOSE VACCINE: CPT | Mod: ,,, | Performed by: INTERNAL MEDICINE

## 2020-12-23 PROCEDURE — 91300 COVID-19, MRNA, LNP-S, PF, 30 MCG/0.3 ML DOSE VACCINE: ICD-10-PCS | Mod: ,,, | Performed by: INTERNAL MEDICINE

## 2020-12-23 PROCEDURE — 0001A COVID-19, MRNA, LNP-S, PF, 30 MCG/0.3 ML DOSE VACCINE: ICD-10-PCS | Mod: CV19,,, | Performed by: INTERNAL MEDICINE

## 2020-12-23 PROCEDURE — 0001A COVID-19, MRNA, LNP-S, PF, 30 MCG/0.3 ML DOSE VACCINE: CPT | Mod: CV19,,, | Performed by: INTERNAL MEDICINE

## 2020-12-29 ENCOUNTER — PATIENT OUTREACH (OUTPATIENT)
Dept: ADMINISTRATIVE | Facility: OTHER | Age: 70
End: 2020-12-29

## 2020-12-29 NOTE — PROGRESS NOTES
Requested updates within Care Everywhere.  Patient's chart was reviewed for overdue RACHELE topics.  Immunizations reconciled.

## 2021-01-05 ENCOUNTER — OFFICE VISIT (OUTPATIENT)
Dept: DERMATOLOGY | Facility: CLINIC | Age: 71
End: 2021-01-05
Payer: MEDICARE

## 2021-01-05 DIAGNOSIS — D22.9 MULTIPLE BENIGN NEVI: ICD-10-CM

## 2021-01-05 DIAGNOSIS — L57.0 AK (ACTINIC KERATOSIS): Primary | ICD-10-CM

## 2021-01-05 DIAGNOSIS — D18.01 CHERRY ANGIOMA: ICD-10-CM

## 2021-01-05 DIAGNOSIS — L21.9 SEBORRHEIC DERMATITIS: ICD-10-CM

## 2021-01-05 DIAGNOSIS — Z85.828 HISTORY OF BASAL CELL CARCINOMA (BCC) OF SKIN: ICD-10-CM

## 2021-01-05 DIAGNOSIS — L82.1 SK (SEBORRHEIC KERATOSIS): ICD-10-CM

## 2021-01-05 PROCEDURE — 99999 PR PBB SHADOW E&M-EST. PATIENT-LVL III: CPT | Mod: PBBFAC,,, | Performed by: PATHOLOGY

## 2021-01-05 PROCEDURE — 99213 PR OFFICE/OUTPT VISIT, EST, LEVL III, 20-29 MIN: ICD-10-PCS | Mod: S$PBB,,, | Performed by: PATHOLOGY

## 2021-01-05 PROCEDURE — 99213 OFFICE O/P EST LOW 20 MIN: CPT | Mod: PBBFAC | Performed by: PATHOLOGY

## 2021-01-05 PROCEDURE — 99999 PR PBB SHADOW E&M-EST. PATIENT-LVL III: ICD-10-PCS | Mod: PBBFAC,,, | Performed by: PATHOLOGY

## 2021-01-05 PROCEDURE — 99213 OFFICE O/P EST LOW 20 MIN: CPT | Mod: S$PBB,,, | Performed by: PATHOLOGY

## 2021-01-14 ENCOUNTER — IMMUNIZATION (OUTPATIENT)
Dept: INTERNAL MEDICINE | Facility: CLINIC | Age: 71
End: 2021-01-14
Payer: MEDICARE

## 2021-01-14 DIAGNOSIS — Z23 NEED FOR VACCINATION: ICD-10-CM

## 2021-01-14 PROCEDURE — 0002A COVID-19, MRNA, LNP-S, PF, 30 MCG/0.3 ML DOSE VACCINE: CPT | Mod: PBBFAC

## 2021-01-14 PROCEDURE — 91300 COVID-19, MRNA, LNP-S, PF, 30 MCG/0.3 ML DOSE VACCINE: CPT | Mod: PBBFAC

## 2021-03-09 ENCOUNTER — PATIENT MESSAGE (OUTPATIENT)
Dept: ADMINISTRATIVE | Facility: OTHER | Age: 71
End: 2021-03-09

## 2021-03-16 ENCOUNTER — PATIENT MESSAGE (OUTPATIENT)
Dept: INTERNAL MEDICINE | Facility: CLINIC | Age: 71
End: 2021-03-16

## 2021-03-16 ENCOUNTER — TELEPHONE (OUTPATIENT)
Dept: INTERNAL MEDICINE | Facility: CLINIC | Age: 71
End: 2021-03-16

## 2021-03-16 DIAGNOSIS — I25.10 ARTERIOSCLEROTIC CORONARY ARTERY DISEASE: Primary | ICD-10-CM

## 2021-03-16 DIAGNOSIS — R97.20 ELEVATED PSA: ICD-10-CM

## 2021-03-16 DIAGNOSIS — E78.00 PURE HYPERCHOLESTEROLEMIA: ICD-10-CM

## 2021-03-16 DIAGNOSIS — I10 ESSENTIAL HYPERTENSION: ICD-10-CM

## 2021-03-17 ENCOUNTER — HOSPITAL ENCOUNTER (OUTPATIENT)
Dept: CARDIOLOGY | Facility: CLINIC | Age: 71
Discharge: HOME | End: 2021-03-17
Payer: MEDICARE

## 2021-03-17 ENCOUNTER — OFFICE VISIT (OUTPATIENT)
Dept: INTERNAL MEDICINE | Facility: CLINIC | Age: 71
End: 2021-03-17
Payer: MEDICARE

## 2021-03-17 ENCOUNTER — TELEPHONE (OUTPATIENT)
Dept: UROLOGY | Facility: CLINIC | Age: 71
End: 2021-03-17

## 2021-03-17 VITALS
BODY MASS INDEX: 26.97 KG/M2 | DIASTOLIC BLOOD PRESSURE: 94 MMHG | TEMPERATURE: 98 F | SYSTOLIC BLOOD PRESSURE: 181 MMHG | WEIGHT: 182.13 LBS | HEART RATE: 56 BPM | HEIGHT: 69 IN

## 2021-03-17 DIAGNOSIS — I25.10 ARTERIOSCLEROTIC CORONARY ARTERY DISEASE: ICD-10-CM

## 2021-03-17 DIAGNOSIS — N13.8 BPH WITH URINARY OBSTRUCTION: Primary | ICD-10-CM

## 2021-03-17 DIAGNOSIS — N20.0 KIDNEY STONE: ICD-10-CM

## 2021-03-17 DIAGNOSIS — I10 ESSENTIAL HYPERTENSION: ICD-10-CM

## 2021-03-17 DIAGNOSIS — E78.00 PURE HYPERCHOLESTEROLEMIA: ICD-10-CM

## 2021-03-17 DIAGNOSIS — G47.33 OSA (OBSTRUCTIVE SLEEP APNEA): ICD-10-CM

## 2021-03-17 DIAGNOSIS — R97.20 ELEVATED PSA: Primary | ICD-10-CM

## 2021-03-17 DIAGNOSIS — E04.2 MULTIPLE THYROID NODULES: ICD-10-CM

## 2021-03-17 DIAGNOSIS — R97.20 ELEVATED PSA: ICD-10-CM

## 2021-03-17 DIAGNOSIS — N40.1 BPH WITH URINARY OBSTRUCTION: Primary | ICD-10-CM

## 2021-03-17 PROCEDURE — 93005 ELECTROCARDIOGRAM TRACING: CPT | Mod: PBBFAC | Performed by: INTERNAL MEDICINE

## 2021-03-17 PROCEDURE — 93010 EKG 12-LEAD: ICD-10-PCS | Mod: S$PBB,,, | Performed by: INTERNAL MEDICINE

## 2021-03-17 PROCEDURE — 99999 PR PBB SHADOW E&M-EST. PATIENT-LVL IV: CPT | Mod: PBBFAC,,, | Performed by: INTERNAL MEDICINE

## 2021-03-17 PROCEDURE — 93010 ELECTROCARDIOGRAM REPORT: CPT | Mod: S$PBB,,, | Performed by: INTERNAL MEDICINE

## 2021-03-17 PROCEDURE — 99215 PR OFFICE/OUTPT VISIT, EST, LEVL V, 40-54 MIN: ICD-10-PCS | Mod: S$PBB,,, | Performed by: INTERNAL MEDICINE

## 2021-03-17 PROCEDURE — 99214 OFFICE O/P EST MOD 30 MIN: CPT | Mod: PBBFAC,25 | Performed by: INTERNAL MEDICINE

## 2021-03-17 PROCEDURE — 99999 PR PBB SHADOW E&M-EST. PATIENT-LVL IV: ICD-10-PCS | Mod: PBBFAC,,, | Performed by: INTERNAL MEDICINE

## 2021-03-17 PROCEDURE — 99215 OFFICE O/P EST HI 40 MIN: CPT | Mod: S$PBB,,, | Performed by: INTERNAL MEDICINE

## 2021-03-18 ENCOUNTER — PATIENT MESSAGE (OUTPATIENT)
Dept: ENDOCRINOLOGY | Facility: CLINIC | Age: 71
End: 2021-03-18

## 2021-03-18 ENCOUNTER — PATIENT MESSAGE (OUTPATIENT)
Dept: UROLOGY | Facility: CLINIC | Age: 71
End: 2021-03-18

## 2021-03-18 ENCOUNTER — PATIENT MESSAGE (OUTPATIENT)
Dept: INTERNAL MEDICINE | Facility: CLINIC | Age: 71
End: 2021-03-18

## 2021-04-01 ENCOUNTER — PATIENT OUTREACH (OUTPATIENT)
Dept: ADMINISTRATIVE | Facility: OTHER | Age: 71
End: 2021-04-01

## 2021-04-06 ENCOUNTER — OFFICE VISIT (OUTPATIENT)
Dept: DERMATOLOGY | Facility: CLINIC | Age: 71
End: 2021-04-06
Payer: MEDICARE

## 2021-04-06 ENCOUNTER — PATIENT MESSAGE (OUTPATIENT)
Dept: DERMATOLOGY | Facility: CLINIC | Age: 71
End: 2021-04-06

## 2021-04-06 DIAGNOSIS — D48.5 NEOPLASM OF UNCERTAIN BEHAVIOR OF SKIN: Primary | ICD-10-CM

## 2021-04-06 DIAGNOSIS — L82.1 SK (SEBORRHEIC KERATOSIS): ICD-10-CM

## 2021-04-06 DIAGNOSIS — D18.01 CHERRY ANGIOMA: ICD-10-CM

## 2021-04-06 DIAGNOSIS — L57.0 AK (ACTINIC KERATOSIS): ICD-10-CM

## 2021-04-06 DIAGNOSIS — Z85.828 HISTORY OF NONMELANOMA SKIN CANCER: ICD-10-CM

## 2021-04-06 DIAGNOSIS — D22.9 MULTIPLE BENIGN NEVI: ICD-10-CM

## 2021-04-06 PROCEDURE — 99999 PR PBB SHADOW E&M-EST. PATIENT-LVL III: CPT | Mod: PBBFAC,,, | Performed by: PATHOLOGY

## 2021-04-06 PROCEDURE — 11103 PR TANGENTIAL BIOPSY, SKIN, EA ADDTL LESION: ICD-10-PCS | Mod: S$PBB,,, | Performed by: PATHOLOGY

## 2021-04-06 PROCEDURE — 99213 OFFICE O/P EST LOW 20 MIN: CPT | Mod: 25,S$PBB,, | Performed by: PATHOLOGY

## 2021-04-06 PROCEDURE — 88305 TISSUE EXAM BY PATHOLOGIST: CPT | Mod: 59 | Performed by: PATHOLOGY

## 2021-04-06 PROCEDURE — 88305 TISSUE EXAM BY PATHOLOGIST: CPT | Mod: 26,,, | Performed by: PATHOLOGY

## 2021-04-06 PROCEDURE — 17000 PR DESTRUCTION(LASER SURGERY,CRYOSURGERY,CHEMOSURGERY),PREMALIGNANT LESIONS,FIRST LESION: ICD-10-PCS | Mod: S$PBB,59,, | Performed by: PATHOLOGY

## 2021-04-06 PROCEDURE — 11102 TANGNTL BX SKIN SINGLE LES: CPT | Mod: S$PBB,,, | Performed by: PATHOLOGY

## 2021-04-06 PROCEDURE — 99213 OFFICE O/P EST LOW 20 MIN: CPT | Mod: PBBFAC | Performed by: PATHOLOGY

## 2021-04-06 PROCEDURE — 11102 PR TANGENTIAL BIOPSY, SKIN, SINGLE LESION: ICD-10-PCS | Mod: S$PBB,,, | Performed by: PATHOLOGY

## 2021-04-06 PROCEDURE — 17000 DESTRUCT PREMALG LESION: CPT | Mod: PBBFAC | Performed by: PATHOLOGY

## 2021-04-06 PROCEDURE — 88305 TISSUE EXAM BY PATHOLOGIST: ICD-10-PCS | Mod: 26,,, | Performed by: PATHOLOGY

## 2021-04-06 PROCEDURE — 99213 PR OFFICE/OUTPT VISIT, EST, LEVL III, 20-29 MIN: ICD-10-PCS | Mod: 25,S$PBB,, | Performed by: PATHOLOGY

## 2021-04-06 PROCEDURE — 17000 DESTRUCT PREMALG LESION: CPT | Mod: S$PBB,59,, | Performed by: PATHOLOGY

## 2021-04-06 PROCEDURE — 11103 TANGNTL BX SKIN EA SEP/ADDL: CPT | Mod: PBBFAC | Performed by: PATHOLOGY

## 2021-04-06 PROCEDURE — 11102 TANGNTL BX SKIN SINGLE LES: CPT | Mod: PBBFAC | Performed by: PATHOLOGY

## 2021-04-06 PROCEDURE — 99999 PR PBB SHADOW E&M-EST. PATIENT-LVL III: ICD-10-PCS | Mod: PBBFAC,,, | Performed by: PATHOLOGY

## 2021-04-06 PROCEDURE — 11103 TANGNTL BX SKIN EA SEP/ADDL: CPT | Mod: S$PBB,,, | Performed by: PATHOLOGY

## 2021-04-09 LAB
FINAL PATHOLOGIC DIAGNOSIS: NORMAL
GROSS: NORMAL
MICROSCOPIC EXAM: NORMAL

## 2021-04-30 ENCOUNTER — OFFICE VISIT (OUTPATIENT)
Dept: OPTOMETRY | Facility: CLINIC | Age: 71
End: 2021-04-30
Payer: MEDICARE

## 2021-04-30 DIAGNOSIS — H02.88B MEIBOMIAN GLAND DYSFUNCTION (MGD), BILATERAL, BOTH UPPER AND LOWER LIDS: ICD-10-CM

## 2021-04-30 DIAGNOSIS — H47.012 NAION (NON-ARTERITIC ANTERIOR ISCHEMIC OPTIC NEUROPATHY), LEFT EYE: Primary | ICD-10-CM

## 2021-04-30 DIAGNOSIS — Z13.5 GLAUCOMA SCREENING: ICD-10-CM

## 2021-04-30 DIAGNOSIS — Z96.1 PSEUDOPHAKIA OF BOTH EYES: ICD-10-CM

## 2021-04-30 DIAGNOSIS — H02.88A MEIBOMIAN GLAND DYSFUNCTION (MGD), BILATERAL, BOTH UPPER AND LOWER LIDS: ICD-10-CM

## 2021-04-30 PROCEDURE — 99999 PR PBB SHADOW E&M-EST. PATIENT-LVL III: CPT | Mod: PBBFAC,,, | Performed by: OPTOMETRIST

## 2021-04-30 PROCEDURE — 92014 PR EYE EXAM, EST PATIENT,COMPREHESV: ICD-10-PCS | Mod: S$PBB,,, | Performed by: OPTOMETRIST

## 2021-04-30 PROCEDURE — 99213 OFFICE O/P EST LOW 20 MIN: CPT | Mod: PBBFAC | Performed by: OPTOMETRIST

## 2021-04-30 PROCEDURE — 92014 COMPRE OPH EXAM EST PT 1/>: CPT | Mod: S$PBB,,, | Performed by: OPTOMETRIST

## 2021-04-30 PROCEDURE — 99999 PR PBB SHADOW E&M-EST. PATIENT-LVL III: ICD-10-PCS | Mod: PBBFAC,,, | Performed by: OPTOMETRIST

## 2021-05-04 ENCOUNTER — LAB VISIT (OUTPATIENT)
Dept: LAB | Facility: HOSPITAL | Age: 71
End: 2021-05-04
Payer: MEDICARE

## 2021-05-04 DIAGNOSIS — R97.20 ELEVATED PSA: ICD-10-CM

## 2021-05-04 LAB — COMPLEXED PSA SERPL-MCNC: 3.7 NG/ML (ref 0–4)

## 2021-05-04 PROCEDURE — 84153 ASSAY OF PSA TOTAL: CPT | Performed by: UROLOGY

## 2021-05-04 PROCEDURE — 36415 COLL VENOUS BLD VENIPUNCTURE: CPT | Performed by: UROLOGY

## 2021-05-07 ENCOUNTER — PATIENT OUTREACH (OUTPATIENT)
Dept: ADMINISTRATIVE | Facility: OTHER | Age: 71
End: 2021-05-07

## 2021-05-11 ENCOUNTER — OFFICE VISIT (OUTPATIENT)
Dept: UROLOGY | Facility: CLINIC | Age: 71
End: 2021-05-11
Payer: MEDICARE

## 2021-05-11 VITALS
HEART RATE: 57 BPM | BODY MASS INDEX: 27.6 KG/M2 | SYSTOLIC BLOOD PRESSURE: 159 MMHG | HEIGHT: 69 IN | DIASTOLIC BLOOD PRESSURE: 88 MMHG | WEIGHT: 186.31 LBS

## 2021-05-11 DIAGNOSIS — R97.20 ELEVATED PSA: Primary | ICD-10-CM

## 2021-05-11 DIAGNOSIS — N40.1 BPH WITH URINARY OBSTRUCTION: ICD-10-CM

## 2021-05-11 DIAGNOSIS — N13.8 BPH WITH URINARY OBSTRUCTION: ICD-10-CM

## 2021-05-11 PROCEDURE — 99999 PR PBB SHADOW E&M-EST. PATIENT-LVL III: CPT | Mod: PBBFAC,,, | Performed by: UROLOGY

## 2021-05-11 PROCEDURE — 99213 OFFICE O/P EST LOW 20 MIN: CPT | Mod: S$PBB,,, | Performed by: UROLOGY

## 2021-05-11 PROCEDURE — 99213 PR OFFICE/OUTPT VISIT, EST, LEVL III, 20-29 MIN: ICD-10-PCS | Mod: S$PBB,,, | Performed by: UROLOGY

## 2021-05-11 PROCEDURE — 99999 PR PBB SHADOW E&M-EST. PATIENT-LVL III: ICD-10-PCS | Mod: PBBFAC,,, | Performed by: UROLOGY

## 2021-05-11 PROCEDURE — 99213 OFFICE O/P EST LOW 20 MIN: CPT | Mod: PBBFAC | Performed by: UROLOGY

## 2021-05-11 RX ORDER — DUTASTERIDE 0.5 MG/1
CAPSULE, LIQUID FILLED ORAL
Qty: 90 CAPSULE | Refills: 3 | Status: SHIPPED | OUTPATIENT
Start: 2021-05-11 | End: 2022-04-25

## 2021-06-23 ENCOUNTER — PATIENT MESSAGE (OUTPATIENT)
Dept: ENDOCRINOLOGY | Facility: CLINIC | Age: 71
End: 2021-06-23

## 2021-06-23 ENCOUNTER — HOSPITAL ENCOUNTER (OUTPATIENT)
Dept: ENDOCRINOLOGY | Facility: CLINIC | Age: 71
Discharge: HOME | End: 2021-06-23
Attending: INTERNAL MEDICINE
Payer: MEDICARE

## 2021-06-23 DIAGNOSIS — E04.2 MULTIPLE THYROID NODULES: ICD-10-CM

## 2021-06-23 PROCEDURE — 76536 US SOFT TISSUE HEAD NECK THYROID: ICD-10-PCS | Mod: 26,,, | Performed by: INTERNAL MEDICINE

## 2021-06-23 PROCEDURE — 76536 US EXAM OF HEAD AND NECK: CPT | Mod: 26,,, | Performed by: INTERNAL MEDICINE

## 2021-06-24 ENCOUNTER — PATIENT MESSAGE (OUTPATIENT)
Dept: ENDOCRINOLOGY | Facility: CLINIC | Age: 71
End: 2021-06-24

## 2021-06-24 ENCOUNTER — PATIENT OUTREACH (OUTPATIENT)
Dept: ADMINISTRATIVE | Facility: OTHER | Age: 71
End: 2021-06-24

## 2021-06-25 ENCOUNTER — OFFICE VISIT (OUTPATIENT)
Dept: ENDOCRINOLOGY | Facility: CLINIC | Age: 71
End: 2021-06-25
Payer: MEDICARE

## 2021-06-25 DIAGNOSIS — E04.2 MULTIPLE THYROID NODULES: Primary | ICD-10-CM

## 2021-06-25 PROCEDURE — 99213 PR OFFICE/OUTPT VISIT, EST, LEVL III, 20-29 MIN: ICD-10-PCS | Mod: 95,,, | Performed by: INTERNAL MEDICINE

## 2021-06-25 PROCEDURE — 99213 OFFICE O/P EST LOW 20 MIN: CPT | Mod: 95,,, | Performed by: INTERNAL MEDICINE

## 2021-07-17 ENCOUNTER — HOSPITAL ENCOUNTER (EMERGENCY)
Facility: HOSPITAL | Age: 71
Discharge: HOME | End: 2021-07-17
Attending: EMERGENCY MEDICINE
Payer: MEDICARE

## 2021-07-17 VITALS
HEART RATE: 71 BPM | DIASTOLIC BLOOD PRESSURE: 96 MMHG | OXYGEN SATURATION: 98 % | HEIGHT: 69 IN | WEIGHT: 183 LBS | SYSTOLIC BLOOD PRESSURE: 184 MMHG | BODY MASS INDEX: 27.11 KG/M2 | TEMPERATURE: 99 F | RESPIRATION RATE: 16 BRPM

## 2021-07-17 DIAGNOSIS — M25.559 HIP PAIN: Primary | ICD-10-CM

## 2021-07-17 PROCEDURE — 99284 PR EMERGENCY DEPT VISIT,LEVEL IV: ICD-10-PCS | Mod: ,,, | Performed by: PHYSICIAN ASSISTANT

## 2021-07-17 PROCEDURE — 63600175 PHARM REV CODE 636 W HCPCS: Performed by: EMERGENCY MEDICINE

## 2021-07-17 PROCEDURE — 96372 THER/PROPH/DIAG INJ SC/IM: CPT

## 2021-07-17 PROCEDURE — 99284 EMERGENCY DEPT VISIT MOD MDM: CPT

## 2021-07-17 PROCEDURE — 99284 EMERGENCY DEPT VISIT MOD MDM: CPT | Mod: ,,, | Performed by: PHYSICIAN ASSISTANT

## 2021-07-17 RX ORDER — KETOROLAC TROMETHAMINE 30 MG/ML
15 INJECTION, SOLUTION INTRAMUSCULAR; INTRAVENOUS
Status: COMPLETED | OUTPATIENT
Start: 2021-07-17 | End: 2021-07-17

## 2021-07-17 RX ORDER — IBUPROFEN 600 MG/1
600 TABLET ORAL EVERY 6 HOURS PRN
Qty: 20 TABLET | Refills: 0 | Status: SHIPPED | OUTPATIENT
Start: 2021-07-17 | End: 2021-10-05

## 2021-07-17 RX ORDER — NAPROXEN SODIUM 220 MG
220 TABLET ORAL 2 TIMES DAILY WITH MEALS
COMMUNITY
End: 2021-10-05

## 2021-07-17 RX ADMIN — KETOROLAC TROMETHAMINE 15 MG: 30 INJECTION, SOLUTION INTRAMUSCULAR; INTRAVENOUS at 05:07

## 2021-07-18 ENCOUNTER — HOSPITAL ENCOUNTER (EMERGENCY)
Facility: HOSPITAL | Age: 71
Discharge: HOME | End: 2021-07-18
Attending: EMERGENCY MEDICINE
Payer: MEDICARE

## 2021-07-18 VITALS
TEMPERATURE: 99 F | BODY MASS INDEX: 27.11 KG/M2 | DIASTOLIC BLOOD PRESSURE: 86 MMHG | HEIGHT: 69 IN | HEART RATE: 62 BPM | WEIGHT: 183 LBS | SYSTOLIC BLOOD PRESSURE: 159 MMHG | RESPIRATION RATE: 16 BRPM | OXYGEN SATURATION: 95 %

## 2021-07-18 DIAGNOSIS — N28.1 RENAL CYST: ICD-10-CM

## 2021-07-18 DIAGNOSIS — M25.559 HIP PAIN: Primary | ICD-10-CM

## 2021-07-18 DIAGNOSIS — K86.9 PANCREATIC LESION: ICD-10-CM

## 2021-07-18 LAB
BASOPHILS # BLD AUTO: 0.03 K/UL (ref 0–0.2)
BASOPHILS NFR BLD: 0.3 % (ref 0–1.9)
BILIRUB UR QL STRIP: NEGATIVE
BUN SERPL-MCNC: 12 MG/DL (ref 6–30)
CHLORIDE SERPL-SCNC: 106 MMOL/L (ref 95–110)
CLARITY UR REFRACT.AUTO: CLEAR
COLOR UR AUTO: YELLOW
CREAT SERPL-MCNC: 1 MG/DL (ref 0.5–1.4)
DIFFERENTIAL METHOD: ABNORMAL
EOSINOPHIL # BLD AUTO: 0.1 K/UL (ref 0–0.5)
EOSINOPHIL NFR BLD: 0.9 % (ref 0–8)
ERYTHROCYTE [DISTWIDTH] IN BLOOD BY AUTOMATED COUNT: 13 % (ref 11.5–14.5)
GLUCOSE SERPL-MCNC: 88 MG/DL (ref 70–110)
GLUCOSE UR QL STRIP: NEGATIVE
HCT VFR BLD AUTO: 48.7 % (ref 40–54)
HCT VFR BLD CALC: 45 %PCV (ref 36–54)
HGB BLD-MCNC: 16.3 G/DL (ref 14–18)
HGB UR QL STRIP: NEGATIVE
IMM GRANULOCYTES # BLD AUTO: 0.03 K/UL (ref 0–0.04)
IMM GRANULOCYTES NFR BLD AUTO: 0.3 % (ref 0–0.5)
KETONES UR QL STRIP: NEGATIVE
LEUKOCYTE ESTERASE UR QL STRIP: NEGATIVE
LYMPHOCYTES # BLD AUTO: 1.3 K/UL (ref 1–4.8)
LYMPHOCYTES NFR BLD: 14.8 % (ref 18–48)
MCH RBC QN AUTO: 30.6 PG (ref 27–31)
MCHC RBC AUTO-ENTMCNC: 33.5 G/DL (ref 32–36)
MCV RBC AUTO: 91 FL (ref 82–98)
MICROSCOPIC COMMENT: NORMAL
MONOCYTES # BLD AUTO: 0.9 K/UL (ref 0.3–1)
MONOCYTES NFR BLD: 10.4 % (ref 4–15)
NEUTROPHILS # BLD AUTO: 6.6 K/UL (ref 1.8–7.7)
NEUTROPHILS NFR BLD: 73.3 % (ref 38–73)
NITRITE UR QL STRIP: NEGATIVE
NRBC BLD-RTO: 0 /100 WBC
PH UR STRIP: 5 [PH] (ref 5–8)
PLATELET # BLD AUTO: 285 K/UL (ref 150–450)
PMV BLD AUTO: 10.5 FL (ref 9.2–12.9)
POC IONIZED CALCIUM: 1.12 MMOL/L (ref 1.06–1.42)
POC TCO2 (MEASURED): 24 MMOL/L (ref 23–29)
POTASSIUM BLD-SCNC: 4.2 MMOL/L (ref 3.5–5.1)
PROT UR QL STRIP: NEGATIVE
RBC # BLD AUTO: 5.33 M/UL (ref 4.6–6.2)
RBC #/AREA URNS AUTO: 1 /HPF (ref 0–4)
SAMPLE: NORMAL
SODIUM BLD-SCNC: 141 MMOL/L (ref 136–145)
SP GR UR STRIP: 1.01 (ref 1–1.03)
SQUAMOUS #/AREA URNS AUTO: 0 /HPF
URN SPEC COLLECT METH UR: NORMAL
WBC # BLD AUTO: 9.06 K/UL (ref 3.9–12.7)
WBC #/AREA URNS AUTO: 1 /HPF (ref 0–5)

## 2021-07-18 PROCEDURE — 99284 EMERGENCY DEPT VISIT MOD MDM: CPT | Mod: 25

## 2021-07-18 PROCEDURE — 99284 PR EMERGENCY DEPT VISIT,LEVEL IV: ICD-10-PCS | Mod: ,,, | Performed by: PHYSICIAN ASSISTANT

## 2021-07-18 PROCEDURE — 96372 THER/PROPH/DIAG INJ SC/IM: CPT | Mod: 59

## 2021-07-18 PROCEDURE — 85025 COMPLETE CBC W/AUTO DIFF WBC: CPT | Performed by: PHYSICIAN ASSISTANT

## 2021-07-18 PROCEDURE — 96374 THER/PROPH/DIAG INJ IV PUSH: CPT

## 2021-07-18 PROCEDURE — 25500020 PHARM REV CODE 255: Performed by: EMERGENCY MEDICINE

## 2021-07-18 PROCEDURE — 63600175 PHARM REV CODE 636 W HCPCS: Performed by: PHYSICIAN ASSISTANT

## 2021-07-18 PROCEDURE — 81001 URINALYSIS AUTO W/SCOPE: CPT | Performed by: PHYSICIAN ASSISTANT

## 2021-07-18 PROCEDURE — 99284 EMERGENCY DEPT VISIT MOD MDM: CPT | Mod: ,,, | Performed by: PHYSICIAN ASSISTANT

## 2021-07-18 PROCEDURE — 25000003 PHARM REV CODE 250: Performed by: PHYSICIAN ASSISTANT

## 2021-07-18 RX ORDER — CARVEDILOL 12.5 MG/1
12.5 TABLET ORAL
Status: COMPLETED | OUTPATIENT
Start: 2021-07-18 | End: 2021-07-18

## 2021-07-18 RX ORDER — ACETAMINOPHEN 500 MG
1000 TABLET ORAL
Status: COMPLETED | OUTPATIENT
Start: 2021-07-18 | End: 2021-07-18

## 2021-07-18 RX ORDER — KETOROLAC TROMETHAMINE 30 MG/ML
10 INJECTION, SOLUTION INTRAMUSCULAR; INTRAVENOUS
Status: COMPLETED | OUTPATIENT
Start: 2021-07-18 | End: 2021-07-18

## 2021-07-18 RX ORDER — LIDOCAINE 50 MG/G
1 PATCH TOPICAL DAILY
Qty: 15 PATCH | Refills: 2 | Status: SHIPPED | OUTPATIENT
Start: 2021-07-18 | End: 2021-10-05

## 2021-07-18 RX ORDER — NAPROXEN 500 MG/1
500 TABLET ORAL 2 TIMES DAILY WITH MEALS
Qty: 20 TABLET | Refills: 0 | Status: SHIPPED | OUTPATIENT
Start: 2021-07-18 | End: 2021-10-05

## 2021-07-18 RX ORDER — TRIAMCINOLONE ACETONIDE 40 MG/ML
80 INJECTION, SUSPENSION INTRA-ARTICULAR; INTRAMUSCULAR
Status: COMPLETED | OUTPATIENT
Start: 2021-07-18 | End: 2021-07-18

## 2021-07-18 RX ORDER — LIDOCAINE 50 MG/G
1 PATCH TOPICAL
Status: DISCONTINUED | OUTPATIENT
Start: 2021-07-18 | End: 2021-07-18 | Stop reason: HOSPADM

## 2021-07-18 RX ADMIN — KETOROLAC TROMETHAMINE 10 MG: 30 INJECTION, SOLUTION INTRAMUSCULAR; INTRAVENOUS at 04:07

## 2021-07-18 RX ADMIN — TRIAMCINOLONE ACETONIDE 80 MG: 40 INJECTION, SUSPENSION INTRA-ARTICULAR; INTRAMUSCULAR at 04:07

## 2021-07-18 RX ADMIN — LIDOCAINE 1 PATCH: 50 PATCH TOPICAL at 04:07

## 2021-07-18 RX ADMIN — CARVEDILOL 12.5 MG: 12.5 TABLET, FILM COATED ORAL at 05:07

## 2021-07-18 RX ADMIN — ACETAMINOPHEN 1000 MG: 500 TABLET ORAL at 04:07

## 2021-07-18 RX ADMIN — IOHEXOL 125 ML: 350 INJECTION, SOLUTION INTRAVENOUS at 06:07

## 2021-07-19 ENCOUNTER — PES CALL (OUTPATIENT)
Dept: ADMINISTRATIVE | Facility: CLINIC | Age: 71
End: 2021-07-19

## 2021-07-20 ENCOUNTER — OFFICE VISIT (OUTPATIENT)
Dept: RHEUMATOLOGY | Facility: CLINIC | Age: 71
End: 2021-07-20
Payer: MEDICARE

## 2021-07-20 ENCOUNTER — OFFICE VISIT (OUTPATIENT)
Dept: INTERNAL MEDICINE | Facility: CLINIC | Age: 71
End: 2021-07-20
Payer: MEDICARE

## 2021-07-20 ENCOUNTER — HOSPITAL ENCOUNTER (OUTPATIENT)
Dept: RADIOLOGY | Facility: HOSPITAL | Age: 71
Discharge: HOME | End: 2021-07-20
Payer: MEDICARE

## 2021-07-20 VITALS
DIASTOLIC BLOOD PRESSURE: 75 MMHG | WEIGHT: 182.63 LBS | BODY MASS INDEX: 26.97 KG/M2 | TEMPERATURE: 99 F | HEART RATE: 57 BPM | SYSTOLIC BLOOD PRESSURE: 170 MMHG

## 2021-07-20 VITALS
WEIGHT: 185.63 LBS | HEART RATE: 59 BPM | BODY MASS INDEX: 27.49 KG/M2 | HEIGHT: 69 IN | DIASTOLIC BLOOD PRESSURE: 78 MMHG | SYSTOLIC BLOOD PRESSURE: 165 MMHG

## 2021-07-20 DIAGNOSIS — M54.9 DORSALGIA, UNSPECIFIED: ICD-10-CM

## 2021-07-20 DIAGNOSIS — M25.551 RIGHT HIP PAIN: Primary | ICD-10-CM

## 2021-07-20 DIAGNOSIS — M54.41 ACUTE RIGHT-SIDED LOW BACK PAIN WITH RIGHT-SIDED SCIATICA: Primary | ICD-10-CM

## 2021-07-20 DIAGNOSIS — M54.41 ACUTE RIGHT-SIDED LOW BACK PAIN WITH RIGHT-SIDED SCIATICA: ICD-10-CM

## 2021-07-20 DIAGNOSIS — M25.551 RIGHT HIP PAIN: ICD-10-CM

## 2021-07-20 PROCEDURE — 72100 XR LUMBAR SPINE AP AND LATERAL: ICD-10-PCS | Mod: 26,,, | Performed by: RADIOLOGY

## 2021-07-20 PROCEDURE — 72100 X-RAY EXAM L-S SPINE 2/3 VWS: CPT | Mod: 26,,, | Performed by: RADIOLOGY

## 2021-07-20 PROCEDURE — 99204 OFFICE O/P NEW MOD 45 MIN: CPT | Mod: S$PBB,GC,,

## 2021-07-20 PROCEDURE — 99999 PR PBB SHADOW E&M-EST. PATIENT-LVL IV: ICD-10-PCS | Mod: PBBFAC,,, | Performed by: INTERNAL MEDICINE

## 2021-07-20 PROCEDURE — 99999 PR PBB SHADOW E&M-EST. PATIENT-LVL V: ICD-10-PCS | Mod: PBBFAC,GC,,

## 2021-07-20 PROCEDURE — 99204 PR OFFICE/OUTPT VISIT, NEW, LEVL IV, 45-59 MIN: ICD-10-PCS | Mod: S$PBB,GC,,

## 2021-07-20 PROCEDURE — 99214 OFFICE O/P EST MOD 30 MIN: CPT | Mod: PBBFAC | Performed by: INTERNAL MEDICINE

## 2021-07-20 PROCEDURE — 99215 OFFICE O/P EST HI 40 MIN: CPT | Mod: PBBFAC,27

## 2021-07-20 PROCEDURE — 99999 PR PBB SHADOW E&M-EST. PATIENT-LVL V: CPT | Mod: PBBFAC,GC,,

## 2021-07-20 PROCEDURE — 99215 OFFICE O/P EST HI 40 MIN: CPT | Mod: S$PBB,,, | Performed by: INTERNAL MEDICINE

## 2021-07-20 PROCEDURE — 72100 X-RAY EXAM L-S SPINE 2/3 VWS: CPT | Mod: TC

## 2021-07-20 PROCEDURE — 99999 PR PBB SHADOW E&M-EST. PATIENT-LVL IV: CPT | Mod: PBBFAC,,, | Performed by: INTERNAL MEDICINE

## 2021-07-20 PROCEDURE — 99215 PR OFFICE/OUTPT VISIT, EST, LEVL V, 40-54 MIN: ICD-10-PCS | Mod: S$PBB,,, | Performed by: INTERNAL MEDICINE

## 2021-07-20 RX ORDER — CYCLOBENZAPRINE HCL 5 MG
2.5 TABLET ORAL 3 TIMES DAILY PRN
Qty: 30 TABLET | Refills: 0 | Status: SHIPPED | OUTPATIENT
Start: 2021-07-20 | End: 2021-07-30

## 2021-07-24 ENCOUNTER — OFFICE VISIT (OUTPATIENT)
Dept: ORTHOPEDICS | Facility: CLINIC | Age: 71
End: 2021-07-24
Payer: MEDICARE

## 2021-07-24 VITALS — HEIGHT: 69 IN | WEIGHT: 181 LBS | BODY MASS INDEX: 26.81 KG/M2

## 2021-07-24 DIAGNOSIS — M25.559 HIP PAIN: ICD-10-CM

## 2021-07-24 DIAGNOSIS — M76.01 GLUTEAL TENDINITIS OF RIGHT BUTTOCK: Primary | ICD-10-CM

## 2021-07-24 PROCEDURE — 99214 OFFICE O/P EST MOD 30 MIN: CPT | Mod: PBBFAC | Performed by: ORTHOPAEDIC SURGERY

## 2021-07-24 PROCEDURE — 99999 PR PBB SHADOW E&M-EST. PATIENT-LVL IV: ICD-10-PCS | Mod: PBBFAC,,, | Performed by: ORTHOPAEDIC SURGERY

## 2021-07-24 PROCEDURE — 99203 OFFICE O/P NEW LOW 30 MIN: CPT | Mod: S$PBB,,, | Performed by: ORTHOPAEDIC SURGERY

## 2021-07-24 PROCEDURE — 99999 PR PBB SHADOW E&M-EST. PATIENT-LVL IV: CPT | Mod: PBBFAC,,, | Performed by: ORTHOPAEDIC SURGERY

## 2021-07-24 PROCEDURE — 99203 PR OFFICE/OUTPT VISIT, NEW, LEVL III, 30-44 MIN: ICD-10-PCS | Mod: S$PBB,,, | Performed by: ORTHOPAEDIC SURGERY

## 2021-07-26 ENCOUNTER — IMMUNIZATION (OUTPATIENT)
Dept: PHARMACY | Facility: CLINIC | Age: 71
End: 2021-07-26
Payer: MEDICARE

## 2021-07-26 DIAGNOSIS — M76.01 GLUTEAL TENDINITIS OF RIGHT BUTTOCK: Primary | ICD-10-CM

## 2021-07-27 PROBLEM — M76.01 GLUTEAL TENDINITIS OF RIGHT BUTTOCK: Status: ACTIVE | Noted: 2021-07-27

## 2021-07-28 ENCOUNTER — PATIENT MESSAGE (OUTPATIENT)
Dept: ADMINISTRATIVE | Facility: OTHER | Age: 71
End: 2021-07-28

## 2021-08-15 ENCOUNTER — PATIENT MESSAGE (OUTPATIENT)
Dept: INTERNAL MEDICINE | Facility: CLINIC | Age: 71
End: 2021-08-15

## 2021-08-17 ENCOUNTER — PATIENT MESSAGE (OUTPATIENT)
Dept: SLEEP MEDICINE | Facility: CLINIC | Age: 71
End: 2021-08-17

## 2021-08-17 DIAGNOSIS — G47.33 OSA (OBSTRUCTIVE SLEEP APNEA): Primary | ICD-10-CM

## 2021-08-18 ENCOUNTER — PATIENT MESSAGE (OUTPATIENT)
Dept: SLEEP MEDICINE | Facility: CLINIC | Age: 71
End: 2021-08-18

## 2021-08-19 ENCOUNTER — PATIENT MESSAGE (OUTPATIENT)
Dept: INTERNAL MEDICINE | Facility: CLINIC | Age: 71
End: 2021-08-19

## 2021-08-19 ENCOUNTER — PATIENT MESSAGE (OUTPATIENT)
Dept: DERMATOLOGY | Facility: CLINIC | Age: 71
End: 2021-08-19

## 2021-09-07 RX ORDER — ATORVASTATIN CALCIUM 10 MG/1
10 TABLET, FILM COATED ORAL DAILY
Qty: 90 TABLET | Refills: 3 | Status: SHIPPED | OUTPATIENT
Start: 2021-09-07 | End: 2022-09-09

## 2021-09-08 ENCOUNTER — PATIENT MESSAGE (OUTPATIENT)
Dept: SLEEP MEDICINE | Facility: CLINIC | Age: 71
End: 2021-09-08

## 2021-09-22 ENCOUNTER — TELEPHONE (OUTPATIENT)
Dept: NEUROSURGERY | Facility: CLINIC | Age: 71
End: 2021-09-22

## 2021-09-22 DIAGNOSIS — D32.9 MENINGIOMA: Primary | ICD-10-CM

## 2021-09-23 ENCOUNTER — PATIENT MESSAGE (OUTPATIENT)
Dept: NEUROSURGERY | Facility: CLINIC | Age: 71
End: 2021-09-23

## 2021-09-24 ENCOUNTER — PATIENT MESSAGE (OUTPATIENT)
Dept: SLEEP MEDICINE | Facility: CLINIC | Age: 71
End: 2021-09-24

## 2021-10-05 ENCOUNTER — OFFICE VISIT (OUTPATIENT)
Dept: NEUROSURGERY | Facility: CLINIC | Age: 71
End: 2021-10-05
Payer: MEDICARE

## 2021-10-05 ENCOUNTER — HOSPITAL ENCOUNTER (OUTPATIENT)
Dept: RADIOLOGY | Facility: HOSPITAL | Age: 71
Discharge: HOME | End: 2021-10-05
Attending: NEUROLOGICAL SURGERY
Payer: MEDICARE

## 2021-10-05 ENCOUNTER — TELEPHONE (OUTPATIENT)
Dept: NEUROSURGERY | Facility: CLINIC | Age: 71
End: 2021-10-05

## 2021-10-05 VITALS
BODY MASS INDEX: 26.05 KG/M2 | HEART RATE: 62 BPM | WEIGHT: 176.38 LBS | SYSTOLIC BLOOD PRESSURE: 154 MMHG | DIASTOLIC BLOOD PRESSURE: 93 MMHG

## 2021-10-05 DIAGNOSIS — D32.9 MENINGIOMA: ICD-10-CM

## 2021-10-05 DIAGNOSIS — D32.9 MENINGIOMA: Primary | ICD-10-CM

## 2021-10-05 PROCEDURE — 99999 PR PBB SHADOW E&M-EST. PATIENT-LVL IV: CPT | Mod: PBBFAC,,, | Performed by: NEUROLOGICAL SURGERY

## 2021-10-05 PROCEDURE — 99214 OFFICE O/P EST MOD 30 MIN: CPT | Mod: PBBFAC,25 | Performed by: NEUROLOGICAL SURGERY

## 2021-10-05 PROCEDURE — 25500020 PHARM REV CODE 255: Performed by: NEUROLOGICAL SURGERY

## 2021-10-05 PROCEDURE — 99214 OFFICE O/P EST MOD 30 MIN: CPT | Mod: S$PBB,,, | Performed by: NEUROLOGICAL SURGERY

## 2021-10-05 PROCEDURE — 70553 MRI BRAIN STEM W/O & W/DYE: CPT | Mod: 26,,, | Performed by: RADIOLOGY

## 2021-10-05 PROCEDURE — A9585 GADOBUTROL INJECTION: HCPCS | Performed by: NEUROLOGICAL SURGERY

## 2021-10-05 PROCEDURE — 99999 PR PBB SHADOW E&M-EST. PATIENT-LVL IV: ICD-10-PCS | Mod: PBBFAC,,, | Performed by: NEUROLOGICAL SURGERY

## 2021-10-05 PROCEDURE — 99214 PR OFFICE/OUTPT VISIT, EST, LEVL IV, 30-39 MIN: ICD-10-PCS | Mod: S$PBB,,, | Performed by: NEUROLOGICAL SURGERY

## 2021-10-05 PROCEDURE — 70553 MRI BRAIN W WO CONTRAST: ICD-10-PCS | Mod: 26,,, | Performed by: RADIOLOGY

## 2021-10-05 PROCEDURE — 70553 MRI BRAIN STEM W/O & W/DYE: CPT | Mod: TC

## 2021-10-05 RX ORDER — GADOBUTROL 604.72 MG/ML
10 INJECTION INTRAVENOUS
Status: COMPLETED | OUTPATIENT
Start: 2021-10-05 | End: 2021-10-05

## 2021-10-05 RX ADMIN — GADOBUTROL 10 ML: 604.72 INJECTION INTRAVENOUS at 09:10

## 2021-10-06 LAB
CREAT SERPL-MCNC: 1 MG/DL (ref 0.5–1.4)
SAMPLE: NORMAL

## 2021-10-26 ENCOUNTER — OFFICE VISIT (OUTPATIENT)
Dept: DERMATOLOGY | Facility: CLINIC | Age: 71
End: 2021-10-26
Payer: MEDICARE

## 2021-10-26 DIAGNOSIS — L57.0 AK (ACTINIC KERATOSIS): Primary | ICD-10-CM

## 2021-10-26 DIAGNOSIS — L81.4 SOLAR LENTIGO: ICD-10-CM

## 2021-10-26 DIAGNOSIS — L82.0 INFLAMED SEBORRHEIC KERATOSIS: ICD-10-CM

## 2021-10-26 DIAGNOSIS — D22.9 MULTIPLE BENIGN NEVI: ICD-10-CM

## 2021-10-26 DIAGNOSIS — L82.1 SK (SEBORRHEIC KERATOSIS): ICD-10-CM

## 2021-10-26 DIAGNOSIS — D18.01 CHERRY ANGIOMA: ICD-10-CM

## 2021-10-26 DIAGNOSIS — Z85.828 HISTORY OF NONMELANOMA SKIN CANCER: ICD-10-CM

## 2021-10-26 PROCEDURE — 99213 OFFICE O/P EST LOW 20 MIN: CPT | Mod: PBBFAC | Performed by: PATHOLOGY

## 2021-10-26 PROCEDURE — 17000 PR DESTRUCTION(LASER SURGERY,CRYOSURGERY,CHEMOSURGERY),PREMALIGNANT LESIONS,FIRST LESION: ICD-10-PCS | Mod: S$PBB,,, | Performed by: PATHOLOGY

## 2021-10-26 PROCEDURE — 99213 OFFICE O/P EST LOW 20 MIN: CPT | Mod: 25,S$PBB,, | Performed by: PATHOLOGY

## 2021-10-26 PROCEDURE — 17003 DESTRUCT PREMALG LES 2-14: CPT | Mod: S$PBB,,, | Performed by: PATHOLOGY

## 2021-10-26 PROCEDURE — 99999 PR PBB SHADOW E&M-EST. PATIENT-LVL III: CPT | Mod: PBBFAC,,, | Performed by: PATHOLOGY

## 2021-10-26 PROCEDURE — 99999 PR PBB SHADOW E&M-EST. PATIENT-LVL III: ICD-10-PCS | Mod: PBBFAC,,, | Performed by: PATHOLOGY

## 2021-10-26 PROCEDURE — 17003 DESTRUCT PREMALG LES 2-14: CPT | Mod: PBBFAC | Performed by: PATHOLOGY

## 2021-10-26 PROCEDURE — 17110 PR DESTRUCTION BENIGN LESIONS UP TO 14: ICD-10-PCS | Mod: S$PBB,59,, | Performed by: PATHOLOGY

## 2021-10-26 PROCEDURE — 17003 DESTRUCTION, PREMALIGNANT LESIONS; SECOND THROUGH 14 LESIONS: ICD-10-PCS | Mod: S$PBB,,, | Performed by: PATHOLOGY

## 2021-10-26 PROCEDURE — 99213 PR OFFICE/OUTPT VISIT, EST, LEVL III, 20-29 MIN: ICD-10-PCS | Mod: 25,S$PBB,, | Performed by: PATHOLOGY

## 2021-10-26 PROCEDURE — 17000 DESTRUCT PREMALG LESION: CPT | Mod: S$PBB,,, | Performed by: PATHOLOGY

## 2021-10-26 PROCEDURE — 17110 DESTRUCTION B9 LES UP TO 14: CPT | Mod: S$PBB,59,, | Performed by: PATHOLOGY

## 2021-10-26 PROCEDURE — 17110 DESTRUCTION B9 LES UP TO 14: CPT | Mod: PBBFAC | Performed by: PATHOLOGY

## 2021-10-26 PROCEDURE — 17000 DESTRUCT PREMALG LESION: CPT | Mod: PBBFAC | Performed by: PATHOLOGY

## 2021-10-28 ENCOUNTER — PATIENT MESSAGE (OUTPATIENT)
Dept: SLEEP MEDICINE | Facility: CLINIC | Age: 71
End: 2021-10-28
Payer: MEDICARE

## 2021-11-30 PROCEDURE — 99458 RPM TX MGMT EA ADDL 20 MIN: CPT | Mod: ,,, | Performed by: INTERNAL MEDICINE

## 2021-11-30 PROCEDURE — 99457 RPM TX MGMT 1ST 20 MIN: CPT | Mod: S$PBB,,, | Performed by: INTERNAL MEDICINE

## 2021-11-30 PROCEDURE — 99457 PR MONITORING, PHYSIOL PARAM, REMOTE, 1ST 20 MINS, PER MONTH: ICD-10-PCS | Mod: S$PBB,,, | Performed by: INTERNAL MEDICINE

## 2021-11-30 PROCEDURE — 99458 PR REMOTE PHYSIOL MONIT, EA ADDTL 20 MINS: ICD-10-PCS | Mod: ,,, | Performed by: INTERNAL MEDICINE

## 2021-12-07 ENCOUNTER — PATIENT MESSAGE (OUTPATIENT)
Dept: DERMATOLOGY | Facility: CLINIC | Age: 71
End: 2021-12-07
Payer: MEDICARE

## 2021-12-07 ENCOUNTER — TELEPHONE (OUTPATIENT)
Dept: DERMATOLOGY | Facility: CLINIC | Age: 71
End: 2021-12-07
Payer: MEDICARE

## 2021-12-07 ENCOUNTER — HOSPITAL ENCOUNTER (OUTPATIENT)
Dept: RADIOLOGY | Facility: HOSPITAL | Age: 71
Discharge: HOME | End: 2021-12-07
Attending: NEUROLOGICAL SURGERY
Payer: MEDICARE

## 2021-12-07 DIAGNOSIS — D32.9 MENINGIOMA: ICD-10-CM

## 2021-12-07 LAB
CREAT SERPL-MCNC: 1 MG/DL (ref 0.5–1.4)
SAMPLE: NORMAL

## 2021-12-07 PROCEDURE — 25500020 PHARM REV CODE 255: Performed by: NEUROLOGICAL SURGERY

## 2021-12-07 PROCEDURE — 70553 MRI BRAIN W WO CONTRAST: ICD-10-PCS | Mod: 26,,, | Performed by: RADIOLOGY

## 2021-12-07 PROCEDURE — A9585 GADOBUTROL INJECTION: HCPCS | Performed by: NEUROLOGICAL SURGERY

## 2021-12-07 PROCEDURE — 70553 MRI BRAIN STEM W/O & W/DYE: CPT | Mod: TC

## 2021-12-07 PROCEDURE — 70553 MRI BRAIN STEM W/O & W/DYE: CPT | Mod: 26,,, | Performed by: RADIOLOGY

## 2021-12-07 RX ORDER — GADOBUTROL 604.72 MG/ML
9 INJECTION INTRAVENOUS
Status: COMPLETED | OUTPATIENT
Start: 2021-12-07 | End: 2021-12-07

## 2021-12-07 RX ADMIN — GADOBUTROL 9 ML: 604.72 INJECTION INTRAVENOUS at 01:12

## 2021-12-08 ENCOUNTER — OFFICE VISIT (OUTPATIENT)
Dept: NEUROSURGERY | Facility: CLINIC | Age: 71
End: 2021-12-08
Payer: MEDICARE

## 2021-12-08 DIAGNOSIS — D32.9 MENINGIOMA: Primary | ICD-10-CM

## 2021-12-08 PROCEDURE — 99214 PR OFFICE/OUTPT VISIT, EST, LEVL IV, 30-39 MIN: ICD-10-PCS | Mod: 95,,, | Performed by: NEUROLOGICAL SURGERY

## 2021-12-08 PROCEDURE — 99214 OFFICE O/P EST MOD 30 MIN: CPT | Mod: 95,,, | Performed by: NEUROLOGICAL SURGERY

## 2022-01-04 ENCOUNTER — PATIENT MESSAGE (OUTPATIENT)
Dept: ADMINISTRATIVE | Facility: OTHER | Age: 72
End: 2022-01-04
Payer: MEDICARE

## 2022-01-31 ENCOUNTER — PATIENT MESSAGE (OUTPATIENT)
Dept: INTERNAL MEDICINE | Facility: CLINIC | Age: 72
End: 2022-01-31
Payer: MEDICARE

## 2022-02-01 ENCOUNTER — TELEPHONE (OUTPATIENT)
Dept: INTERNAL MEDICINE | Facility: CLINIC | Age: 72
End: 2022-02-01

## 2022-02-01 ENCOUNTER — OFFICE VISIT (OUTPATIENT)
Dept: INTERNAL MEDICINE | Facility: CLINIC | Age: 72
End: 2022-02-01
Payer: MEDICARE

## 2022-02-01 VITALS
WEIGHT: 183 LBS | TEMPERATURE: 98 F | BODY MASS INDEX: 27.11 KG/M2 | HEIGHT: 69 IN | SYSTOLIC BLOOD PRESSURE: 144 MMHG | DIASTOLIC BLOOD PRESSURE: 82 MMHG | HEART RATE: 82 BPM

## 2022-02-01 DIAGNOSIS — I25.10 ARTERIOSCLEROTIC CORONARY ARTERY DISEASE: Primary | ICD-10-CM

## 2022-02-01 DIAGNOSIS — E78.00 PURE HYPERCHOLESTEROLEMIA: ICD-10-CM

## 2022-02-01 DIAGNOSIS — R10.12 LUQ ABDOMINAL PAIN: Primary | ICD-10-CM

## 2022-02-01 DIAGNOSIS — R97.20 ELEVATED PSA: ICD-10-CM

## 2022-02-01 DIAGNOSIS — I10 PRIMARY HYPERTENSION: ICD-10-CM

## 2022-02-01 PROCEDURE — 99213 OFFICE O/P EST LOW 20 MIN: CPT | Mod: S$PBB,,, | Performed by: INTERNAL MEDICINE

## 2022-02-01 PROCEDURE — 99213 PR OFFICE/OUTPT VISIT, EST, LEVL III, 20-29 MIN: ICD-10-PCS | Mod: S$PBB,,, | Performed by: INTERNAL MEDICINE

## 2022-02-01 PROCEDURE — 99999 PR PBB SHADOW E&M-EST. PATIENT-LVL III: CPT | Mod: PBBFAC,,, | Performed by: INTERNAL MEDICINE

## 2022-02-01 PROCEDURE — 99999 PR PBB SHADOW E&M-EST. PATIENT-LVL III: ICD-10-PCS | Mod: PBBFAC,,, | Performed by: INTERNAL MEDICINE

## 2022-02-01 PROCEDURE — 99213 OFFICE O/P EST LOW 20 MIN: CPT | Mod: PBBFAC | Performed by: INTERNAL MEDICINE

## 2022-02-01 NOTE — TELEPHONE ENCOUNTER
----- Message from Lily Sosa sent at 2/1/2022  2:16 PM CST -----  Regarding: Please place orders for annual

## 2022-02-01 NOTE — PROGRESS NOTES
Subjective:       Patient ID: Anton Christiansen is a 71 y.o. male.    Chief Complaint: LUQ abdominal pain    HPI   For unclear amount of time (probably weeks to a couple of months) has had LUQ or L lower rib cage pain which seems to be associated with lying on his left side. May awaken him and typically only lasts for a few seconds to minutes. He has some notion that someone told him it was a hernia, but review of all his records in last year do not reflect any consideration of or finding supporting that dx.     He is to get his annual physical in a few weeks. Will see how he is doing at that time. Presently asymptomatic.    Had CTA done in July of last year for right hip/abd pain and it did not reveal any plausible findings to explain his current symptoms.    Suspect this is chest or abdominal wall pain due to its positional nature.    Review of Systems   Constitutional: Negative for chills, decreased appetite, fever, malaise/fatigue, night sweats, weight gain and weight loss.   HENT: Positive for hearing loss and tinnitus. Negative for congestion, ear pain, hoarse voice and sore throat.    Eyes: Negative for blurred vision, redness and visual disturbance.   Cardiovascular: Negative for chest pain, leg swelling and palpitations.   Respiratory: Positive for cough. Negative for hemoptysis, shortness of breath and sputum production.    Hematologic/Lymphatic: Negative for adenopathy. Does not bruise/bleed easily.   Skin: Positive for dry skin and itching. Negative for rash and suspicious lesions.   Musculoskeletal: Negative for back pain, joint pain, myalgias and neck pain.   Gastrointestinal: Negative for abdominal pain, constipation, diarrhea, heartburn, nausea and vomiting.   Genitourinary: Negative for dysuria, flank pain, frequency, hematuria, hesitancy and urgency.   Neurological: Negative for dizziness, headaches, numbness, paresthesias and weakness.   Psychiatric/Behavioral: Positive for memory loss. Negative  for depression. The patient does not have insomnia and is not nervous/anxious.        Objective:      Physical Exam  Vitals reviewed.   Constitutional:       General: He is not in acute distress.     Appearance: Normal appearance. He is not ill-appearing.   Abdominal:      Palpations: There is no mass.      Tenderness: There is no abdominal tenderness. There is no guarding or rebound.      Hernia: No hernia is present.      Comments: obese   Neurological:      Mental Status: He is alert.   Psychiatric:         Behavior: Behavior normal.         Assessment:       1. LUQ abdominal pain        Plan:        reassurred; come back for annual PE in few weeks

## 2022-03-29 ENCOUNTER — OFFICE VISIT (OUTPATIENT)
Dept: INTERNAL MEDICINE | Facility: CLINIC | Age: 72
End: 2022-03-29
Payer: MEDICARE

## 2022-03-29 ENCOUNTER — PATIENT MESSAGE (OUTPATIENT)
Dept: INTERNAL MEDICINE | Facility: CLINIC | Age: 72
End: 2022-03-29

## 2022-03-29 ENCOUNTER — HOSPITAL ENCOUNTER (OUTPATIENT)
Dept: CARDIOLOGY | Facility: CLINIC | Age: 72
Discharge: HOME | End: 2022-03-29
Payer: MEDICARE

## 2022-03-29 VITALS
SYSTOLIC BLOOD PRESSURE: 128 MMHG | DIASTOLIC BLOOD PRESSURE: 78 MMHG | BODY MASS INDEX: 26.92 KG/M2 | HEIGHT: 69 IN | HEART RATE: 61 BPM | TEMPERATURE: 99 F | WEIGHT: 181.75 LBS

## 2022-03-29 DIAGNOSIS — N20.0 KIDNEY STONE: Chronic | ICD-10-CM

## 2022-03-29 DIAGNOSIS — R97.20 ELEVATED PSA: ICD-10-CM

## 2022-03-29 DIAGNOSIS — I25.10 ARTERIOSCLEROTIC CORONARY ARTERY DISEASE: ICD-10-CM

## 2022-03-29 DIAGNOSIS — E21.3 HYPERPARATHYROIDISM: ICD-10-CM

## 2022-03-29 DIAGNOSIS — E78.00 PURE HYPERCHOLESTEROLEMIA: ICD-10-CM

## 2022-03-29 DIAGNOSIS — I10 PRIMARY HYPERTENSION: ICD-10-CM

## 2022-03-29 DIAGNOSIS — E78.2 MIXED HYPERLIPIDEMIA: Chronic | ICD-10-CM

## 2022-03-29 DIAGNOSIS — E04.2 MULTIPLE THYROID NODULES: ICD-10-CM

## 2022-03-29 DIAGNOSIS — N40.1 BPH WITH URINARY OBSTRUCTION: ICD-10-CM

## 2022-03-29 DIAGNOSIS — I10 PRIMARY HYPERTENSION: Chronic | ICD-10-CM

## 2022-03-29 DIAGNOSIS — N13.8 BPH WITH URINARY OBSTRUCTION: ICD-10-CM

## 2022-03-29 DIAGNOSIS — E83.52 HYPERCALCEMIA: Primary | ICD-10-CM

## 2022-03-29 PROCEDURE — 99214 OFFICE O/P EST MOD 30 MIN: CPT | Mod: PBBFAC | Performed by: INTERNAL MEDICINE

## 2022-03-29 PROCEDURE — 99215 OFFICE O/P EST HI 40 MIN: CPT | Mod: S$PBB,,, | Performed by: INTERNAL MEDICINE

## 2022-03-29 PROCEDURE — 93005 ELECTROCARDIOGRAM TRACING: CPT | Mod: PBBFAC | Performed by: INTERNAL MEDICINE

## 2022-03-29 PROCEDURE — 93010 ELECTROCARDIOGRAM REPORT: CPT | Mod: S$PBB,,, | Performed by: INTERNAL MEDICINE

## 2022-03-29 PROCEDURE — 99999 PR PBB SHADOW E&M-EST. PATIENT-LVL IV: ICD-10-PCS | Mod: PBBFAC,,, | Performed by: INTERNAL MEDICINE

## 2022-03-29 PROCEDURE — 93010 EKG 12-LEAD: ICD-10-PCS | Mod: S$PBB,,, | Performed by: INTERNAL MEDICINE

## 2022-03-29 PROCEDURE — 99999 PR PBB SHADOW E&M-EST. PATIENT-LVL IV: CPT | Mod: PBBFAC,,, | Performed by: INTERNAL MEDICINE

## 2022-03-29 PROCEDURE — 99215 PR OFFICE/OUTPT VISIT, EST, LEVL V, 40-54 MIN: ICD-10-PCS | Mod: S$PBB,,, | Performed by: INTERNAL MEDICINE

## 2022-03-29 NOTE — PROGRESS NOTES
Subjective:       Patient ID: Anton Christiansen is a 71 y.o. male.    Chief Complaint:  Followup on multiple medical problems     HPI        Thyroid nodules  Pt saw Dr. Haney who did FNA 8/7/20. It was QNS to analyze.   Last thyroid ultrasound was 3/13/20 showed:  1. Decreased size of mixed cystic and solid nodule in the right thyroid lobe, though with increased calcification, which meets TI-RADS 4 category.  Note is made of a prior biopsy of this lesion with inconclusive results.  This nodule would meet criteria for repeat FNA, if clinically warranted.  2. Three subcentimeter hypoechoic nodules noted in the left thyroid lobe, none of which meet criteria for FNA or dedicated follow-up.     Hypercalcemia  Was incidentally noted last year and repeat value this hear again elevated at 10.7. His PTH level is high at 294. This may be related to his prior kidney stone. Will ask endocrine to reevaluate    Hypertension  Pt still on carvedilol 12.5 mg once daily and Benicar HCT (which may aggravate his hypercalcemia) . He is on the [facility] digital HTN program and BP is well controlled.        Elevated PSA  Followed by Dr. Quinteros for elevated PSA and renal stones.  He had multiple prostate bx done in 2018 all of which were negative for malignancy. PSA today is 3.9 (3.7 last year).     Hypercholesterolemia   On atorvastatin 10 mg. Cholesterol control is good but his TG is up related to  weight.    Lab Results   Component Value Date    CHOL 174 03/29/2022    CHOL 137 03/17/2021    CHOL 146 03/10/2020     Lab Results   Component Value Date    HDL 34 (L) 03/29/2022    HDL 38 (L) 03/17/2021    HDL 39 (L) 03/10/2020     Lab Results   Component Value Date    LDLCALC 88.0 03/29/2022    LDLCALC 70.8 03/17/2021    LDLCALC 75.6 03/10/2020     Lab Results   Component Value Date    TRIG 260 (H) 03/29/2022    TRIG 141 03/17/2021    TRIG 157 (H) 03/10/2020     Lab Results   Component Value Date    CHOLHDL 19.5 (L) 03/29/2022    CHOLHDL 27.7  03/17/2021    CHOLHDL 26.7 03/10/2020         He walks regularly.      ASHA   was dx with ASHA and has done much better since using CPAP. Daytime drowsiness has resolved     Recurrent nephrolithiasis     Last renal stone study 9/18/15 showed:  Stable 0.5-cm distal right ureter calculus with persistent proximal right dilatation and worsening right perinephric fat stranding.   Last sx of colic were 2015.  He is hydrating regularly and consistently   Last visit with Dr. Quinteros was May 2021 and he wanted to see him back in one year.                    He has had two Pfizer Covid 19 vaccines. I STRONGLY urged him to get a booster but he is reluctant because his wife doesn't want him to get it.     Last colonoscopy was 2019 with a recommended rescreening interval of 5 years.     CBC, sed rate, comprehensive metabolic profile (except for minor elevation of potassium), and EKG were all normal.      Review of Systems   Constitutional: Negative for chills, decreased appetite, fever, malaise/fatigue, night sweats, weight gain and weight loss.   HENT: Negative for congestion, ear pain, hearing loss, hoarse voice, sore throat and tinnitus.    Eyes: Negative for blurred vision, redness and visual disturbance.   Cardiovascular: Negative for chest pain, leg swelling and palpitations.   Respiratory: Negative for cough, hemoptysis, shortness of breath and sputum production.    Hematologic/Lymphatic: Negative for adenopathy. Does not bruise/bleed easily.   Skin: Negative for dry skin, itching, rash and suspicious lesions.   Musculoskeletal: Negative for back pain, joint pain, myalgias and neck pain.   Gastrointestinal: Negative for abdominal pain, constipation, diarrhea, heartburn, nausea and vomiting.   Genitourinary: Negative for dysuria, flank pain, frequency, hematuria, hesitancy and urgency.   Neurological: Negative for dizziness, headaches, numbness, paresthesias and weakness.   Psychiatric/Behavioral: Negative for depression and  memory loss. The patient does not have insomnia and is not nervous/anxious.        Objective:      Physical Exam  Vitals reviewed.   Constitutional:       Appearance: He is well-developed.      Comments: overweight   HENT:      Head: Normocephalic and atraumatic.      Right Ear: External ear normal.      Left Ear: External ear normal.   Eyes:      Conjunctiva/sclera: Conjunctivae normal.      Pupils: Pupils are equal, round, and reactive to light.   Neck:      Thyroid: No thyromegaly.   Cardiovascular:      Rate and Rhythm: Normal rate and regular rhythm.      Heart sounds: Normal heart sounds. No murmur heard.  Pulmonary:      Effort: Pulmonary effort is normal.      Breath sounds: Normal breath sounds. No wheezing or rales.   Abdominal:      General: Bowel sounds are normal.      Palpations: Abdomen is soft. There is no mass.      Tenderness: There is no abdominal tenderness. There is no rebound.   Musculoskeletal:         General: Normal range of motion.      Cervical back: Normal range of motion and neck supple.   Lymphadenopathy:      Cervical: No cervical adenopathy.   Skin:     General: Skin is warm and dry.   Neurological:      Mental Status: He is alert and oriented to person, place, and time.   Psychiatric:         Behavior: Behavior normal.         Assessment:       1. Hypercalcemia due to hyperparathyroidism   2. Multiple thyroid nodules    3. Hyperparathyroidism    4. Arteriosclerotic coronary artery disease    5. Mixed hyperlipidemia    6. Primary hypertension    7. BPH with urinary obstruction    8. Kidney stone        Plan:        1. Repeat thyroid and parathyroid ultrasound   2. Revisit Dr. Haney re thyroid/parathyroid issues   3. Covid booster   4. Continue present meds for now   5. followup appt with Dr. Quinteros

## 2022-04-05 ENCOUNTER — HOSPITAL ENCOUNTER (OUTPATIENT)
Dept: RADIOLOGY | Facility: HOSPITAL | Age: 72
Discharge: HOME | End: 2022-04-05
Attending: INTERNAL MEDICINE
Payer: MEDICARE

## 2022-04-05 DIAGNOSIS — E04.2 MULTIPLE THYROID NODULES: ICD-10-CM

## 2022-04-05 DIAGNOSIS — E83.52 HYPERCALCEMIA: ICD-10-CM

## 2022-04-05 PROCEDURE — 76536 US EXAM OF HEAD AND NECK: CPT | Mod: TC

## 2022-04-05 PROCEDURE — 76536 US EXAM OF HEAD AND NECK: CPT | Mod: 26,,, | Performed by: RADIOLOGY

## 2022-04-05 PROCEDURE — 76536 US SOFT TISSUE HEAD NECK THYROID: ICD-10-PCS | Mod: 26,,, | Performed by: RADIOLOGY

## 2022-04-06 ENCOUNTER — PATIENT MESSAGE (OUTPATIENT)
Dept: ENDOCRINOLOGY | Facility: CLINIC | Age: 72
End: 2022-04-06
Payer: MEDICARE

## 2022-04-08 ENCOUNTER — TELEPHONE (OUTPATIENT)
Dept: DERMATOLOGY | Facility: CLINIC | Age: 72
End: 2022-04-08
Payer: MEDICARE

## 2022-04-20 ENCOUNTER — PATIENT MESSAGE (OUTPATIENT)
Dept: ADMINISTRATIVE | Facility: OTHER | Age: 72
End: 2022-04-20
Payer: MEDICARE

## 2022-04-25 ENCOUNTER — PATIENT MESSAGE (OUTPATIENT)
Dept: INTERNAL MEDICINE | Facility: CLINIC | Age: 72
End: 2022-04-25
Payer: MEDICARE

## 2022-04-25 DIAGNOSIS — I10 PRIMARY HYPERTENSION: ICD-10-CM

## 2022-04-25 RX ORDER — CARVEDILOL 12.5 MG/1
12.5 TABLET ORAL
Qty: 90 TABLET | Refills: 1 | Status: SHIPPED | OUTPATIENT
Start: 2022-04-25 | End: 2022-04-27 | Stop reason: SDUPTHER

## 2022-04-27 DIAGNOSIS — I10 PRIMARY HYPERTENSION: ICD-10-CM

## 2022-04-27 RX ORDER — CARVEDILOL 12.5 MG/1
12.5 TABLET ORAL
Qty: 90 TABLET | Refills: 3 | Status: SHIPPED | OUTPATIENT
Start: 2022-04-27 | End: 2023-06-12 | Stop reason: SDUPTHER

## 2022-04-28 ENCOUNTER — OFFICE VISIT (OUTPATIENT)
Dept: DERMATOLOGY | Facility: CLINIC | Age: 72
End: 2022-04-28
Payer: MEDICARE

## 2022-04-28 DIAGNOSIS — L82.1 SK (SEBORRHEIC KERATOSIS): ICD-10-CM

## 2022-04-28 DIAGNOSIS — Z85.828 HISTORY OF BASAL CELL CARCINOMA (BCC) OF SKIN: Primary | ICD-10-CM

## 2022-04-28 DIAGNOSIS — L81.4 SOLAR LENTIGO: ICD-10-CM

## 2022-04-28 DIAGNOSIS — L82.0 INFLAMED SEBORRHEIC KERATOSIS: ICD-10-CM

## 2022-04-28 DIAGNOSIS — D22.9 MULTIPLE BENIGN NEVI: ICD-10-CM

## 2022-04-28 DIAGNOSIS — L57.0 AK (ACTINIC KERATOSIS): ICD-10-CM

## 2022-04-28 DIAGNOSIS — D18.01 CHERRY ANGIOMA: ICD-10-CM

## 2022-04-28 PROCEDURE — 17110 DESTRUCTION B9 LES UP TO 14: CPT | Mod: S$PBB,,, | Performed by: PATHOLOGY

## 2022-04-28 PROCEDURE — 99213 OFFICE O/P EST LOW 20 MIN: CPT | Mod: PBBFAC | Performed by: PATHOLOGY

## 2022-04-28 PROCEDURE — 99213 OFFICE O/P EST LOW 20 MIN: CPT | Mod: 25,S$PBB,, | Performed by: PATHOLOGY

## 2022-04-28 PROCEDURE — 17003 DESTRUCT PREMALG LES 2-14: CPT | Mod: PBBFAC | Performed by: PATHOLOGY

## 2022-04-28 PROCEDURE — 99213 PR OFFICE/OUTPT VISIT, EST, LEVL III, 20-29 MIN: ICD-10-PCS | Mod: 25,S$PBB,, | Performed by: PATHOLOGY

## 2022-04-28 PROCEDURE — 17110 PR DESTRUCTION BENIGN LESIONS UP TO 14: ICD-10-PCS | Mod: S$PBB,,, | Performed by: PATHOLOGY

## 2022-04-28 PROCEDURE — 17110 DESTRUCTION B9 LES UP TO 14: CPT | Mod: PBBFAC | Performed by: PATHOLOGY

## 2022-04-28 PROCEDURE — 17000 DESTRUCT PREMALG LESION: CPT | Mod: PBBFAC,XS | Performed by: PATHOLOGY

## 2022-04-28 PROCEDURE — 17000 DESTRUCT PREMALG LESION: CPT | Mod: S$PBB,XS,, | Performed by: PATHOLOGY

## 2022-04-28 PROCEDURE — 99999 PR PBB SHADOW E&M-EST. PATIENT-LVL III: ICD-10-PCS | Mod: PBBFAC,,, | Performed by: PATHOLOGY

## 2022-04-28 PROCEDURE — 17003 DESTRUCTION, PREMALIGNANT LESIONS; SECOND THROUGH 14 LESIONS: ICD-10-PCS | Mod: S$PBB,XS,, | Performed by: PATHOLOGY

## 2022-04-28 PROCEDURE — 17003 DESTRUCT PREMALG LES 2-14: CPT | Mod: S$PBB,XS,, | Performed by: PATHOLOGY

## 2022-04-28 PROCEDURE — 17000 PR DESTRUCTION(LASER SURGERY,CRYOSURGERY,CHEMOSURGERY),PREMALIGNANT LESIONS,FIRST LESION: ICD-10-PCS | Mod: S$PBB,XS,, | Performed by: PATHOLOGY

## 2022-04-28 PROCEDURE — 99999 PR PBB SHADOW E&M-EST. PATIENT-LVL III: CPT | Mod: PBBFAC,,, | Performed by: PATHOLOGY

## 2022-04-28 NOTE — PATIENT INSTRUCTIONS
CRYOSURGERY      Your doctor has used a method called cryosurgery to treat your skin condition. Cryosurgery refers to the use of very cold substances to treat a variety of skin conditions such as warts, pre-skin cancers, molluscum contagiosum, sun spots, and several benign growths. The substance we use in cryosurgery is liquid nitrogen and is so cold (-195 degrees Celsius) that is burns when administered.     Following treatment in the office, the skin may immediately burn and become red. You may find the area around the lesion is affected as well. It is sometimes necessary to treat not only the lesion, but a small area of the surrounding normal skin to achieve a good response.     A blister, and even a blood filled blister, may form after treatment.   This is a normal response. If the blister is painful, it is acceptable to sterilize a needle and with rubbing alcohol and gently pop the blister. It is important that you gently wash the area with soap and warm water as the blister fluid may contain wart virus if a wart was treated. Do no remove the roof of the blister.     The area treated can take anywhere from 1-3 weeks to heal. Healing time depends on the kind skin lesion treated, the location, and how aggressively the lesion was treated. It is recommended that the areas treated are covered with Vaseline or bacitracin ointment and a band-aid. If a band-aid is not practical, just ointment applied several times per day will do. Keeping these areas moist will speed the healing time.    Treatment with liquid nitrogen can leave a scar. In dark skin, it may be a light or dark scar, in light skin it may be a white or pink scar. These will generally fade with time, but may never go away completely.     If you have any concerns after your treatment, please feel free to call the office.       [address] [place] [address]/ [phone] FAX/ [email or web address]    May 18, 2022    Israel Hurtado   [address]  [facility] [address]    RE: Joel Chau   MRN: [id number]  YOB: 1948   Lung Screening Exam Done On: 6/30/21    Dear Dr. Israel Hurtado,    Your patient is due for his annual low dose lung screening.     If you would like him to continue in the program, please enter an order in [facility] for CT LUNG CANCER SCREENING LOW DOSE WO CONTRAST ([id number]    Thank you for using [facility], feel free to contact me with any questions.    Sincerely,      Lung Screening Program

## 2022-04-28 NOTE — PROGRESS NOTES
Subjective:       Patient ID:  Anton Christiansen is a 71 y.o. male who presents for   Chief Complaint   Patient presents with    Skin Check     UBSE     HPI  Pt with h/o BCC.  Also h/o:  Skin, dorsal nose, shave biopsy:   -ACTINIC KERATOSIS, INVOLVING HAIR FOLLICLES AND EXTENDING TO THE BASE OF THE   BIOPSY      Treated with Efudex cream bid for 3 weeks with robust response.  H/o tinea versicolor - resolved with OTC antifungal cream and oral diflucan.  Had allergic reaction to ketoconazole cream.     Derm Hx  Pt had a BCC excised from R dorsal hand in 8/15 by Dr. Haynes.  Also had a BCC of nasal tip s/p Mohs excision in 4/2014 with subsequent paramedian forehead flap repair.     Today, he notes waxing and waning pink scaly spot to right nose.  Also several new brown warty papules, some frequently irritated and pruritic.    Review of Systems   Constitutional: Negative for fever, chills, fatigue and malaise.   Skin: Negative for itching, rash and recent sunburn.        Objective:    Physical Exam   Constitutional: He appears well-developed and well-nourished. No distress.   Neurological: He is alert and oriented to person, place, and time. He is not disoriented.   Psychiatric: He has a normal mood and affect. He is not agitated.   Skin:   Areas Examined (abnormalities noted in diagram):   Scalp / Hair Palpated and Inspected  Head / Face Inspection Performed  Neck Inspection Performed  Chest / Axilla Inspection Performed  Abdomen Inspection Performed  Back Inspection Performed  RUE Inspected  LUE Inspection Performed  Nails and Digits Inspection Performed                       Diagram Legend     Erythematous scaling macule/papule c/w actinic keratosis       Vascular papule c/w angioma      Pigmented verrucoid papule/plaque c/w seborrheic keratosis      Yellow umbilicated papule c/w sebaceous hyperplasia      Irregularly shaped tan macule c/w lentigo     1-2 mm smooth white papules consistent with Milia      Movable  subcutaneous cyst with punctum c/w epidermal inclusion cyst      Subcutaneous movable cyst c/w pilar cyst      Firm pink to brown papule c/w dermatofibroma      Pedunculated fleshy papule(s) c/w skin tag(s)      Evenly pigmented macule c/w junctional nevus     Mildly variegated pigmented, slightly irregular-bordered macule c/w mildly atypical nevus      Flesh colored to evenly pigmented papule c/w intradermal nevus       Pink pearly papule/plaque c/w basal cell carcinoma      Erythematous hyperkeratotic cursted plaque c/w SCC      Surgical scar with no sign of skin cancer recurrence      Open and closed comedones      Inflammatory papules and pustules      Verrucoid papule consistent consistent with wart     Erythematous eczematous patches and plaques     Dystrophic onycholytic nail with subungual debris c/w onychomycosis     Umbilicated papule    Erythematous-base heme-crusted tan verrucoid plaque consistent with inflamed seborrheic keratosis     Erythematous Silvery Scaling Plaque c/w Psoriasis     See annotation      Assessment / Plan:        History of basal cell carcinoma (BCC) of skin - Area(s) of previous NMSC evaluated with no signs of recurrence.    Upper body skin examination performed today including at least 6 points as noted in physical examination. No lesions suspicious for malignancy noted.    Recommend daily sun protection/avoidance and use of at least SPF 30, broad spectrum sunscreen (OTC drug).       AK (actinic keratosis) - Cryosurgery Procedure Note    Verbal consent from the patient is obtained including, but not limited to, risk of hypopigmentation/hyperpigmentation, scar, recurrence of lesion. The patient is aware of the precancerous quality and need for treatment of these lesions. Liquid nitrogen cryosurgery is applied to the 5 actinic keratoses, as detailed in the physical exam, to produce a freeze injury. The patient is aware that blisters may form and is instructed on wound care with  gentle cleansing and use of vaseline ointment to keep moist until healed. The patient is supplied a handout on cryosurgery and is instructed to call if lesions do not completely resolve.      SK (seborrheic keratosis) - These are benign inherited growths without a malignant potential. Reassurance given to patient. No treatment is necessary.       Inflamed seborrheic keratosis - Cryosurgery procedure note:    Verbal consent from the patient is obtained including, but not limited to, risk of hypopigmentation/hyperpigmentation, scar, recurrence of lesion. Liquid nitrogen cryosurgery is applied to 2 lesions to produce a freeze injury. The patient is aware that blisters may form and is instructed on wound care with gentle cleansing and use of vaseline ointment to keep moist until healed. The patient is supplied a handout on cryosurgery and is instructed to call if lesions do not completely resolve.      Multiple benign nevi - Patient with a few benign appearing nevi. Instructed patient to observe lesion(s) for changes and follow up in clinic if changes are noted.       Solar lentigo - This is a benign hyperpigmented sun induced lesion. Recommend daily sun protection/avoidance and use of at least SPF 30, broad spectrum sunscreen (OTC drug) will reduce the number of new lesions. Treatment of these benign lesions are considered cosmetic.      Cherry angioma - This is a benign vascular lesion. Reassurance given. No treatment required.                No follow-ups on file.

## 2022-05-03 ENCOUNTER — TELEPHONE (OUTPATIENT)
Dept: DERMATOLOGY | Facility: CLINIC | Age: 72
End: 2022-05-03
Payer: MEDICARE

## 2022-05-03 NOTE — TELEPHONE ENCOUNTER
I spoke with pt and he stated that he just seen and  didn't mention anything about getting PDT. I will double check with Dr. castellon

## 2022-05-04 ENCOUNTER — PATIENT MESSAGE (OUTPATIENT)
Dept: ENDOCRINOLOGY | Facility: CLINIC | Age: 72
End: 2022-05-04
Payer: MEDICARE

## 2022-05-04 DIAGNOSIS — E04.2 MULTIPLE THYROID NODULES: Primary | ICD-10-CM

## 2022-05-18 ENCOUNTER — PATIENT MESSAGE (OUTPATIENT)
Dept: UROLOGY | Facility: CLINIC | Age: 72
End: 2022-05-18
Payer: MEDICARE

## 2022-05-19 NOTE — PROGRESS NOTES
CC: enlarged prostate (100 g) with elevated PSA.    Anton Christiansen is a 71 y.o. man who is here for evaluation of elevated PSA.  Has been on Avodart.  Voices no problem with urination.    His recent PSA was 6.2 in 3/2021 and he was recommended to follow up with me with a repeat PSA.  His PSA is now down to 3.7 on 5/7/21.    Hx of elevated PSA, kidney stones, recurrent prostatitis, and ED.  He reports that ever since he has been avoiding caffeine, soda and tea, his urinary symptoms including urgency and discomfort got much better.   hx of calcium oxalate stone.  No family hx of prostate cancer reported.    1. UroNav bx of prostate 11/20/18  Findings:                                                                         --- Transrectal Ultrasound of the Prostate ---                               Prostate measurements.                                                                                                          PSA: Lab Results       Component                Value               Date                       PSA                      6.0 (H)             05/20/2014                 PSADIAG                  6.8 (H)             10/12/2018                   - Volume:73 gm.           PSA Density: 0.09                                                         no calcification in the prostate  Lesion #1: 0.9 cm left apex lateral peripheral zone lesion presumably represents recently found prostatic adenocarcinoma on biopsy.     Lesion #2: 0.7 cm right apex lateral peripheral zone lesion.  This could be related to post biopsy hemorrhage however an additional focus of malignancy cannot be excluded.   SPECIMEN  1) Prostate, left apex.  2) Prostate, left middle.  3) Prostate, left base.  4) Prostate, right apex.  5) Prostate, right middle.  6) Prostate, right base.  7) Prostate, target lesion #1.  8) Prostate, target lesion #2.  FINAL PATHOLOGIC DIAGNOSIS  PROSTATE BIOPSIES 1, 2, 3, 4, 5, 6, 7, AND 8:  NO CARCINOMA  IDENTIFIED    2. TRUS bx of prostate 6/26/18  Findings:                                                                      --- Transrectal Ultrasound of the Prostate ---                               Prostate measurements.                                                                                               PSA:   Lab Results   Component Value Date    PSA 6.0 (H) 05/20/2014    PSADIAG 10.2 (H) 06/12/2018            - Volume:100 gm.           PSA Density: 0.10                                                         no calcification in the prostate        Pathology  SPECIMEN  1) Prostate biopsy, left apex.  2) Prostate biopsy, left middle.  3) Prostate biopsy, left base.  4) Prostate biopsy, right apex.  5) Prostate biopsy, right middle.  6) Prostate biopsy, right base.  Supplemental Diagnosis  1. PROSTATE CORE BIOPSY SHOWING A SINGLE FOCUS SUSPICIOUS FOR ADENOCARCINOMA.  IMMUNOSTAINS FOR P63, HIGH MOLECULAR WEIGHT KERATIN AND AMACAR ARE NONCONTRIBUTORY  TO THE ABSENCE OF THIS SMALL FOCUS IN DEEPER LEVELS. CONTROLS STAIN APPROPRIATELY.     Following his prostate bx, I started him on Avodart for his LUTS back in 11/2018.  He is here with PSA.    Past Medical History:   Diagnosis Date    Allergy     seasonal    Arteriosclerotic coronary artery disease 4/10/2017    Basal cell carcinoma 07/14/15    right dorsal hand    BCC (basal cell carcinoma of skin) 4/2014    nasal tip s/p Mohs with forehead flap    Cataract     Family history of colon cancer     Hyperlipidemia     Hypertension     Meningioma     Multiple thyroid nodules 4/24/2018    Nephrolithiasis     ASHA on CPAP     Prostatitis, chronic     Special screening for malignant neoplasms, colon 6/19/2014    Vertigo 09/2020    Likely BPPV     Past Surgical History:   Procedure Laterality Date    CATARACT EXTRACTION W/  INTRAOCULAR LENS IMPLANT  12/20/12    OD    COLONOSCOPY  [id number]    COLONOSCOPY N/A 5/20/2019    Procedure: COLONOSCOPY;   Surgeon: Artemio Greer MD;  Location: [facility] ([address] [place] [address]);  Service: Endoscopy;  Laterality: N/A;    Division and Inset  4/29/14    D&I Forehead Flap    EYE SURGERY      Pilonoidal Cyst      SKIN SURGERY  MOHS Repair    nose    TONSILLECTOMY       Social History     Tobacco Use    Smoking status: Never Smoker    Smokeless tobacco: Never Used   Substance Use Topics    Alcohol use: No    Drug use: No     Family History   Problem Relation Age of Onset    Cancer Mother 70        colon    Hypertension Mother     Cancer Father     No Known Problems Sister     No Known Problems Brother     No Known Problems Maternal Aunt     No Known Problems Maternal Uncle     No Known Problems Paternal Aunt     No Known Problems Paternal Uncle     No Known Problems Maternal Grandmother     No Known Problems Maternal Grandfather     No Known Problems Paternal Grandmother     No Known Problems Paternal Grandfather     Glaucoma Neg Hx     Macular degeneration Neg Hx     Amblyopia Neg Hx     Blindness Neg Hx     Cataracts Neg Hx     Diabetes Neg Hx     Retinal detachment Neg Hx     Strabismus Neg Hx     Stroke Neg Hx     Thyroid disease Neg Hx     Melanoma Neg Hx     Psoriasis Neg Hx     Lupus Neg Hx     Eczema Neg Hx     Acne Neg Hx     Thyroid cancer Neg Hx      Allergy:  Review of patient's allergies indicates:   Allergen Reactions    Cephalexin Diarrhea    Ace inhibitors Other (See Comments)     Cough    Cialis [tadalafil] Other (See Comments)     Muscle pain      Proscar [finasteride] Other (See Comments)     Decreased libido     Vicodin [hydrocodone-acetaminophen] Nausea And Vomiting     Outpatient Encounter Medications as of 5/20/2022   Medication Sig Dispense Refill    ascorbic acid, vitamin C, (VITAMIN C) 500 MG tablet Take 500 mg by mouth once daily.      atorvastatin (LIPITOR) 10 MG tablet Take 1 tablet (10 mg total) by mouth once daily. 90 tablet 3    carvediloL (COREG) 12.5 MG tablet  Take 1 tablet (12.5 mg total) by mouth daily with breakfast. 90 tablet 3    cholecalciferol, vitamin D3, 3,000 unit Tab Take 1 tablet by mouth once daily.       fish oil-omega-3 fatty acids 300-1,000 mg capsule Take 1.25 g by mouth daily with dinner or evening meal.      meclizine (ANTIVERT) 25 mg tablet Take 1 tablet (25 mg total) by mouth 3 (three) times daily as needed for Dizziness. 30 tablet 2    olmesartan-hydrochlorothiazide (BENICAR HCT) 20-12.5 mg per tablet Take 1 tablet by mouth once daily. 90 tablet 1    saw palmetto 80 MG capsule Take 80 mg by mouth 2 (two) times daily.      vitamin E 100 UNIT capsule Take 100 Units by mouth once daily.      [DISCONTINUED] dutasteride (AVODART) 0.5 mg capsule TAKE ONE CAPSULE BY MOUTH EVERY DAY 90 capsule 3    dutasteride (AVODART) 0.5 mg capsule TAKE ONE CAPSULE BY MOUTH EVERY DAY 90 capsule 3     No facility-administered encounter medications on file as of 5/20/2022.     Review of Systems   General ROS: GENERAL:  No weight gain or loss  SKIN:  No rashes or lacerations  HEAD:  No headaches  EYES:  No exophthalmos, jaundice or blindness  EARS:  No dizziness, tinnitus or hearing loss  NOSE:  No changes in smell  MOUTH & THROAT:  No dyskinetic movements or obvious goiter  CHEST:  No shortness of breath, hyperventilation or cough  CARDIOVASCULAR:  No tachycardia or chest pain  ABDOMEN:  No nausea, vomiting, pain, constipation or diarrhea  URINARY:  No frequency, dysuria or sexual dysfunction  ENDOCRINE:  No polydipsia, polyuria  MUSCULOSKELETAL:  No pain or stiffness of the joints  NEUROLOGIC:  No weakness, sensory changes, seizures, confusion, memory loss, tremor or other abnormal movements  Physical Exam     Vitals:    05/20/22 0833   BP: 122/76   Pulse: 65     General Appearance:  Alert, cooperative, no distress, appears stated age   Head:  Normocephalic, without obvious abnormality, atraumatic   Eyes:  PERRL, conjunctiva/corneas clear, EOM's intact, fundi  benign, both eyes   Ears:  Normal TM's and external ear canals, both ears   Nose: Nares normal, septum midline, mucosa normal, no drainage or sinus tenderness   Throat: Lips, mucosa, and tongue normal; teeth and gums normal   Neck: Supple, symmetrical, trachea midline, no adenopathy, thyroid: not enlarged, symmetric, no tenderness/mass/nodules, no carotid bruit or JVD   Back:   Symmetric, no curvature, ROM normal, no CVA tenderness   Lungs:   Clear to auscultation bilaterally, respirations unlabored   Chest Wall:  No tenderness or deformity   Heart:  Regular rate and rhythm, S1, S2 normal, no murmur, rub or gallop   Abdomen:   Soft, non-tender, bowel sounds active all four quadrants,  no masses, no organomegaly of liver and spleen  No hernia noted   Genitalia:  Scrotum: no rash or lesion  Normal symmetric epididymis without masses  Normal vas palpated  Normal size, symmetric testicles with no masses   Normal urethral meatus with no discharge  Normal circumcised penis with no lesion   Rectal:  Normal perineum and anus upon inspection.  Normal tone, no masses or tenderness;   Extremities: Extremities normal, atraumatic, no cyanosis or edema   Pulses: 2+ and symmetric   Skin: Skin color, texture, turgor normal, no rashes or lesions   Lymph nodes: Cervical, supraclavicular, and axillary nodes normal   Neurologic: Normal     Prostate 40 grams smooth with no nodule or tenderness.    LABS:  Lab Results   Component Value Date    PSA 6.0 (H) 05/20/2014    PSA 5.81 (H) 06/04/2013    PSA 10.78 (H) 05/28/2013    PSADIAG 3.9 03/29/2022    PSADIAG 3.7 05/04/2021    PSADIAG 6.2 (H) 03/17/2021     Lab Results   Component Value Date    CREATININE 1.1 03/29/2022    CREATININE 1.1 12/07/2021    CREATININE 1.1 03/17/2021     No results found for this or any previous visit.  Urine Culture, Routine   Date Value Ref Range Status   09/20/2015 No growth  Final     Assessment and Plan:  BPH with urinary obstruction  -     dutasteride  (AVODART) 0.5 mg capsule; TAKE ONE CAPSULE BY MOUTH EVERY DAY  Dispense: 90 capsule; Refill: 3  -     Prostate Specific Antigen, Diagnostic; Future; Expected date: 05/20/2022    his PSA is stable since he has been taking Avodart.  A rule of 2 on his PSA while on avodart explained.  Will continue to monitor his PSA.  As long as his PSA remains less than 4.0 ( 8.0 in actual), I am OK with the value.    Avoid sex, riding a bicycle which may stimulate his prostate and affect his PSA level.  Continue Avodart.  So far UroNav bx of prostate x 2, showed no malignancy.    Will follow up with serial PSA annually.  I spent 25 minutes with the patient of which more than half was spent in direct consultation with the patient in regards to our treatment and plan.      Follow-up:  Follow up in about 1 year (around 5/20/2023) for PSA.

## 2022-05-20 ENCOUNTER — OFFICE VISIT (OUTPATIENT)
Dept: UROLOGY | Facility: CLINIC | Age: 72
End: 2022-05-20
Payer: MEDICARE

## 2022-05-20 VITALS
WEIGHT: 175.94 LBS | BODY MASS INDEX: 26.06 KG/M2 | HEIGHT: 69 IN | HEART RATE: 65 BPM | DIASTOLIC BLOOD PRESSURE: 76 MMHG | SYSTOLIC BLOOD PRESSURE: 122 MMHG

## 2022-05-20 DIAGNOSIS — N40.1 BPH WITH URINARY OBSTRUCTION: ICD-10-CM

## 2022-05-20 DIAGNOSIS — N13.8 BPH WITH URINARY OBSTRUCTION: ICD-10-CM

## 2022-05-20 PROCEDURE — 99214 PR OFFICE/OUTPT VISIT, EST, LEVL IV, 30-39 MIN: ICD-10-PCS | Mod: S$PBB,,, | Performed by: UROLOGY

## 2022-05-20 PROCEDURE — 99999 PR PBB SHADOW E&M-EST. PATIENT-LVL IV: CPT | Mod: PBBFAC,,, | Performed by: UROLOGY

## 2022-05-20 PROCEDURE — 99999 PR PBB SHADOW E&M-EST. PATIENT-LVL IV: ICD-10-PCS | Mod: PBBFAC,,, | Performed by: UROLOGY

## 2022-05-20 PROCEDURE — 99214 OFFICE O/P EST MOD 30 MIN: CPT | Mod: PBBFAC | Performed by: UROLOGY

## 2022-05-20 PROCEDURE — 99214 OFFICE O/P EST MOD 30 MIN: CPT | Mod: S$PBB,,, | Performed by: UROLOGY

## 2022-05-20 RX ORDER — DUTASTERIDE 0.5 MG/1
CAPSULE, LIQUID FILLED ORAL
Qty: 90 CAPSULE | Refills: 3 | Status: SHIPPED | OUTPATIENT
Start: 2022-05-20 | End: 2023-06-16 | Stop reason: SDUPTHER

## 2022-05-20 NOTE — PATIENT INSTRUCTIONS
Lab Results   Component Value Date    PSA 6.0 (H) 05/20/2014    PSA 5.81 (H) 06/04/2013    PSA 10.78 (H) 05/28/2013    PSADIAG 3.9 03/29/2022    PSADIAG 3.7 05/04/2021    PSADIAG 6.2 (H) 03/17/2021

## 2022-06-13 ENCOUNTER — PATIENT MESSAGE (OUTPATIENT)
Dept: UROLOGY | Facility: CLINIC | Age: 72
End: 2022-06-13
Payer: MEDICARE

## 2022-07-07 ENCOUNTER — OFFICE VISIT (OUTPATIENT)
Dept: OPTOMETRY | Facility: CLINIC | Age: 72
End: 2022-07-07
Payer: MEDICARE

## 2022-07-07 ENCOUNTER — LAB VISIT (OUTPATIENT)
Dept: LAB | Facility: HOSPITAL | Age: 72
End: 2022-07-07
Attending: INTERNAL MEDICINE
Payer: MEDICARE

## 2022-07-07 DIAGNOSIS — H47.012 NAION (NON-ARTERITIC ANTERIOR ISCHEMIC OPTIC NEUROPATHY), LEFT EYE: Primary | ICD-10-CM

## 2022-07-07 DIAGNOSIS — H02.88B MEIBOMIAN GLAND DYSFUNCTION (MGD), BILATERAL, BOTH UPPER AND LOWER LIDS: ICD-10-CM

## 2022-07-07 DIAGNOSIS — Z96.1 PSEUDOPHAKIA OF BOTH EYES: ICD-10-CM

## 2022-07-07 DIAGNOSIS — Z13.5 GLAUCOMA SCREENING: ICD-10-CM

## 2022-07-07 DIAGNOSIS — H02.88A MEIBOMIAN GLAND DYSFUNCTION (MGD), BILATERAL, BOTH UPPER AND LOWER LIDS: ICD-10-CM

## 2022-07-07 DIAGNOSIS — E78.2 MIXED HYPERLIPIDEMIA: ICD-10-CM

## 2022-07-07 DIAGNOSIS — H52.221 MYOPIA OF RIGHT EYE WITH REGULAR ASTIGMATISM: ICD-10-CM

## 2022-07-07 DIAGNOSIS — H52.11 MYOPIA OF RIGHT EYE WITH REGULAR ASTIGMATISM: ICD-10-CM

## 2022-07-07 LAB
CHOLEST SERPL-MCNC: 162 MG/DL (ref 120–199)
CHOLEST/HDLC SERPL: 4.3 {RATIO} (ref 2–5)
HDLC SERPL-MCNC: 38 MG/DL (ref 40–75)
HDLC SERPL: 23.5 % (ref 20–50)
LDLC SERPL CALC-MCNC: 88.8 MG/DL (ref 63–159)
NONHDLC SERPL-MCNC: 124 MG/DL
TRIGL SERPL-MCNC: 176 MG/DL (ref 30–150)

## 2022-07-07 PROCEDURE — 92014 PR EYE EXAM, EST PATIENT,COMPREHESV: ICD-10-PCS | Mod: S$PBB,,, | Performed by: OPTOMETRIST

## 2022-07-07 PROCEDURE — 99999 PR PBB SHADOW E&M-EST. PATIENT-LVL III: ICD-10-PCS | Mod: PBBFAC,,, | Performed by: OPTOMETRIST

## 2022-07-07 PROCEDURE — 99213 OFFICE O/P EST LOW 20 MIN: CPT | Mod: PBBFAC | Performed by: OPTOMETRIST

## 2022-07-07 PROCEDURE — 80061 LIPID PANEL: CPT | Performed by: INTERNAL MEDICINE

## 2022-07-07 PROCEDURE — 92015 PR REFRACTION: ICD-10-PCS | Mod: ,,, | Performed by: OPTOMETRIST

## 2022-07-07 PROCEDURE — 99999 PR PBB SHADOW E&M-EST. PATIENT-LVL III: CPT | Mod: PBBFAC,,, | Performed by: OPTOMETRIST

## 2022-07-07 PROCEDURE — 92014 COMPRE OPH EXAM EST PT 1/>: CPT | Mod: S$PBB,,, | Performed by: OPTOMETRIST

## 2022-07-07 PROCEDURE — 92015 DETERMINE REFRACTIVE STATE: CPT | Mod: ,,, | Performed by: OPTOMETRIST

## 2022-07-07 PROCEDURE — 36415 COLL VENOUS BLD VENIPUNCTURE: CPT | Performed by: INTERNAL MEDICINE

## 2022-07-07 NOTE — PROGRESS NOTES
HPI     Annual eye exam  DLS: 04/30/21    GTTS: None    Patient is here for annual eye exam  Pt states he feels his RX has change  Pt denied flashes and floaters    Last edited by Oksana Perez MA on 7/7/2022  1:27 PM. (History)            Assessment /Plan     For exam results, see Encounter Report.    NAION (non-arteritic anterior ischemic optic neuropathy), left eye  -Stable, decreasing scotoma increased BVA    Meibomian gland dysfunction (MGD), bilateral, both upper and lower lids  -Systane PRN    Glaucoma screening  -Monitor with annual eye exam and IOP check    Pseudophakia of both eyes  -clear, centered    Myopia of right eye with regular astigmatism  Eyeglass Final Rx     Eyeglass Final Rx       Sphere Cylinder Axis Dist VA Add    Right +0.25 +0.75 180 20/20 +2.50    Left -0.25 +0.75 175 20/20 +2.50    Type: PAL    Expiration Date: 7/7/2023                  RTC 1 yr

## 2022-07-21 ENCOUNTER — PATIENT MESSAGE (OUTPATIENT)
Dept: ADMINISTRATIVE | Facility: OTHER | Age: 72
End: 2022-07-21
Payer: MEDICARE

## 2022-07-28 ENCOUNTER — OFFICE VISIT (OUTPATIENT)
Dept: DERMATOLOGY | Facility: CLINIC | Age: 72
End: 2022-07-28
Payer: MEDICARE

## 2022-07-28 DIAGNOSIS — Z85.828 HISTORY OF BASAL CELL CARCINOMA (BCC) OF SKIN: ICD-10-CM

## 2022-07-28 DIAGNOSIS — D22.9 MULTIPLE BENIGN NEVI: ICD-10-CM

## 2022-07-28 DIAGNOSIS — L57.0 AK (ACTINIC KERATOSIS): Primary | ICD-10-CM

## 2022-07-28 DIAGNOSIS — L82.1 SK (SEBORRHEIC KERATOSIS): ICD-10-CM

## 2022-07-28 DIAGNOSIS — L81.4 SOLAR LENTIGO: ICD-10-CM

## 2022-07-28 DIAGNOSIS — D18.01 CHERRY ANGIOMA: ICD-10-CM

## 2022-07-28 PROCEDURE — 99213 OFFICE O/P EST LOW 20 MIN: CPT | Mod: PBBFAC | Performed by: PATHOLOGY

## 2022-07-28 PROCEDURE — 17000 PR DESTRUCTION(LASER SURGERY,CRYOSURGERY,CHEMOSURGERY),PREMALIGNANT LESIONS,FIRST LESION: ICD-10-PCS | Mod: S$PBB,,, | Performed by: PATHOLOGY

## 2022-07-28 PROCEDURE — 99999 PR PBB SHADOW E&M-EST. PATIENT-LVL III: CPT | Mod: PBBFAC,,, | Performed by: PATHOLOGY

## 2022-07-28 PROCEDURE — 17000 DESTRUCT PREMALG LESION: CPT | Mod: PBBFAC | Performed by: PATHOLOGY

## 2022-07-28 PROCEDURE — 99213 PR OFFICE/OUTPT VISIT, EST, LEVL III, 20-29 MIN: ICD-10-PCS | Mod: 25,S$PBB,, | Performed by: PATHOLOGY

## 2022-07-28 PROCEDURE — 17003 DESTRUCT PREMALG LES 2-14: CPT | Mod: S$PBB,,, | Performed by: PATHOLOGY

## 2022-07-28 PROCEDURE — 17003 DESTRUCTION, PREMALIGNANT LESIONS; SECOND THROUGH 14 LESIONS: ICD-10-PCS | Mod: S$PBB,,, | Performed by: PATHOLOGY

## 2022-07-28 PROCEDURE — 17000 DESTRUCT PREMALG LESION: CPT | Mod: S$PBB,,, | Performed by: PATHOLOGY

## 2022-07-28 PROCEDURE — 99999 PR PBB SHADOW E&M-EST. PATIENT-LVL III: ICD-10-PCS | Mod: PBBFAC,,, | Performed by: PATHOLOGY

## 2022-07-28 PROCEDURE — 17003 DESTRUCT PREMALG LES 2-14: CPT | Mod: PBBFAC | Performed by: PATHOLOGY

## 2022-07-28 PROCEDURE — 99213 OFFICE O/P EST LOW 20 MIN: CPT | Mod: 25,S$PBB,, | Performed by: PATHOLOGY

## 2022-07-28 NOTE — PATIENT INSTRUCTIONS

## 2022-07-28 NOTE — PROGRESS NOTES
Subjective:       Patient ID:  Anton Christiansen is a 71 y.o. male who presents for   Chief Complaint   Patient presents with    Skin Check     UBSE     HPI  Pt with h/o BCC.  Also h/o:  Skin, dorsal nose, shave biopsy:   -ACTINIC KERATOSIS, INVOLVING HAIR FOLLICLES AND EXTENDING TO THE BASE OF THE   BIOPSY      Treated with Efudex cream bid for 3 weeks with robust response.  H/o tinea versicolor - resolved with OTC antifungal cream and oral diflucan.  Had allergic reaction to ketoconazole cream.     Derm Hx  Pt had a BCC excised from R dorsal hand in 8/15 by Dr. Haynes.  Also had a BCC of nasal tip s/p Mohs excision in 4/2014 with subsequent paramedian forehead flap repair.  This is a high risk patient here to check for the development of new lesions.       Review of Systems   Constitutional: Negative for fever, chills, fatigue and malaise.   Skin: Negative for itching, rash, daily sunscreen use and activity-related sunscreen use.        Objective:    Physical Exam   Constitutional: He appears well-developed and well-nourished. No distress.   Neurological: He is alert and oriented to person, place, and time. He is not disoriented.   Psychiatric: He has a normal mood and affect. He is not agitated.   Skin:   Areas Examined (abnormalities noted in diagram):   Scalp / Hair Palpated and Inspected  Head / Face Inspection Performed  Neck Inspection Performed  Chest / Axilla Inspection Performed  Abdomen Inspection Performed  Back Inspection Performed  RUE Inspected  LUE Inspection Performed  Nails and Digits Inspection Performed                       Diagram Legend     Erythematous scaling macule/papule c/w actinic keratosis       Vascular papule c/w angioma      Pigmented verrucoid papule/plaque c/w seborrheic keratosis      Yellow umbilicated papule c/w sebaceous hyperplasia      Irregularly shaped tan macule c/w lentigo     1-2 mm smooth white papules consistent with Milia      Movable subcutaneous cyst with  punctum c/w epidermal inclusion cyst      Subcutaneous movable cyst c/w pilar cyst      Firm pink to brown papule c/w dermatofibroma      Pedunculated fleshy papule(s) c/w skin tag(s)      Evenly pigmented macule c/w junctional nevus     Mildly variegated pigmented, slightly irregular-bordered macule c/w mildly atypical nevus      Flesh colored to evenly pigmented papule c/w intradermal nevus       Pink pearly papule/plaque c/w basal cell carcinoma      Erythematous hyperkeratotic cursted plaque c/w SCC      Surgical scar with no sign of skin cancer recurrence      Open and closed comedones      Inflammatory papules and pustules      Verrucoid papule consistent consistent with wart     Erythematous eczematous patches and plaques     Dystrophic onycholytic nail with subungual debris c/w onychomycosis     Umbilicated papule    Erythematous-base heme-crusted tan verrucoid plaque consistent with inflamed seborrheic keratosis     Erythematous Silvery Scaling Plaque c/w Psoriasis     See annotation      Assessment / Plan:        AK (actinic keratosis) - Cryosurgery Procedure Note    Verbal consent from the patient is obtained including, but not limited to, risk of hypopigmentation/hyperpigmentation, scar, recurrence of lesion. The patient is aware of the precancerous quality and need for treatment of these lesions. Liquid nitrogen cryosurgery is applied to the 2 actinic keratoses, as detailed in the physical exam, to produce a freeze injury. The patient is aware that blisters may form and is instructed on wound care with gentle cleansing and use of vaseline ointment to keep moist until healed. The patient is supplied a handout on cryosurgery and is instructed to call if lesions do not completely resolve.      History of basal cell carcinoma (BCC) of skin - Area(s) of previous NMSC evaluated with no signs of recurrence.    Upper body skin examination performed today including at least 6 points as noted in physical  examination. No lesions suspicious for malignancy noted.    Recommend daily sun protection/avoidance and use of at least SPF 30, broad spectrum sunscreen (OTC drug).       Multiple benign nevi - Patient with several benign appearing atypical nevi. Instructed patient to observe lesion(s) for changes and follow up in clinic if changes are noted.     Solar lentigo - This is a benign hyperpigmented sun induced lesion. Recommend daily sun protection/avoidance and use of at least SPF 30, broad spectrum sunscreen (OTC drug) will reduce the number of new lesions. Treatment of these benign lesions are considered cosmetic.    Cherry angioma - This is a benign vascular lesion. Reassurance given. No treatment required.     SK (seborrheic keratosis) - These are benign inherited growths without a malignant potential. Reassurance given to patient. No treatment is necessary.                No follow-ups on file.

## 2022-07-31 ENCOUNTER — PATIENT MESSAGE (OUTPATIENT)
Dept: DERMATOLOGY | Facility: CLINIC | Age: 72
End: 2022-07-31
Payer: MEDICARE

## 2022-08-22 ENCOUNTER — OFFICE VISIT (OUTPATIENT)
Dept: INTERNAL MEDICINE | Facility: CLINIC | Age: 72
End: 2022-08-22
Attending: FAMILY MEDICINE
Payer: MEDICARE

## 2022-08-22 VITALS
DIASTOLIC BLOOD PRESSURE: 74 MMHG | OXYGEN SATURATION: 96 % | SYSTOLIC BLOOD PRESSURE: 118 MMHG | WEIGHT: 178.56 LBS | HEART RATE: 64 BPM | HEIGHT: 69 IN | BODY MASS INDEX: 26.45 KG/M2

## 2022-08-22 DIAGNOSIS — H61.23 BILATERAL IMPACTED CERUMEN: ICD-10-CM

## 2022-08-22 DIAGNOSIS — H53.412 CENTRAL SCOTOMA, LEFT EYE: ICD-10-CM

## 2022-08-22 DIAGNOSIS — R42 VERTIGO: Primary | ICD-10-CM

## 2022-08-22 DIAGNOSIS — I10 PRIMARY HYPERTENSION: Chronic | ICD-10-CM

## 2022-08-22 DIAGNOSIS — D32.9 MENINGIOMA: ICD-10-CM

## 2022-08-22 DIAGNOSIS — H47.012 NAION (NON-ARTERITIC ANTERIOR ISCHEMIC OPTIC NEUROPATHY), LEFT EYE: ICD-10-CM

## 2022-08-22 PROCEDURE — 99214 OFFICE O/P EST MOD 30 MIN: CPT | Mod: S$PBB,,, | Performed by: FAMILY MEDICINE

## 2022-08-22 PROCEDURE — 99999 PR PBB SHADOW E&M-EST. PATIENT-LVL IV: CPT | Mod: PBBFAC,,, | Performed by: FAMILY MEDICINE

## 2022-08-22 PROCEDURE — 99999 PR PBB SHADOW E&M-EST. PATIENT-LVL IV: ICD-10-PCS | Mod: PBBFAC,,, | Performed by: FAMILY MEDICINE

## 2022-08-22 PROCEDURE — 99214 PR OFFICE/OUTPT VISIT, EST, LEVL IV, 30-39 MIN: ICD-10-PCS | Mod: S$PBB,,, | Performed by: FAMILY MEDICINE

## 2022-08-22 PROCEDURE — 99214 OFFICE O/P EST MOD 30 MIN: CPT | Mod: PBBFAC | Performed by: FAMILY MEDICINE

## 2022-08-22 RX ORDER — MECLIZINE HCL 12.5 MG 12.5 MG/1
12.5 TABLET ORAL 3 TIMES DAILY PRN
Qty: 24 TABLET | Refills: 0 | Status: SHIPPED | OUTPATIENT
Start: 2022-08-22

## 2022-08-22 NOTE — PROGRESS NOTES
Subjective:       Patient ID: Anton Christiansen is a 71 y.o. male.    Chief Complaint: Dizziness (Since friday) and Medication Refill    New patient, same day urgent care presents - dizziness.  Predominantly nocturnal when he moves his head but also notes when he leans forward.  Chronic hearing loss, no change.  No tinnitus.  No nausea, vomiting.  No other focal neurologic deficits.  Lasts a few seconds.  These paroxysms have been occurring for approximately 3 days.  States he has had this in the past but in general those would resolve within a day or so.  He has not taken any of the previous prescribed Antivert.    No chest pain reported no syncope or near-syncope.  Nothing to suggest orthostasis.  Nothing to suggest TIA or CVA.  Does not describe gait instability at this time.  No headache reported.    Review of Systems   Constitutional: Negative for appetite change, chills, diaphoresis, fatigue and fever.   HENT: Positive for hearing loss. Negative for congestion, postnasal drip, rhinorrhea, sore throat and trouble swallowing.    Eyes: Negative for visual disturbance.   Respiratory: Negative for cough, choking, chest tightness, shortness of breath and wheezing.    Cardiovascular: Negative for chest pain and leg swelling.   Gastrointestinal: Negative for abdominal distention, abdominal pain, diarrhea, nausea and vomiting.   Genitourinary: Negative for difficulty urinating and hematuria.   Musculoskeletal: Negative for arthralgias and myalgias.   Skin: Negative for rash.   Neurological: Positive for dizziness. Negative for weakness, light-headedness and headaches.   Psychiatric/Behavioral: Negative for confusion and dysphoric mood.       Objective:      Physical Exam  Vitals and nursing note reviewed.   Constitutional:       General: He is not in acute distress.     Appearance: He is well-developed. He is not diaphoretic.   HENT:      Head: Normocephalic.      Right Ear: There is impacted cerumen.      Left Ear:  There is impacted cerumen.      Ears:      Comments: Halpike to R     Nose: Nose normal.   Eyes:      General: Visual field deficit present. No scleral icterus.        Right eye: No discharge.         Left eye: No discharge.      Conjunctiva/sclera: Conjunctivae normal.   Neck:      Thyroid: No thyromegaly.   Cardiovascular:      Rate and Rhythm: Normal rate and regular rhythm.      Heart sounds: Heart sounds are distant. No murmur heard.    No friction rub. No gallop.   Pulmonary:      Effort: Pulmonary effort is normal. No respiratory distress.      Breath sounds: No wheezing or rales.   Chest:      Chest wall: No tenderness.   Abdominal:      Palpations: Abdomen is soft.      Tenderness: There is no abdominal tenderness.   Musculoskeletal:      Cervical back: Normal range of motion and neck supple.   Lymphadenopathy:      Cervical: No cervical adenopathy.   Skin:     General: Skin is warm and dry.      Findings: No rash.   Neurological:      Mental Status: He is alert and oriented to person, place, and time.      GCS: GCS eye subscore is 4. GCS verbal subscore is 5. GCS motor subscore is 6.      Cranial Nerves: Cranial nerves 2-12 are intact.      Motor: Motor function is intact.      Coordination: Coordination is intact.      Comments: Old visual deficit L - states is gradually improving         Assessment:       1. Vertigo    2. Meningioma    3. Primary hypertension    4. Bilateral impacted cerumen    5. Central scotoma, left eye    6. NAION (non-arteritic anterior ischemic optic neuropathy), left eye        Plan:     Medication List with Changes/Refills   Current Medications    ASCORBIC ACID, VITAMIN C, (VITAMIN C) 500 MG TABLET    Take 500 mg by mouth once daily.    ATORVASTATIN (LIPITOR) 10 MG TABLET    Take 1 tablet (10 mg total) by mouth once daily.    CARVEDILOL (COREG) 12.5 MG TABLET    Take 1 tablet (12.5 mg total) by mouth daily with breakfast.    CHOLECALCIFEROL, VITAMIN D3, 3,000 UNIT TAB    Take 1  tablet by mouth once daily.     DUTASTERIDE (AVODART) 0.5 MG CAPSULE    TAKE ONE CAPSULE BY MOUTH EVERY DAY    FISH OIL-OMEGA-3 FATTY ACIDS 300-1,000 MG CAPSULE    Take 1.25 g by mouth daily with dinner or evening meal.    OLMESARTAN-HYDROCHLOROTHIAZIDE (BENICAR HCT) 20-12.5 MG PER TABLET    Take 1 tablet by mouth once daily.    SAW PALMETTO 80 MG CAPSULE    Take 80 mg by mouth 2 (two) times daily.    VITAMIN E 100 UNIT CAPSULE    Take 100 Units by mouth once daily.   Changed and/or Refilled Medications    Modified Medication Previous Medication    MECLIZINE (ANTIVERT) 12.5 MG TABLET meclizine (ANTIVERT) 25 mg tablet       Take 1 tablet (12.5 mg total) by mouth 3 (three) times daily as needed for Dizziness.    Take 1 tablet (25 mg total) by mouth 3 (three) times daily as needed for Dizziness.     Anton was seen today for dizziness and medication refill.    Diagnoses and all orders for this visit:    Vertigo  -     Ambulatory referral/consult to ENT; Future  -     meclizine (ANTIVERT) 12.5 mg tablet; Take 1 tablet (12.5 mg total) by mouth 3 (three) times daily as needed for Dizziness.    Meningioma    Primary hypertension    Bilateral impacted cerumen  -     Ambulatory referral/consult to ENT; Future    Central scotoma, left eye    NAION (non-arteritic anterior ischemic optic neuropathy), left eye      See meds, orders, follow up, routing and instructions sections of encounter and AVS. Discussed with patient and provided on AVS.    Lab Results   Component Value Date     03/29/2022    K 4.4 03/29/2022     03/29/2022    BUN 26 (H) 03/29/2022    CREATININE 1.1 03/29/2022    GLU 96 03/29/2022    MG 1.7 09/22/2015    AST 25 03/29/2022    ALT 39 03/29/2022    ALBUMIN 4.1 03/29/2022    PROT 7.6 03/29/2022    BILITOT 1.7 (H) 03/29/2022    CHOL 162 07/07/2022    HDL 38 (L) 07/07/2022    LDLCALC 88.8 07/07/2022    TRIG 176 (H) 07/07/2022    WBC 7.12 03/29/2022    HGB 14.2 03/29/2022    HCT 43.6 03/29/2022    HCT 45  07/18/2021     03/29/2022    PSA 6.0 (H) 05/20/2014    PSADIAG 3.9 03/29/2022    TSH 3.187 03/10/2020    URICACID 5.7 07/20/2021             Meningioma  Followed by Dr. Nelson - last MRI 12/8/2021 - other findings, incl ventriculometry incidentally noted. Patient states he was advised to follow summer '23    Central scotoma, left eye  No report of new visual problems    NAION (non-arteritic anterior ischemic optic neuropathy), left eye  No report of new visual problems or neurologic changes      Symptoms most compatible with BPPV.  Sedation warnings for medication.  Follow-up with ENT and PCP.  Chart reviewed concerning a previous history of meningioma.  MRI suggest ventriculomegaly, which was apparently addressed by neurosurgeon at last visit.      Today's appointment was >25 minutes including chart review (such as review of consult notes, op notes, ER notes, labs, imaging, path, cultures, etc.), medication reconciliation and adjustment, and in some cases >50% time spent in counseling.

## 2022-09-29 ENCOUNTER — PATIENT MESSAGE (OUTPATIENT)
Dept: ADMINISTRATIVE | Facility: OTHER | Age: 72
End: 2022-09-29
Payer: MEDICARE

## 2022-10-10 ENCOUNTER — TELEPHONE (OUTPATIENT)
Dept: OTOLARYNGOLOGY | Facility: CLINIC | Age: 72
End: 2022-10-10
Payer: MEDICARE

## 2022-10-15 ENCOUNTER — PATIENT MESSAGE (OUTPATIENT)
Dept: OTOLARYNGOLOGY | Facility: CLINIC | Age: 72
End: 2022-10-15
Payer: MEDICARE

## 2022-10-18 ENCOUNTER — CLINICAL SUPPORT (OUTPATIENT)
Dept: AUDIOLOGY | Facility: CLINIC | Age: 72
End: 2022-10-18
Payer: MEDICARE

## 2022-10-18 ENCOUNTER — OFFICE VISIT (OUTPATIENT)
Dept: OTOLARYNGOLOGY | Facility: CLINIC | Age: 72
End: 2022-10-18
Attending: FAMILY MEDICINE
Payer: MEDICARE

## 2022-10-18 VITALS — DIASTOLIC BLOOD PRESSURE: 79 MMHG | HEART RATE: 59 BPM | SYSTOLIC BLOOD PRESSURE: 132 MMHG

## 2022-10-18 DIAGNOSIS — H93.19 TINNITUS, UNSPECIFIED LATERALITY: ICD-10-CM

## 2022-10-18 DIAGNOSIS — H61.23 BILATERAL IMPACTED CERUMEN: ICD-10-CM

## 2022-10-18 DIAGNOSIS — Z97.4 WEARS HEARING AID IN BOTH EARS: ICD-10-CM

## 2022-10-18 DIAGNOSIS — H81.12 BPPV (BENIGN PAROXYSMAL POSITIONAL VERTIGO), LEFT: Primary | ICD-10-CM

## 2022-10-18 DIAGNOSIS — R42 VERTIGO: ICD-10-CM

## 2022-10-18 DIAGNOSIS — R42 DIZZINESS: ICD-10-CM

## 2022-10-18 DIAGNOSIS — H90.3 SENSORINEURAL HEARING LOSS (SNHL), BILATERAL: Primary | ICD-10-CM

## 2022-10-18 PROCEDURE — 99999 PR PBB SHADOW E&M-EST. PATIENT-LVL III: CPT | Mod: PBBFAC,,, | Performed by: NURSE PRACTITIONER

## 2022-10-18 PROCEDURE — 69210 REMOVE IMPACTED EAR WAX UNI: CPT | Mod: PBBFAC | Performed by: NURSE PRACTITIONER

## 2022-10-18 PROCEDURE — 92567 TYMPANOMETRY: CPT | Mod: PBBFAC

## 2022-10-18 PROCEDURE — 99211 OFF/OP EST MAY X REQ PHY/QHP: CPT | Mod: PBBFAC

## 2022-10-18 PROCEDURE — 99203 OFFICE O/P NEW LOW 30 MIN: CPT | Mod: S$PBB,25,, | Performed by: NURSE PRACTITIONER

## 2022-10-18 PROCEDURE — 95992 CANALITH REPOSITIONING PROC: CPT | Mod: PBBFAC | Performed by: NURSE PRACTITIONER

## 2022-10-18 PROCEDURE — 99999 PR PBB SHADOW E&M-EST. PATIENT-LVL I: CPT | Mod: PBBFAC,,,

## 2022-10-18 PROCEDURE — 69210 EAR CERUMEN REMOVAL: ICD-10-PCS | Mod: S$PBB,,, | Performed by: NURSE PRACTITIONER

## 2022-10-18 PROCEDURE — 92557 COMPREHENSIVE HEARING TEST: CPT | Mod: PBBFAC

## 2022-10-18 PROCEDURE — 69210 REMOVE IMPACTED EAR WAX UNI: CPT | Mod: S$PBB,,, | Performed by: NURSE PRACTITIONER

## 2022-10-18 PROCEDURE — 99999 PR PBB SHADOW E&M-EST. PATIENT-LVL I: ICD-10-PCS | Mod: PBBFAC,,,

## 2022-10-18 PROCEDURE — 95992 PR CANALITH REPOSITIONING PROCEDURE, PER DAY: ICD-10-PCS | Mod: S$PBB,,, | Performed by: NURSE PRACTITIONER

## 2022-10-18 PROCEDURE — 99213 OFFICE O/P EST LOW 20 MIN: CPT | Mod: PBBFAC,27 | Performed by: NURSE PRACTITIONER

## 2022-10-18 PROCEDURE — 95992 CANALITH REPOSITIONING PROC: CPT | Mod: S$PBB,,, | Performed by: NURSE PRACTITIONER

## 2022-10-18 PROCEDURE — 99999 PR PBB SHADOW E&M-EST. PATIENT-LVL III: ICD-10-PCS | Mod: PBBFAC,,, | Performed by: NURSE PRACTITIONER

## 2022-10-18 PROCEDURE — 99203 PR OFFICE/OUTPT VISIT, NEW, LEVL III, 30-44 MIN: ICD-10-PCS | Mod: S$PBB,25,, | Performed by: NURSE PRACTITIONER

## 2022-10-18 NOTE — PROGRESS NOTES
Subjective:   Anton Christiansen is a 72 y.o. male who was referred to me by Dr. Juan Carlos Garber in consultation for dizziness.    He reports the symptoms started about  2-3  weeks ago and have improved.  He describes the dizziness as a room spinning sensation that would last only a few seconds.  Positions that worsen symptoms include rolling in bed to the left and looking down .  He denies any hearing changes, tinnitus, fullness, or ear pain associated with the dizziness.  He denies any associated CNS symptoms including: confusion, dimming vision, drowsiness, headaches, paresthesias, personality change, speech change, unconsciousness, and visual floaters.  He denies recent infections, head trauma or drug ingestion.  He denies a history of loud noise exposure.  He denies a family history of any dizzy disorders.    He has a history of bilateral SNHL and has worn hearing aids bilaterally for the past 5 years.     Past Medical History  He has a past medical history of Allergy, Arteriosclerotic coronary artery disease, Basal cell carcinoma, BCC (basal cell carcinoma of skin), Cataract, Family history of colon cancer, Hyperlipidemia, Hypertension, Meningioma, Multiple thyroid nodules, Nephrolithiasis, ASHA on CPAP, Prostatitis, chronic, Special screening for malignant neoplasms, colon, and Vertigo.    Past Surgical History  He has a past surgical history that includes Tonsillectomy; Pilonoidal Cyst; Cataract extraction w/  intraocular lens implant (12/20/12); Colonoscopy (078457); Eye surgery; Skin surgery (MOHS Repair); Division and Inset (4/29/14); and Colonoscopy (N/A, 5/20/2019).    Family History  His family history includes Cancer in his father; Cancer (age of onset: 70) in his mother; Hypertension in his mother; No Known Problems in his brother, maternal aunt, maternal grandfather, maternal grandmother, maternal uncle, paternal aunt, paternal grandfather, paternal grandmother, paternal uncle, and  sister.    Social History  He reports that he has never smoked. He has never used smokeless tobacco. He reports that he does not drink alcohol and does not use drugs.    Allergies  He is allergic to cephalexin, ace inhibitors, cialis [tadalafil], proscar [finasteride], and vicodin [hydrocodone-acetaminophen].    Medications  He has a current medication list which includes the following prescription(s): ascorbic acid (vitamin c), atorvastatin, carvedilol, cholecalciferol (vitamin d3), dutasteride, fish oil-omega-3 fatty acids, meclizine, olmesartan-hydrochlorothiazide, saw palmetto, and vitamin e.    Review of Systems     Constitutional: Negative for appetite change, chills, fatigue, fever and unexpected weight loss.      HENT: Positive for postnasal drip, sinus infection and sinus pressure.      Eyes:  Negative for change in eyesight, eye drainage, eye itching and photophobia.     Respiratory:  Positive for cough and sleep apnea.      Cardiovascular:  Negative for chest pain, foot swelling, irregular heartbeat and swollen veins.     Gastrointestinal:  Negative for abdominal pain, acid reflux, constipation, diarrhea, heartburn and vomiting.     Genitourinary: Negative for difficulty urinating, sexual problems and frequent urination.     Musc: Negative for aching joints, aching muscles, back pain and neck pain.     Skin: Negative for rash.     Allergy: Positive for seasonal allergies.     Endocrine: Negative for cold intolerance and heat intolerance.      Neurological: Positive for dizziness.     Hematologic: Negative for bruises/bleeds easily and swollen glands.      Psychiatric: Negative for decreased concentration, depression, nervous/anxious and sleep disturbance.        Objective:   /79   Pulse (!) 59      Constitutional:   He is oriented to person, place, and time. He appears well-developed and well-nourished. He appears alert. He is cooperative.  Non-toxic appearance. He does not have a sickly  appearance. He does not appear ill. Normal speech.      Head:  Normocephalic and atraumatic. Not macrocephalic and not microcephalic. Head is without raccoon's eyes, without Guerra's sign, without abrasion, without laceration, without right periorbital erythema, without left periorbital erythema and without TMJ tenderness.     Ears:    Right Ear: No lacerations. No drainage, swelling or tenderness. No foreign bodies. No mastoid tenderness. Tympanic membrane is not injected, not scarred, not perforated, not erythematous, not retracted and not bulging. No middle ear effusion. No hemotympanum.   Left Ear: No lacerations. No drainage, swelling or tenderness. No foreign bodies. No mastoid tenderness. Tympanic membrane is not injected, not scarred, not perforated, not erythematous, not retracted and not bulging.  No middle ear effusion. No hemotympanum.   Significant ceruminous debris obstructing bilateral TMs removed under microscopy     Pulmonary/Chest:   Effort normal.     Psychiatric:   He has a normal mood and affect. His speech is normal and behavior is normal.     Neurological:   He is alert and oriented to person, place, and time. Coordination and gait normal.   Qian-Hallpike Left: Negative for torsional and up-beating nystagmus-symptomatic with testing    Qian-Hallpike Right: Negative for torsional and up-beating nystagmus    Tandem Gait: normal    Heel to Shin: normal    Finger to nose & finger to finger test: No Dysmetria    Rapid alternating movements: No Dysdiadochokinesia    Romberg: Negative    Procedure  Cerumen removal performed.  See procedure Note.     Data Reviewed  WBC (K/uL)   Date Value   03/29/2022 7.12     Platelets (K/uL)   Date Value   03/29/2022 278      Creatinine (mg/dL)   Date Value   03/29/2022 1.1     TSH (uIU/mL)   Date Value   03/10/2020 3.187     Glucose (mg/dL)   Date Value   03/29/2022 96     No results found for: HGBA1C    Audiogram      I independently reviewed the tracings of the  complete audiometric evaluation performed today.  I reviewed the audiogram with the patient as well.  Pertinent findings include binaural sloping HF sensorineural hearing loss with normal tymps.  Assessment:     1. BPPV (benign paroxysmal positional vertigo), left    2. Vertigo    3. Bilateral impacted cerumen    4. Wears hearing aid in both ears      Plan:     BPPV (benign paroxysmal positional vertigo), left  HPI consistent with BPPV, however testing was negative for nystagmus but symptomatic with left Dallas-Hallpike.  We discussed the pathology of BPPV including the displacement of crystals (canaliths) within the inner ear.  Epley maneuver can be performed in-office, but may require multiple treatments for success.  Left Canalith Repositioning procedure performed in clinic.  Provided him with at home Epley maneuver instructions.       Follow-up in 2 weeks if symptoms persist.    Vertigo  -     Ambulatory referral/consult to ENT    Bilateral impacted cerumen  -     Ambulatory referral/consult to ENT  -     Ear Cerumen Removal    Wears hearing aid in both ears

## 2022-10-18 NOTE — PROCEDURES
Ear Cerumen Removal    Date/Time: 10/18/2022 9:30 AM  Performed by: Gilma Farfan NP  Authorized by: Gilma Farfan NP       Local anesthetic:  None  Location details:  Both ears  Procedure type: curette    Cerumen  Removal Results:  Cerumen completely removed  Patient tolerance:  Patient tolerated the procedure well with no immediate complications     Procedure Note:    The patient was brought to the minor procedure room and placed under the operating microscope of the right ear canal which was cleaned of ceruminous debris. Using a combination of suction, curettes and cup forceps the patient's cerumen impaction was removed. The patient tolerated the procedure well. There were no complications.    Procedure Note:    The patient was brought to the minor procedure room and placed under the operating microscope of the left ear canal which was cleaned of ceruminous debris. Using a combination of suction, curettes and cup forceps the patient's cerumen impaction was removed.  The patient tolerated the procedure well. There were no complications.

## 2022-10-18 NOTE — PROGRESS NOTES
Anton Christiansen, a 72 y.o. male, was seen today in the clinic for an audiologic evaluation.  The patient's main complaint was a room-spinning dizziness that occurs when he looks down or turns over to the left side when lying in bed.  Mr. Christiansen wears bilateral Sonic hearing aids.  The patient reported tinnitus.  Mr. Christiansen stated he recently experienced right ear otalgia that he believes may have been related to a possible ear infection.  There were no reports of aural fullness.     Tympanometry revealed Type A in the right ear and Type A in the left ear.  Audiogram results revealed a normal sloping to moderately severe sensorineural hearing loss (SNHL) in both ears.  Speech reception thresholds were noted at 25 dB in the right ear and 20 dB in the left ear.  Speech discrimination scores were 96% in the right ear and 96% in the left ear.    Recommendations:  Otologic evaluation  Continue use of binaural amplification  Hearing aid follow up as needed with dispensing audiologist  Hearing protection when in noise  Annual audiogram

## 2022-10-21 ENCOUNTER — TELEPHONE (OUTPATIENT)
Dept: NEUROSURGERY | Facility: CLINIC | Age: 72
End: 2022-10-21
Payer: MEDICARE

## 2022-10-21 NOTE — TELEPHONE ENCOUNTER
Returned patient call . Advised patient will be scheduling his f/u appt as well as imaging and he will be able to view those appointments in the portal.   Patient is aware and v/u . Encouraged to call back should have any questions or concerns .             ----- Message from Salina Wagner sent at 10/21/2022  2:38 PM CDT -----  Regarding: Recall Appointment  Contact: [phone]  Pt is needing to get scheduled for his recall appointment that would be for 12/8. Pt stated that he wants to been seen at  and not . Please call to discuss further @ [phone]

## 2022-11-08 ENCOUNTER — PATIENT MESSAGE (OUTPATIENT)
Dept: INTERNAL MEDICINE | Facility: CLINIC | Age: 72
End: 2022-11-08
Payer: MEDICARE

## 2022-11-08 ENCOUNTER — LAB VISIT (OUTPATIENT)
Dept: LAB | Facility: HOSPITAL | Age: 72
End: 2022-11-08
Payer: MEDICARE

## 2022-11-08 DIAGNOSIS — E78.2 MIXED HYPERLIPIDEMIA: Chronic | ICD-10-CM

## 2022-11-08 LAB
CHOLEST SERPL-MCNC: 132 MG/DL (ref 120–199)
CHOLEST/HDLC SERPL: 3.8 {RATIO} (ref 2–5)
HDLC SERPL-MCNC: 35 MG/DL (ref 40–75)
HDLC SERPL: 26.5 % (ref 20–50)
LDLC SERPL CALC-MCNC: 63 MG/DL (ref 63–159)
NONHDLC SERPL-MCNC: 97 MG/DL
TRIGL SERPL-MCNC: 170 MG/DL (ref 30–150)

## 2022-11-08 PROCEDURE — 80061 LIPID PANEL: CPT | Performed by: INTERNAL MEDICINE

## 2022-11-08 PROCEDURE — 36415 COLL VENOUS BLD VENIPUNCTURE: CPT | Performed by: INTERNAL MEDICINE

## 2022-11-29 ENCOUNTER — HOSPITAL ENCOUNTER (OUTPATIENT)
Dept: RADIOLOGY | Facility: HOSPITAL | Age: 72
Discharge: HOME | End: 2022-11-29
Attending: NEUROLOGICAL SURGERY
Payer: MEDICARE

## 2022-11-29 DIAGNOSIS — D32.9 MENINGIOMA: ICD-10-CM

## 2022-11-29 PROCEDURE — 25500020 PHARM REV CODE 255: Performed by: NEUROLOGICAL SURGERY

## 2022-11-29 PROCEDURE — 70553 MRI BRAIN W WO CONTRAST: ICD-10-PCS | Mod: 26,,, | Performed by: RADIOLOGY

## 2022-11-29 PROCEDURE — 70553 MRI BRAIN STEM W/O & W/DYE: CPT | Mod: 26,,, | Performed by: RADIOLOGY

## 2022-11-29 PROCEDURE — 70553 MRI BRAIN STEM W/O & W/DYE: CPT | Mod: TC

## 2022-11-29 PROCEDURE — A9585 GADOBUTROL INJECTION: HCPCS | Performed by: NEUROLOGICAL SURGERY

## 2022-11-29 RX ORDER — GADOBUTROL 604.72 MG/ML
8 INJECTION INTRAVENOUS
Status: COMPLETED | OUTPATIENT
Start: 2022-11-29 | End: 2022-11-29

## 2022-11-29 RX ADMIN — GADOBUTROL 8 ML: 604.72 INJECTION INTRAVENOUS at 10:11

## 2022-12-06 ENCOUNTER — OFFICE VISIT (OUTPATIENT)
Dept: NEUROSURGERY | Facility: CLINIC | Age: 72
End: 2022-12-06
Payer: MEDICARE

## 2022-12-06 VITALS
WEIGHT: 181.19 LBS | DIASTOLIC BLOOD PRESSURE: 75 MMHG | HEIGHT: 69 IN | SYSTOLIC BLOOD PRESSURE: 132 MMHG | HEART RATE: 59 BPM | BODY MASS INDEX: 26.84 KG/M2

## 2022-12-06 DIAGNOSIS — D32.9 MENINGIOMA: Primary | ICD-10-CM

## 2022-12-06 PROCEDURE — 99214 OFFICE O/P EST MOD 30 MIN: CPT | Mod: S$PBB,,, | Performed by: NEUROLOGICAL SURGERY

## 2022-12-06 PROCEDURE — 99214 PR OFFICE/OUTPT VISIT, EST, LEVL IV, 30-39 MIN: ICD-10-PCS | Mod: S$PBB,,, | Performed by: NEUROLOGICAL SURGERY

## 2022-12-06 PROCEDURE — 99999 PR PBB SHADOW E&M-EST. PATIENT-LVL III: CPT | Mod: PBBFAC,,, | Performed by: NEUROLOGICAL SURGERY

## 2022-12-06 PROCEDURE — 99213 OFFICE O/P EST LOW 20 MIN: CPT | Mod: PBBFAC | Performed by: NEUROLOGICAL SURGERY

## 2022-12-06 PROCEDURE — 99999 PR PBB SHADOW E&M-EST. PATIENT-LVL III: ICD-10-PCS | Mod: PBBFAC,,, | Performed by: NEUROLOGICAL SURGERY

## 2022-12-06 NOTE — PATIENT INSTRUCTIONS
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  No parenchymal edema.  No new enhancing lesion. Ventriculomegaly out of proportion to the degree of sulcal prominence, stable, which can be seen with normal pressure hydrocephalus in the appropriate clinical context versus central volume loss.    I will schedule the patient for 2 year follow up with MRI brain.

## 2022-12-06 NOTE — PROGRESS NOTES
Subjective:   I, Shayy Guzmán, attest that this documentation has been prepared under the direction and in the presence of Twin Nelson MD.     Patient ID: Anton Christiansen is a 72 y.o. male     Chief Complaint: Meningioma      HPI  MrBruno Christiansen is a 72 y.o. gentleman with meningioma, who presents today for 1 year follow up with MRI brain. At the time of our last clinic visit on 12/8/2021, the pt reports he is doing well, no pain at this time. Today the pt reports he has been staying active and volunteering as normal. He does report of an episode of vertigo approximately 6 months ago. States this episode lasted for approximately 3 weeks and subsided on its own. He is otherwise doing well in the interim and has no new complaints.    Review of Systems   Constitutional:  Negative for activity change, appetite change, fatigue, fever and unexpected weight change.   HENT:  Negative for facial swelling.    Eyes: Negative.    Respiratory: Negative.     Cardiovascular: Negative.    Gastrointestinal:  Negative for diarrhea, nausea and vomiting.   Endocrine: Negative.    Genitourinary: Negative.    Musculoskeletal:  Negative for back pain, joint swelling, myalgias and neck pain.   Neurological:  Negative for dizziness, seizures, weakness, numbness and headaches.   Psychiatric/Behavioral: Negative.        Past Medical History:   Diagnosis Date    Allergy     seasonal    Arteriosclerotic coronary artery disease 4/10/2017    Basal cell carcinoma 07/14/15    right dorsal hand    BCC (basal cell carcinoma of skin) 4/2014    nasal tip s/p Mohs with forehead flap    Cataract     Family history of colon cancer     Hyperlipidemia     Hypertension     Meningioma     Multiple thyroid nodules 4/24/2018    Nephrolithiasis     ASHA on CPAP     Prostatitis, chronic     Special screening for malignant neoplasms, colon 6/19/2014    Vertigo 09/2020    Likely BPPV       Objective:      Vitals:    12/06/22 1247   BP: 132/75    Pulse: (!) 59      Physical Exam  Constitutional:       General: He is not in acute distress.     Appearance: Normal appearance.   HENT:      Head: Normocephalic and atraumatic.   Pulmonary:      Effort: Pulmonary effort is normal.   Musculoskeletal:      Cervical back: Neck supple.   Neurological:      Mental Status: He is alert and oriented to person, place, and time.      GCS: GCS eye subscore is 4. GCS verbal subscore is 5. GCS motor subscore is 6.      Cranial Nerves: No cranial nerve deficit.     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  No parenchymal edema.  No new enhancing lesion. Ventriculomegaly out of proportion to the degree of sulcal prominence, stable, which can be seen with normal pressure hydrocephalus in the appropriate clinical context versus central volume loss.    I have personally reviewed the images with the pt.      I, Dr. Twin Nelson, personally performed the services described in this documentation. All medical record entries made by the scribe, Shayy Guzmán, were at my direction and in my presence.  I have reviewed the chart and agree that the record reflects my personal performance and is accurate and complete. Twin Nelson MD. 12/06/2022    Assessment:       Meningioma.     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  No parenchymal edema.  No new enhancing lesion. Ventriculomegaly out of proportion to the degree of sulcal prominence, stable, which can be seen with normal pressure hydrocephalus in the appropriate clinical context versus central volume loss.    I will schedule the patient for 2 year follow up with MRI brain.

## 2023-01-05 ENCOUNTER — TELEPHONE (OUTPATIENT)
Dept: DERMATOLOGY | Facility: CLINIC | Age: 73
End: 2023-01-05
Payer: MEDICARE

## 2023-02-01 ENCOUNTER — PATIENT MESSAGE (OUTPATIENT)
Dept: ADMINISTRATIVE | Facility: OTHER | Age: 73
End: 2023-02-01
Payer: MEDICARE

## 2023-02-06 ENCOUNTER — LAB VISIT (OUTPATIENT)
Dept: LAB | Facility: HOSPITAL | Age: 73
End: 2023-02-06
Payer: MEDICARE

## 2023-02-06 DIAGNOSIS — E78.2 MIXED HYPERLIPIDEMIA: Chronic | ICD-10-CM

## 2023-02-06 LAB
CHOLEST SERPL-MCNC: 138 MG/DL (ref 120–199)
CHOLEST/HDLC SERPL: 3.7 {RATIO} (ref 2–5)
HDLC SERPL-MCNC: 37 MG/DL (ref 40–75)
HDLC SERPL: 26.8 % (ref 20–50)
LDLC SERPL CALC-MCNC: 65.2 MG/DL (ref 63–159)
NONHDLC SERPL-MCNC: 101 MG/DL
TRIGL SERPL-MCNC: 179 MG/DL (ref 30–150)

## 2023-02-06 PROCEDURE — 80061 LIPID PANEL: CPT | Performed by: INTERNAL MEDICINE

## 2023-02-06 PROCEDURE — 36415 COLL VENOUS BLD VENIPUNCTURE: CPT | Performed by: INTERNAL MEDICINE

## 2023-02-09 ENCOUNTER — OFFICE VISIT (OUTPATIENT)
Dept: DERMATOLOGY | Facility: CLINIC | Age: 73
End: 2023-02-09
Payer: MEDICARE

## 2023-02-09 DIAGNOSIS — D18.01 CHERRY ANGIOMA: ICD-10-CM

## 2023-02-09 DIAGNOSIS — L82.0 INFLAMED SEBORRHEIC KERATOSIS: ICD-10-CM

## 2023-02-09 DIAGNOSIS — L82.1 SK (SEBORRHEIC KERATOSIS): ICD-10-CM

## 2023-02-09 DIAGNOSIS — Z85.828 HISTORY OF BASAL CELL CARCINOMA (BCC) OF SKIN: Primary | ICD-10-CM

## 2023-02-09 DIAGNOSIS — D22.9 MULTIPLE BENIGN NEVI: ICD-10-CM

## 2023-02-09 DIAGNOSIS — L81.4 SOLAR LENTIGO: ICD-10-CM

## 2023-02-09 PROCEDURE — 99213 OFFICE O/P EST LOW 20 MIN: CPT | Mod: 25,S$PBB,, | Performed by: PATHOLOGY

## 2023-02-09 PROCEDURE — 17110 PR DESTRUCTION BENIGN LESIONS UP TO 14: ICD-10-PCS | Mod: S$PBB,,, | Performed by: PATHOLOGY

## 2023-02-09 PROCEDURE — 17110 DESTRUCTION B9 LES UP TO 14: CPT | Mod: PBBFAC | Performed by: PATHOLOGY

## 2023-02-09 PROCEDURE — 99213 PR OFFICE/OUTPT VISIT, EST, LEVL III, 20-29 MIN: ICD-10-PCS | Mod: 25,S$PBB,, | Performed by: PATHOLOGY

## 2023-02-09 PROCEDURE — 99999 PR PBB SHADOW E&M-EST. PATIENT-LVL III: ICD-10-PCS | Mod: PBBFAC,,, | Performed by: PATHOLOGY

## 2023-02-09 PROCEDURE — 17110 DESTRUCTION B9 LES UP TO 14: CPT | Mod: S$PBB,,, | Performed by: PATHOLOGY

## 2023-02-09 PROCEDURE — 99213 OFFICE O/P EST LOW 20 MIN: CPT | Mod: PBBFAC | Performed by: PATHOLOGY

## 2023-02-09 PROCEDURE — 99999 PR PBB SHADOW E&M-EST. PATIENT-LVL III: CPT | Mod: PBBFAC,,, | Performed by: PATHOLOGY

## 2023-02-09 NOTE — PATIENT INSTRUCTIONS

## 2023-02-09 NOTE — PROGRESS NOTES
Subjective:       Patient ID:  Anton Christiansen is a 72 y.o. male who presents for   Chief Complaint   Patient presents with    Skin Check     ubse     HPI  Pt with h/o BCC.  Also h/o:  Skin, dorsal nose, shave biopsy:   -ACTINIC KERATOSIS, INVOLVING HAIR FOLLICLES AND EXTENDING TO THE BASE OF THE   BIOPSY      Treated with Efudex cream bid for 3 weeks with robust response and resolution prior to last visit 7/28/22.  H/o tinea versicolor - resolved with OTC antifungal cream and oral diflucan in the past.  Had allergic reaction to ketoconazole cream in the past.  Today, he notes irritated brown warty lesion to right elbow area.    Review of Systems   Constitutional:  Negative for fever, chills, fatigue and malaise.   Skin:  Negative for itching, rash and recent sunburn.      Objective:    Physical Exam   Constitutional: He appears well-developed and well-nourished. No distress.   Neurological: He is alert and oriented to person, place, and time. He is not disoriented.   Psychiatric: He has a normal mood and affect. He is not agitated.   Skin:   Areas Examined (abnormalities noted in diagram):   Scalp / Hair Palpated and Inspected  Head / Face Inspection Performed  Neck Inspection Performed  Chest / Axilla Inspection Performed  Abdomen Inspection Performed  Back Inspection Performed  RUE Inspected  LUE Inspection Performed  Nails and Digits Inspection Performed                     Diagram Legend     Erythematous scaling macule/papule c/w actinic keratosis       Vascular papule c/w angioma      Pigmented verrucoid papule/plaque c/w seborrheic keratosis      Yellow umbilicated papule c/w sebaceous hyperplasia      Irregularly shaped tan macule c/w lentigo     1-2 mm smooth white papules consistent with Milia      Movable subcutaneous cyst with punctum c/w epidermal inclusion cyst      Subcutaneous movable cyst c/w pilar cyst      Firm pink to brown papule c/w dermatofibroma      Pedunculated fleshy papule(s) c/w  skin tag(s)      Evenly pigmented macule c/w junctional nevus     Mildly variegated pigmented, slightly irregular-bordered macule c/w mildly atypical nevus      Flesh colored to evenly pigmented papule c/w intradermal nevus       Pink pearly papule/plaque c/w basal cell carcinoma      Erythematous hyperkeratotic cursted plaque c/w SCC      Surgical scar with no sign of skin cancer recurrence      Open and closed comedones      Inflammatory papules and pustules      Verrucoid papule consistent consistent with wart     Erythematous eczematous patches and plaques     Dystrophic onycholytic nail with subungual debris c/w onychomycosis     Umbilicated papule    Erythematous-base heme-crusted tan verrucoid plaque consistent with inflamed seborrheic keratosis     Erythematous Silvery Scaling Plaque c/w Psoriasis     See annotation      Assessment / Plan:        History of basal cell carcinoma (BCC) of skin - Area(s) of previous NMSC evaluated with no signs of recurrence.    Upper body skin examination performed today including at least 6 points as noted in physical examination. No lesions suspicious for malignancy noted.    Recommend daily sun protection/avoidance and use of at least SPF 30, broad spectrum sunscreen (OTC drug).       Inflamed seborrheic keratosis - Cryosurgery procedure note:    Verbal consent from the patient is obtained including, but not limited to, risk of hypopigmentation/hyperpigmentation, scar, recurrence of lesion. Liquid nitrogen cryosurgery is applied to 1 lesion to produce a freeze injury. The patient is aware that blisters may form and is instructed on wound care with gentle cleansing and use of vaseline ointment to keep moist until healed. The patient is supplied a handout on cryosurgery and is instructed to call if lesions do not completely resolve.      SK (seborrheic keratosis) - These are benign inherited growths without a malignant potential. Reassurance given to patient. No treatment is  necessary.       Multiple benign nevi - Patient with several benign appearing nevi. Instructed patient to observe lesion(s) for changes and follow up in clinic if changes are noted.     Cherry angioma - This is a benign vascular lesion. Reassurance given. No treatment required.     Solar lentigo - This is a benign hyperpigmented sun induced lesion. Recommend daily sun protection/avoidance and use of at least SPF 30, broad spectrum sunscreen (OTC drug) will reduce the number of new lesions. Treatment of these benign lesions are considered cosmetic.               No follow-ups on file.

## 2023-03-17 DIAGNOSIS — E78.2 MIXED HYPERLIPIDEMIA: Chronic | ICD-10-CM

## 2023-03-17 RX ORDER — ATORVASTATIN CALCIUM 40 MG/1
40 TABLET, FILM COATED ORAL DAILY
Qty: 90 TABLET | Refills: 1 | Status: SHIPPED | OUTPATIENT
Start: 2023-03-17 | End: 2023-06-16 | Stop reason: SDUPTHER

## 2023-03-17 NOTE — TELEPHONE ENCOUNTER
No new care gaps identified.  [facility] Embedded Care Gaps. Reference number: [id number]. 3/17/2023   2:33:24 PM CDT

## 2023-03-26 ENCOUNTER — PATIENT MESSAGE (OUTPATIENT)
Dept: ADMINISTRATIVE | Facility: OTHER | Age: 73
End: 2023-03-26
Payer: MEDICARE

## 2023-04-04 ENCOUNTER — OFFICE VISIT (OUTPATIENT)
Dept: INTERNAL MEDICINE | Facility: CLINIC | Age: 73
End: 2023-04-04
Payer: MEDICARE

## 2023-04-04 ENCOUNTER — LAB VISIT (OUTPATIENT)
Dept: LAB | Facility: HOSPITAL | Age: 73
End: 2023-04-04
Attending: UROLOGY
Payer: MEDICARE

## 2023-04-04 VITALS
HEIGHT: 69 IN | SYSTOLIC BLOOD PRESSURE: 138 MMHG | HEART RATE: 58 BPM | WEIGHT: 178 LBS | OXYGEN SATURATION: 96 % | BODY MASS INDEX: 26.36 KG/M2 | DIASTOLIC BLOOD PRESSURE: 84 MMHG

## 2023-04-04 DIAGNOSIS — E21.3 HYPERPARATHYROIDISM: ICD-10-CM

## 2023-04-04 DIAGNOSIS — Z11.59 ENCOUNTER FOR HEPATITIS C SCREENING TEST FOR LOW RISK PATIENT: ICD-10-CM

## 2023-04-04 DIAGNOSIS — I70.0 AORTIC ATHEROSCLEROSIS: ICD-10-CM

## 2023-04-04 DIAGNOSIS — N40.0 BENIGN PROSTATIC HYPERPLASIA, UNSPECIFIED WHETHER LOWER URINARY TRACT SYMPTOMS PRESENT: ICD-10-CM

## 2023-04-04 DIAGNOSIS — Z76.89 ENCOUNTER TO ESTABLISH CARE WITH NEW DOCTOR: Primary | ICD-10-CM

## 2023-04-04 DIAGNOSIS — D32.9 MENINGIOMA: ICD-10-CM

## 2023-04-04 DIAGNOSIS — N40.1 BPH WITH URINARY OBSTRUCTION: ICD-10-CM

## 2023-04-04 DIAGNOSIS — N13.8 BPH WITH URINARY OBSTRUCTION: ICD-10-CM

## 2023-04-04 DIAGNOSIS — R79.9 ABNORMAL FINDING OF BLOOD CHEMISTRY, UNSPECIFIED: ICD-10-CM

## 2023-04-04 DIAGNOSIS — I10 PRIMARY HYPERTENSION: Chronic | ICD-10-CM

## 2023-04-04 PROBLEM — M76.01 GLUTEAL TENDINITIS OF RIGHT BUTTOCK: Status: RESOLVED | Noted: 2021-07-27 | Resolved: 2023-04-04

## 2023-04-04 LAB
ALBUMIN SERPL BCP-MCNC: 4.1 G/DL (ref 3.5–5.2)
ALP SERPL-CCNC: 77 U/L (ref 55–135)
ALT SERPL W/O P-5'-P-CCNC: 27 U/L (ref 10–44)
ANION GAP SERPL CALC-SCNC: 6 MMOL/L (ref 8–16)
AST SERPL-CCNC: 23 U/L (ref 10–40)
BASOPHILS # BLD AUTO: 0.05 K/UL (ref 0–0.2)
BASOPHILS NFR BLD: 0.8 % (ref 0–1.9)
BILIRUB SERPL-MCNC: 1.9 MG/DL (ref 0.1–1)
BUN SERPL-MCNC: 18 MG/DL (ref 8–23)
CALCIUM SERPL-MCNC: 10.5 MG/DL (ref 8.7–10.5)
CHLORIDE SERPL-SCNC: 104 MMOL/L (ref 95–110)
CO2 SERPL-SCNC: 27 MMOL/L (ref 23–29)
COMPLEXED PSA SERPL-MCNC: 6.3 NG/ML (ref 0–4)
CREAT SERPL-MCNC: 1 MG/DL (ref 0.5–1.4)
DIFFERENTIAL METHOD: NORMAL
EOSINOPHIL # BLD AUTO: 0.2 K/UL (ref 0–0.5)
EOSINOPHIL NFR BLD: 2.5 % (ref 0–8)
ERYTHROCYTE [DISTWIDTH] IN BLOOD BY AUTOMATED COUNT: 13.2 % (ref 11.5–14.5)
EST. GFR  (NO RACE VARIABLE): >60 ML/MIN/1.73 M^2
GLUCOSE SERPL-MCNC: 83 MG/DL (ref 70–110)
HCT VFR BLD AUTO: 43.8 % (ref 40–54)
HCV AB SERPL QL IA: NORMAL
HGB BLD-MCNC: 14.1 G/DL (ref 14–18)
IMM GRANULOCYTES # BLD AUTO: 0.02 K/UL (ref 0–0.04)
IMM GRANULOCYTES NFR BLD AUTO: 0.3 % (ref 0–0.5)
LYMPHOCYTES # BLD AUTO: 1.7 K/UL (ref 1–4.8)
LYMPHOCYTES NFR BLD: 27.8 % (ref 18–48)
MCH RBC QN AUTO: 30.3 PG (ref 27–31)
MCHC RBC AUTO-ENTMCNC: 32.2 G/DL (ref 32–36)
MCV RBC AUTO: 94 FL (ref 82–98)
MONOCYTES # BLD AUTO: 0.7 K/UL (ref 0.3–1)
MONOCYTES NFR BLD: 12.2 % (ref 4–15)
NEUTROPHILS # BLD AUTO: 3.4 K/UL (ref 1.8–7.7)
NEUTROPHILS NFR BLD: 56.4 % (ref 38–73)
NRBC BLD-RTO: 0 /100 WBC
PLATELET # BLD AUTO: 277 K/UL (ref 150–450)
PMV BLD AUTO: 10.9 FL (ref 9.2–12.9)
POTASSIUM SERPL-SCNC: 4.6 MMOL/L (ref 3.5–5.1)
PROT SERPL-MCNC: 7.3 G/DL (ref 6–8.4)
RBC # BLD AUTO: 4.66 M/UL (ref 4.6–6.2)
SODIUM SERPL-SCNC: 137 MMOL/L (ref 136–145)
WBC # BLD AUTO: 6.09 K/UL (ref 3.9–12.7)

## 2023-04-04 PROCEDURE — 99214 OFFICE O/P EST MOD 30 MIN: CPT | Mod: PBBFAC | Performed by: INTERNAL MEDICINE

## 2023-04-04 PROCEDURE — 99214 PR OFFICE/OUTPT VISIT, EST, LEVL IV, 30-39 MIN: ICD-10-PCS | Mod: S$PBB,,, | Performed by: INTERNAL MEDICINE

## 2023-04-04 PROCEDURE — 80053 COMPREHEN METABOLIC PANEL: CPT | Performed by: INTERNAL MEDICINE

## 2023-04-04 PROCEDURE — 99999 PR PBB SHADOW E&M-EST. PATIENT-LVL IV: ICD-10-PCS | Mod: PBBFAC,,, | Performed by: INTERNAL MEDICINE

## 2023-04-04 PROCEDURE — 85025 COMPLETE CBC W/AUTO DIFF WBC: CPT | Performed by: INTERNAL MEDICINE

## 2023-04-04 PROCEDURE — 86803 HEPATITIS C AB TEST: CPT | Performed by: INTERNAL MEDICINE

## 2023-04-04 PROCEDURE — 83036 HEMOGLOBIN GLYCOSYLATED A1C: CPT | Performed by: INTERNAL MEDICINE

## 2023-04-04 PROCEDURE — 99999 PR PBB SHADOW E&M-EST. PATIENT-LVL IV: CPT | Mod: PBBFAC,,, | Performed by: INTERNAL MEDICINE

## 2023-04-04 PROCEDURE — 36415 COLL VENOUS BLD VENIPUNCTURE: CPT | Performed by: UROLOGY

## 2023-04-04 PROCEDURE — 99214 OFFICE O/P EST MOD 30 MIN: CPT | Mod: S$PBB,,, | Performed by: INTERNAL MEDICINE

## 2023-04-04 PROCEDURE — 84153 ASSAY OF PSA TOTAL: CPT | Performed by: UROLOGY

## 2023-04-04 NOTE — PROGRESS NOTES
"    CHIEF COMPLAINT     Chief Complaint   Patient presents with    [facility] Care       HPI     Anton Christiansen is a 72 y.o. male HTN, HLD, ASHA, BPH, meningioma here today for Dr. Visit    Hypertension  Taking Coreg 12.5 b.i.d. and Benicar HCT 20-12.5.  In digital hypertension program.  Blood pressures been well controlled.  Tolerating medication    BPH  By Dr. Martin status post biopsy MRI on Avodart and saw palmetto.    Meningioma  Followed by Dr. Nelson.  On every 24 months surveillance.  No symptoms  Personally Reviewed Patient's Medical, surgical, family and social hx. Changes updated in [facility].  Care Team updated in Epic    Review of Systems:  Review of Systems    Health Maintenance:   Reviewed with patient  Due for the following:      PHYSICAL EXAM     /84   Pulse (!) 58   Ht 5' 9" (1.753 m)   Wt 80.7 kg (178 lb)   SpO2 96%   BMI 26.29 kg/m²     Gen: Well Appearing, NAD  HEENT: PERR, EOMI  Neck: FROM, no thyromegaly, no cervical adenopathy  CVD: RRR, no M/R/G  Pulm: Normal work of breathing, CTAB, no wheezing  Abd:  Soft, NT, ND non TTP, no mass  MSK: no LE edema  Neuro: A&Ox3, gait normal, speech normal  Mood; Mood normal, behavior normal, thought process linear       LABS     Labs reviewed; Notable for  Lab Results   Component Value Date    .8 (H) 03/29/2022    CALCIUM 10.7 (H) 03/29/2022    PHOS 3.0 09/22/2015       Chemistry        Component Value Date/Time     03/29/2022 0821    K 4.4 03/29/2022 0821     03/29/2022 0821    CO2 26 03/29/2022 0821    BUN 26 (H) 03/29/2022 0821    CREATININE 1.1 03/29/2022 0821    GLU 96 03/29/2022 0821        Component Value Date/Time    CALCIUM 10.7 (H) 03/29/2022 0821    ALKPHOS 83 03/29/2022 0821    AST 25 03/29/2022 0821    ALT 39 03/29/2022 0821    BILITOT 1.7 (H) 03/29/2022 0821    ESTGFRAFRICA >60.0 03/29/2022 0821    EGFRNONAA >60.0 03/29/2022 0821          ASSESSMENT     1. Encounter to establish care with new doctor        2. Primary " hypertension  CBC Auto Differential    Comprehensive Metabolic Panel    Hemoglobin A1C      3. Benign prostatic hyperplasia, unspecified whether lower urinary tract symptoms present        4. Encounter for hepatitis C screening test for low risk patient  Hepatitis C Antibody      5. Abnormal finding of blood chemistry, unspecified  Hemoglobin A1C      6. Aortic atherosclerosis        7. Meningioma        8. Hyperparathyroidism                Plan     Anton Christiansen is a 72 y.o. male with HTN, HLD, ASHA, BPH, meningioma   1. Encounter to establish care with new doctor  Updated problem list, medical history, care team and discussed HM.       2. Primary hypertension  In digital HTN, coreg 12.5mg, benicar hct 20-12.5  - CBC Auto Differential; Future  - Comprehensive Metabolic Panel; Future  - Hemoglobin A1C; Future    3. Benign prostatic hyperplasia, unspecified whether lower urinary tract symptoms present  -followed by Dr. Quinteros  On avodart and saw palmetto    4. Encounter for hepatitis C screening test for low risk patient  - Hepatitis C Antibody; Future    5.  Abnormal finding of blood chemistry, unspecified  - Hemoglobin A1C; Future      6. Aortic atherosclerosis  Radiological diagnosis  Control BP and lipids    7. Meningioma  Stable, followed by Dr. Nelson, on q2 year surveilance    8. Hyperparathyroidism  Asymptomatic, followed by Dr. Haney in Endocrinology    Сергей Landis MD

## 2023-04-05 ENCOUNTER — TELEPHONE (OUTPATIENT)
Dept: UROLOGY | Facility: CLINIC | Age: 73
End: 2023-04-05
Payer: MEDICARE

## 2023-04-05 LAB
ESTIMATED AVG GLUCOSE: 117 MG/DL (ref 68–131)
HBA1C MFR BLD: 5.7 % (ref 4–5.6)

## 2023-04-05 NOTE — TELEPHONE ENCOUNTER
Lab Results   Component Value Date    PSA 6.0 (H) 05/20/2014    PSA 5.81 (H) 06/04/2013    PSA 10.78 (H) 05/28/2013    PSADIAG 6.3 (H) 04/04/2023    PSADIAG 3.9 03/29/2022    PSADIAG 3.7 05/04/2021        Please give him an appt to see me for his elevated PSA.

## 2023-05-25 NOTE — PROGRESS NOTES
Subjective:      Patient ID: Anton Christiansen is a 67 y.o. male.    Chief Complaint:  Thyroid Nodule    Referral from Dr. Norris    History of Present Illness  Mr. Christiansen presents for evaluation and management of     Has active history of     First noted to have thyroid nodules on recent visit with Dr. Norris. Was found to have a thyroid nodule on exam which was confirmed with ultrasound.     Denies family history of thyroid cancer.  TSH WNL.  No personal history of head or neck irradiation    Denies dysphagia, hoarseness or orthopnea.    Previous thyroid FNA results: none    Most recent thyroid USS dated 4/12/2018:  The thyroid is normal in size.  Right lobe of the thyroid measures 4.5 x 2.1 x 2.1 cm.  Left lobe of the thyroid measures 4.2 x 1.1 x 1.0 cm.  Normal thyroid parenchyma.    There is a 2.0 x 2.0 x 2.5 cm partially cystic and solid nodule in the right thyroid lobe with macrocalcifications that is a TI-RADS 3.  This nodule likely represents a solid nodule that has undergone hemorrhagic degeneration.  Although this is primarily a cystic nodule because of the calcifications FNA is recommended.    There are several hypoechoic solid nodules in the left thyroid lobe, non of which meet criteria for FNA or follow-up.  The largest measures 0.6 x 0.5 x 0.6 cm.    No cervical lymphadenopathy.    Review of Systems   Constitutional: Negative for unexpected weight change.   Eyes: Negative for visual disturbance.   Respiratory: Negative for shortness of breath.    Cardiovascular: Negative for chest pain.   Gastrointestinal: Negative for abdominal pain.   Endocrine: Negative for cold intolerance.   Genitourinary: Negative for frequency.   Musculoskeletal: Negative for arthralgias.   Skin: Negative for rash.   Neurological: Negative for headaches.       Objective:   Physical Exam   Constitutional: He is oriented to person, place, and time. He appears well-developed and well-nourished.   HENT:   Head:  Normocephalic and atraumatic.   Right Ear: External ear normal.   Left Ear: External ear normal.   Nose: Nose normal.   Neck: No tracheal deviation present.   Right lobe thyroid nodule palpated   Cardiovascular: Normal rate, regular rhythm and normal heart sounds.    No edema   Pulmonary/Chest: Effort normal and breath sounds normal.   Abdominal: Soft. Bowel sounds are normal. There is no tenderness.   Musculoskeletal:   Normal gait, no cyanosis or clubbing   Neurological: He is alert and oriented to person, place, and time. He has normal reflexes.   Vibration sense intact   Skin: Skin is warm and dry. No rash noted.   No nodules, no ulcers   Psychiatric: He has a normal mood and affect. Judgment normal.   Vitals reviewed.      Lab Review:   Results for [facility] (MRN [id number]) as of 4/24/2018 10:54   Ref. Range 4/10/2018 08:23 4/10/2018 08:23 4/19/2018 08:09   Sodium Latest Ref Range: 136 - 145 mmol/L 139     Potassium Latest Ref Range: 3.5 - 5.1 mmol/L 5.1     Chloride Latest Ref Range: 95 - 110 mmol/L 107     CO2 Latest Ref Range: 23 - 29 mmol/L 25     Anion Gap Latest Ref Range: 8 - 16 mmol/L 7 (L)     BUN, Bld Latest Ref Range: 8 - 23 mg/dL 15     Creatinine Latest Ref Range: 0.5 - 1.4 mg/dL 0.9     eGFR if non African American Latest Ref Range: >60 mL/min/1.73 m^2 >60.0     eGFR if African American Latest Ref Range: >60 mL/min/1.73 m^2 >60.0     Glucose Latest Ref Range: 70 - 110 mg/dL 89     Calcium Latest Ref Range: 8.7 - 10.5 mg/dL 9.7     Alkaline Phosphatase Latest Ref Range: 55 - 135 U/L 87     Total Protein Latest Ref Range: 6.0 - 8.4 g/dL 7.0     Albumin Latest Ref Range: 3.5 - 5.2 g/dL 4.0     Total Bilirubin Latest Ref Range: 0.1 - 1.0 mg/dL 1.7 (H)     AST Latest Ref Range: 10 - 40 U/L 23     ALT Latest Ref Range: 10 - 44 U/L 35     Triglycerides Latest Ref Range: 30 - 150 mg/dL 152 (H)     Cholesterol Latest Ref Range: 120 - 199 mg/dL 157     HDL Latest Ref Range: 40 - 75 mg/dL 40     LDL  Cholesterol Latest Ref Range: 63.0 - 159.0 mg/dL 86.6     Total Cholesterol/HDL Ratio Latest Ref Range: 2.0 - 5.0  3.9     TSH Latest Ref Range: 0.400 - 4.000 uIU/mL   2.521       Assessment:     1. Multiple thyroid nodules    2. Pure hypercholesterolemia        Plan:     --Patient found to have right lobe thyroid nodule on exam that was confirmed with thyroid ultrasound  --No compressive symptoms  --TSH WNL  --No risk factors for thyroid cancer  --Independently reviewed images with the patient  --The right lobe nodule is predominately cystic but does have a solid component that should undergo FNA  --Will also attempt to drain cystic fluid during FNA  --Explained possible FNA outcomes including those that would necessitate repeat FNA (non-diagnostic, FLUS)  --Will plan for FNA or right lobe nodule  --On statin for HLP    Copy to Dr. Myrna Haney M.D. Staff Endocrinology   Xray Chest 1 View- PORTABLE-Urgent

## 2023-05-30 ENCOUNTER — OFFICE VISIT (OUTPATIENT)
Dept: UROLOGY | Facility: CLINIC | Age: 73
End: 2023-05-30
Payer: MEDICARE

## 2023-05-30 VITALS
SYSTOLIC BLOOD PRESSURE: 114 MMHG | BODY MASS INDEX: 26.02 KG/M2 | HEART RATE: 55 BPM | HEIGHT: 69 IN | WEIGHT: 175.69 LBS | DIASTOLIC BLOOD PRESSURE: 67 MMHG

## 2023-05-30 DIAGNOSIS — R97.20 ELEVATED PSA: Primary | ICD-10-CM

## 2023-05-30 PROCEDURE — 99214 PR OFFICE/OUTPT VISIT, EST, LEVL IV, 30-39 MIN: ICD-10-PCS | Mod: S$PBB,,, | Performed by: UROLOGY

## 2023-05-30 PROCEDURE — 99214 OFFICE O/P EST MOD 30 MIN: CPT | Mod: S$PBB,,, | Performed by: UROLOGY

## 2023-05-30 PROCEDURE — 99999 PR PBB SHADOW E&M-EST. PATIENT-LVL IV: CPT | Mod: PBBFAC,,, | Performed by: UROLOGY

## 2023-05-30 PROCEDURE — 99214 OFFICE O/P EST MOD 30 MIN: CPT | Mod: PBBFAC | Performed by: UROLOGY

## 2023-05-30 PROCEDURE — 99999 PR PBB SHADOW E&M-EST. PATIENT-LVL IV: ICD-10-PCS | Mod: PBBFAC,,, | Performed by: UROLOGY

## 2023-05-30 NOTE — PATIENT INSTRUCTIONS
Lab Results   Component Value Date    PSA 6.0 (H) 05/20/2014    PSA 5.81 (H) 06/04/2013    PSA 10.78 (H) 05/28/2013    PSADIAG 6.3 (H) 04/04/2023    PSADIAG 3.9 03/29/2022    PSADIAG 3.7 05/04/2021

## 2023-05-30 NOTE — PROGRESS NOTES
CC: enlarged prostate (100 g) with elevated PSA.    Anton Christiansen is a 72 y.o. man who is here for evaluation of elevated PSA.  Has been on Avodart.  Voices no problem with urination.    His recent PSA was 6.2 in 3/2021 and he was recommended to follow up with me with a repeat PSA.  His PSA is now down to 3.7 on 5/7/21.    Hx of elevated PSA, kidney stones, recurrent prostatitis, and ED.  He reports that ever since he has been avoiding caffeine, soda and tea, his urinary symptoms including urgency and discomfort got much better.   hx of calcium oxalate stone.  No family hx of prostate cancer reported.    1. UroNav bx of prostate 11/20/18  Findings:                                                                         --- Transrectal Ultrasound of the Prostate ---                               Prostate measurements.                                                                                                          PSA: Lab Results       Component                Value               Date                       PSA                      6.0 (H)             05/20/2014                 PSADIAG                  6.8 (H)             10/12/2018                   - Volume:73 gm.           PSA Density: 0.09                                                         no calcification in the prostate  Lesion #1: 0.9 cm left apex lateral peripheral zone lesion presumably represents recently found prostatic adenocarcinoma on biopsy.     Lesion #2: 0.7 cm right apex lateral peripheral zone lesion.  This could be related to post biopsy hemorrhage however an additional focus of malignancy cannot be excluded.   SPECIMEN  1) Prostate, left apex.  2) Prostate, left middle.  3) Prostate, left base.  4) Prostate, right apex.  5) Prostate, right middle.  6) Prostate, right base.  7) Prostate, target lesion #1.  8) Prostate, target lesion #2.  FINAL PATHOLOGIC DIAGNOSIS  PROSTATE BIOPSIES 1, 2, 3, 4, 5, 6, 7, AND 8:  NO CARCINOMA  IDENTIFIED    2. TRUS bx of prostate 6/26/18  Findings:                                                                      --- Transrectal Ultrasound of the Prostate ---                               Prostate measurements.                                                                                               PSA:   Lab Results   Component Value Date    PSA 6.0 (H) 05/20/2014    PSADIAG 10.2 (H) 06/12/2018            - Volume:100 gm.           PSA Density: 0.10                                                         no calcification in the prostate        Pathology  SPECIMEN  1) Prostate biopsy, left apex.  2) Prostate biopsy, left middle.  3) Prostate biopsy, left base.  4) Prostate biopsy, right apex.  5) Prostate biopsy, right middle.  6) Prostate biopsy, right base.  Supplemental Diagnosis  1. PROSTATE CORE BIOPSY SHOWING A SINGLE FOCUS SUSPICIOUS FOR ADENOCARCINOMA.  IMMUNOSTAINS FOR P63, HIGH MOLECULAR WEIGHT KERATIN AND AMACAR ARE NONCONTRIBUTORY  TO THE ABSENCE OF THIS SMALL FOCUS IN DEEPER LEVELS. CONTROLS STAIN APPROPRIATELY.     Following his prostate bx, I started him on Avodart for his LUTS back in 11/2018.  He is here with PSA.    Past Medical History:   Diagnosis Date    Allergy     seasonal    Arteriosclerotic coronary artery disease 4/10/2017    Basal cell carcinoma 07/14/15    right dorsal hand    BCC (basal cell carcinoma of skin) 4/2014    nasal tip s/p Mohs with forehead flap    Cataract     Family history of colon cancer     Hyperlipidemia     Hypertension     Meningioma     Multiple thyroid nodules 4/24/2018    Nephrolithiasis     ASHA on CPAP     Prostatitis, chronic     Special screening for malignant neoplasms, colon 6/19/2014    Vertigo 09/2020    Likely BPPV     Past Surgical History:   Procedure Laterality Date    CATARACT EXTRACTION W/  INTRAOCULAR LENS IMPLANT  12/20/12    OD    COLONOSCOPY  [id number]    COLONOSCOPY N/A 5/20/2019    Procedure: COLONOSCOPY;  Surgeon: Artemio Greer MD;   Location: [facility] ([address] [place] [address]);  Service: Endoscopy;  Laterality: N/A;    Division and Inset  4/29/14    D&I Forehead Flap    EYE SURGERY      Pilonoidal Cyst      SKIN SURGERY  MOHS Repair    nose    TONSILLECTOMY       Social History     Tobacco Use    Smoking status: Never    Smokeless tobacco: Never   Substance Use Topics    Alcohol use: No    Drug use: No     Family History   Problem Relation Age of Onset    Cancer Mother 70        colon    Hypertension Mother     Cancer Father     No Known Problems Sister     No Known Problems Brother     No Known Problems Maternal Aunt     No Known Problems Maternal Uncle     No Known Problems Paternal Aunt     No Known Problems Paternal Uncle     No Known Problems Maternal Grandmother     No Known Problems Maternal Grandfather     No Known Problems Paternal Grandmother     No Known Problems Paternal Grandfather     Glaucoma Neg Hx     Macular degeneration Neg Hx     Amblyopia Neg Hx     Blindness Neg Hx     Cataracts Neg Hx     Diabetes Neg Hx     Retinal detachment Neg Hx     Strabismus Neg Hx     Stroke Neg Hx     Thyroid disease Neg Hx     Melanoma Neg Hx     Psoriasis Neg Hx     Lupus Neg Hx     Eczema Neg Hx     Acne Neg Hx     Thyroid cancer Neg Hx      Allergy:  Review of patient's allergies indicates:   Allergen Reactions    Cephalexin Diarrhea    Ace inhibitors Other (See Comments)     Cough    Cialis [tadalafil] Other (See Comments)     Muscle pain      Proscar [finasteride] Other (See Comments)     Decreased libido     Vicodin [hydrocodone-acetaminophen] Nausea And Vomiting     Outpatient Encounter Medications as of 5/30/2023   Medication Sig Dispense Refill    ascorbic acid, vitamin C, (VITAMIN C) 500 MG tablet Take 500 mg by mouth once daily.      atorvastatin (LIPITOR) 40 MG tablet Take 1 tablet (40 mg total) by mouth once daily. 90 tablet 1    carvediloL (COREG) 12.5 MG tablet Take 1 tablet (12.5 mg total) by mouth daily with breakfast. 90 tablet 3     cholecalciferol, vitamin D3, 3,000 unit Tab Take 1 tablet by mouth once daily.       dutasteride (AVODART) 0.5 mg capsule TAKE ONE CAPSULE BY MOUTH EVERY DAY 90 capsule 3    fish oil-omega-3 fatty acids 300-1,000 mg capsule Take 5 g by mouth daily with dinner or evening meal.      meclizine (ANTIVERT) 12.5 mg tablet Take 1 tablet (12.5 mg total) by mouth 3 (three) times daily as needed for Dizziness. 24 tablet 0    olmesartan-hydrochlorothiazide (BENICAR HCT) 20-12.5 mg per tablet Take 1 tablet by mouth once daily. 90 tablet 1    saw palmetto 80 MG capsule Take 80 mg by mouth 2 (two) times daily.      vitamin E 100 UNIT capsule Take 100 Units by mouth once daily.       No facility-administered encounter medications on file as of 5/30/2023.     Review of Systems   General ROS: GENERAL:  No weight gain or loss  SKIN:  No rashes or lacerations  HEAD:  No headaches  EYES:  No exophthalmos, jaundice or blindness  EARS:  No dizziness, tinnitus or hearing loss  NOSE:  No changes in smell  MOUTH & THROAT:  No dyskinetic movements or obvious goiter  CHEST:  No shortness of breath, hyperventilation or cough  CARDIOVASCULAR:  No tachycardia or chest pain  ABDOMEN:  No nausea, vomiting, pain, constipation or diarrhea  URINARY:  No frequency, dysuria or sexual dysfunction  ENDOCRINE:  No polydipsia, polyuria  MUSCULOSKELETAL:  No pain or stiffness of the joints  NEUROLOGIC:  No weakness, sensory changes, seizures, confusion, memory loss, tremor or other abnormal movements  Physical Exam     Vitals:    05/30/23 1331   BP: 114/67   Pulse: (!) 55     General Appearance:  Alert, cooperative, no distress, appears stated age   Head:  Normocephalic, without obvious abnormality, atraumatic   Eyes:  PERRL, conjunctiva/corneas clear, EOM's intact, fundi benign, both eyes   Ears:  Normal TM's and external ear canals, both ears   Nose: Nares normal, septum midline, mucosa normal, no drainage or sinus tenderness   Throat: Lips, mucosa, and  tongue normal; teeth and gums normal   Neck: Supple, symmetrical, trachea midline, no adenopathy, thyroid: not enlarged, symmetric, no tenderness/mass/nodules, no carotid bruit or JVD   Back:   Symmetric, no curvature, ROM normal, no CVA tenderness   Lungs:   Clear to auscultation bilaterally, respirations unlabored   Chest Wall:  No tenderness or deformity   Heart:  Regular rate and rhythm, S1, S2 normal, no murmur, rub or gallop   Abdomen:   Soft, non-tender, bowel sounds active all four quadrants,  no masses, no organomegaly of liver and spleen  No hernia noted   Genitalia:  Scrotum: no rash or lesion  Normal symmetric epididymis without masses  Normal vas palpated  Normal size, symmetric testicles with no masses   Normal urethral meatus with no discharge  Normal circumcised penis with no lesion   Rectal:  Normal perineum and anus upon inspection.  Normal tone, no masses or tenderness;   Extremities: Extremities normal, atraumatic, no cyanosis or edema   Pulses: 2+ and symmetric   Skin: Skin color, texture, turgor normal, no rashes or lesions   Lymph nodes: Cervical, supraclavicular, and axillary nodes normal   Neurologic: Normal     Prostate 40 grams smooth with no nodule or tenderness.    LABS:  Lab Results   Component Value Date    PSA 6.0 (H) 05/20/2014    PSA 5.81 (H) 06/04/2013    PSA 10.78 (H) 05/28/2013    PSADIAG 6.3 (H) 04/04/2023    PSADIAG 3.9 03/29/2022    PSADIAG 3.7 05/04/2021     Lab Results   Component Value Date    CREATININE 1.0 04/04/2023    CREATININE 1.1 03/29/2022    CREATININE 1.1 12/07/2021     No results found for this or any previous visit.  Urine Culture, Routine   Date Value Ref Range Status   09/20/2015 No growth  Final     Assessment and Plan:  Elevated PSA  -     Prostate Specific Antigen, Diagnostic; Future; Expected date: 05/30/2023  -     MRI Prostate W W/O Contrast; Future; Expected date: 05/30/2023      He thinks he did certain exercise that might have caused prostate  irritation.  He does not have dysuria or symptoms of prostatitis.  Will repeat in 2 months after he avoids any activity that may affect his prostate.  MRI of prostate if still elevated.    His PSA should be stable since he is on Avodart.  A rule of 2 on his PSA while on avodart explained.  Will continue to monitor his PSA.  As long as his PSA remains less than 4.0 ( 8.0 in actual), I am OK with the value.  So far UroNav bx of prostate x 2, showed no malignancy.    I spent 25 minutes with the patient of which more than half was spent in direct consultation with the patient in regards to our treatment and plan.      Follow-up:  Follow up in about 3 months (around 8/30/2023), or PSA first then MRI prostate if still elevated.

## 2023-06-16 DIAGNOSIS — N40.1 BPH WITH URINARY OBSTRUCTION: ICD-10-CM

## 2023-06-16 DIAGNOSIS — N13.8 BPH WITH URINARY OBSTRUCTION: ICD-10-CM

## 2023-06-19 RX ORDER — DUTASTERIDE 0.5 MG/1
CAPSULE, LIQUID FILLED ORAL
Qty: 90 CAPSULE | Refills: 3 | Status: SHIPPED | OUTPATIENT
Start: 2023-06-19 | End: 2024-03-22 | Stop reason: SDUPTHER

## 2023-07-18 ENCOUNTER — OFFICE VISIT (OUTPATIENT)
Dept: OTOLARYNGOLOGY | Facility: CLINIC | Age: 73
End: 2023-07-18
Payer: MEDICARE

## 2023-07-18 DIAGNOSIS — Z97.4 WEARS HEARING AID IN BOTH EARS: ICD-10-CM

## 2023-07-18 DIAGNOSIS — H61.23 BILATERAL IMPACTED CERUMEN: Primary | ICD-10-CM

## 2023-07-18 PROCEDURE — 69210 EAR CERUMEN REMOVAL: ICD-10-PCS | Mod: S$PBB,,, | Performed by: NURSE PRACTITIONER

## 2023-07-18 PROCEDURE — 99999 PR PBB SHADOW E&M-EST. PATIENT-LVL III: CPT | Mod: PBBFAC,,, | Performed by: NURSE PRACTITIONER

## 2023-07-18 PROCEDURE — 99999 PR PBB SHADOW E&M-EST. PATIENT-LVL III: ICD-10-PCS | Mod: PBBFAC,,, | Performed by: NURSE PRACTITIONER

## 2023-07-18 PROCEDURE — 99212 PR OFFICE/OUTPT VISIT, EST, LEVL II, 10-19 MIN: ICD-10-PCS | Mod: 25,S$PBB,, | Performed by: NURSE PRACTITIONER

## 2023-07-18 PROCEDURE — 69210 REMOVE IMPACTED EAR WAX UNI: CPT | Mod: 50,PBBFAC | Performed by: NURSE PRACTITIONER

## 2023-07-18 PROCEDURE — 99212 OFFICE O/P EST SF 10 MIN: CPT | Mod: 25,S$PBB,, | Performed by: NURSE PRACTITIONER

## 2023-07-18 PROCEDURE — 69210 REMOVE IMPACTED EAR WAX UNI: CPT | Mod: S$PBB,,, | Performed by: NURSE PRACTITIONER

## 2023-07-18 PROCEDURE — 99213 OFFICE O/P EST LOW 20 MIN: CPT | Mod: PBBFAC | Performed by: NURSE PRACTITIONER

## 2023-07-18 NOTE — PROGRESS NOTES
Subjective:   Anton Christiansen is a 72 y.o. male who was self-referred for an ear check. At a recent hearing aid appointment he was told that there was something in the left ear. Denies any left ear symptoms including-otalgia, otorrhea, ear fullness/pressure, tinnitus or vertigo. He is wearing his hearing aids today. Further denies any dizziness episodes since he was last seen in October.     Last visit on 10/18/2022:  He reports the symptoms started about  2-3  weeks ago and have improved.  He describes the dizziness as a room spinning sensation that would last only a few seconds.  Positions that worsen symptoms include rolling in bed to the left and looking down .  He denies any hearing changes, tinnitus, fullness, or ear pain associated with the dizziness.  He denies any associated CNS symptoms including: confusion, dimming vision, drowsiness, headaches, paresthesias, personality change, speech change, unconsciousness, and visual floaters.  He denies recent infections, head trauma or drug ingestion.  He denies a history of loud noise exposure.  He denies a family history of any dizzy disorders.  BPPV (benign paroxysmal positional vertigo), left  HPI consistent with BPPV, however testing was negative for nystagmus but symptomatic with left [place]-Hallpike.  We discussed the pathology of BPPV including the displacement of crystals (canaliths) within the inner ear.  Epley maneuver can be performed in-office, but may require multiple treatments for success.  Left Canalith Repositioning procedure performed in clinic.  Provided him with at home Epley maneuver instructions.       Follow-up in 2 weeks if symptoms persist.  Bilateral impacted cerumen  -     Ambulatory referral/consult to ENT  -     Ear Cerumen Removal  Wears hearing aid in both ears  He has a history of bilateral SNHL and has worn hearing aids bilaterally for the past 5 years.     Past Medical History  He has a past medical history of Allergy,  Arteriosclerotic coronary artery disease, Basal cell carcinoma, BCC (basal cell carcinoma of skin), Cataract, Family history of colon cancer, Hyperlipidemia, Hypertension, Meningioma, Multiple thyroid nodules, Nephrolithiasis, ASHA on CPAP, Prostatitis, chronic, Special screening for malignant neoplasms, colon, and Vertigo.    Past Surgical History  He has a past surgical history that includes Tonsillectomy; Pilonoidal Cyst; Cataract extraction w/  intraocular lens implant (12/20/12); Colonoscopy (060509); Eye surgery; Skin surgery (MOHS Repair); Division and Inset (4/29/14); and Colonoscopy (N/A, 5/20/2019).    Family History  His family history includes Cancer in his father; Cancer (age of onset: 70) in his mother; Hypertension in his mother; No Known Problems in his brother, maternal aunt, maternal grandfather, maternal grandmother, maternal uncle, paternal aunt, paternal grandfather, paternal grandmother, paternal uncle, and sister.    Social History  He reports that he has never smoked. He has never used smokeless tobacco. He reports that he does not drink alcohol and does not use drugs.    Allergies  He is allergic to cephalexin, ace inhibitors, cialis [tadalafil], proscar [finasteride], and vicodin [hydrocodone-acetaminophen].    Medications  He has a current medication list which includes the following prescription(s): ascorbic acid (vitamin c), atorvastatin, carvedilol, cholecalciferol (vitamin d3), dutasteride, fish oil-omega-3 fatty acids, meclizine, olmesartan-hydrochlorothiazide, saw palmetto, and vitamin e.  Review of Systems     Constitutional: Negative for appetite change, chills, fatigue, fever and unexpected weight loss.      HENT: Positive for sinus infection.      Eyes:  Negative for change in eyesight, eye drainage, eye itching and photophobia.     Respiratory:  Positive for sleep apnea.      Cardiovascular:  Negative for chest pain, foot swelling, irregular heartbeat and swollen veins.      Gastrointestinal:  Negative for abdominal pain, acid reflux, constipation, diarrhea, heartburn and vomiting.     Genitourinary: Negative for difficulty urinating, sexual problems and frequent urination.     Musc: Negative for aching joints, aching muscles, back pain and neck pain.     Skin: Negative for rash.     Allergy: Negative for food allergies and seasonal allergies.     Endocrine: Negative for cold intolerance and heat intolerance.      Neurological: Negative for dizziness, headaches, light-headedness, seizures and tremors.      Hematologic: Negative for bruises/bleeds easily and swollen glands.      Psychiatric: Negative for decreased concentration, depression, nervous/anxious and sleep disturbance.        Objective:     Constitutional:   He is oriented to person, place, and time. He appears well-developed and well-nourished. He appears alert. He is cooperative.  Non-toxic appearance. He does not have a sickly appearance. He does not appear ill. Normal speech.      Head:  Normocephalic and atraumatic. Not macrocephalic and not microcephalic. Head is without raccoon's eyes, without Guerra's sign, without abrasion, without laceration, without right periorbital erythema, without left periorbital erythema and without TMJ tenderness.     Ears:    Right Ear: No lacerations. No drainage, swelling or tenderness. No foreign bodies. No mastoid tenderness. Tympanic membrane is not injected, not scarred, not perforated, not erythematous, not retracted and not bulging. No middle ear effusion. No hemotympanum.   Left Ear: No lacerations. No drainage, swelling or tenderness. No foreign bodies. No mastoid tenderness. Tympanic membrane is not injected, not scarred, not perforated, not erythematous, not retracted and not bulging.  No middle ear effusion. No hemotympanum.   Moderate amount of cerumen cleared AU    AS- combination of cerumen and layers of dried skin removed from the EAC    Pulmonary/Chest:   Effort normal.      Psychiatric:   He has a normal mood and affect. His speech is normal and behavior is normal.     Neurological:   He is alert and oriented to person, place, and time.   Procedure  Cerumen removal performed.  See procedure note.    Imaging  No pertinent imaging available.  Audiogram  No testing indicated today  Assessment:     1. Bilateral impacted cerumen    2. Wears hearing aid in both ears      Plan:   Bilateral impacted cerumen  -     Ear Cerumen Removal    Normal ear exam after cleaning.     Wears hearing aid in both ears    He would like to RTC in 10 months for repeat cleaning.

## 2023-07-18 NOTE — PROCEDURES
Ear Cerumen Removal    Date/Time: 7/18/2023 9:00 AM  Performed by: Gilma Farfan NP  Authorized by: Gilma Farfan NP       Local anesthetic:  None  Location details:  Both ears  Procedure type: curette    Cerumen  Removal Results:  Cerumen completely removed  Patient tolerance:  Patient tolerated the procedure well with no immediate complications     Procedure Note:    The patient was brought to the minor procedure room and placed under the operating microscope of the right ear canal which was cleaned of ceruminous debris. Using a combination of suction, curettes and cup forceps the patient's cerumen was removed. The patient tolerated the procedure well. There were no complications.    Procedure Note:    The patient was brought to the minor procedure room and placed under the operating microscope of the left ear canal which was cleaned of ceruminous debris. Using a combination of suction, curettes and cup forceps the patient's cerumen was removed.  The patient tolerated the procedure well. There were no complications.

## 2023-08-15 ENCOUNTER — PES CALL (OUTPATIENT)
Dept: ADMINISTRATIVE | Facility: CLINIC | Age: 73
End: 2023-08-15
Payer: MEDICARE

## 2023-08-22 ENCOUNTER — LAB VISIT (OUTPATIENT)
Dept: LAB | Facility: HOSPITAL | Age: 73
End: 2023-08-22
Attending: UROLOGY
Payer: MEDICARE

## 2023-08-22 ENCOUNTER — PATIENT MESSAGE (OUTPATIENT)
Dept: UROLOGY | Facility: CLINIC | Age: 73
End: 2023-08-22
Payer: MEDICARE

## 2023-08-22 DIAGNOSIS — R97.20 ELEVATED PSA: ICD-10-CM

## 2023-08-22 LAB — COMPLEXED PSA SERPL-MCNC: 4.8 NG/ML (ref 0–4)

## 2023-08-22 PROCEDURE — 36415 COLL VENOUS BLD VENIPUNCTURE: CPT | Performed by: UROLOGY

## 2023-08-22 PROCEDURE — 84153 ASSAY OF PSA TOTAL: CPT | Performed by: UROLOGY

## 2023-08-23 ENCOUNTER — TELEPHONE (OUTPATIENT)
Dept: UROLOGY | Facility: HOSPITAL | Age: 73
End: 2023-08-23
Payer: MEDICARE

## 2023-08-23 DIAGNOSIS — R97.20 ELEVATED PSA: Primary | ICD-10-CM

## 2023-08-23 NOTE — TELEPHONE ENCOUNTER
Lab Results   Component Value Date    PSA 6.0 (H) 05/20/2014    PSA 5.81 (H) 06/04/2013    PSA 10.78 (H) 05/28/2013    PSADIAG 4.8 (H) 08/22/2023    PSADIAG 6.3 (H) 04/04/2023    PSADIAG 3.9 03/29/2022      Elevated PSA  -     Prostate Specific Antigen, Diagnostic; Future; Expected date: 08/23/2023      His PSA came down.  Will cancel his MRI prostate on Friday.  I will see him in 6 months with PSA please.

## 2023-08-24 ENCOUNTER — OFFICE VISIT (OUTPATIENT)
Dept: INTERNAL MEDICINE | Facility: CLINIC | Age: 73
End: 2023-08-24
Payer: MEDICARE

## 2023-08-24 VITALS
HEART RATE: 58 BPM | HEIGHT: 69 IN | SYSTOLIC BLOOD PRESSURE: 124 MMHG | BODY MASS INDEX: 26.81 KG/M2 | DIASTOLIC BLOOD PRESSURE: 74 MMHG | OXYGEN SATURATION: 96 % | WEIGHT: 181 LBS

## 2023-08-24 DIAGNOSIS — I10 PRIMARY HYPERTENSION: Chronic | ICD-10-CM

## 2023-08-24 DIAGNOSIS — G47.33 OSA (OBSTRUCTIVE SLEEP APNEA): ICD-10-CM

## 2023-08-24 DIAGNOSIS — E21.3 HYPERPARATHYROIDISM: ICD-10-CM

## 2023-08-24 DIAGNOSIS — E78.2 MIXED HYPERLIPIDEMIA: Chronic | ICD-10-CM

## 2023-08-24 DIAGNOSIS — Z00.00 ENCOUNTER FOR PREVENTIVE HEALTH EXAMINATION: Primary | ICD-10-CM

## 2023-08-24 DIAGNOSIS — E66.3 OVERWEIGHT (BMI 25.0-29.9): ICD-10-CM

## 2023-08-24 DIAGNOSIS — N13.8 BPH WITH URINARY OBSTRUCTION: ICD-10-CM

## 2023-08-24 DIAGNOSIS — D32.9 MENINGIOMA: ICD-10-CM

## 2023-08-24 DIAGNOSIS — N40.1 BPH WITH URINARY OBSTRUCTION: ICD-10-CM

## 2023-08-24 DIAGNOSIS — I70.0 AORTIC ATHEROSCLEROSIS: ICD-10-CM

## 2023-08-24 PROCEDURE — 99999 PR PBB SHADOW E&M-EST. PATIENT-LVL IV: ICD-10-PCS | Mod: PBBFAC,,, | Performed by: NURSE PRACTITIONER

## 2023-08-24 PROCEDURE — 99999 PR PBB SHADOW E&M-EST. PATIENT-LVL IV: CPT | Mod: PBBFAC,,, | Performed by: NURSE PRACTITIONER

## 2023-08-24 PROCEDURE — G0439 PPPS, SUBSEQ VISIT: HCPCS | Mod: ,,, | Performed by: NURSE PRACTITIONER

## 2023-08-24 PROCEDURE — G0439 PR MEDICARE ANNUAL WELLNESS SUBSEQUENT VISIT: ICD-10-PCS | Mod: ,,, | Performed by: NURSE PRACTITIONER

## 2023-08-24 PROCEDURE — 99214 OFFICE O/P EST MOD 30 MIN: CPT | Mod: PBBFAC | Performed by: NURSE PRACTITIONER

## 2023-08-24 NOTE — PATIENT INSTRUCTIONS
Counseling and Referral of Other Preventative  (Italic type indicates deductible and co-insurance are waived)    Patient Name: Anton Christiansen  Today's Date: 8/24/2023    Health Maintenance         Date Due Completion Date    COVID-19 Vaccine (3 - Pfizer series) 08/24/2024 (Originally 3/11/2021) 1/14/2021    Influenza Vaccine (1) 09/01/2023 9/22/2022    Colorectal Cancer Screening 05/20/2024 5/20/2019    High Dose Statin 08/24/2024 8/24/2023    Lipid Panel 02/06/2028 2/6/2023    TETANUS VACCINE 04/04/2033 4/4/2023          No orders of the defined types were placed in this encounter.      The following information is provided to all patients.  This information is to help you find resources for any of the problems found today that may be affecting your health:                Living healthy guide: www.[facility].[place].[facility]      Understanding Diabetes: www.diabetes.org      Eating healthy: www.cdc.gov/healthyweight      [facility] home safety checklist: www.cdc.gov/steadi/patient.html      Agency on Aging: www.goea.[place].[facility]      Alcoholics anonymous (AA): www.aa.org      Physical Activity: www.rowan.nih.gov/ry3tqbk      Tobacco use: www.quitwithusla.org

## 2023-08-24 NOTE — PROGRESS NOTES
"  Anton Christiansen presented for a  Medicare AWV and comprehensive Health Risk Assessment today. The following components were reviewed and updated:    Medical history  Family History  Social history  Allergies and Current Medications  Health Risk Assessment  Health Maintenance  Care Team         ** See Completed Assessments for Annual Wellness Visit within the encounter summary.**         The following assessments were completed:  Living Situation  CAGE  Depression Screening  Timed Get Up and Go  Whisper Test--wears hearing aids  Cognitive Function Screening      Nutrition Screening  ADL Screening  PAQ Screening        Vitals:    08/24/23 1037   BP: 124/74   BP Location: Right arm   Patient Position: Sitting   BP Method: Medium (Automatic)   Pulse: (!) 58   SpO2: 96%   Weight: 82.1 kg (181 lb)   Height: 5' 9" (1.753 m)     Body mass index is 26.73 kg/m².  Physical Exam  Vitals and nursing note reviewed.   Constitutional:       Appearance: Normal appearance.   HENT:      Head: Normocephalic.      Nose: Nose normal.   Eyes:      Conjunctiva/sclera: Conjunctivae normal.   Cardiovascular:      Rate and Rhythm: Normal rate and regular rhythm.      Heart sounds: Normal heart sounds.   Pulmonary:      Effort: Pulmonary effort is normal.      Breath sounds: Normal breath sounds.   Musculoskeletal:         General: Normal range of motion.      Cervical back: Normal range of motion.   Skin:     General: Skin is warm and dry.   Neurological:      General: No focal deficit present.      Mental Status: He is alert and oriented to person, place, and time.   Psychiatric:         Mood and Affect: Mood normal.         Behavior: Behavior normal.         Thought Content: Thought content normal.         Judgment: Judgment normal.         Diagnoses and health risks identified today and associated recommendations/orders:    1. Encounter for preventive health examination  Exam done    Health Maintenance updated    Records reviewed    2. " Aortic atherosclerosis  Chronic, followed by PCP    3. Hyperparathyroidism  Chronic, followed by PCP    4. ASHA (obstructive sleep apnea)  Chronic, followed by PCP    5. Meningioma  Chronic, followed by PCP    6. Mixed hyperlipidemia  Chronic, followed by PCP    Continue cholesterol med, low fat diet, and exercise.     7. Primary hypertension  Chronic, followed by PCP    Take medications as prescribed.    Monitor BP at home, goal BP < or = 140/80, call office if consistently above this range.    Follow low salt DASH diet and exercise.    BMI reviewed.    Go to ED if Headaches, blurred vision, chest pain, or SOB occurs along with elevated readings > or = 160/90.    8. BPH with urinary obstruction  Chronic, followed by PCP    9. BMI 26.0-26.9,adult  BMI reviewed    10. Overweight (BMI 25.0-29.9)  BMI reviewed.    Diet and exercise to lose weight.    Provided Anton with a 5-10 year written screening schedule and personal prevention plan. Recommendations were developed using the USPSTF age appropriate recommendations. Education, counseling, and referrals were provided as needed. After Visit Summary printed and given to patient which includes a list of additional screenings\tests needed.    Follow up in about 8 months (around 4/24/2024) for with PCP Dr. Landis for annual.    Mulu Jovel, CARLA      I offered to discuss advanced care planning, including how to pick a person who would make decisions for you if you were unable to make them for yourself, called a health care power of , and what kind of decisions you might make such as use of life sustaining treatments such as ventilators and tube feeding when faced with a life limiting illness recorded on a living will that they will need to know. (How you want to be cared for as you near the end of your natural life)     X Patient is interested in learning more about how to make advanced directives.  I provided them paperwork and offered to discuss this with them.

## 2023-12-13 ENCOUNTER — PATIENT MESSAGE (OUTPATIENT)
Dept: OPTOMETRY | Facility: CLINIC | Age: 73
End: 2023-12-13
Payer: MEDICARE

## 2023-12-27 ENCOUNTER — PATIENT MESSAGE (OUTPATIENT)
Dept: INTERNAL MEDICINE | Facility: CLINIC | Age: 73
End: 2023-12-27
Payer: MEDICARE

## 2023-12-27 DIAGNOSIS — I10 PRIMARY HYPERTENSION: ICD-10-CM

## 2023-12-28 RX ORDER — CARVEDILOL 12.5 MG/1
12.5 TABLET ORAL
Qty: 90 TABLET | Refills: 1 | Status: SHIPPED | OUTPATIENT
Start: 2023-12-28

## 2023-12-28 NOTE — TELEPHONE ENCOUNTER
Refill Decision Note   Anton Елена  is requesting a refill authorization.  Brief Assessment and Rationale for Refill:  Approve     Medication Therapy Plan:  Patient sent message alerting to approval as message was marked high priority since he stated he was on lsat pill.      Comments:     Note composed:7:13 AM 12/28/2023

## 2023-12-28 NOTE — TELEPHONE ENCOUNTER
Care Due:                  Date            Visit Type   Department     Provider  --------------------------------------------------------------------------------                                NP -                              PRIMARY      [facility]  Last Visit: 04-      CARE ([facility])   MEDICINE       RAVEN MORGAN  Next Visit: None Scheduled  None         None Found                                                            Last  Test          Frequency    Reason                     Performed    Due Date  --------------------------------------------------------------------------------    Lipid Panel.  12 months..  atorvastatin.............  02- 02-    [facility] Embedded Care Due Messages. Reference number: [id number].   12/28/2023 7:06:08 AM CST

## 2024-01-11 ENCOUNTER — PATIENT MESSAGE (OUTPATIENT)
Dept: INTERNAL MEDICINE | Facility: CLINIC | Age: 74
End: 2024-01-11
Payer: MEDICARE

## 2024-01-11 DIAGNOSIS — E78.2 MIXED HYPERLIPIDEMIA: ICD-10-CM

## 2024-01-11 DIAGNOSIS — I10 PRIMARY HYPERTENSION: ICD-10-CM

## 2024-01-12 ENCOUNTER — PATIENT MESSAGE (OUTPATIENT)
Dept: ADMINISTRATIVE | Facility: OTHER | Age: 74
End: 2024-01-12
Payer: MEDICARE

## 2024-01-12 RX ORDER — OLMESARTAN MEDOXOMIL AND HYDROCHLOROTHIAZIDE 20/12.5 20; 12.5 MG/1; MG/1
1 TABLET ORAL DAILY
Qty: 90 TABLET | Refills: 3 | Status: SHIPPED | OUTPATIENT
Start: 2024-01-12 | End: 2025-01-11

## 2024-01-12 RX ORDER — ATORVASTATIN CALCIUM 40 MG/1
40 TABLET, FILM COATED ORAL DAILY
Qty: 90 TABLET | Refills: 3 | Status: SHIPPED | OUTPATIENT
Start: 2024-01-12 | End: 2025-01-11

## 2024-01-12 NOTE — TELEPHONE ENCOUNTER
Care Due:                  Date            Visit Type   Department     Provider  --------------------------------------------------------------------------------                                NP -                              PRIMARY      [facility]  Last Visit: 04-      [facility] ([facility])   MEDICINE       RAVEN MORGAN  Next Visit: None Scheduled  None         None Found                                                            Last  Test          Frequency    Reason                     Performed    Due Date  --------------------------------------------------------------------------------    CMP.........  12 months..  atorvastatin,              04- 03-                             olmesartan-hydrochlorothi                             azide....................    [employer] Embedded Care Due Messages. Reference number: [id number].   1/12/2024 8:18:59 AM CST

## 2024-01-24 ENCOUNTER — OFFICE VISIT (OUTPATIENT)
Dept: OPTOMETRY | Facility: CLINIC | Age: 74
End: 2024-01-24
Payer: MEDICARE

## 2024-01-24 DIAGNOSIS — H47.012 NAION (NON-ARTERITIC ANTERIOR ISCHEMIC OPTIC NEUROPATHY), LEFT EYE: Primary | ICD-10-CM

## 2024-01-24 DIAGNOSIS — H02.88A MEIBOMIAN GLAND DYSFUNCTION (MGD), BILATERAL, BOTH UPPER AND LOWER LIDS: ICD-10-CM

## 2024-01-24 DIAGNOSIS — H02.88B MEIBOMIAN GLAND DYSFUNCTION (MGD), BILATERAL, BOTH UPPER AND LOWER LIDS: ICD-10-CM

## 2024-01-24 DIAGNOSIS — H52.01 HYPEROPIA WITH ASTIGMATISM, RIGHT: ICD-10-CM

## 2024-01-24 DIAGNOSIS — Z13.5 GLAUCOMA SCREENING: ICD-10-CM

## 2024-01-24 DIAGNOSIS — Z96.1 PSEUDOPHAKIA OF BOTH EYES: ICD-10-CM

## 2024-01-24 DIAGNOSIS — H52.201 HYPEROPIA WITH ASTIGMATISM, RIGHT: ICD-10-CM

## 2024-01-24 PROCEDURE — 99999 PR PBB SHADOW E&M-EST. PATIENT-LVL III: CPT | Mod: PBBFAC,,, | Performed by: OPTOMETRIST

## 2024-01-24 PROCEDURE — 92015 DETERMINE REFRACTIVE STATE: CPT | Mod: ,,, | Performed by: OPTOMETRIST

## 2024-01-24 PROCEDURE — 99213 OFFICE O/P EST LOW 20 MIN: CPT | Mod: PBBFAC | Performed by: OPTOMETRIST

## 2024-01-24 PROCEDURE — 92014 COMPRE OPH EXAM EST PT 1/>: CPT | Mod: S$PBB,,, | Performed by: OPTOMETRIST

## 2024-01-24 NOTE — PROGRESS NOTES
HPI    Annual Exam   Pt states vision has changed since 2022    Pt denies F/F   Pt denies Dry/ Itchy/ Burning  Gtt: Yes OTC gtt    Last edited by Lashonda Jamison on 1/24/2024  2:01 PM.            Assessment /Plan     For exam results, see Encounter Report.    NAION (non-arteritic anterior ischemic optic neuropathy), left eye  -Stable, monitor at annual DFE    Meibomian gland dysfunction (MGD), bilateral, both upper and lower lids  -Systane Complete PRN    Glaucoma screening  -Monitor with annual eye exam and IOP check    Pseudophakia of both eyes  -clear, centered    Hyperopia with astigmatism, right  Eyeglass Final Rx       Eyeglass Final Rx         Sphere Cylinder Axis Dist VA Add    Right [place] +0.75 180 20/20 +2.50    Left [place] +0.75 180 20/200 +2.50      Type: PAL    Expiration Date: 1/24/2025                      RTC 1 yr

## 2024-02-12 ENCOUNTER — LAB VISIT (OUTPATIENT)
Dept: LAB | Facility: HOSPITAL | Age: 74
End: 2024-02-12
Payer: MEDICARE

## 2024-02-12 DIAGNOSIS — E78.2 MIXED HYPERLIPIDEMIA: ICD-10-CM

## 2024-02-12 LAB
CHOLEST SERPL-MCNC: 134 MG/DL (ref 120–199)
CHOLEST/HDLC SERPL: 3.9 {RATIO} (ref 2–5)
HDLC SERPL-MCNC: 34 MG/DL (ref 40–75)
HDLC SERPL: 25.4 % (ref 20–50)
LDLC SERPL CALC-MCNC: 65.6 MG/DL (ref 63–159)
NONHDLC SERPL-MCNC: 100 MG/DL
TRIGL SERPL-MCNC: 172 MG/DL (ref 30–150)

## 2024-02-12 PROCEDURE — 36415 COLL VENOUS BLD VENIPUNCTURE: CPT | Performed by: INTERNAL MEDICINE

## 2024-02-12 PROCEDURE — 80061 LIPID PANEL: CPT | Performed by: INTERNAL MEDICINE

## 2024-03-12 ENCOUNTER — OFFICE VISIT (OUTPATIENT)
Dept: DERMATOLOGY | Facility: CLINIC | Age: 74
End: 2024-03-12
Payer: MEDICARE

## 2024-03-12 DIAGNOSIS — Z85.828 HISTORY OF NONMELANOMA SKIN CANCER: ICD-10-CM

## 2024-03-12 DIAGNOSIS — D22.9 MULTIPLE BENIGN NEVI: ICD-10-CM

## 2024-03-12 DIAGNOSIS — L82.1 SEBORRHEIC KERATOSES: ICD-10-CM

## 2024-03-12 DIAGNOSIS — D48.5 NEOPLASM OF UNCERTAIN BEHAVIOR OF SKIN: Primary | ICD-10-CM

## 2024-03-12 DIAGNOSIS — L57.0 ACTINIC KERATOSIS: ICD-10-CM

## 2024-03-12 DIAGNOSIS — D18.01 CHERRY ANGIOMA: ICD-10-CM

## 2024-03-12 PROCEDURE — 11102 TANGNTL BX SKIN SINGLE LES: CPT | Mod: PBBFAC,ICN | Performed by: STUDENT IN AN ORGANIZED HEALTH CARE EDUCATION/TRAINING PROGRAM

## 2024-03-12 PROCEDURE — 17003 DESTRUCT PREMALG LES 2-14: CPT | Mod: S$PBB,ICN,, | Performed by: STUDENT IN AN ORGANIZED HEALTH CARE EDUCATION/TRAINING PROGRAM

## 2024-03-12 PROCEDURE — 17000 DESTRUCT PREMALG LESION: CPT | Mod: PBBFAC,XS | Performed by: STUDENT IN AN ORGANIZED HEALTH CARE EDUCATION/TRAINING PROGRAM

## 2024-03-12 PROCEDURE — 99213 OFFICE O/P EST LOW 20 MIN: CPT | Mod: 25,S$PBB,ICN, | Performed by: STUDENT IN AN ORGANIZED HEALTH CARE EDUCATION/TRAINING PROGRAM

## 2024-03-12 PROCEDURE — 11102 TANGNTL BX SKIN SINGLE LES: CPT | Mod: S$PBB,ICN,, | Performed by: STUDENT IN AN ORGANIZED HEALTH CARE EDUCATION/TRAINING PROGRAM

## 2024-03-12 PROCEDURE — 17003 DESTRUCT PREMALG LES 2-14: CPT | Mod: PBBFAC | Performed by: STUDENT IN AN ORGANIZED HEALTH CARE EDUCATION/TRAINING PROGRAM

## 2024-03-12 PROCEDURE — 17000 DESTRUCT PREMALG LESION: CPT | Mod: S$PBB,XS,ICN, | Performed by: STUDENT IN AN ORGANIZED HEALTH CARE EDUCATION/TRAINING PROGRAM

## 2024-03-12 PROCEDURE — 88305 TISSUE EXAM BY PATHOLOGIST: CPT | Performed by: DERMATOLOGY

## 2024-03-12 PROCEDURE — 88305 TISSUE EXAM BY PATHOLOGIST: CPT | Mod: 26,,, | Performed by: DERMATOLOGY

## 2024-03-12 PROCEDURE — 99999 PR PBB SHADOW E&M-EST. PATIENT-LVL III: CPT | Mod: PBBFAC,,, | Performed by: STUDENT IN AN ORGANIZED HEALTH CARE EDUCATION/TRAINING PROGRAM

## 2024-03-12 PROCEDURE — 99213 OFFICE O/P EST LOW 20 MIN: CPT | Mod: PBBFAC | Performed by: STUDENT IN AN ORGANIZED HEALTH CARE EDUCATION/TRAINING PROGRAM

## 2024-03-12 NOTE — PATIENT INSTRUCTIONS
Shave Biopsy Wound Care    Your doctor has performed a shave biopsy today.  A band aid and vaseline ointment has been placed over the site.  This should remain in place for NO LONGER THAN 48 hours.  It is fine to remove the bandaid after 24 hours, if the area is no longer bleeding. It is recommended that you keep the area dry (do not wet)) for the first 24 hours.  After 24 hours, wash the area with warm soap and water and apply Vaseline jelly.  Many patients prefer to use Neosporin or Bacitracin ointment.  This is acceptable; however, know that you can develop an allergy to this medication even if you have used it safely for years.  It is important to keep the area moist.  Letting it dry out and get air slows healing time, and will worsen the scar.        If you notice increasing redness, tenderness, pain, or yellow drainage at the biopsy site, please notify your doctor.  These are signs of an infection.    If your biopsy site is bleeding, apply firm pressure for 15 minutes straight.  Repeat for another 15 minutes, if it is still bleeding.   If the surgical site continues to bleed, then please contact your doctor.      For [facility] users:   You will receive your biopsy results in [facility] as soon as they are available. Please be assured that your physician/provider will review your results and will then determine what further treatment, evaluation, or planning is required. You should be contacted by your physician's/provider's office within 5 business days of receiving your results; If not, please reach out to directly. This is one more way [facility] is putting you first.     [facility] [place] [address]/ [phone] FAX/ [email or web address]         CRYOSURGERY      Your doctor has used a method called cryosurgery to treat your skin condition. Cryosurgery refers to the use of very cold substances to treat a variety of skin conditions such as warts, pre-skin cancers, molluscum  contagiosum, sun spots, and several benign growths. The substance we use in cryosurgery is liquid nitrogen and is so cold (-195 degrees Celsius) that is burns when administered.     Following treatment in the office, the skin may immediately burn and become red. You may find the area around the lesion is affected as well. It is sometimes necessary to treat not only the lesion, but a small area of the surrounding normal skin to achieve a good response.     A blister, and even a blood filled blister, may form after treatment.   This is a normal response. If the blister is painful, it is acceptable to sterilize a needle and with rubbing alcohol and gently pop the blister. It is important that you gently wash the area with soap and warm water as the blister fluid may contain wart virus if a wart was treated. Do no remove the roof of the blister.     The area treated can take anywhere from 1-3 weeks to heal. Healing time depends on the kind skin lesion treated, the location, and how aggressively the lesion was treated. It is recommended that the areas treated are covered with Vaseline or bacitracin ointment and a band-aid. If a band-aid is not practical, just ointment applied several times per day will do. Keeping these areas moist will speed the healing time.    Treatment with liquid nitrogen can leave a scar. In dark skin, it may be a light or dark scar, in light skin it may be a white or pink scar. These will generally fade with time, but may never go away completely.     If you have any concerns after your treatment, please feel free to call the office.       [address] [place] [address]/ [phone] FAX/ [email or web address]     Sunscreen Guidelines  Sunscreen protects your skin by absorbing and reflecting ultraviolet rays. All sunscreens have a sun protection factor (SPF) rating that indicates how long a sunscreen will remain effective on the skin.    Why protect your skin?  The sun's  rays are composed of many different wavelengths, including UVA, UVB, and visible light that each affect the skin differently.    UVB: sunburn, photoaging, skin cancer (melanoma, basal cell, and squamous cell carcinomas) and modulation of the skin's immune system.    UVA: similar to above but thought to contribute more to aging; at the same dose of UVB it is less powerful however the sun has 10-20 times the levels of UVA as compared with UVB.  Visible light: implicated in causing unwanted darkening of skin, such as melasma and post-inflammatory hyperpigmentation in darker skin types     If I have dark skin, do I need to worry about the sun?    More darkly pigmented skin is more protected against UV-induced skin cancer, sunburn, and photoaging, though may still suffer from sun-related conditions, including melasma, hyperpigmentation, and other dark spots.    Sun avoidance  As a general rule, stay out of the sun as much as possible between 10 a.m. - 4 p.m.    Download the EPA UV index dhara to track the UV index by hour in your zip code.      Which sunscreen should I choose?  The best sunscreen to use varies by individual. The one that feels best on your skin and fits your lifestyle will be the one you will likely use most regularly.   Active ingredients of sunscreen vary by , and may be a chemical (such as avobenzone or oxybenzone) or physical agent (such as zinc oxide or titanium dioxide). I recommend a physical agent.  A water-resistant sunscreen is one that maintains the SPF level after 40 minutes of water exposure. A very water-resistant sunscreen maintains the SPF level after 80 minutes of water exposure.    Sunscreen: this is the last layer in sun protection   Be generous: 1 shot glass of sunscreen for your body, ½ teaspoon for your face/neck  Reapply every 2 hours  Broad spectrum (provides UVA/UVB protection), SPF 50 or above  Avoid spray sunscreens: less effective and have been found to contain  "benzene (carcinogen)    Sun protective clothing  Although sunscreen helps minimize sun damage, no sunscreen completely blocks all wavelengths of UV light. Wearing sun protective clothing such as hats, rashguards or swim shirts, and long sleeves and/or pants, as well as avoiding sun exposure from 10 a.m. to 4 p.m. will help protect your skin from overexposure and minimize sun damage. Seek shade.  Long sleeved clothing, hats, and sunglasses: makes sun protection easier, more effective, and can even be more affordable, since sunscreen needs to be reapplied frequently.    Sol[employer]ra (www.sunprectautions.com)  [employer] (www.Polyglot Systems.Petra Systems)  SpectraRepibar (www.Bizimply)  [employer]co (www.Arid[employer]ia [employer])  Hats from Diana Yenny (www.[email or web address])    My Favorite Sunscreens:  Physical blockers: Can have a "white case" but in general are more effective  - Face: CeraVe tinted mineral sunscreen, Bare Minerals complexion rescue (20 shades), Elta MD (UV elements, UV physical, UV restore, etc), Tizo ultra zinc tinted, Cetaphil Sheer Mineral Face Liquid Sunscreen  - Body: Blue Lizard, Neutrogena Sheer Zinc, Eucerin Daily Protection, Aveeno Baby   "

## 2024-03-12 NOTE — PROGRESS NOTES
Subjective:      Patient ID:  Anton Christiansen is a 73 y.o. male who presents for   Chief Complaint   Patient presents with    Skin Check     Pt here today for TBSE    Pt with h/o BCC.    Pt concerned about new spots on cheeks.       Review of Systems   Constitutional:  Negative for fever, chills, fatigue and malaise.   Skin:  Negative for itching, rash and recent sunburn.       Objective:   Physical Exam   Constitutional: He appears well-developed and well-nourished. No distress.   Neurological: He is alert and oriented to person, place, and time. He is not disoriented.   Psychiatric: He has a normal mood and affect. He is not agitated.   Skin:   Areas Examined (abnormalities noted in diagram):   Scalp / Hair Palpated and Inspected  Head / Face Inspection Performed  Neck Inspection Performed  Chest / Axilla Inspection Performed  Abdomen Inspection Performed  Back Inspection Performed  RUE Inspected  LUE Inspection Performed  Nails and Digits Inspection Performed                     Diagram Legend     Erythematous scaling macule/papule c/w actinic keratosis       Vascular papule c/w angioma      Pigmented verrucoid papule/plaque c/w seborrheic keratosis      Yellow umbilicated papule c/w sebaceous hyperplasia      Irregularly shaped tan macule c/w lentigo     1-2 mm smooth white papules consistent with Milia      Movable subcutaneous cyst with punctum c/w epidermal inclusion cyst      Subcutaneous movable cyst c/w pilar cyst      Firm pink to brown papule c/w dermatofibroma      Pedunculated fleshy papule(s) c/w skin tag(s)      Evenly pigmented macule c/w junctional nevus     Mildly variegated pigmented, slightly irregular-bordered macule c/w mildly atypical nevus      Flesh colored to evenly pigmented papule c/w intradermal nevus       Pink pearly papule/plaque c/w basal cell carcinoma      Erythematous hyperkeratotic cursted plaque c/w SCC      Surgical scar with no sign of skin cancer recurrence      Open  and closed comedones      Inflammatory papules and pustules      Verrucoid papule consistent consistent with wart     Erythematous eczematous patches and plaques     Dystrophic onycholytic nail with subungual debris c/w onychomycosis     Umbilicated papule    Erythematous-base heme-crusted tan verrucoid plaque consistent with inflamed seborrheic keratosis     Erythematous Silvery Scaling Plaque c/w Psoriasis     See annotation            Assessment / Plan:      Pathology Orders:       Normal Orders This Visit    Specimen to Pathology, Dermatology     Questions:    Procedure Type: Dermatology and skin neoplasms    Number of Specimens: 1    ------------------------: -------------------------    Spec 1 Procedure: Biopsy    Spec 1 Clinical Impression: r/o bcc vs angiofibroma    Spec 1 Source: left nasal ala    Release to patient: Immediate          Neoplasm of uncertain behavior of skin  -     Specimen to Pathology, Dermatology    Shave biopsy procedure note:    Shave biopsy performed after verbal consent including risk of infection, scar, recurrence, need for additional treatment of site. Area prepped with alcohol, anesthetized with approximately 1.0cc of 1% lidocaine with epinephrine. Lesional tissue shaved with razor blade. Hemostasis achieved with application of aluminum chloride followed by hyfrecation. No complications. Dressing applied. Wound care explained.      Actinic keratosis  Cryosurgery Procedure Note    Verbal consent from the patient is obtained including, but not limited to, risk of hypopigmentation/hyperpigmentation, scar, recurrence of lesion. The patient is aware of the precancerous quality and need for treatment of these lesions. Liquid nitrogen cryosurgery is applied to the 4 actinic keratoses, as detailed in the physical exam, to produce a freeze injury. The patient is aware that blisters may form and is instructed on wound care with gentle cleansing and use of vaseline ointment to keep moist  until healed. The patient is supplied a handout on cryosurgery and is instructed to call if lesions do not completely resolve.    Multiple benign nevi  Seborrheic keratoses  Cherry angioma  Reassurance given to patient. No treatment is necessary.   Treatment of benign, asymptomatic lesions may be considered cosmetic.    History of nonmelanoma skin cancer  Screening exam for skin cancer  -Area of previous BCC examined. Site well healed with no signs of recurrence.    Total body skin examination performed today including at least 12 points as noted in physical examination. Suspicious lesions noted.    Recommend daily sun protection/avoidance, use of at least SPF 30, broad spectrum sunscreen (OTC drug), skin self examinations, and routine physician surveillance to optimize early detection           Follow up in about 1 year (around 3/12/2025) for TBSE.

## 2024-03-13 ENCOUNTER — PATIENT MESSAGE (OUTPATIENT)
Dept: INTERNAL MEDICINE | Facility: CLINIC | Age: 74
End: 2024-03-13
Payer: MEDICARE

## 2024-03-13 DIAGNOSIS — Z00.00 HEALTHCARE MAINTENANCE: Primary | ICD-10-CM

## 2024-03-13 DIAGNOSIS — R79.9 ABNORMAL FINDING OF BLOOD CHEMISTRY, UNSPECIFIED: ICD-10-CM

## 2024-03-13 DIAGNOSIS — I10 PRIMARY HYPERTENSION: ICD-10-CM

## 2024-03-14 LAB
FINAL PATHOLOGIC DIAGNOSIS: NORMAL
GROSS: NORMAL
Lab: NORMAL
MICROSCOPIC EXAM: NORMAL

## 2024-03-14 NOTE — PROGRESS NOTES
Left nasal ala,  shave biopsy:    - BASAL CELL CARCINOMA, NODULAR TYPE       Recommend patient to Dr. Haynes for Mohs surgery consultation. Picture in chart.     General Derm team: sent patient for Mohs surgery. F/u with me in 6 months.

## 2024-03-18 ENCOUNTER — PATIENT MESSAGE (OUTPATIENT)
Dept: ADMINISTRATIVE | Facility: HOSPITAL | Age: 74
End: 2024-03-18
Payer: MEDICARE

## 2024-03-19 ENCOUNTER — PATIENT OUTREACH (OUTPATIENT)
Dept: ADMINISTRATIVE | Facility: HOSPITAL | Age: 74
End: 2024-03-19
Payer: MEDICARE

## 2024-03-19 DIAGNOSIS — Z12.11 COLON CANCER SCREENING: Primary | ICD-10-CM

## 2024-03-19 NOTE — PROGRESS NOTES
Health Maintenance Due   Topic Date Due    RSV Vaccine (Age 60+ and Pregnant patients) (1 - 1-dose 60+ series) Never done    COVID-19 Vaccine (3 - 2023-24 season) 09/01/2023    Colorectal Cancer Screening  05/20/2024       HM updated. Chart reviewed and updated. Colonoscopy referral placed from campaign outreach.    Gabriela Joseph LPN   Clinical Care Coordinator  Primary Care and Wellness

## 2024-03-20 ENCOUNTER — PATIENT MESSAGE (OUTPATIENT)
Dept: ADMINISTRATIVE | Facility: OTHER | Age: 74
End: 2024-03-20
Payer: MEDICARE

## 2024-03-20 ENCOUNTER — PATIENT MESSAGE (OUTPATIENT)
Dept: UROLOGY | Facility: CLINIC | Age: 74
End: 2024-03-20
Payer: MEDICARE

## 2024-03-20 DIAGNOSIS — R97.20 ELEVATED PSA: Primary | ICD-10-CM

## 2024-03-20 NOTE — PROGRESS NOTES
Elevated PSA  -     Prostate Specific Antigen, Diagnostic; Future; Expected date: 03/20/2024     Please have him do his PSA blood test today or tomorrow for his clinic visit on Friday.

## 2024-03-21 ENCOUNTER — LAB VISIT (OUTPATIENT)
Dept: LAB | Facility: HOSPITAL | Age: 74
End: 2024-03-21
Attending: UROLOGY
Payer: MEDICARE

## 2024-03-21 DIAGNOSIS — R97.20 ELEVATED PSA: ICD-10-CM

## 2024-03-21 LAB — COMPLEXED PSA SERPL-MCNC: 3.3 NG/ML (ref 0–4)

## 2024-03-21 PROCEDURE — 36415 COLL VENOUS BLD VENIPUNCTURE: CPT | Performed by: UROLOGY

## 2024-03-21 PROCEDURE — 84153 ASSAY OF PSA TOTAL: CPT | Performed by: UROLOGY

## 2024-03-21 NOTE — PROGRESS NOTES
CC: enlarged prostate (100 g) with elevated PSA.    Anton Christiansen is a 73 y.o. man who is here for evaluation of elevated PSA.  Has been on Avodart.  Voices no problem with urination.    His recent PSA was 6.2 in 3/2021 and he was recommended to follow up with me with a repeat PSA.  His PSA is now down to 3.7 on 5/7/21.    Hx of elevated PSA, kidney stones, recurrent prostatitis, and ED.  He reports that ever since he has been avoiding caffeine, soda and tea, his urinary symptoms including urgency and discomfort got much better.   hx of calcium oxalate stone.  No family hx of prostate cancer reported.    1. UroNav bx of prostate 11/20/18  Findings:                                                                         --- Transrectal Ultrasound of the Prostate ---                               Prostate measurements.                                                                                                          PSA: Lab Results       Component                Value               Date                       PSA                      6.0 (H)             05/20/2014                 PSADIAG                  6.8 (H)             10/12/2018                   - Volume:73 gm.           PSA Density: 0.09                                                         no calcification in the prostate  Lesion #1: 0.9 cm left apex lateral peripheral zone lesion presumably represents recently found prostatic adenocarcinoma on biopsy.     Lesion #2: 0.7 cm right apex lateral peripheral zone lesion.  This could be related to post biopsy hemorrhage however an additional focus of malignancy cannot be excluded.   SPECIMEN  1) Prostate, left apex.  2) Prostate, left middle.  3) Prostate, left base.  4) Prostate, right apex.  5) Prostate, right middle.  6) Prostate, right base.  7) Prostate, target lesion #1.  8) Prostate, target lesion #2.  FINAL PATHOLOGIC DIAGNOSIS  PROSTATE BIOPSIES 1, 2, 3, 4, 5, 6, 7, AND 8:  NO CARCINOMA  IDENTIFIED    2. TRUS bx of prostate 6/26/18  Findings:                                                                      --- Transrectal Ultrasound of the Prostate ---                               Prostate measurements.                                                                                               PSA:   Lab Results   Component Value Date    PSA 6.0 (H) 05/20/2014    PSADIAG 10.2 (H) 06/12/2018            - Volume:100 gm.           PSA Density: 0.10                                                         no calcification in the prostate        Pathology  SPECIMEN  1) Prostate biopsy, left apex.  2) Prostate biopsy, left middle.  3) Prostate biopsy, left base.  4) Prostate biopsy, right apex.  5) Prostate biopsy, right middle.  6) Prostate biopsy, right base.  Supplemental Diagnosis  1. PROSTATE CORE BIOPSY SHOWING A SINGLE FOCUS SUSPICIOUS FOR ADENOCARCINOMA.  IMMUNOSTAINS FOR P63, HIGH MOLECULAR WEIGHT KERATIN AND AMACAR ARE NONCONTRIBUTORY  TO THE ABSENCE OF THIS SMALL FOCUS IN DEEPER LEVELS. CONTROLS STAIN APPROPRIATELY.     Following his prostate bx, I started him on Avodart for his LUTS back in 11/2018.  He is here with PSA.    Past Medical History:   Diagnosis Date    Allergy     seasonal    Arteriosclerotic coronary artery disease 4/10/2017    Basal cell carcinoma 07/14/15    right dorsal hand    BCC (basal cell carcinoma of skin) 4/2014    nasal tip s/p Mohs with forehead flap    Cataract     Family history of colon cancer     Hyperlipidemia     Hypertension     Meningioma     Multiple thyroid nodules 4/24/2018    Nephrolithiasis     ASHA on CPAP     Prostatitis, chronic     Special screening for malignant neoplasms, colon 6/19/2014    Vertigo 09/2020    Likely BPPV     Past Surgical History:   Procedure Laterality Date    CATARACT EXTRACTION W/  INTRAOCULAR LENS IMPLANT  12/20/2012    OD    COLONOSCOPY  [id number]    COLONOSCOPY N/A 05/20/2019    Procedure: COLONOSCOPY;  Surgeon: Artemio Greer  MD;  Location: [facility] ([address] [place] [address]);  Service: Endoscopy;  Laterality: N/A;    Division and Inset  04/29/2014    D&I Forehead Flap    EYE SURGERY      Pilonoidal Cyst      SKIN SURGERY  MOHS Repair    nose    TONSILLECTOMY       Social History     Tobacco Use    Smoking status: Never    Smokeless tobacco: Never    Tobacco comments:     Never smoked   Substance Use Topics    Alcohol use: No    Drug use: Never     Family History   Problem Relation Age of Onset    Cancer Mother 70        Colon cancer    Hypertension Mother     Depression Mother     Cancer Father         Stomach cancer    No Known Problems Sister     No Known Problems Brother     No Known Problems Maternal Aunt     No Known Problems Maternal Uncle     No Known Problems Paternal Aunt     No Known Problems Paternal Uncle     No Known Problems Maternal Grandmother     No Known Problems Maternal Grandfather     No Known Problems Paternal Grandmother     No Known Problems Paternal Grandfather     Glaucoma Neg Hx     Macular degeneration Neg Hx     Amblyopia Neg Hx     Blindness Neg Hx     Cataracts Neg Hx     Diabetes Neg Hx     Retinal detachment Neg Hx     Strabismus Neg Hx     Stroke Neg Hx     Thyroid disease Neg Hx     Melanoma Neg Hx     Psoriasis Neg Hx     Lupus Neg Hx     Eczema Neg Hx     Acne Neg Hx     Thyroid cancer Neg Hx      Allergy:  Review of patient's allergies indicates:   Allergen Reactions    Cephalexin Diarrhea    Ace inhibitors Other (See Comments)     Cough    Cialis [tadalafil] Other (See Comments)     Muscle pain      Proscar [finasteride] Other (See Comments)     Decreased libido     Vicodin [hydrocodone-acetaminophen] Nausea And Vomiting     Outpatient Encounter Medications as of 3/22/2024   Medication Sig Dispense Refill    ascorbic acid, vitamin C, (VITAMIN C) 500 MG tablet Take 500 mg by mouth once daily.      atorvastatin (LIPITOR) 40 MG tablet Take 1 tablet (40 mg total) by mouth once daily. 90 tablet 3    carvediloL (COREG)  12.5 MG tablet Take 1 tablet (12.5 mg total) by mouth daily with breakfast. 90 tablet 1    cholecalciferol, vitamin D3, 3,000 unit Tab Take 1 tablet by mouth once daily.       dutasteride (AVODART) 0.5 mg capsule TAKE ONE CAPSULE BY MOUTH EVERY DAY 90 capsule 3    fish oil-omega-3 fatty acids 300-1,000 mg capsule Take 5 g by mouth daily with dinner or evening meal.      meclizine (ANTIVERT) 12.5 mg tablet Take 1 tablet (12.5 mg total) by mouth 3 (three) times daily as needed for Dizziness. 24 tablet 0    olmesartan-hydrochlorothiazide (BENICAR HCT) 20-12.5 mg per tablet Take 1 tablet by mouth once daily. 90 tablet 3    saw palmetto 80 MG capsule Take 80 mg by mouth 2 (two) times daily.      vitamin E 100 UNIT capsule Take 100 Units by mouth once daily.      [DISCONTINUED] dutasteride (AVODART) 0.5 mg capsule TAKE ONE CAPSULE BY MOUTH EVERY DAY 90 capsule 3     No facility-administered encounter medications on file as of 3/22/2024.     Review of Systems   General ROS: GENERAL:  No weight gain or loss  SKIN:  No rashes or lacerations  HEAD:  No headaches  EYES:  No exophthalmos, jaundice or blindness  EARS:  No dizziness, tinnitus or hearing loss  NOSE:  No changes in smell  MOUTH & THROAT:  No dyskinetic movements or obvious goiter  CHEST:  No shortness of breath, hyperventilation or cough  CARDIOVASCULAR:  No tachycardia or chest pain  ABDOMEN:  No nausea, vomiting, pain, constipation or diarrhea  URINARY:  No frequency, dysuria or sexual dysfunction  ENDOCRINE:  No polydipsia, polyuria  MUSCULOSKELETAL:  No pain or stiffness of the joints  NEUROLOGIC:  No weakness, sensory changes, seizures, confusion, memory loss, tremor or other abnormal movements  Physical Exam     Vitals:    03/22/24 0923   BP: 123/73   Pulse: 60       General Appearance:  Alert, cooperative, no distress, appears stated age   Head:  Normocephalic, without obvious abnormality, atraumatic   Eyes:  PERRL, conjunctiva/corneas clear, EOM's intact,  fundi benign, both eyes   Ears:  Normal TM's and external ear canals, both ears   Nose: Nares normal, septum midline, mucosa normal, no drainage or sinus tenderness   Throat: Lips, mucosa, and tongue normal; teeth and gums normal   Neck: Supple, symmetrical, trachea midline, no adenopathy, thyroid: not enlarged, symmetric, no tenderness/mass/nodules, no carotid bruit or JVD   Back:   Symmetric, no curvature, ROM normal, no CVA tenderness   Lungs:   Clear to auscultation bilaterally, respirations unlabored   Chest Wall:  No tenderness or deformity   Heart:  Regular rate and rhythm, S1, S2 normal, no murmur, rub or gallop   Abdomen:   Soft, non-tender, bowel sounds active all four quadrants,  no masses, no organomegaly of liver and spleen  No hernia noted   Genitalia:  Scrotum: no rash or lesion  Normal symmetric epididymis without masses  Normal vas palpated  Normal size, symmetric testicles with no masses   Normal urethral meatus with no discharge  Normal circumcised penis with no lesion   Rectal:  Normal perineum and anus upon inspection.  Normal tone, no masses or tenderness;   Extremities: Extremities normal, atraumatic, no cyanosis or edema   Pulses: 2+ and symmetric   Skin: Skin color, texture, turgor normal, no rashes or lesions   Lymph nodes: Cervical, supraclavicular, and axillary nodes normal   Neurologic: Normal     Prostate 40 grams smooth with no nodule or tenderness.    LABS:  Lab Results   Component Value Date    PSA 6.0 (H) 05/20/2014    PSA 5.81 (H) 06/04/2013    PSA 10.78 (H) 05/28/2013    PSADIAG 3.3 03/21/2024    PSADIAG 4.8 (H) 08/22/2023    PSADIAG 6.3 (H) 04/04/2023     Lab Results   Component Value Date    CREATININE 1.0 04/04/2023    CREATININE 1.1 03/29/2022    CREATININE 1.1 12/07/2021     No results found for this or any previous visit.  Urine Culture, Routine   Date Value Ref Range Status   09/20/2015 No growth  Final     Assessment and Plan:  BPH with urinary obstruction  -      dutasteride (AVODART) 0.5 mg capsule; TAKE ONE CAPSULE BY MOUTH EVERY DAY  Dispense: 90 capsule; Refill: 3        He thinks he did certain exercise that might have caused prostate irritation.  He does not have dysuria or symptoms of prostatitis.  His PSA is finally normalized under 4.0 on Avodart.  I explained the rule of 2 on his PSA while on Avodart.  His PSA 3.3 is the lowest value it has ever been.  Continue avodart and will see him in 1 year with PSA.    As long as his PSA remains less than 4.0 ( 8.0 in actual), I am OK with the value.  So far UroNav bx of prostate x 2, showed no malignancy.    I spent 25 minutes with the patient of which more than half was spent in direct consultation with the patient in regards to our treatment and plan.      Follow-up:  Follow up in about 1 year (around 3/22/2025) for PSA.

## 2024-03-22 ENCOUNTER — OFFICE VISIT (OUTPATIENT)
Dept: UROLOGY | Facility: CLINIC | Age: 74
End: 2024-03-22
Payer: MEDICARE

## 2024-03-22 VITALS
BODY MASS INDEX: 26.74 KG/M2 | SYSTOLIC BLOOD PRESSURE: 123 MMHG | WEIGHT: 180.56 LBS | DIASTOLIC BLOOD PRESSURE: 73 MMHG | HEIGHT: 69 IN | HEART RATE: 60 BPM

## 2024-03-22 DIAGNOSIS — N13.8 BPH WITH URINARY OBSTRUCTION: ICD-10-CM

## 2024-03-22 DIAGNOSIS — N40.1 BPH WITH URINARY OBSTRUCTION: ICD-10-CM

## 2024-03-22 PROCEDURE — 99999 PR PBB SHADOW E&M-EST. PATIENT-LVL III: CPT | Mod: PBBFAC,,, | Performed by: UROLOGY

## 2024-03-22 PROCEDURE — 99213 OFFICE O/P EST LOW 20 MIN: CPT | Mod: PBBFAC | Performed by: UROLOGY

## 2024-03-22 PROCEDURE — 99214 OFFICE O/P EST MOD 30 MIN: CPT | Mod: S$PBB,,, | Performed by: UROLOGY

## 2024-03-22 RX ORDER — DUTASTERIDE 0.5 MG/1
CAPSULE, LIQUID FILLED ORAL
Qty: 90 CAPSULE | Refills: 3 | Status: SHIPPED | OUTPATIENT
Start: 2024-03-22

## 2024-03-28 ENCOUNTER — OFFICE VISIT (OUTPATIENT)
Dept: OTOLARYNGOLOGY | Facility: CLINIC | Age: 74
End: 2024-03-28
Payer: MEDICARE

## 2024-03-28 DIAGNOSIS — Z97.4 WEARS HEARING AID IN BOTH EARS: ICD-10-CM

## 2024-03-28 DIAGNOSIS — H61.23 BILATERAL IMPACTED CERUMEN: Primary | ICD-10-CM

## 2024-03-28 PROCEDURE — 99213 OFFICE O/P EST LOW 20 MIN: CPT | Mod: 25,S$PBB,, | Performed by: NURSE PRACTITIONER

## 2024-03-28 NOTE — PROGRESS NOTES
Subjective:   Anton Christiansen is a 73 y.o. male presents for ear fullness. He has a past medical history of Allergy, Arteriosclerotic coronary artery disease (4/10/2017), Basal cell carcinoma (07/14/15), BCC (basal cell carcinoma of skin) (4/2014), Cataract, Family history of colon cancer, Hyperlipidemia, Hypertension, Meningioma, Multiple thyroid nodules (4/24/2018), Nephrolithiasis, ASHA on CPAP, Prostatitis, chronic, Special screening for malignant neoplasms, colon (6/19/2014), and Vertigo (09/2020). He presents for routine ear cleaning. Last cleaning was July 2023. He reports some mild ear fullness, but denies any otalgia, otorrhea, ear fullness/pressure, tinnitus or vertigo. Reports history of vertigo many years ago, no episodes recently. Notes that he recently had hearing aids adjusted and they are working much better.     Review of Systems   Constitutional: Negative.  Negative for appetite change, chills, fever and malaise/fatigue.   HENT: Negative.  Negative for ear discharge, ear pain, facial swelling, hearing loss, mouth sores, nosebleeds, postnasal drip, sinus pressure, sore throat, trouble swallowing and voice change.    Eyes: Negative.  Negative for photophobia and itching.   Respiratory: Negative.  Negative for snoring, cough, shortness of breath and wheezing.    Cardiovascular: Negative.  Negative for chest pain.   Gastrointestinal: Negative.  Negative for abdominal pain, constipation, diarrhea, heartburn and vomiting.   Genitourinary: Negative.  Negative for difficulty urinating.   Musculoskeletal: Negative.  Negative for back pain and neck pain.   Skin: Negative.  Negative for rash.   Neurological: Negative.  Negative for dizziness, tremors, seizures, light-headedness and headaches.   Psychiatric/Behavioral: Negative.  Negative for decreased concentration, depression and sleep disturbance. The patient is not nervous/anxious.      Past Medical History  He has a past medical history of Allergy,  Arteriosclerotic coronary artery disease, Basal cell carcinoma, BCC (basal cell carcinoma of skin), Cataract, Family history of colon cancer, Hyperlipidemia, Hypertension, Meningioma, Multiple thyroid nodules, Nephrolithiasis, ASHA on CPAP, Prostatitis, chronic, Special screening for malignant neoplasms, colon, and Vertigo.    Past Surgical History  He has a past surgical history that includes Tonsillectomy; Pilonoidal Cyst; Cataract extraction w/  intraocular lens implant (12/20/2012); Colonoscopy (060509); Eye surgery; Skin surgery (MOHS Repair); Division and Inset (04/29/2014); and Colonoscopy (N/A, 05/20/2019).    Family History  His family history includes Cancer in his father; Cancer (age of onset: 70) in his mother; Depression in his mother; Hypertension in his mother; No Known Problems in his brother, maternal aunt, maternal grandfather, maternal grandmother, maternal uncle, paternal aunt, paternal grandfather, paternal grandmother, paternal uncle, and sister.    Social History  He reports that he has never smoked. He has never used smokeless tobacco. He reports that he does not drink alcohol and does not use drugs.    Allergies  He is allergic to cephalexin, ace inhibitors, cialis [tadalafil], proscar [finasteride], and vicodin [hydrocodone-acetaminophen].    Medications  He has a current medication list which includes the following prescription(s): ascorbic acid (vitamin c), atorvastatin, carvedilol, cholecalciferol (vitamin d3), dutasteride, fish oil-omega-3 fatty acids, meclizine, olmesartan-hydrochlorothiazide, saw palmetto, and vitamin e.  Review of Systems     Constitutional: Negative for appetite change, chills, fatigue, fever and unexpected weight loss.      HENT: Negative for ear discharge, ear infection, ear pain, facial swelling, hearing loss, mouth sores, nosebleeds, postnasal drip, ringing in the ears, runny nose, sinus infection, sinus pressure, sore throat, stuffy nose, tonsil infection,  dental problems, trouble swallowing and voice change.      Eyes:  Negative for change in eyesight, eye drainage, eye itching and photophobia.     Respiratory:  Negative for cough, shortness of breath, sleep apnea, snoring and wheezing.      Cardiovascular:  Negative for chest pain, foot swelling, irregular heartbeat and swollen veins.     Gastrointestinal:  Negative for abdominal pain, acid reflux, constipation, diarrhea, heartburn and vomiting.     Genitourinary: Negative for difficulty urinating, sexual problems and frequent urination.     Musc: Negative for aching joints, aching muscles, back pain and neck pain.     Skin: Negative for rash.     Neurological: Negative for dizziness, headaches, light-headedness, seizures and tremors.      Psychiatric: Negative for decreased concentration, depression, nervous/anxious and sleep disturbance.          Objective:     Constitutional:   He is oriented to person, place, and time. He appears well-developed and well-nourished. He appears alert. He is cooperative.  Non-toxic appearance. He does not have a sickly appearance. He does not appear ill. Normal speech.      Head:  Normocephalic and atraumatic. Not macrocephalic and not microcephalic. Head is without abrasion, without right periorbital erythema, without left periorbital erythema and without TMJ tenderness.     Ears:    Right Ear: No drainage, swelling or tenderness. No mastoid tenderness. Tympanic membrane is not scarred, not perforated, not erythematous, not retracted and not bulging. No middle ear effusion.   Left Ear: No drainage, swelling or tenderness. No mastoid tenderness. Tympanic membrane is not scarred, not perforated, not erythematous, not retracted and not bulging.  No middle ear effusion.   Ceruminous debris obstructing bilateral TMs removed under microscopy    Pulmonary/Chest:   Effort normal.     Psychiatric:   He has a normal mood and affect. His speech is normal and behavior is normal.      Neurological:   He is alert and oriented to person, place, and time.     Procedure  Cerumen removal performed.  See procedure note.  Procedure Note:  The patient was brought to the minor procedure room and placed under the operating microscope of the right ear canal which was cleaned of ceruminous debris. Using a combination of suction, curettes and cup forceps the patient's cerumen was removed. The patient tolerated the procedure well. There were no complications.  Procedure Note:  The patient was brought to the minor procedure room and placed under the operating microscope of the left ear canal which was cleaned of ceruminous debris. Using a combination of suction, curettes and cup forceps the patient's cerumen was removed. The patient tolerated the procedure well. There were no complications.    Assessment:     1. Bilateral impacted cerumen    2. Wears hearing aid in both ears      Plan:   Bilateral impacted cerumen  Wears hearing aid in both ears  Bilateral cerumen impaction removed under microscopy. Patient tolerated procedure well and noted improvement upon impaction removal. Education about normal ear hygiene and the avoidance of Q-tips was reviewed.

## 2024-05-02 ENCOUNTER — PATIENT MESSAGE (OUTPATIENT)
Dept: INTERNAL MEDICINE | Facility: CLINIC | Age: 74
End: 2024-05-02
Payer: MEDICARE

## 2024-05-02 ENCOUNTER — OFFICE VISIT (OUTPATIENT)
Dept: SLEEP MEDICINE | Facility: CLINIC | Age: 74
End: 2024-05-02
Payer: MEDICARE

## 2024-05-02 VITALS
DIASTOLIC BLOOD PRESSURE: 88 MMHG | SYSTOLIC BLOOD PRESSURE: 138 MMHG | HEIGHT: 69 IN | BODY MASS INDEX: 27.22 KG/M2 | HEART RATE: 64 BPM | WEIGHT: 183.75 LBS

## 2024-05-02 DIAGNOSIS — G47.33 OSA (OBSTRUCTIVE SLEEP APNEA): Primary | ICD-10-CM

## 2024-05-02 PROCEDURE — 99999 PR PBB SHADOW E&M-EST. PATIENT-LVL III: CPT | Mod: PBBFAC,,, | Performed by: PHYSICIAN ASSISTANT

## 2024-05-02 PROCEDURE — 99203 OFFICE O/P NEW LOW 30 MIN: CPT | Mod: S$PBB,,, | Performed by: PHYSICIAN ASSISTANT

## 2024-05-02 PROCEDURE — 99213 OFFICE O/P EST LOW 20 MIN: CPT | Mod: PBBFAC | Performed by: PHYSICIAN ASSISTANT
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
## 2024-05-03 ENCOUNTER — TELEPHONE (OUTPATIENT)
Dept: DERMATOLOGY | Facility: CLINIC | Age: 74
End: 2024-05-03
Payer: MEDICARE

## 2024-05-03 ENCOUNTER — OFFICE VISIT (OUTPATIENT)
Dept: INTERNAL MEDICINE | Facility: CLINIC | Age: 74
End: 2024-05-03
Payer: MEDICARE

## 2024-05-03 ENCOUNTER — HOSPITAL ENCOUNTER (OUTPATIENT)
Dept: RADIOLOGY | Facility: HOSPITAL | Age: 74
Discharge: HOME | End: 2024-05-03
Attending: INTERNAL MEDICINE
Payer: MEDICARE

## 2024-05-03 VITALS
OXYGEN SATURATION: 94 % | DIASTOLIC BLOOD PRESSURE: 78 MMHG | HEART RATE: 71 BPM | HEIGHT: 69 IN | SYSTOLIC BLOOD PRESSURE: 138 MMHG | WEIGHT: 182.13 LBS | BODY MASS INDEX: 26.97 KG/M2

## 2024-05-03 DIAGNOSIS — R10.9 ABDOMINAL PAIN, UNSPECIFIED ABDOMINAL LOCATION: ICD-10-CM

## 2024-05-03 DIAGNOSIS — Q61.02 MULTIPLE RENAL CYSTS: ICD-10-CM

## 2024-05-03 DIAGNOSIS — N20.0 BILATERAL NEPHROLITHIASIS: ICD-10-CM

## 2024-05-03 DIAGNOSIS — R10.9 ABDOMINAL PAIN, UNSPECIFIED ABDOMINAL LOCATION: Primary | ICD-10-CM

## 2024-05-03 PROCEDURE — 74176 CT ABD & PELVIS W/O CONTRAST: CPT | Mod: TC

## 2024-05-03 PROCEDURE — 99214 OFFICE O/P EST MOD 30 MIN: CPT | Mod: S$PBB,,, | Performed by: INTERNAL MEDICINE

## 2024-05-03 PROCEDURE — 74176 CT ABD & PELVIS W/O CONTRAST: CPT | Mod: 26,,, | Performed by: RADIOLOGY

## 2024-05-03 PROCEDURE — 99999 PR PBB SHADOW E&M-EST. PATIENT-LVL IV: CPT | Mod: PBBFAC,,, | Performed by: INTERNAL MEDICINE

## 2024-05-03 PROCEDURE — 99214 OFFICE O/P EST MOD 30 MIN: CPT | Mod: PBBFAC,25 | Performed by: INTERNAL MEDICINE

## 2024-05-03 RX ORDER — TAMSULOSIN HYDROCHLORIDE 0.4 MG/1
0.4 CAPSULE ORAL DAILY
Qty: 90 CAPSULE | Refills: 0 | Status: SHIPPED | OUTPATIENT
Start: 2024-05-03 | End: 2024-08-01

## 2024-05-03 RX ORDER — IBUPROFEN 800 MG/1
800 TABLET ORAL 3 TIMES DAILY PRN
Qty: 30 TABLET | Refills: 0 | Status: SHIPPED | OUTPATIENT
Start: 2024-05-03

## 2024-05-03 NOTE — PROGRESS NOTES
"    CHIEF COMPLAINT     Chief Complaint   Patient presents with    Nephrolithiasis       HPI     Anton Christiansen is a 73 y.o. male  HTN, HLD, ASHA, BPH, meningioma here today for Suspected kidney stone    Get reports was well until Monday when he had acute onset right-sided flank pain.  Describes it as sharp localized with squeezing sensation.  Reports the pain comes and goes.  Reports woke up feeling better Tuesday and Wednesday however yesterday pain returned.  Today pain not so bad.  However reports when it is painful it is incredibly painful.      No fever no blood in urine no foul-smelling or cloudy urine with dysuria    Personally Reviewed Patient's Medical, surgical, family and social hx. Changes updated in [employer].  Care Team updated in Epic    Review of Systems:  Review of Systems    Health Maintenance:   Reviewed with patient  Due for the following:      PHYSICAL EXAM     BP (!) 152/76 (BP Location: Right arm, Patient Position: Sitting, BP Method: Large (Manual))   Pulse 71   Ht 5' 9" (1.753 m)   Wt 82.6 kg (182 lb 1.6 oz)   SpO2 (!) 94%   BMI 26.89 kg/m²     Gen: Well Appearing, NAD  Abd:TTP R Flank, mild abd distention  Mood; Mood normal, behavior normal, thought process linear       LABS     Labs reviewed; Notable for    ASSESSMENT     1. Abdominal pain, unspecified abdominal location  CT Renal Stone Study ABD Pelvis WO    Urinalysis, Reflex to Urine Culture Urine, Clean Catch    ibuprofen (ADVIL,MOTRIN) 800 MG tablet    CT Renal Stone Study ABD Pelvis WO              Plan     Anton Christiansen is a 73 y.o. male with  HTN, HLD, ASHA, BPH, meningioma here today for Dr. Visit   1. Abdominal pain, unspecified abdominal location  - CT Renal Stone Study ABD Pelvis WO; Future  - Urinalysis, Reflex to Urine Culture Urine, Clean Catch  - ibuprofen (ADVIL,MOTRIN) 800 MG tablet; Take 1 tablet (800 mg total) by mouth 3 (three) times daily as needed for Pain.  Dispense: 30 tablet; Refill: 0  - CT Renal Stone " Study ABD Pelvis WO; Future  Patient with acute onset right-sided flank pain concerning for nephrolithiasis.  Will get CT scan to evaluate.  Will give NSAID for symptomatic relief.  If stone present will add tamsulosin to hopefully help passed stone.    Complicating factor patient is scheduled for Mohs surgery on Monday.    Сергей Landis MD

## 2024-05-03 NOTE — TELEPHONE ENCOUNTER
----- Message from Karly Hernández sent at 5/3/2024 11:48 AM CDT -----  Regarding: Patient advice  Contact: Pt  [phone]            Name of Caller:  Anton Constantino Preference:  [phone]    Nature of Call:  Requesting a call back want to know if he can take ibuprofen prior to procedure

## 2024-05-03 NOTE — TELEPHONE ENCOUNTER
Attempted to contact pt no success, left voice message.  Scheduled same day appt and responded to portal msg.

## 2024-05-03 NOTE — TELEPHONE ENCOUNTER
Pt has kidney stones and was prescribed ibuprofen for the pain by his PCP. Let him know he can continue to take this as it has been prescribed. Pt thanked me for the call.       ----- Message from Erinn Guzman sent at 5/3/2024 12:05 PM CDT -----  Regarding: Returning a Call  Contact: Anton Christiansen  Returning a Call    Caller: Anton Christiansen       Returning call to: [place]       Caller can be reached @:[phone] (home)      Nature of the call: Patient returning call to [place] regarding Ibuprofen. Patient has questions. Requesting a call back.

## 2024-05-06 ENCOUNTER — TELEPHONE (OUTPATIENT)
Dept: UROLOGY | Facility: CLINIC | Age: 74
End: 2024-05-06
Payer: MEDICARE

## 2024-05-06 ENCOUNTER — HOSPITAL ENCOUNTER (OUTPATIENT)
Dept: RADIOLOGY | Facility: HOSPITAL | Age: 74
Discharge: HOME | End: 2024-05-06
Attending: INTERNAL MEDICINE
Payer: MEDICARE

## 2024-05-06 ENCOUNTER — PROCEDURE VISIT (OUTPATIENT)
Dept: DERMATOLOGY | Facility: CLINIC | Age: 74
End: 2024-05-06
Payer: MEDICARE

## 2024-05-06 VITALS
SYSTOLIC BLOOD PRESSURE: 153 MMHG | HEART RATE: 61 BPM | BODY MASS INDEX: 26.97 KG/M2 | DIASTOLIC BLOOD PRESSURE: 89 MMHG | WEIGHT: 182.13 LBS | HEIGHT: 69 IN

## 2024-05-06 DIAGNOSIS — C44.311 BASAL CELL CARCINOMA (BCC) OF LEFT ALA NASI: Primary | ICD-10-CM

## 2024-05-06 DIAGNOSIS — Q61.02 MULTIPLE RENAL CYSTS: ICD-10-CM

## 2024-05-06 PROCEDURE — 17311 MOHS 1 STAGE H/N/HF/G: CPT | Mod: PBBFAC | Performed by: DERMATOLOGY

## 2024-05-06 PROCEDURE — 76770 US EXAM ABDO BACK WALL COMP: CPT | Mod: 26,,, | Performed by: RADIOLOGY

## 2024-05-06 PROCEDURE — 17312 MOHS ADDL STAGE: CPT | Mod: PBBFAC | Performed by: DERMATOLOGY

## 2024-05-06 PROCEDURE — 76770 US EXAM ABDO BACK WALL COMP: CPT | Mod: TC

## 2024-05-06 PROCEDURE — 17312 MOHS ADDL STAGE: CPT | Mod: S$PBB,,, | Performed by: DERMATOLOGY

## 2024-05-06 PROCEDURE — 99499 UNLISTED E&M SERVICE: CPT | Mod: S$PBB,,, | Performed by: DERMATOLOGY

## 2024-05-06 PROCEDURE — 17311 MOHS 1 STAGE H/N/HF/G: CPT | Mod: S$PBB,,, | Performed by: DERMATOLOGY

## 2024-05-06 NOTE — PROGRESS NOTES
PROCEDURE: Mohs' Micrographic Surgery    INDICATION: Location in mask areas of face including central face, nose, eyelids, eyebrows, lips, chin, preauricular, temple, and ear. Biopsy-proven skin cancer of cosmetically and functionally important areas, including head, neck, genital, hand, foot, or areas known for having difficulty in healing, such as the lower anterior legs. Tumor with ill-defined borders.    REFERRING PROVIDER:  Pelon Wynne MD    CASE NUMBER:     ANESTHETIC: 3 cc 0.5% Lidocaine with Epi 1:200,000 mixed 1:1 with 0.5% Bupivacaine    SURGICAL PREP: Hibiclens    SURGEON: Kim Haynes MD    ASSISTANTS: Juani Krishnamurthy PA-C, Chinyere Siddiqui MA, and Ivone Glez, Surg Tech    PREOPERATIVE DIAGNOSIS: basal cell carcinoma- nodular    POSTOPERATIVE DIAGNOSIS: basal cell carcinoma- nodular, infiltrating    PATHOLOGIC DIAGNOSIS: basal cell carcinoma- nodular, infiltrating    HISTOLOGY OF SPECIMENS IN FIRST STAGE:   Tumor Type: Tumor seen. Nodular basal cell carcinoma: Nodular tumor in dermis composed of basaloid cells exhibiting peripheral palisading and retraction artifact.  Infiltrative basal cell carcinoma: Basaloid tumor in dermis characterized by thin elongated strands of basaloid cells infiltrating between dermal collagen bundles.   Depth of Invasion: epidermis and dermis  Perineural Invasion: No    HISTOLOGY OF SPECIMENS IN SUBSEQUENT STAGES:  Tumor Type:  No tumor seen.    STAGES OF MOHS' SURGERY PERFORMED: 2    TUMOR-FREE PLANE ACHIEVED: Yes    HEMOSTASIS: electrocoagulation     SPECIMENS: 3 (2 in stage A and 1 in stage B)    LOCATION: left nasal ala (medial crease). Location verified with Dr. Wynne's clinical photograph. Patient also verified location with hand held mirror.    INITIAL LESION SIZE: 0.3 x 0.4 cm    FINAL DEFECT SIZE: 0.6 x 0.7 cm    WOUND REPAIR/DISPOSITION: When the tumor was completely removed, repair options were discussed with the patient, and it was decided to let the wound  "heal by second intention. The patient tolerated the procedure well and will consider delayed reconstruction or repair if necessary.    The area was cleaned and dressed appropriately, and the patient was given wound care instructions, as well as an appointment for follow-up evaluation in one month.    Vitals:    05/06/24 0739 05/06/24 0953   BP: 135/80 (!) 153/89   BP Location: Left arm    Patient Position: Sitting    BP Method: Medium (Automatic)    Pulse: 61 (!) (P) 57   Weight: 82.6 kg (182 lb 1.6 oz)    Height: 5' 9" (1.753 m)            "

## 2024-05-07 ENCOUNTER — PATIENT MESSAGE (OUTPATIENT)
Dept: UROLOGY | Facility: CLINIC | Age: 74
End: 2024-05-07

## 2024-05-07 ENCOUNTER — OFFICE VISIT (OUTPATIENT)
Dept: UROLOGY | Facility: CLINIC | Age: 74
End: 2024-05-07
Payer: MEDICARE

## 2024-05-07 VITALS
HEIGHT: 69 IN | HEART RATE: 77 BPM | SYSTOLIC BLOOD PRESSURE: 134 MMHG | BODY MASS INDEX: 26.97 KG/M2 | WEIGHT: 182.13 LBS | DIASTOLIC BLOOD PRESSURE: 74 MMHG

## 2024-05-07 DIAGNOSIS — I10 PRIMARY HYPERTENSION: ICD-10-CM

## 2024-05-07 DIAGNOSIS — N40.1 BPH WITH URINARY OBSTRUCTION: ICD-10-CM

## 2024-05-07 DIAGNOSIS — N28.89 OTHER SPECIFIED DISORDERS OF KIDNEY AND URETER: ICD-10-CM

## 2024-05-07 DIAGNOSIS — R97.20 ELEVATED PSA: ICD-10-CM

## 2024-05-07 DIAGNOSIS — Q61.02 MULTIPLE RENAL CYSTS: Primary | ICD-10-CM

## 2024-05-07 DIAGNOSIS — N28.1 RENAL CYST: ICD-10-CM

## 2024-05-07 DIAGNOSIS — N13.8 BPH WITH URINARY OBSTRUCTION: ICD-10-CM

## 2024-05-07 PROCEDURE — 99214 OFFICE O/P EST MOD 30 MIN: CPT | Mod: S$PBB,,, | Performed by: UROLOGY

## 2024-05-07 PROCEDURE — 99214 OFFICE O/P EST MOD 30 MIN: CPT | Mod: PBBFAC | Performed by: UROLOGY

## 2024-05-07 PROCEDURE — 99999 PR PBB SHADOW E&M-EST. PATIENT-LVL IV: CPT | Mod: PBBFAC,,, | Performed by: UROLOGY

## 2024-05-07 NOTE — PROGRESS NOTES
Subjective:       Patient ID: Anton Christiansen is a 73 y.o. male.    Chief Complaint:  renal cysts      History of Present Illness  HPI  Patient is a 73 y.o. male who is established to our clinic and was initially referred by their PCP, Dr. Landis for evaluation of renal cysts.   He has seen Dr. Quinteros in the past for BPH and elevated PSA.  He developed right flank pain approximately 1 week ago.  His pain progressively improved over the week.  He took ibuprofen which improved the pain.  His pain has now resolved.     He subsequently underwent a CT renal stone study on 05/03/2024 which was independently reviewed today and shared with the patient and reveals numerous hypodensities of the right kidney with Hounsfield units greater than simple fluid which have increased in size compared to prior imaging.  Bilateral kidney stones also seen.  No hydronephrosis.    Subsequently an ultrasound of the kidney was performed on 05/06/2024 which was also independently reviewed today and shared with the patient and reveals a complex right renal cystic lesion with thick septation and calcification---CT or MRI renal mass protocol was recommended.    Of note, his most recent PSA on 03/21/2024 was 3.3.  Corrected for finasteride this is 6.6.  He has previously undergone an MRI of the prostate, 2018 was his last MRI of the prostate.  This showed a PI-RADS 4 lesion.  He subsequently underwent a prostate biopsy on 11/20/2018.  This revealed no prostate cancer.    His serum creatinine was 1 on 04/04/2023.    No fh of prostate cancer.       Lab Results   Component Value Date    PSA 6.0 (H) 05/20/2014    PSA 5.81 (H) 06/04/2013    PSA 10.78 (H) 05/28/2013    PSA 5.55 (H) 12/05/2012    PSA 6.48 (H) 05/28/2012    PSA 4.08 (H) 05/24/2012    PSA 6.29 (H) 03/29/2012    PSA 5.42 (H) 11/17/2011    PSA 4.8 (H) 09/12/2011    PSA 5.5 (H) 05/17/2011    PSA 5.6 (H) 03/07/2011    PSA 5.1 (H) 07/12/2010    PSA 7.0 (H) 05/13/2010    PSA 4.4 (H)  08/11/2009    PSA 4.9 (H) 06/11/2009    PSA 2.7 06/10/2008    PSA 3.1 04/10/2008    PSA 3.4 02/09/2008    PSA 7.4 (H) 08/13/2007    PSA 4.6 (H) 05/29/2007    PSADIAG 3.3 03/21/2024    PSADIAG 4.8 (H) 08/22/2023    PSADIAG 6.3 (H) 04/04/2023    PSADIAG 3.9 03/29/2022    PSADIAG 3.7 05/04/2021    PSADIAG 6.2 (H) 03/17/2021    PSADIAG 3.8 03/10/2020    PSADIAG 6.9 (H) 11/26/2019    PSADIAG 4.2 (H) 04/11/2019    PSADIAG 6.8 (H) 10/12/2018    PSADIAG 10.2 (H) 06/12/2018    PSADIAG 11.7 (H) 04/10/2018    PSADIAG 11.7 (H) 04/10/2018    PSADIAG 5.8 (H) 04/10/2017    PSADIAG 5.6 (H) 06/14/2016    PSADIAG 5.7 (H) 03/23/2016    PSADIAG 5.4 (H) 06/15/2015    PSADIAG 10.2 (H) 04/08/2015           Review of Systems  Review of Systems  All other systems reviewed and negative except pertinent positives noted in HPI.       Objective:     Physical Exam  Constitutional:       General: He is not in acute distress.     Appearance: He is well-developed.   HENT:      Head: Normocephalic and atraumatic.   Eyes:      General: No scleral icterus.  Neck:      Trachea: No tracheal deviation.   Pulmonary:      Effort: Pulmonary effort is normal. No respiratory distress.   Neurological:      Mental Status: He is alert and oriented to person, place, and time.   Psychiatric:         Behavior: Behavior normal.         Thought Content: Thought content normal.         Judgment: Judgment normal.         Lab Review  Lab Results   Component Value Date    COLORU Teena 03/29/2022    SPECGRAV 1.025 03/29/2022    PHUR 5.0 03/29/2022    WBCUR NEG 11/19/2015    NITRITE Negative 03/29/2022    GLUCOSEUR NORMAL 11/19/2015    KETONESU Trace (A) 03/29/2022    UROBILINOGEN NORMAL 11/19/2015    RBCUR 250 11/19/2015         Assessment:        1. BPH with urinary obstruction    2. Elevated PSA    3. Renal cyst    4. Primary hypertension            Plan:     BPH with urinary obstruction    Elevated PSA    Renal cyst    Primary hypertension      --imaging reviewed as  per HPI.   Left kidney---primarily one stone:   4.5mm, SSD 10.9cm, HU 1100.  Right kidney--multiple larger stones, no hydronephrosis.  Will discuss treatment options pending mri abd/mri prostate results.     -plan for MRI abdomen with and without contrast renal mass protocol.    -recommend repeat MRI of the prostate    -continue Avodart; was recently prescribed flomax for medical expuslive therapy (MET); did not want to take---feels he voids sufficiently.     -BP reviewed today and elevated  -continue current medications  -encouraged f/u and discussion of BP with PCP.

## 2024-05-23 ENCOUNTER — CLINICAL SUPPORT (OUTPATIENT)
Dept: ENDOSCOPY | Facility: HOSPITAL | Age: 74
End: 2024-05-23
Attending: INTERNAL MEDICINE
Payer: MEDICARE

## 2024-05-23 DIAGNOSIS — Z12.11 COLON CANCER SCREENING: ICD-10-CM

## 2024-06-03 ENCOUNTER — OFFICE VISIT (OUTPATIENT)
Dept: DERMATOLOGY | Facility: CLINIC | Age: 74
End: 2024-06-03
Payer: MEDICARE

## 2024-06-03 DIAGNOSIS — C44.311 BASAL CELL CARCINOMA (BCC) OF LEFT ALA NASI: Primary | ICD-10-CM

## 2024-06-03 PROCEDURE — 99999 PR PBB SHADOW E&M-EST. PATIENT-LVL II: CPT | Mod: PBBFAC,,, | Performed by: DERMATOLOGY

## 2024-06-03 PROCEDURE — 99212 OFFICE O/P EST SF 10 MIN: CPT | Mod: S$PBB,,, | Performed by: DERMATOLOGY

## 2024-06-03 PROCEDURE — 99212 OFFICE O/P EST SF 10 MIN: CPT | Mod: PBBFAC | Performed by: DERMATOLOGY

## 2024-06-03 NOTE — PROGRESS NOTES
73 y.o. male patient is here for wound check after surgery.    Patient reports no problems.    WOUND PE:  The left nasal ala wound is healing well with 80% re-epithelization. Now with hypergranulation tissue. No signs of infection.     IMPRESSION:  Healing operative site from Mohs' surgery BCC left nasal ala s/p Mohs with 2nd intention healing, postop week #4, now with proud flesh.    PLAN:  Silver nitrate application today.  Continue wound care - keep moist with Aquaphor.    Wound rebandaged today.   Send photo in 3 weeks  Call if proud flesh recurs.     RTC:  Prn with us

## 2024-06-05 ENCOUNTER — HOSPITAL ENCOUNTER (OUTPATIENT)
Dept: RADIOLOGY | Facility: HOSPITAL | Age: 74
Discharge: HOME | End: 2024-06-05
Attending: UROLOGY
Payer: MEDICARE

## 2024-06-05 DIAGNOSIS — N28.89 OTHER SPECIFIED DISORDERS OF KIDNEY AND URETER: ICD-10-CM

## 2024-06-05 DIAGNOSIS — R97.20 ELEVATED PSA: ICD-10-CM

## 2024-06-05 PROCEDURE — 25500020 PHARM REV CODE 255: Performed by: UROLOGY

## 2024-06-05 PROCEDURE — A9585 GADOBUTROL INJECTION: HCPCS | Performed by: UROLOGY

## 2024-06-05 PROCEDURE — 72197 MRI PELVIS W/O & W/DYE: CPT | Mod: 26,,, | Performed by: INTERNAL MEDICINE

## 2024-06-05 PROCEDURE — 72197 MRI PELVIS W/O & W/DYE: CPT | Mod: TC

## 2024-06-05 RX ORDER — GADOBUTROL 604.72 MG/ML
10 INJECTION INTRAVENOUS
Status: COMPLETED | OUTPATIENT
Start: 2024-06-05 | End: 2024-06-05

## 2024-06-05 RX ADMIN — GADOBUTROL 10 ML: 604.72 INJECTION INTRAVENOUS at 03:06

## 2024-06-09 ENCOUNTER — HOSPITAL ENCOUNTER (OUTPATIENT)
Dept: RADIOLOGY | Facility: HOSPITAL | Age: 74
Discharge: HOME | End: 2024-06-09
Attending: UROLOGY
Payer: MEDICARE

## 2024-06-09 PROCEDURE — 25500020 PHARM REV CODE 255: Performed by: UROLOGY

## 2024-06-09 PROCEDURE — A9585 GADOBUTROL INJECTION: HCPCS | Performed by: UROLOGY

## 2024-06-09 PROCEDURE — 74183 MRI ABD W/O CNTR FLWD CNTR: CPT | Mod: 26,,, | Performed by: RADIOLOGY

## 2024-06-09 PROCEDURE — 74183 MRI ABD W/O CNTR FLWD CNTR: CPT | Mod: TC

## 2024-06-09 RX ORDER — GADOBUTROL 604.72 MG/ML
10 INJECTION INTRAVENOUS
Status: COMPLETED | OUTPATIENT
Start: 2024-06-09 | End: 2024-06-09

## 2024-06-09 RX ADMIN — GADOBUTROL 10 ML: 604.72 INJECTION INTRAVENOUS at 02:06

## 2024-06-10 ENCOUNTER — PATIENT MESSAGE (OUTPATIENT)
Dept: INTERNAL MEDICINE | Facility: CLINIC | Age: 74
End: 2024-06-10
Payer: MEDICARE

## 2024-06-10 ENCOUNTER — LAB VISIT (OUTPATIENT)
Dept: LAB | Facility: HOSPITAL | Age: 74
End: 2024-06-10
Attending: UROLOGY
Payer: MEDICARE

## 2024-06-10 DIAGNOSIS — I10 PRIMARY HYPERTENSION: ICD-10-CM

## 2024-06-10 DIAGNOSIS — R79.9 ABNORMAL FINDING OF BLOOD CHEMISTRY, UNSPECIFIED: ICD-10-CM

## 2024-06-10 DIAGNOSIS — R97.20 ELEVATED PSA: ICD-10-CM

## 2024-06-10 DIAGNOSIS — Z00.00 HEALTHCARE MAINTENANCE: ICD-10-CM

## 2024-06-10 LAB
ALBUMIN SERPL BCP-MCNC: 3.8 G/DL (ref 3.5–5.2)
ALP SERPL-CCNC: 98 U/L (ref 55–135)
ALT SERPL W/O P-5'-P-CCNC: 28 U/L (ref 10–44)
ANION GAP SERPL CALC-SCNC: 7 MMOL/L (ref 8–16)
AST SERPL-CCNC: 20 U/L (ref 10–40)
BASOPHILS # BLD AUTO: 0.05 K/UL (ref 0–0.2)
BASOPHILS NFR BLD: 0.7 % (ref 0–1.9)
BILIRUB SERPL-MCNC: 1.1 MG/DL (ref 0.1–1)
BUN SERPL-MCNC: 16 MG/DL (ref 8–23)
CALCIUM SERPL-MCNC: 10.6 MG/DL (ref 8.7–10.5)
CHLORIDE SERPL-SCNC: 107 MMOL/L (ref 95–110)
CHOLEST SERPL-MCNC: 134 MG/DL (ref 120–199)
CHOLEST/HDLC SERPL: 3.7 {RATIO} (ref 2–5)
CO2 SERPL-SCNC: 24 MMOL/L (ref 23–29)
COMPLEXED PSA SERPL-MCNC: 3.9 NG/ML (ref 0–4)
CREAT SERPL-MCNC: 1.1 MG/DL (ref 0.5–1.4)
DIFFERENTIAL METHOD BLD: NORMAL
EOSINOPHIL # BLD AUTO: 0.3 K/UL (ref 0–0.5)
EOSINOPHIL NFR BLD: 3.9 % (ref 0–8)
ERYTHROCYTE [DISTWIDTH] IN BLOOD BY AUTOMATED COUNT: 13.2 % (ref 11.5–14.5)
EST. GFR  (NO RACE VARIABLE): >60 ML/MIN/1.73 M^2
ESTIMATED AVG GLUCOSE: 111 MG/DL (ref 68–131)
GLUCOSE SERPL-MCNC: 101 MG/DL (ref 70–110)
HBA1C MFR BLD: 5.5 % (ref 4–5.6)
HCT VFR BLD AUTO: 44.5 % (ref 40–54)
HDLC SERPL-MCNC: 36 MG/DL (ref 40–75)
HDLC SERPL: 26.9 % (ref 20–50)
HGB BLD-MCNC: 14.4 G/DL (ref 14–18)
IMM GRANULOCYTES # BLD AUTO: 0.03 K/UL (ref 0–0.04)
IMM GRANULOCYTES NFR BLD AUTO: 0.4 % (ref 0–0.5)
LDLC SERPL CALC-MCNC: 73.2 MG/DL (ref 63–159)
LYMPHOCYTES # BLD AUTO: 1.5 K/UL (ref 1–4.8)
LYMPHOCYTES NFR BLD: 20.9 % (ref 18–48)
MCH RBC QN AUTO: 30.3 PG (ref 27–31)
MCHC RBC AUTO-ENTMCNC: 32.4 G/DL (ref 32–36)
MCV RBC AUTO: 94 FL (ref 82–98)
MONOCYTES # BLD AUTO: 0.8 K/UL (ref 0.3–1)
MONOCYTES NFR BLD: 11.2 % (ref 4–15)
NEUTROPHILS # BLD AUTO: 4.5 K/UL (ref 1.8–7.7)
NEUTROPHILS NFR BLD: 62.9 % (ref 38–73)
NONHDLC SERPL-MCNC: 98 MG/DL
NRBC BLD-RTO: 0 /100 WBC
PLATELET # BLD AUTO: 283 K/UL (ref 150–450)
PMV BLD AUTO: 10.4 FL (ref 9.2–12.9)
POTASSIUM SERPL-SCNC: 4.8 MMOL/L (ref 3.5–5.1)
PROT SERPL-MCNC: 7.3 G/DL (ref 6–8.4)
RBC # BLD AUTO: 4.75 M/UL (ref 4.6–6.2)
SODIUM SERPL-SCNC: 138 MMOL/L (ref 136–145)
TRIGL SERPL-MCNC: 124 MG/DL (ref 30–150)
TSH SERPL DL<=0.005 MIU/L-ACNC: 2.41 UIU/ML (ref 0.4–4)
WBC # BLD AUTO: 7.16 K/UL (ref 3.9–12.7)

## 2024-06-10 PROCEDURE — 83036 HEMOGLOBIN GLYCOSYLATED A1C: CPT | Performed by: INTERNAL MEDICINE

## 2024-06-10 PROCEDURE — 36415 COLL VENOUS BLD VENIPUNCTURE: CPT | Performed by: UROLOGY

## 2024-06-10 PROCEDURE — 80061 LIPID PANEL: CPT | Performed by: INTERNAL MEDICINE

## 2024-06-10 PROCEDURE — 85025 COMPLETE CBC W/AUTO DIFF WBC: CPT | Performed by: INTERNAL MEDICINE

## 2024-06-10 PROCEDURE — 84153 ASSAY OF PSA TOTAL: CPT | Performed by: UROLOGY

## 2024-06-10 PROCEDURE — 80053 COMPREHEN METABOLIC PANEL: CPT | Performed by: INTERNAL MEDICINE

## 2024-06-10 PROCEDURE — 84443 ASSAY THYROID STIM HORMONE: CPT | Performed by: INTERNAL MEDICINE

## 2024-06-12 ENCOUNTER — PATIENT MESSAGE (OUTPATIENT)
Dept: UROLOGY | Facility: CLINIC | Age: 74
End: 2024-06-12
Payer: MEDICARE

## 2024-06-17 ENCOUNTER — PATIENT OUTREACH (OUTPATIENT)
Dept: ADMINISTRATIVE | Facility: HOSPITAL | Age: 74
End: 2024-06-17
Payer: MEDICARE

## 2024-06-17 ENCOUNTER — OFFICE VISIT (OUTPATIENT)
Dept: INTERNAL MEDICINE | Facility: CLINIC | Age: 74
End: 2024-06-17
Payer: MEDICARE

## 2024-06-17 VITALS
OXYGEN SATURATION: 97 % | WEIGHT: 183.19 LBS | HEART RATE: 59 BPM | BODY MASS INDEX: 27.13 KG/M2 | HEIGHT: 69 IN | SYSTOLIC BLOOD PRESSURE: 138 MMHG | DIASTOLIC BLOOD PRESSURE: 72 MMHG

## 2024-06-17 DIAGNOSIS — Q61.02 MULTIPLE RENAL CYSTS: ICD-10-CM

## 2024-06-17 DIAGNOSIS — R97.20 ELEVATED PSA: Primary | ICD-10-CM

## 2024-06-17 DIAGNOSIS — N13.8 BPH WITH URINARY OBSTRUCTION: ICD-10-CM

## 2024-06-17 DIAGNOSIS — I70.0 AORTIC ATHEROSCLEROSIS: ICD-10-CM

## 2024-06-17 DIAGNOSIS — D32.9 MENINGIOMA: ICD-10-CM

## 2024-06-17 DIAGNOSIS — N40.1 BPH WITH URINARY OBSTRUCTION: ICD-10-CM

## 2024-06-17 DIAGNOSIS — R79.9 ABNORMAL FINDING OF BLOOD CHEMISTRY, UNSPECIFIED: ICD-10-CM

## 2024-06-17 DIAGNOSIS — E21.3 HYPERPARATHYROIDISM: ICD-10-CM

## 2024-06-17 DIAGNOSIS — I10 PRIMARY HYPERTENSION: Chronic | ICD-10-CM

## 2024-06-17 DIAGNOSIS — Z00.00 ANNUAL PHYSICAL EXAM: Primary | ICD-10-CM

## 2024-06-17 DIAGNOSIS — K86.2 PANCREATIC CYST: ICD-10-CM

## 2024-06-17 DIAGNOSIS — E78.2 MIXED HYPERLIPIDEMIA: Chronic | ICD-10-CM

## 2024-06-17 DIAGNOSIS — N28.89 OTHER SPECIFIED DISORDERS OF KIDNEY AND URETER: Primary | ICD-10-CM

## 2024-06-17 PROCEDURE — 99214 OFFICE O/P EST MOD 30 MIN: CPT | Mod: PBBFAC | Performed by: INTERNAL MEDICINE

## 2024-06-17 PROCEDURE — 99397 PER PM REEVAL EST PAT 65+ YR: CPT | Mod: S$PBB,GZ,, | Performed by: INTERNAL MEDICINE

## 2024-06-17 PROCEDURE — 99999 PR PBB SHADOW E&M-EST. PATIENT-LVL IV: CPT | Mod: PBBFAC,,, | Performed by: INTERNAL MEDICINE

## 2024-06-17 NOTE — PROGRESS NOTES
Health Maintenance Due   Topic Date Due    RSV Vaccine (Age 60+ and Pregnant patients) (1 - 1-dose 60+ series) Never done    Colorectal Cancer Screening  05/20/2024       Chart reviewed and updated for campaigns. Colonoscopy has been scheduled.    Gabriela Joseph LPN   Clinical Care Coordinator  [facility]ness

## 2024-06-17 NOTE — PROGRESS NOTES
"    CHIEF COMPLAINT     Chief Complaint   Patient presents with    Annual Exam       HPI     Anton Christiansen is a 73 y.o. male HTN, HLD, ASHA, BPH, meningioma here today for annual exam.    Left GTB pain  Worse at night laying on left side. Improves with pillow    Hx bCC  Follows with Derm s/p recent excision of BCC.    Renal mass patient with complex renal cyst and pancreatic cyst seen on MRI abdomen.  Getting surveillance q.6 months    Personally Reviewed Patient's Medical, surgical, family and social hx. Changes updated in [facility].  Care Team updated in Epic    Review of Systems:  Review of Systems   Constitutional:  Negative for fatigue and fever.   Respiratory:  Negative for cough and shortness of breath.    Musculoskeletal:  Positive for arthralgias.       Health Maintenance:   Reviewed with patient  Due for the following:      PHYSICAL EXAM     /72 (BP Location: Right arm, Patient Position: Sitting)   Pulse (!) 59   Ht 5' 9" (1.753 m)   Wt 83.1 kg (183 lb 3.2 oz)   SpO2 97%   BMI 27.05 kg/m²     Gen: Well Appearing, NAD  HEENT: PERR, EOMI  Neck: FROM, no thyromegaly, no cervical adenopathy  CVD: RRR, no M/R/G  Pulm: Normal work of breathing, CTAB, no wheezing  Abd:  Soft, NT, ND non TTP, no mass  MSK: no LE edema  Neuro: A&Ox3, gait normal, speech normal  Mood; Mood normal, behavior normal, thought process linear   Skin: multiple SK    LABS     Labs reviewed; Notable for  Lab Results   Component Value Date    WBC 7.16 06/10/2024    HGB 14.4 06/10/2024    HCT 44.5 06/10/2024    MCV 94 06/10/2024     06/10/2024         Chemistry        Component Value Date/Time     06/10/2024 0950    K 4.8 06/10/2024 0950     06/10/2024 0950    CO2 24 06/10/2024 0950    BUN 16 06/10/2024 0950    CREATININE 1.1 06/10/2024 0950     06/10/2024 0950        Component Value Date/Time    CALCIUM 10.6 (H) 06/10/2024 0950    ALKPHOS 98 06/10/2024 0950    AST 20 06/10/2024 0950    ALT 28 06/10/2024 " 0950    BILITOT 1.1 (H) 06/10/2024 0950    ESTGFRAFRICA >60.0 03/29/2022 0821    EGFRNONAA >60.0 03/29/2022 0821        Lab Results   Component Value Date    HGBA1C 5.5 06/10/2024     Lab Results   Component Value Date    LDLCALC 73.2 06/10/2024     Lab Results   Component Value Date    TSH 2.408 06/10/2024     1. Complex signal right renal lesion which may reflect complex cyst versus mixed cystic and solid mass.  Renal cell carcinoma is not excluded.  2. Multiple simple and mildly complex additional renal cysts as detailed above  3. Stable 18 mm cystic pancreatic lesion which may reflect cyst, pseudocyst, or side branch IPMN.  Recommend yearly follow-up.  4. This report was flagged in Epic as abnormal.     ASSESSMENT     1. Annual physical exam  CBC Auto Differential    Comprehensive Metabolic Panel    Hemoglobin A1C    Lipid Panel      2. Multiple renal cysts        3. Primary hypertension  CBC Auto Differential    Comprehensive Metabolic Panel    Hemoglobin A1C    Lipid Panel      4. Mixed hyperlipidemia  CBC Auto Differential    Comprehensive Metabolic Panel    Hemoglobin A1C    Lipid Panel      5. BPH with urinary obstruction        6. Hyperparathyroidism  CBC Auto Differential    Comprehensive Metabolic Panel    Hemoglobin A1C    Lipid Panel      7. Aortic atherosclerosis  CBC Auto Differential    Comprehensive Metabolic Panel    Hemoglobin A1C    Lipid Panel      8. Pancreatic cyst        9. Meningioma        10. Abnormal finding of blood chemistry, unspecified  Hemoglobin A1C              Plan     Anton Christiansen is a 73 y.o. male with HTN, HLD, ASHA, BPH, meningioma   1. Annual physical exam  Updated problem list, medical history, care team and discussed HM.   Given handout for greater trochanteric bursitis left side  - CBC Auto Differential; Future  - Comprehensive Metabolic Panel; Future  - Hemoglobin A1C; Future  - Lipid Panel; Future    2. Multiple renal cysts  Follows with Urology continue  surveillance    3. Primary hypertension  At goal continue Benicar 20-12.5 Coreg 12.5  - CBC Auto Differential; Future  - Comprehensive Metabolic Panel; Future  - Hemoglobin A1C; Future  - Lipid Panel; Future    4. Mixed hyperlipidemia  At goal continue atorvastatin 40  - CBC Auto Differential; Future  - Comprehensive Metabolic Panel; Future  - Hemoglobin A1C; Future  - Lipid Panel; Future    5. BPH with urinary obstruction  Followed by urology continue Avodart    6. Hyperparathyroidism  Get updated PTH  - CBC Auto Differential; Future  - Comprehensive Metabolic Panel; Future  - Hemoglobin A1C; Future  - Lipid Panel; Future    7. Aortic atherosclerosis   Radiologic diagnosis, while provide some prognostic information does not require active medical management documented for HCC purposes  - CBC Auto Differential; Future  - Comprehensive Metabolic Panel; Future  - Hemoglobin A1C; Future  - Lipid Panel; Future    8. Pancreatic cyst  Incidental finding on MRI abdomen continue follow    9. Meningioma  Getting surveillance by  Where stable    10. Abnormal finding of blood chemistry, unspecified  - Hemoglobin A1C; Future      Сергей Landis MD

## 2024-06-24 ENCOUNTER — OFFICE VISIT (OUTPATIENT)
Dept: DERMATOLOGY | Facility: CLINIC | Age: 74
End: 2024-06-24
Payer: MEDICARE

## 2024-06-24 DIAGNOSIS — C44.311 BASAL CELL CARCINOMA (BCC) OF LEFT ALA NASI: Primary | ICD-10-CM

## 2024-06-24 PROCEDURE — 99212 OFFICE O/P EST SF 10 MIN: CPT | Mod: S$PBB,,, | Performed by: DERMATOLOGY

## 2024-06-24 PROCEDURE — 99213 OFFICE O/P EST LOW 20 MIN: CPT | Mod: PBBFAC | Performed by: DERMATOLOGY

## 2024-06-24 PROCEDURE — 99999 PR PBB SHADOW E&M-EST. PATIENT-LVL III: CPT | Mod: PBBFAC,,, | Performed by: DERMATOLOGY

## 2024-06-24 NOTE — PROGRESS NOTES
73 y.o. male patient is here for wound check after surgery.    Patient reports no problems.    WOUND PE:  The left nasal ala wound is well healed with 100% re-epithelialization. Mild firmness and erythema of scar. No nodularity.    IMPRESSION:  Healing operative site from Mohs' surgery BCC left nasal ala s/p Mohs with 2nd intention healing, postop week #7, all healed in.     PLAN:  D/c wound care  Daily SPF.  Regular skin checks.    RTC:  In 3-6 months with  Pelon Wynne MD  for skin check or sooner if new concern arises.

## 2024-06-28 ENCOUNTER — TELEPHONE (OUTPATIENT)
Dept: ENDOSCOPY | Facility: HOSPITAL | Age: 74
End: 2024-06-28
Payer: MEDICARE

## 2024-06-28 DIAGNOSIS — Z12.11 SPECIAL SCREENING FOR MALIGNANT NEOPLASMS, COLON: Primary | ICD-10-CM

## 2024-06-28 RX ORDER — POLYETHYLENE GLYCOL 3350, SODIUM SULFATE ANHYDROUS, SODIUM BICARBONATE, SODIUM CHLORIDE, POTASSIUM CHLORIDE 236; 22.74; 6.74; 5.86; 2.97 G/4L; G/4L; G/4L; G/4L; G/4L
4 POWDER, FOR SOLUTION ORAL ONCE
Qty: 1 EACH | Refills: 0 | Status: SHIPPED | OUTPATIENT
Start: 2024-06-28 | End: 2024-06-28

## 2024-06-28 NOTE — TELEPHONE ENCOUNTER
----- Message from Qiana Rodríguez sent at 6/28/2024 10:38 AM CDT -----  Regarding: FW: pt advice  Contact: pt @  [phone]    ----- Message -----  From: Virgil Del Angel  Sent: 6/27/2024   3:47 PM CDT  To: [facility] Endoscopy Schedulers  Subject: pt advice                                        Pt is calling to advise that he has no information on scheduled colonoscopy. Pt is requesting a call back for more information. Please call to advise further. Thank you for all you are doing.

## 2024-06-28 NOTE — TELEPHONE ENCOUNTER
Spoke to pt to schedule procedure(s) Colonoscopy       Physician to perform procedure(s)    Date of Procedure (s) 9/23/2024  Arrival Time 8:00 AM  Time of Procedure(s) 9:00 AM   Location of Procedure(s) [place] 4th Floor  Type of Rx Prep sent to patient: PEG  Instructions provided to patient via [facility]    Patient was informed on the following information and verbalized understanding. Screening questionnaire reviewed with patient and complete. If procedure requires anesthesia, a responsible adult needs to be present to accompany the patient home, patient cannot drive after receiving anesthesia. Appointment details are tentative, especially check-in time. Patient will receive a prep-op call 7 days prior to confirm check-in time for procedure. If applicable the patient should contact their pharmacy to verify Rx for procedure prep is ready for pick-up. Patient was advised to call the scheduling department at [phone] if pharmacy states no Rx is available. Patient was advised to call the endoscopy scheduling department if any questions or concerns arise.      [facility] Endoscopy Scheduling Department

## 2024-06-30 ENCOUNTER — PATIENT MESSAGE (OUTPATIENT)
Dept: INTERNAL MEDICINE | Facility: CLINIC | Age: 74
End: 2024-06-30
Payer: MEDICARE

## 2024-06-30 DIAGNOSIS — I10 PRIMARY HYPERTENSION: ICD-10-CM

## 2024-07-01 RX ORDER — CARVEDILOL 12.5 MG/1
12.5 TABLET ORAL
Qty: 90 TABLET | Refills: 3 | Status: SHIPPED | OUTPATIENT
Start: 2024-07-01

## 2024-07-01 NOTE — TELEPHONE ENCOUNTER
No care due was identified.  [facility] Embedded Care Due Messages. Reference number: [id number].   7/01/2024 8:08:16 AM CDT

## 2024-07-01 NOTE — TELEPHONE ENCOUNTER
Refill Decision Note   Anton Christiansen  is requesting a refill authorization.  Brief Assessment and Rationale for Refill:  Approve     Medication Therapy Plan:         Comments:     Note composed:8:45 AM 07/01/2024

## 2024-07-03 RX ORDER — CARVEDILOL 12.5 MG/1
12.5 TABLET ORAL
Qty: 90 TABLET | Refills: 3 | OUTPATIENT
Start: 2024-07-03

## 2024-07-11 ENCOUNTER — PATIENT MESSAGE (OUTPATIENT)
Dept: ADMINISTRATIVE | Facility: OTHER | Age: 74
End: 2024-07-11
Payer: MEDICARE

## 2024-07-15 ENCOUNTER — TELEPHONE (OUTPATIENT)
Dept: SLEEP MEDICINE | Facility: CLINIC | Age: 74
End: 2024-07-15
Payer: MEDICARE

## 2024-07-15 NOTE — TELEPHONE ENCOUNTER
----- Message from Zoila Raymond sent at 7/15/2024  9:11 AM CDT -----  Regarding: missed call  Name of caller: magno Ureña is the requesting detail: pt is returning a call from this number. Please advise       Can the clinic reply by [facility]:       What number to call back:[phone]

## 2024-07-15 NOTE — TELEPHONE ENCOUNTER
Patient contacted Compliance visit scheduled        ----- Message from Zoila Raymond sent at 7/15/2024  9:11 AM CDT -----  Regarding: missed call  Name of caller: magno       What is the requesting detail: pt is returning a call from this number. Please advise       Can the clinic reply by [facility]:       What number to call back:[phone]

## 2024-07-16 ENCOUNTER — OFFICE VISIT (OUTPATIENT)
Dept: SLEEP MEDICINE | Facility: CLINIC | Age: 74
End: 2024-07-16
Payer: MEDICARE

## 2024-07-16 VITALS
WEIGHT: 183 LBS | HEIGHT: 69 IN | BODY MASS INDEX: 27.11 KG/M2 | HEART RATE: 71 BPM | SYSTOLIC BLOOD PRESSURE: 131 MMHG | DIASTOLIC BLOOD PRESSURE: 80 MMHG

## 2024-07-16 DIAGNOSIS — G47.33 OSA (OBSTRUCTIVE SLEEP APNEA): Primary | ICD-10-CM

## 2024-07-16 PROCEDURE — 99999 PR PBB SHADOW E&M-EST. PATIENT-LVL III: CPT | Mod: PBBFAC,,, | Performed by: PHYSICIAN ASSISTANT

## 2024-07-16 PROCEDURE — 99214 OFFICE O/P EST MOD 30 MIN: CPT | Mod: S$PBB,,, | Performed by: PHYSICIAN ASSISTANT

## 2024-07-16 PROCEDURE — 99213 OFFICE O/P EST LOW 20 MIN: CPT | Mod: PBBFAC | Performed by: PHYSICIAN ASSISTANT
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
## 2024-09-17 ENCOUNTER — TELEPHONE (OUTPATIENT)
Dept: ENDOSCOPY | Facility: HOSPITAL | Age: 74
End: 2024-09-17
Payer: MEDICARE

## 2024-09-17 NOTE — TELEPHONE ENCOUNTER
Spoke to patient for pre-call to confirm scheduled Colonoscopy and patient verbalized understanding of the following:       Date of Procedure (s)  verified 9/23/24  Arrival Time 8:00 AM verified.  Location of Procedure(s) [place] 4th Floor verified.  NPO status reinforced. Ok to continue clear liquids up until 4 hours prior to the Endoscopy procedure.   Pt confirmed receipt of prep instructions and Rx prep (if applicable).  Instructions provided to patient via [facility]  Pt confirmed ride home after procedure if procedure requires anesthesia.   Pre-call screening questionnaire reviewed and completed with patient.   Appointment details are tentative, including check-in time.  If the patient begins taking any blood thinning medications, injectable weight loss/diabetes medications (other than insulin), or Adipex (phentermine) patient was instructed to contact the endoscopy scheduling department as soon as possible.  Patient was advised to call the endoscopy scheduling department if any questions or concerns arise.       [facility] Endoscopy Scheduling Department

## 2024-09-23 ENCOUNTER — ANESTHESIA EVENT (OUTPATIENT)
Dept: ENDOSCOPY | Facility: HOSPITAL | Age: 74
End: 2024-09-23
Payer: MEDICARE

## 2024-09-23 ENCOUNTER — HOSPITAL ENCOUNTER (OUTPATIENT)
Facility: HOSPITAL | Age: 74
Discharge: HOME | End: 2024-09-23
Attending: INTERNAL MEDICINE | Admitting: INTERNAL MEDICINE
Payer: MEDICARE

## 2024-09-23 ENCOUNTER — ANESTHESIA (OUTPATIENT)
Dept: ENDOSCOPY | Facility: HOSPITAL | Age: 74
End: 2024-09-23
Payer: MEDICARE

## 2024-09-23 VITALS
SYSTOLIC BLOOD PRESSURE: 131 MMHG | RESPIRATION RATE: 18 BRPM | HEART RATE: 61 BPM | TEMPERATURE: 98 F | DIASTOLIC BLOOD PRESSURE: 77 MMHG | HEIGHT: 67 IN | WEIGHT: 184 LBS | BODY MASS INDEX: 28.88 KG/M2 | OXYGEN SATURATION: 100 %

## 2024-09-23 DIAGNOSIS — D12.6 COLON ADENOMAS: ICD-10-CM

## 2024-09-23 PROCEDURE — 37000008 HC ANESTHESIA 1ST 15 MINUTES: Performed by: INTERNAL MEDICINE

## 2024-09-23 PROCEDURE — 37000009 HC ANESTHESIA EA ADD 15 MINS: Performed by: INTERNAL MEDICINE

## 2024-09-23 PROCEDURE — G0105 COLORECTAL SCRN; HI RISK IND: HCPCS | Mod: ,,, | Performed by: INTERNAL MEDICINE

## 2024-09-23 PROCEDURE — 25000003 PHARM REV CODE 250: Performed by: NURSE ANESTHETIST, CERTIFIED REGISTERED

## 2024-09-23 PROCEDURE — G0105 COLORECTAL SCRN; HI RISK IND: HCPCS | Performed by: INTERNAL MEDICINE

## 2024-09-23 PROCEDURE — 63600175 PHARM REV CODE 636 W HCPCS: Performed by: NURSE ANESTHETIST, CERTIFIED REGISTERED

## 2024-09-23 PROCEDURE — E9220 PRA ENDO ANESTHESIA: HCPCS | Mod: ,,, | Performed by: NURSE ANESTHETIST, CERTIFIED REGISTERED

## 2024-09-23 RX ORDER — PROPOFOL 10 MG/ML
VIAL (ML) INTRAVENOUS
Status: DISCONTINUED | OUTPATIENT
Start: 2024-09-23 | End: 2024-09-23

## 2024-09-23 RX ORDER — SODIUM CHLORIDE 9 MG/ML
INJECTION, SOLUTION INTRAVENOUS CONTINUOUS
Status: DISCONTINUED | OUTPATIENT
Start: 2024-09-23 | End: 2024-09-23 | Stop reason: HOSPADM

## 2024-09-23 RX ORDER — LIDOCAINE HYDROCHLORIDE 20 MG/ML
INJECTION INTRAVENOUS
Status: DISCONTINUED | OUTPATIENT
Start: 2024-09-23 | End: 2024-09-23

## 2024-09-23 RX ORDER — PROPOFOL 10 MG/ML
VIAL (ML) INTRAVENOUS CONTINUOUS PRN
Status: DISCONTINUED | OUTPATIENT
Start: 2024-09-23 | End: 2024-09-23

## 2024-09-23 RX ADMIN — PROPOFOL 150 MCG/KG/MIN: 10 INJECTION, EMULSION INTRAVENOUS at 09:09

## 2024-09-23 RX ADMIN — PROPOFOL 60 MG: 10 INJECTION, EMULSION INTRAVENOUS at 09:09

## 2024-09-23 RX ADMIN — SODIUM CHLORIDE: 9 INJECTION, SOLUTION INTRAVENOUS at 09:09

## 2024-09-23 RX ADMIN — LIDOCAINE HYDROCHLORIDE 30 MG: 20 INJECTION INTRAVENOUS at 09:09

## 2024-09-23 NOTE — PROVATION PATIENT INSTRUCTIONS
Discharge Summary/Instructions after an Endoscopic Procedure  Patient Name: Anton Christiansen  Patient MRN: [id number]  Patient YOB: 1950  Monday, September 23, 2024  Johnny Garrett MD  Dear patient,  As a result of recent federal legislation (The Federal Cures Act), you may   receive lab or pathology results from your procedure in your [facility]   account before your physician is able to contact you. Your physician or   their representative will relay the results to you with their   recommendations at their soonest availability.  Thank you,  RESTRICTIONS:  During your procedure today, you received medications for sedation.  These   medications may affect your judgment, balance and coordination.  Therefore,   for 24 hours, you have the following restrictions:   - DO NOT drive a car, operate machinery, make legal/financial decisions,   sign important papers or drink alcohol.    ACTIVITY:  Today: no heavy lifting, straining or running due to procedural   sedation/anesthesia.  The following day: return to full activity including work.  DIET:  Eat and drink normally unless instructed otherwise.     TREATMENT FOR COMMON SIDE EFFECTS:  - Mild abdominal pain, nausea, belching, bloating or excessive gas:  rest,   eat lightly and use a heating pad.  - Sore Throat: treat with throat lozenges and/or gargle with warm salt   water.  - Because air was used during the procedure, expelling large amounts of air   from your rectum or belching is normal.  - If a bowel prep was taken, you may not have a bowel movement for 1-3 days.    This is normal.  SYMPTOMS TO WATCH FOR AND REPORT TO YOUR PHYSICIAN:  1. Abdominal pain or bloating, other than gas cramps.  2. Chest pain.  3. Back pain.  4. Signs of infection such as: chills or fever occurring within 24 hours   after the procedure.  5. Rectal bleeding, which would show as bright red, maroon, or black stools.   (A tablespoon of blood from the rectum is not serious,  especially if   hemorrhoids are present.)  6. Vomiting.  7. Weakness or dizziness.  GO DIRECTLY TO THE NEAREST EMERGENCY ROOM IF YOU HAVE ANY OF THE FOLLOWING:      Difficulty breathing              Chills and/or fever over 101 F   Persistent vomiting and/or vomiting blood   Severe abdominal pain   Severe chest pain   Black, tarry stools   Bleeding- more than one tablespoon   Any other symptom or condition that you feel may need urgent attention  Your doctor recommends these additional instructions:  If any biopsies were taken, your doctors clinic will contact you in 1 to 2   weeks with any results.  - Discharge patient to home.   - Repeat colonoscopy in 5 years for surveillance.   - Return to primary care physician.   - The findings and recommendations were discussed with the patient.  For questions, problems or results please call your physician - Johnny Garrett MD at Work:  [phone].  [facility], EMERGENCY ROOM PHONE NUMBER: [phone]  IF A COMPLICATION OR EMERGENCY SITUATION ARISES AND YOU ARE UNABLE TO REACH   YOUR PHYSICIAN - GO DIRECTLY TO THE EMERGENCY ROOM.  Johnny Garrett MD  9/23/2024 10:13:52 AM  This report has been verified and signed electronically.  Dear patient,  As a result of recent federal legislation (The Federal Cures Act), you may   receive lab or pathology results from your procedure in your [facility]   account before your physician is able to contact you. Your physician or   their representative will relay the results to you with their   recommendations at their soonest availability.  Thank you,  PROVATION

## 2024-09-23 NOTE — H&P
[facility] 4th [facility]  History & Physical    Subjective:      Chief Complaint/Reason for Admission:     Direct access surveillance colonoscopy for history of colon adenomas and FDR mom with CRC in her 70's    Anton Christiansen is a 73 y.o. male.    Past Medical History:   Diagnosis Date    Allergy     seasonal    Arteriosclerotic coronary artery disease 04/10/2017    Basal cell carcinoma 07/14/2015    right dorsal hand    Basal cell carcinoma (BCC) of left ala nasi 05/06/2024    L nasal ala    BCC (basal cell carcinoma of skin) 04/2014    nasal tip s/p Mohs with forehead flap    Cataract     Family history of colon cancer     Hyperlipidemia     Hypertension     Meningioma     Multiple thyroid nodules 04/24/2018    Nephrolithiasis     ASHA on CPAP     Prostatitis, chronic     Special screening for malignant neoplasms, colon 06/19/2014    Vertigo 09/2020    Likely BPPV     Past Surgical History:   Procedure Laterality Date    CATARACT EXTRACTION W/  INTRAOCULAR LENS IMPLANT  12/20/2012    OD    COLONOSCOPY  060509    COLONOSCOPY N/A 05/20/2019    Procedure: COLONOSCOPY;  Surgeon: Artemio Greer MD;  Location: [facility] ([address] [place] [address]);  Service: Endoscopy;  Laterality: N/A;    Division and Inset  04/29/2014    D&I Forehead Flap    EYE SURGERY      Pilonoidal Cyst      SKIN SURGERY  MOHS Repair    nose    TONSILLECTOMY       Social History     Tobacco Use    Smoking status: Never    Smokeless tobacco: Never    Tobacco comments:     Never smoked   Substance Use Topics    Alcohol use: No    Drug use: Never       PTA Medications   Medication Sig    atorvastatin (LIPITOR) 40 MG tablet Take 1 tablet (40 mg total) by mouth once daily.    carvediloL (COREG) 12.5 MG tablet Take 1 tablet (12.5 mg total) by mouth daily with breakfast.    dutasteride (AVODART) 0.5 mg capsule TAKE ONE CAPSULE BY MOUTH EVERY DAY    ibuprofen (ADVIL,MOTRIN) 800 MG tablet Take 1 tablet (800 mg total) by mouth 3 (three) times daily as needed for  Pain.    olmesartan-hydrochlorothiazide (BENICAR HCT) 20-12.5 mg per tablet Take 1 tablet by mouth once daily.    ascorbic acid, vitamin C, (VITAMIN C) 500 MG tablet Take 500 mg by mouth once daily.    cholecalciferol, vitamin D3, 3,000 unit Tab Take 1 tablet by mouth once daily.    fish oil-omega-3 fatty acids 300-1,000 mg capsule Take 5 g by mouth daily with dinner or evening meal.    meclizine (ANTIVERT) 12.5 mg tablet Take 1 tablet (12.5 mg total) by mouth 3 (three) times daily as needed for Dizziness.    saw palmetto 80 MG capsule Take 80 mg by mouth 2 (two) times daily.    tamsulosin (FLOMAX) 0.4 mg Cap Take 1 capsule (0.4 mg total) by mouth once daily.    vitamin E 100 UNIT capsule Take 100 Units by mouth once daily.     Review of patient's allergies indicates:   Allergen Reactions    Cephalexin Diarrhea    Ace inhibitors Other (See Comments)     Cough    Cialis [tadalafil] Other (See Comments)     Muscle pain      Proscar [finasteride] Other (See Comments)     Decreased libido     Vicodin [hydrocodone-acetaminophen] Nausea And Vomiting        Review of Systems   Constitutional:  Negative for fever.       Objective:      Vital Signs (Most Recent)  Temp: 98.6 °F (37 °C) (09/23/24 0826)  Pulse: 63 (09/23/24 0826)  Resp: 16 (09/23/24 0826)  BP: 133/71 (09/23/24 0827)  SpO2: 96 % (09/23/24 0826)    Vital Signs Range (Last 24H):  Temp:  [98.6 °F (37 °C)]   Pulse:  [63]   Resp:  [16]   BP: (133)/(71)   SpO2:  [96 %]     Physical Exam  Neurological:      Mental Status: He is alert and oriented to person, place, and time.             Assessment:      Direct access surveillance colonoscopy for history of colon adenomas and FDR mom with CRC in her 70's      Plan:    Direct access surveillance colonoscopy for history of colon adenomas and FDR mom with CRC in her 70's

## 2024-09-23 NOTE — ANESTHESIA PREPROCEDURE EVALUATION
09/23/2024  Anton Christiansen is a 73 y.o., male.      Patient Name: Anton Christiansen  YOB: 1950  MRN: [id number]  [facility]: [id number]      Code Status: Prior   Date of Procedure: 9/23/2024  Anesthesia: Choice Procedure: Procedure(s) (LRB):  COLONOSCOPY (N/A)  Pre-Operative Diagnosis: Colon cancer screening [Z12.11]  Proceduralist: Surgeons and Role:     * Johnny Garrett MD - Primary        SUBJECTIVE:   Anton Christiansen is a 73 y.o. male who  has a past medical history of Allergy, Arteriosclerotic coronary artery disease (04/10/2017), Basal cell carcinoma (07/14/2015), Basal cell carcinoma (BCC) of left ala nasi (05/06/2024), BCC (basal cell carcinoma of skin) (04/2014), Cataract, Family history of colon cancer, Hyperlipidemia, Hypertension, Meningioma, Multiple thyroid nodules (04/24/2018), Nephrolithiasis, ASHA on CPAP, Prostatitis, chronic, Special screening for malignant neoplasms, colon (06/19/2014), and Vertigo (09/2020). No notes on file    ALLERGIES:     Review of patient's allergies indicates:   Allergen Reactions    Cephalexin Diarrhea    Ace inhibitors Other (See Comments)     Cough    Cialis [tadalafil] Other (See Comments)     Muscle pain      Proscar [finasteride] Other (See Comments)     Decreased libido     Vicodin [hydrocodone-acetaminophen] Nausea And Vomiting     MEDICATIONS:     Current Facility-Administered Medications   Medication Dose Route Frequency Provider Last Rate Last Admin    0.9%  NaCl infusion   Intravenous Continuous Johnny Garrett MD              History:     Patient Active Problem List   Diagnosis    Hypertension    Hyperlipidemia    Kidney stone    Chronic prostatitis    Elevated PSA    Status post cataract extraction    Mohs defect of ala nasi    ED (erectile dysfunction) of organic origin    Urinary retention    Special screening for malignant  neoplasms, colon    AK (actinic keratosis)    NAION (non-arteritic anterior ischemic optic neuropathy), left eye    Central scotoma, left eye    Meningioma    IC (interstitial cystitis)    Multiple thyroid nodules    AHSA (obstructive sleep apnea)    Colon cancer screening    BPH with urinary obstruction    Hyperparathyroidism    Aortic atherosclerosis     Past Medical History:   Diagnosis Date    Allergy     seasonal    Arteriosclerotic coronary artery disease 04/10/2017    Basal cell carcinoma 07/14/2015    right dorsal hand    Basal cell carcinoma (BCC) of left ala nasi 05/06/2024    L nasal ala    BCC (basal cell carcinoma of skin) 04/2014    nasal tip s/p Mohs with forehead flap    Cataract     Family history of colon cancer     Hyperlipidemia     Hypertension     Meningioma     Multiple thyroid nodules 04/24/2018    Nephrolithiasis     ASHA on CPAP     Prostatitis, chronic     Special screening for malignant neoplasms, colon 06/19/2014    Vertigo 09/2020    Likely BPPV     Surgical History:    has a past surgical history that includes Tonsillectomy; Pilonoidal Cyst; Cataract extraction w/  intraocular lens implant (12/20/2012); Colonoscopy (060509); Eye surgery; Skin surgery (MOHS Repair); Division and Inset (04/29/2014); and Colonoscopy (N/A, 05/20/2019).   Social History:    reports that he is not currently sexually active and has had partner(s) who are female. He reports using the following method of birth control/protection: Abstinence.  reports that he has never smoked. He has never used smokeless tobacco. He reports that he does not drink alcohol and does not use drugs.       Pre-op Assessment    I have reviewed the Patient Summary Reports.     I have reviewed the Nursing Notes. I have reviewed the NPO Status.   I have reviewed the Medications.     Review of Systems  Anesthesia Hx:  No problems with previous Anesthesia             Denies Family Hx of Anesthesia complications.    Denies Personal Hx of  Anesthesia complications.                    Hematology/Oncology:  Hematology Normal                                     Cardiovascular:     Hypertension   CAD                                        Pulmonary:        Sleep Apnea                Musculoskeletal:  Musculoskeletal Normal                Neurological:  Neurology Normal                                      Dermatological:  Skin Normal        Physical Exam  General: Cooperative, Alert and Oriented    Airway:  Mallampati: II   Mouth Opening: Normal  TM Distance: Normal  Tongue: Normal  Neck ROM: Normal ROM    Dental:  Intact        Anesthesia Plan  Type of Anesthesia, risks & benefits discussed:    Anesthesia Type: Gen Natural Airway  Intra-op Monitoring Plan: Standard ASA Monitors  Induction:  IV  Informed Consent: Informed consent signed with the Patient and all parties understand the risks and agree with anesthesia plan.  All questions answered.   ASA Score: 3  Day of Surgery Review of History & Physical: H&P Update referred to the surgeon/provider.I have interviewed and examined the patient. I have reviewed the patient's H&P dated: There are no significant changes.     Ready For Surgery From Anesthesia Perspective.     .

## 2024-09-23 NOTE — TRANSFER OF CARE
"Anesthesia Transfer of Care Note    Patient: Anton Christiansen    Procedure(s) Performed: Procedure(s) (LRB):  COLONOSCOPY (N/A)    Patient location: PACU    Anesthesia Type: general    Transport from OR: Transported from OR on room air with adequate spontaneous ventilation    Post pain: adequate analgesia    Post assessment: no apparent anesthetic complications and tolerated procedure well    Post vital signs: stable    Level of consciousness: awake, alert and oriented    Nausea/Vomiting: no nausea/vomiting    Complications: none    Transfer of care protocol was followed      Last vitals: Visit Vitals  /71   Pulse 63   Temp 37 °C (98.6 °F) (Skin)   Resp 16   Ht 5' 7" (1.702 m)   Wt 83.5 kg (184 lb)   SpO2 96%   BMI 28.82 kg/m²     "

## 2024-09-23 NOTE — ANESTHESIA POSTPROCEDURE EVALUATION
Anesthesia Post Evaluation    Patient: Anton Christiansen    Procedure(s) Performed: Procedure(s) (LRB):  COLONOSCOPY (N/A)    Final Anesthesia Type: general      Patient location during evaluation: PACU  Patient participation: Yes- Able to Participate  Level of consciousness: awake and alert and oriented  Post-procedure vital signs: reviewed and stable  Pain management: adequate  Airway patency: patent    PONV status at discharge: No PONV  Anesthetic complications: no      Cardiovascular status: blood pressure returned to baseline  Respiratory status: unassisted, room air and spontaneous ventilation  Hydration status: euvolemic  Follow-up not needed.              Vitals Value Taken Time   /77 09/23/24 1032   Temp 36.4 °C (97.5 °F) 09/23/24 1009   Pulse 61 09/23/24 1032   Resp 18 09/23/24 1032   SpO2 100 % 09/23/24 1032         No case tracking events are documented in the log.      Pain/Pura Score: Pura Score: 9 (9/23/2024 10:10 AM)

## 2024-09-25 ENCOUNTER — TELEPHONE (OUTPATIENT)
Dept: ENDOSCOPY | Facility: HOSPITAL | Age: 74
End: 2024-09-25
Payer: MEDICARE

## 2024-09-25 DIAGNOSIS — Z00.00 ENCOUNTER FOR MEDICARE ANNUAL WELLNESS EXAM: ICD-10-CM

## 2024-09-25 NOTE — TELEPHONE ENCOUNTER
Contacted the patient to schedule an endoscopy procedure(s) 9/25/24. The patient did not answer the call and left a voice message requesting a call back.

## 2024-09-25 NOTE — TELEPHONE ENCOUNTER
"----- Message from Qiana Rodríguez sent at 2024  3:37 PM CDT -----    ----- Message -----  From: Johnny Garrett MD  Sent: 2024   9:43 AM CDT  To: [facility] Endoscopy Schedulers    Procedure: EGD    Diagnosis: Family history of stomach cancer dad with stomach cancer    Procedure Timin-12 weeks    #If within 4 weeks selected, please liliana as high priority#    #If greater than 12 weeks, please select "5-12 weeks" and delay sending until 3 months prior to requested date#     Location: Any Site    Additional Scheduling Information: No scheduling concerns    Prep Specifications:Standard prep    Is the patient taking a GLP-1 Agonist:no    Have you attached a patient to this message: yes  "

## 2024-09-26 ENCOUNTER — PATIENT MESSAGE (OUTPATIENT)
Dept: GASTROENTEROLOGY | Facility: CLINIC | Age: 74
End: 2024-09-26
Payer: MEDICARE

## 2024-10-01 ENCOUNTER — TELEPHONE (OUTPATIENT)
Dept: ENDOSCOPY | Facility: HOSPITAL | Age: 74
End: 2024-10-01
Payer: MEDICARE

## 2024-10-01 ENCOUNTER — PATIENT MESSAGE (OUTPATIENT)
Dept: OTHER | Facility: OTHER | Age: 74
End: 2024-10-01
Payer: MEDICARE

## 2024-10-01 ENCOUNTER — PATIENT MESSAGE (OUTPATIENT)
Dept: ADMINISTRATIVE | Facility: OTHER | Age: 74
End: 2024-10-01
Payer: MEDICARE

## 2024-10-01 VITALS — BODY MASS INDEX: 29.35 KG/M2 | WEIGHT: 187 LBS | HEIGHT: 67 IN

## 2024-10-01 DIAGNOSIS — Z80.0 FAMILY HISTORY OF STOMACH CANCER: Primary | ICD-10-CM

## 2024-10-01 NOTE — TELEPHONE ENCOUNTER
Spoke to Anton to schedule procedure(s) Upper Endoscopy (EGD)       Physician to perform procedure(s) Dr. TASHI Garrett  Date of Procedure (s) 10/22/24  Arrival Time 2:30 PM  Time of Procedure(s) 3:30 PM   Location of Procedure(s) [address]  Type of Rx Prep sent to patient: Other  Instructions provided to patient via [facility]    Patient was informed on the following information and verbalized understanding. Screening questionnaire reviewed with patient and complete. If procedure requires anesthesia, a responsible adult needs to be present to accompany the patient home, patient cannot drive after receiving anesthesia. Appointment details are tentative, especially check-in time. Patient will receive a prep-op call 7 days prior to confirm check-in time for procedure. If applicable the patient should contact their pharmacy to verify Rx for procedure prep is ready for pick-up. Patient was advised to call the scheduling department at [phone] if pharmacy states no Rx is available. Patient was advised to call the endoscopy scheduling department if any questions or concerns arise.      [facility] Endoscopy Scheduling Department

## 2024-10-01 NOTE — TELEPHONE ENCOUNTER
"----- Message from Qiana sent at 2024  3:37 PM CDT -----    ----- Message -----  From: Johnny Garrett MD  Sent: 2024   9:43 AM CDT  To: [facility] Endoscopy Schedulers    Procedure: EGD    Diagnosis: Family history of stomach cancer dad with stomach cancer    Procedure Timin-12 weeks    #If within 4 weeks selected, please liliana as high priority#    #If greater than 12 weeks, please select "5-12 weeks" and delay sending until 3 months prior to requested date#     Location: Any Site    Additional Scheduling Information: No scheduling concerns    Prep Specifications:Standard prep    Is the patient taking a GLP-1 Agonist:no    Have you attached a patient to this message: yes  "

## 2024-10-01 NOTE — TELEPHONE ENCOUNTER
"----- Message from Qiana sent at 2024  3:37 PM CDT -----    ----- Message -----  From: Johnny Garrett MD  Sent: 2024   9:43 AM CDT  To: [facility] Endoscopy Schedulers    Procedure: EGD    Diagnosis: Family history of stomach cancer dad with stomach cancer    Procedure Timin-12 weeks    #If within 4 weeks selected, please liliana as high priority#    #If greater than 12 weeks, please select "5-12 weeks" and delay sending until 3 months prior to requested date#     Location: Any Site    Additional Scheduling Information: No scheduling concerns    Prep Specifications:Standard prep    Is the patient taking a GLP-1 Agonist:no    Have you attached a patient to this message: yes  "

## 2024-10-16 ENCOUNTER — TELEPHONE (OUTPATIENT)
Dept: ENDOSCOPY | Facility: HOSPITAL | Age: 74
End: 2024-10-16
Payer: MEDICARE

## 2024-10-18 ENCOUNTER — TELEPHONE (OUTPATIENT)
Dept: ENDOSCOPY | Facility: HOSPITAL | Age: 74
End: 2024-10-18
Payer: MEDICARE

## 2024-10-18 NOTE — TELEPHONE ENCOUNTER
Spoke to patient for pre-call to confirm scheduled Upper Endoscopy (EGD) and patient verbalized understanding of the following:       Date of Procedure (s)  verified 10/22/24  Arrival Time 2:30 PM verified.  Location of Procedure(s) [place] 4[address] Floor verified.  NPO status reinforced. Ok to continue clear liquids up until 2 hours prior to the Endoscopy procedure.     Pt confirmed receipt of prep instructions and Rx prep (if applicable).  Instructions provided to patient via [facility]  Pt confirmed ride home after procedure if procedure requires anesthesia.   Pre-call screening questionnaire reviewed and completed with patient.   Appointment details are tentative, including check-in time.  If the patient begins taking any blood thinning medications, injectable weight loss/diabetes medications (other than insulin), or Adipex (phentermine) patient was instructed to contact the endoscopy scheduling department as soon as possible.  Patient was advised to call the endoscopy scheduling department if any questions or concerns arise.       [facility] Endoscopy Scheduling Department

## 2024-10-18 NOTE — TELEPHONE ENCOUNTER
Contacted Pt for Endoscopy precall to confirm scheduled procedure(s) Upper Endoscopy (EGD) on 10/22/24. Pt did not answer. Left voicemail for pt to call Endoscopy Scheduling to confirm.

## 2024-10-21 ENCOUNTER — TELEPHONE (OUTPATIENT)
Dept: ENDOSCOPY | Facility: HOSPITAL | Age: 74
End: 2024-10-21
Payer: MEDICARE

## 2024-10-21 NOTE — TELEPHONE ENCOUNTER
Called and spoke to patient. Offered earlier arrival time of 1 PM, patient accepted. Instructions sent via portal. Patient verbalized an understanding.

## 2024-10-22 ENCOUNTER — ANESTHESIA (OUTPATIENT)
Dept: ENDOSCOPY | Facility: HOSPITAL | Age: 74
End: 2024-10-22
Payer: MEDICARE

## 2024-10-22 ENCOUNTER — HOSPITAL ENCOUNTER (OUTPATIENT)
Facility: HOSPITAL | Age: 74
Discharge: HOME | End: 2024-10-22
Attending: INTERNAL MEDICINE | Admitting: INTERNAL MEDICINE
Payer: MEDICARE

## 2024-10-22 ENCOUNTER — ANESTHESIA EVENT (OUTPATIENT)
Dept: ENDOSCOPY | Facility: HOSPITAL | Age: 74
End: 2024-10-22
Payer: MEDICARE

## 2024-10-22 VITALS
DIASTOLIC BLOOD PRESSURE: 68 MMHG | SYSTOLIC BLOOD PRESSURE: 116 MMHG | RESPIRATION RATE: 17 BRPM | HEART RATE: 67 BPM | TEMPERATURE: 98 F | OXYGEN SATURATION: 95 %

## 2024-10-22 DIAGNOSIS — K44.9 HIATAL HERNIA: Primary | ICD-10-CM

## 2024-10-22 DIAGNOSIS — Z80.0 FAMILY HISTORY OF STOMACH CANCER: ICD-10-CM

## 2024-10-22 DIAGNOSIS — K44.9 HIATAL HERNIA: ICD-10-CM

## 2024-10-22 PROCEDURE — 43251 EGD REMOVE LESION SNARE: CPT | Mod: ,,, | Performed by: INTERNAL MEDICINE

## 2024-10-22 PROCEDURE — 27201042 HC RETRIEVAL NET: Performed by: INTERNAL MEDICINE

## 2024-10-22 PROCEDURE — 88305 TISSUE EXAM BY PATHOLOGIST: CPT | Mod: 59 | Performed by: PATHOLOGY

## 2024-10-22 PROCEDURE — 63600175 PHARM REV CODE 636 W HCPCS: Performed by: NURSE ANESTHETIST, CERTIFIED REGISTERED

## 2024-10-22 PROCEDURE — 43251 EGD REMOVE LESION SNARE: CPT | Performed by: INTERNAL MEDICINE

## 2024-10-22 PROCEDURE — 88305 TISSUE EXAM BY PATHOLOGIST: CPT | Mod: 26,,, | Performed by: PATHOLOGY

## 2024-10-22 PROCEDURE — 43239 EGD BIOPSY SINGLE/MULTIPLE: CPT | Mod: 59,,, | Performed by: INTERNAL MEDICINE

## 2024-10-22 PROCEDURE — 37000008 HC ANESTHESIA 1ST 15 MINUTES: Performed by: INTERNAL MEDICINE

## 2024-10-22 PROCEDURE — 27201089 HC SNARE, DISP (ANY): Performed by: INTERNAL MEDICINE

## 2024-10-22 PROCEDURE — 37000009 HC ANESTHESIA EA ADD 15 MINS: Performed by: INTERNAL MEDICINE

## 2024-10-22 PROCEDURE — 43239 EGD BIOPSY SINGLE/MULTIPLE: CPT | Mod: 59 | Performed by: INTERNAL MEDICINE

## 2024-10-22 PROCEDURE — 27201012 HC FORCEPS, HOT/COLD, DISP: Performed by: INTERNAL MEDICINE

## 2024-10-22 RX ORDER — PANTOPRAZOLE SODIUM 40 MG/1
40 TABLET, DELAYED RELEASE ORAL
Qty: 60 TABLET | Refills: 0 | Status: SHIPPED | OUTPATIENT
Start: 2024-10-22 | End: 2024-10-23

## 2024-10-22 RX ORDER — PROPOFOL 10 MG/ML
VIAL (ML) INTRAVENOUS CONTINUOUS PRN
Status: DISCONTINUED | OUTPATIENT
Start: 2024-10-22 | End: 2024-10-22

## 2024-10-22 RX ORDER — LIDOCAINE HYDROCHLORIDE 20 MG/ML
INJECTION INTRAVENOUS
Status: DISCONTINUED | OUTPATIENT
Start: 2024-10-22 | End: 2024-10-22

## 2024-10-22 RX ORDER — PROPOFOL 10 MG/ML
VIAL (ML) INTRAVENOUS
Status: DISCONTINUED | OUTPATIENT
Start: 2024-10-22 | End: 2024-10-22

## 2024-10-22 RX ORDER — SODIUM CHLORIDE 9 MG/ML
INJECTION, SOLUTION INTRAVENOUS CONTINUOUS
Status: CANCELLED | OUTPATIENT
Start: 2024-10-22

## 2024-10-22 RX ADMIN — LIDOCAINE HYDROCHLORIDE 100 MG: 20 INJECTION INTRAVENOUS at 02:10

## 2024-10-22 RX ADMIN — PROPOFOL 200 MCG/KG/MIN: 10 INJECTION, EMULSION INTRAVENOUS at 02:10

## 2024-10-22 RX ADMIN — GLYCOPYRROLATE 0.2 MG: 0.2 INJECTION, SOLUTION INTRAMUSCULAR; INTRAVENOUS at 02:10

## 2024-10-22 RX ADMIN — PROPOFOL 70 MG: 10 INJECTION, EMULSION INTRAVENOUS at 02:10

## 2024-10-22 NOTE — ANESTHESIA POSTPROCEDURE EVALUATION
Anesthesia Post Evaluation    Patient: Anton Christiansen    Procedure(s) Performed: Procedure(s) (LRB):  EGD (ESOPHAGOGASTRODUODENOSCOPY) (N/A)    Final Anesthesia Type: general      Patient location during evaluation: PACU  Patient participation: Yes- Able to Participate  Level of consciousness: awake and alert and oriented  Post-procedure vital signs: reviewed and stable  Pain management: adequate  Airway patency: patent    PONV status at discharge: No PONV  Anesthetic complications: no      Cardiovascular status: stable  Respiratory status: unassisted, spontaneous ventilation and room air  Hydration status: euvolemic  Follow-up not needed.              Vitals Value Taken Time   /63 10/22/24 1510   Temp 36.7 °C (98 °F) 10/22/24 1455   Pulse 71 10/22/24 1510   Resp 16 10/22/24 1510   SpO2 94 % 10/22/24 1510         No case tracking events are documented in the log.      Pain/Pura Score: Pura Score: 10 (10/22/2024  3:10 PM)

## 2024-10-22 NOTE — TRANSFER OF CARE
Anesthesia Transfer of Care Note    Patient: Anton Christiansen    Procedure(s) Performed: Procedure(s) (LRB):  EGD (ESOPHAGOGASTRODUODENOSCOPY) (N/A)    Patient location: PACU    Anesthesia Type: general    Transport from OR: Transported from OR on room air with adequate spontaneous ventilation    Post pain: adequate analgesia    Post assessment: no apparent anesthetic complications and tolerated procedure well    Post vital signs: stable    Level of consciousness: responds to stimulation    Nausea/Vomiting: no vomiting    Complications: none    Transfer of care protocol was followed    Last vitals: Visit Vitals  BP (!) 97/55 (BP Location: Left arm, Patient Position: Lying)   Pulse 69   Temp 36.7 °C (98 °F) (Temporal)   Resp 17   SpO2 (!) 94%

## 2024-10-22 NOTE — ANESTHESIA PREPROCEDURE EVALUATION
10/22/2024  Anton Christiansen is a 74 y.o., male.    Procedure: EGD (ESOPHAGOGASTRODUODENOSCOPY) (Abdomen) - prep inst sent to pt via portal / reyna pt /  10/16-lvm for precall-Kpvt  10/18-precall complete-Kpvt  10/21 pt aware of earlier arrival time of 1pm-ml   Anesthesia type: Choice   Diagnosis: Family history of stomach cancer [Z80.0]       Pre-operative evaluation for Procedure(s) (LRB):  EGD (ESOPHAGOGASTRODUODENOSCOPY) (N/A)      No diagnosis found.    Review of patient's allergies indicates:   Allergen Reactions    Cephalexin Diarrhea    Ace inhibitors Other (See Comments)     Cough    Cialis [tadalafil] Other (See Comments)     Muscle pain      Proscar [finasteride] Other (See Comments)     Decreased libido     Vicodin [hydrocodone-acetaminophen] Nausea And Vomiting       Medications Prior to Admission   Medication Sig Dispense Refill Last Dose/Taking    ascorbic acid, vitamin C, (VITAMIN C) 500 MG tablet Take 500 mg by mouth once daily.       atorvastatin (LIPITOR) 40 MG tablet Take 1 tablet (40 mg total) by mouth once daily. 90 tablet 3     carvediloL (COREG) 12.5 MG tablet Take 1 tablet (12.5 mg total) by mouth daily with breakfast. 90 tablet 3     cholecalciferol, vitamin D3, 3,000 unit Tab Take 1 tablet by mouth once daily.       dutasteride (AVODART) 0.5 mg capsule TAKE ONE CAPSULE BY MOUTH EVERY DAY 90 capsule 3     fish oil-omega-3 fatty acids 300-1,000 mg capsule Take 5 g by mouth daily with dinner or evening meal.       ibuprofen (ADVIL,MOTRIN) 800 MG tablet Take 1 tablet (800 mg total) by mouth 3 (three) times daily as needed for Pain. 30 tablet 0     meclizine (ANTIVERT) 12.5 mg tablet Take 1 tablet (12.5 mg total) by mouth 3 (three) times daily as needed for Dizziness. 24 tablet 0     olmesartan-hydrochlorothiazide (BENICAR HCT) 20-12.5 mg per tablet Take 1 tablet by mouth once  daily. 90 tablet 3     saw palmetto 80 MG capsule Take 80 mg by mouth 2 (two) times daily.       tamsulosin (FLOMAX) 0.4 mg Cap Take 1 capsule (0.4 mg total) by mouth once daily. 90 capsule 0     vitamin E 100 UNIT capsule Take 100 Units by mouth once daily.               No current facility-administered medications on file prior to encounter.     Current Outpatient Medications on File Prior to Encounter   Medication Sig Dispense Refill    ascorbic acid, vitamin C, (VITAMIN C) 500 MG tablet Take 500 mg by mouth once daily.      atorvastatin (LIPITOR) 40 MG tablet Take 1 tablet (40 mg total) by mouth once daily. 90 tablet 3    carvediloL (COREG) 12.5 MG tablet Take 1 tablet (12.5 mg total) by mouth daily with breakfast. 90 tablet 3    cholecalciferol, vitamin D3, 3,000 unit Tab Take 1 tablet by mouth once daily.      dutasteride (AVODART) 0.5 mg capsule TAKE ONE CAPSULE BY MOUTH EVERY DAY 90 capsule 3    fish oil-omega-3 fatty acids 300-1,000 mg capsule Take 5 g by mouth daily with dinner or evening meal.      ibuprofen (ADVIL,MOTRIN) 800 MG tablet Take 1 tablet (800 mg total) by mouth 3 (three) times daily as needed for Pain. 30 tablet 0    meclizine (ANTIVERT) 12.5 mg tablet Take 1 tablet (12.5 mg total) by mouth 3 (three) times daily as needed for Dizziness. 24 tablet 0    olmesartan-hydrochlorothiazide (BENICAR HCT) 20-12.5 mg per tablet Take 1 tablet by mouth once daily. 90 tablet 3    saw palmetto 80 MG capsule Take 80 mg by mouth 2 (two) times daily.      tamsulosin (FLOMAX) 0.4 mg Cap Take 1 capsule (0.4 mg total) by mouth once daily. 90 capsule 0    vitamin E 100 UNIT capsule Take 100 Units by mouth once daily.         Past Medical History:  No date: Allergy      Comment:  seasonal  04/10/2017: Arteriosclerotic coronary artery disease  07/14/2015: Basal cell carcinoma      Comment:  right dorsal hand  05/06/2024: Basal cell carcinoma (BCC) of left ala nasi      Comment:  L nasal ala  04/2014: BCC (basal cell  "carcinoma of skin)      Comment:  nasal tip s/p Mohs with forehead flap  No date: Cataract  No date: Family history of colon cancer  No date: Hyperlipidemia  No date: Hypertension  No date: Meningioma  04/24/2018: Multiple thyroid nodules  No date: Nephrolithiasis  No date: ASHA on CPAP  No date: Prostatitis, chronic  06/19/2014: Special screening for malignant neoplasms, colon  09/2020: Vertigo      Comment:  Likely BPPV    Past Surgical History:   Procedure Laterality Date    CATARACT EXTRACTION W/  INTRAOCULAR LENS IMPLANT  12/20/2012    OD    COLONOSCOPY  060509    COLONOSCOPY N/A 05/20/2019    Procedure: COLONOSCOPY;  Surgeon: Artemio Greer MD;  Location: [facility] ENDO (4TH FLR);  Service: Endoscopy;  Laterality: N/A;    COLONOSCOPY N/A 9/23/2024    Procedure: COLONOSCOPY;  Surgeon: Johnny Garrett MD;  Location: [facility] ENDO (4TH FLR);  Service: Endoscopy;  Laterality: N/A;  6/28/2024 ref Dr. Сергей Landis, nausea with anesthesia, PEG instr via portal- [facility]  9/17 precall complete.  Pt aware MD and time of procedure will be determined the day of.  olu  9/20-pt moved from extra screeing to Dr. Garrett-vt    Division and Inset  04/29/2014    D&I Forehead Flap    EYE SURGERY      Pilonoidal Cyst      SKIN SURGERY  MOHS Repair    nose    TONSILLECTOMY         Social History     Tobacco Use   Smoking Status Never   Smokeless Tobacco Never   Tobacco Comments    Never smoked       Social History     Substance and Sexual Activity   Alcohol Use No       Physical Activity: Sufficiently Active (1/24/2024)    Exercise Vital Sign     Days of Exercise per Week: 3 days     Minutes of Exercise per Session: 60 min       No birth history on file.  No results for input(s): "HCT" in the last 72 hours.  No results for input(s): "PLT" in the last 72 hours.  No results for input(s): "K" in the last 72 hours.  No results for input(s): "CREATININE" in the last 72 hours.  No results for input(s): "GLU" in the last 72 hours.  No results for " "input(s): "PT" in the last 72 hours.  There were no vitals filed for this visit.                    Pre-op Assessment          Review of Systems  Anesthesia Hx:  No problems with previous Anesthesia                Hematology/Oncology:  Hematology Normal   Oncology Normal                               Oncology Comments: Minor skin cured with excision     Cardiovascular:     Denies Pacemaker. Hypertension, well controlled   Denies MI.     Denies CABG/stent.    Denies Angina.         2 miles often+                           Pulmonary:    Denies COPD.  Denies Asthma.   Denies Shortness of breath.  Sleep Apnea, CPAP                Renal/:   Denies Chronic Renal Disease. renal calculi (no sequelae)               Hepatic/GI:      Denies Liver Disease.               Neurological:  Denies TIA.  Denies CVA.    Denies Seizures.                                Endocrine:  Denies Diabetes.               Physical Exam  General: Well nourished, Cooperative, Alert and Oriented    Airway:  Mallampati: II   Mouth Opening: Normal  TM Distance: Normal  Tongue: Normal  Neck ROM: Normal ROM        Anesthesia Plan  Type of Anesthesia, risks & benefits discussed:    Anesthesia Type: Gen Natural Airway  Intra-op Monitoring Plan: Standard ASA Monitors  Post Op Pain Control Plan: IV/PO Opioids PRN  Induction:  IV  Informed Consent: Informed consent signed with the Patient and all parties understand the risks and agree with anesthesia plan.  All questions answered.   ASA Score: 2  Day of Surgery Review of History & Physical: H&P Update referred to the surgeon/provider.    Ready For Surgery From Anesthesia Perspective.     .      "

## 2024-10-22 NOTE — [employer] PATIENT INSTRUCTIONS
Discharge Summary/Instructions after an Endoscopic Procedure  Patient Name: Anton Christiansen  Patient MRN: [id number]  Patient YOB: 1950  Tuesday, October 22, 2024  Johnny Garrett MD  Dear patient,  As a result of recent federal legislation (The Federal Cures Act), you may   receive lab or pathology results from your procedure in your [facility]   account before your physician is able to contact you. Your physician or   their representative will relay the results to you with their   recommendations at their soonest availability.  Thank you,  RESTRICTIONS:  During your procedure today, you received medications for sedation.  These   medications may affect your judgment, balance and coordination.  Therefore,   for 24 hours, you have the following restrictions:   - DO NOT drive a car, operate machinery, make legal/financial decisions,   sign important papers or drink alcohol.    ACTIVITY:  Today: no heavy lifting, straining or running due to procedural   sedation/anesthesia.  The following day: return to full activity including work.  DIET:  Eat and drink normally unless instructed otherwise.     TREATMENT FOR COMMON SIDE EFFECTS:  - Mild abdominal pain, nausea, belching, bloating or excessive gas:  rest,   eat lightly and use a heating pad.  - Sore Throat: treat with throat lozenges and/or gargle with warm salt   water.  - Because air was used during the procedure, expelling large amounts of air   from your rectum or belching is normal.  - If a bowel prep was taken, you may not have a bowel movement for 1-3 days.    This is normal.  SYMPTOMS TO WATCH FOR AND REPORT TO YOUR PHYSICIAN:  1. Abdominal pain or bloating, other than gas cramps.  2. Chest pain.  3. Back pain.  4. Signs of infection such as: chills or fever occurring within 24 hours   after the procedure.  5. Rectal bleeding, which would show as bright red, maroon, or black stools.   (A tablespoon of blood from the rectum is not serious, especially  if   hemorrhoids are present.)  6. Vomiting.  7. Weakness or dizziness.  GO DIRECTLY TO THE NEAREST EMERGENCY ROOM IF YOU HAVE ANY OF THE FOLLOWING:      Difficulty breathing              Chills and/or fever over 101 F   Persistent vomiting and/or vomiting blood   Severe abdominal pain   Severe chest pain   Black, tarry stools   Bleeding- more than one tablespoon   Any other symptom or condition that you feel may need urgent attention  Your doctor recommends these additional instructions:  If any biopsies were taken, your doctors clinic will contact you in 1 to 2   weeks with any results.  - Discharge patient to home.   - Follow an antireflux regimen.   - Await pathology results.   - Telephone endoscopist for pathology results in 3 weeks.   - Repeat upper endoscopy in 1 year for surveillance of multiple polyps.   - For the next 4 weeks Consider avoiding all non-steroidal anti-inflammatory   drugs (Mobic, ibuprofen, naproxen, etc.), unless needed for cardiovascular   protection.  Recommend you discuss with your prescribing doctor (of your   aspirin) to see if cardiovascular benefits of your aspirin outweigh the   risks of GI bleeding. O.K. to take aspirin if needed for cardiovascular   protection.  - For the next 8 weeks Use Protonix 40 mg once daily (or any other full   strength proton pump inhibitor) - best taken 45 minutes before your first   protein containing meal. Rx sent- Return to primary care physician.   - The findings and recommendations were discussed with the patient.  For questions, problems or results please call your physician - Johnny Garrett MD at Work:  [phone].  [facility], EMERGENCY ROOM PHONE NUMBER: [phone]  IF A COMPLICATION OR EMERGENCY SITUATION ARISES AND YOU ARE UNABLE TO REACH   YOUR PHYSICIAN - GO DIRECTLY TO THE EMERGENCY ROOM.  Johnny Garrett MD  10/22/2024 2:55:36 PM  This report has been verified and signed electronically.  Dear patient,  As a result of  recent federal legislation (The Federal Cures Act), you may   receive lab or pathology results from your procedure in your [facility]   account before your physician is able to contact you. Your physician or   their representative will relay the results to you with their   recommendations at their soonest availability.  Thank you,  [employer]ATION

## 2024-10-22 NOTE — H&P
[facility]  History & Physical    Subjective:      Chief Complaint/Reason for Admission:     EGD for family history of stomach cancer    Anton Christiansen is a 74 y.o. male.    Past Medical History:   Diagnosis Date    Allergy     seasonal    Arteriosclerotic coronary artery disease 04/10/2017    Basal cell carcinoma 07/14/2015    right dorsal hand    Basal cell carcinoma (BCC) of left ala nasi 05/06/2024    L nasal ala    BCC (basal cell carcinoma of skin) 04/2014    nasal tip s/p Mohs with forehead flap    Cataract     Family history of colon cancer     Hyperlipidemia     Hypertension     Meningioma     Multiple thyroid nodules 04/24/2018    Nephrolithiasis     ASHA on CPAP     Prostatitis, chronic     Special screening for malignant neoplasms, colon 06/19/2014    Vertigo 09/2020    Likely BPPV     Past Surgical History:   Procedure Laterality Date    CATARACT EXTRACTION W/  INTRAOCULAR LENS IMPLANT  12/20/2012    OD    COLONOSCOPY  060509    COLONOSCOPY N/A 05/20/2019    Procedure: COLONOSCOPY;  Surgeon: Artemio Greer MD;  Location: [facility] ENDO ([address] [place] [address]);  Service: Endoscopy;  Laterality: N/A;    COLONOSCOPY N/A 9/23/2024    Procedure: COLONOSCOPY;  Surgeon: Johnny Garrett MD;  [facility]  Service: Endoscopy;  Laterality: N/A;  6/28/2024 ref Dr. Сергей Landis, nausea with anesthesia, PEG instr via portal- [facility]  9/17 precall complete.  Pt aware MD and time of procedure will be determined the day of.  olu  9/20-pt moved from extra screeing to Dr. Garrett-vt    Division and Inset  04/29/2014    D&I Forehead Flap    EYE SURGERY      Pilonoidal Cyst      SKIN SURGERY  MOHS Repair    nose    TONSILLECTOMY       Social History     Tobacco Use    Smoking status: Never    Smokeless tobacco: Never    Tobacco comments:     Never smoked   Substance Use Topics    Alcohol use: No    Drug use: Never       PTA Medications   Medication Sig    ascorbic acid, vitamin C, (VITAMIN C) 500 MG  tablet Take 500 mg by mouth once daily.    atorvastatin (LIPITOR) 40 MG tablet Take 1 tablet (40 mg total) by mouth once daily.    carvediloL (COREG) 12.5 MG tablet Take 1 tablet (12.5 mg total) by mouth daily with breakfast.    cholecalciferol, vitamin D3, 3,000 unit Tab Take 1 tablet by mouth once daily.    dutasteride (AVODART) 0.5 mg capsule TAKE ONE CAPSULE BY MOUTH EVERY DAY    fish oil-omega-3 fatty acids 300-1,000 mg capsule Take 5 g by mouth daily with dinner or evening meal.    olmesartan-hydrochlorothiazide (BENICAR HCT) 20-12.5 mg per tablet Take 1 tablet by mouth once daily.    vitamin E 100 UNIT capsule Take 100 Units by mouth once daily.    ibuprofen (ADVIL,MOTRIN) 800 MG tablet Take 1 tablet (800 mg total) by mouth 3 (three) times daily as needed for Pain.    meclizine (ANTIVERT) 12.5 mg tablet Take 1 tablet (12.5 mg total) by mouth 3 (three) times daily as needed for Dizziness.    saw palmetto 80 MG capsule Take 80 mg by mouth 2 (two) times daily.    tamsulosin (FLOMAX) 0.4 mg Cap Take 1 capsule (0.4 mg total) by mouth once daily.     Review of patient's allergies indicates:   Allergen Reactions    Cephalexin Diarrhea    Ace inhibitors Other (See Comments)     Cough    Cialis [tadalafil] Other (See Comments)     Muscle pain      Proscar [finasteride] Other (See Comments)     Decreased libido     Vicodin [hydrocodone-acetaminophen] Nausea And Vomiting        Review of Systems   Constitutional:  Negative for fever.       Objective:      Vital Signs (Most Recent)  Temp: 98.4 °F (36.9 °C) (10/22/24 1312)  Pulse: 62 (10/22/24 1312)  Resp: 18 (10/22/24 1312)  BP: 133/81 (10/22/24 1312)  SpO2: 97 % (10/22/24 1312)    Vital Signs Range (Last 24H):  Temp:  [98.4 °F (36.9 °C)]   Pulse:  [62]   Resp:  [18]   BP: (133)/(81)   SpO2:  [97 %]     Physical Exam  Cardiovascular:      Rate and Rhythm: Normal rate.   Pulmonary:      Effort: Pulmonary effort is normal.   Neurological:      Mental Status: He is alert  and oriented to person, place, and time.             Assessment:      EGD for family history of stomach cancer      Plan:    EGD for family history of stomach cancer

## 2024-10-23 DIAGNOSIS — K44.9 HIATAL HERNIA: ICD-10-CM

## 2024-10-23 RX ORDER — PANTOPRAZOLE SODIUM 40 MG/1
40 TABLET, DELAYED RELEASE ORAL
Qty: 90 TABLET | Refills: 0 | Status: SHIPPED | OUTPATIENT
Start: 2024-10-23 | End: 2025-01-21

## 2024-10-23 RX ORDER — PANTOPRAZOLE SODIUM 40 MG/1
40 TABLET, DELAYED RELEASE ORAL
Qty: 60 TABLET | Refills: 0 | Status: SHIPPED | OUTPATIENT
Start: 2024-10-23 | End: 2024-10-23

## 2024-10-24 LAB
FINAL PATHOLOGIC DIAGNOSIS: NORMAL
GROSS: NORMAL
Lab: NORMAL

## 2024-10-28 ENCOUNTER — PATIENT MESSAGE (OUTPATIENT)
Dept: DERMATOLOGY | Facility: CLINIC | Age: 74
End: 2024-10-28
Payer: MEDICARE

## 2024-10-29 ENCOUNTER — PATIENT MESSAGE (OUTPATIENT)
Dept: GASTROENTEROLOGY | Facility: CLINIC | Age: 74
End: 2024-10-29
Payer: MEDICARE

## 2024-11-06 ENCOUNTER — OFFICE VISIT (OUTPATIENT)
Dept: INTERNAL MEDICINE | Facility: CLINIC | Age: 74
End: 2024-11-06
Payer: MEDICARE

## 2024-11-06 VITALS
BODY MASS INDEX: 28.89 KG/M2 | SYSTOLIC BLOOD PRESSURE: 130 MMHG | DIASTOLIC BLOOD PRESSURE: 70 MMHG | OXYGEN SATURATION: 98 % | HEIGHT: 67 IN | HEART RATE: 60 BPM | WEIGHT: 184.06 LBS

## 2024-11-06 DIAGNOSIS — I10 PRIMARY HYPERTENSION: Chronic | ICD-10-CM

## 2024-11-06 DIAGNOSIS — E04.2 MULTIPLE THYROID NODULES: ICD-10-CM

## 2024-11-06 DIAGNOSIS — E21.3 HYPERPARATHYROIDISM: ICD-10-CM

## 2024-11-06 DIAGNOSIS — D32.9 MENINGIOMA: ICD-10-CM

## 2024-11-06 DIAGNOSIS — I70.0 AORTIC ATHEROSCLEROSIS: ICD-10-CM

## 2024-11-06 DIAGNOSIS — N41.1 CHRONIC PROSTATITIS: ICD-10-CM

## 2024-11-06 DIAGNOSIS — G47.33 OSA (OBSTRUCTIVE SLEEP APNEA): ICD-10-CM

## 2024-11-06 DIAGNOSIS — E78.5 HYPERLIPIDEMIA, UNSPECIFIED HYPERLIPIDEMIA TYPE: Chronic | ICD-10-CM

## 2024-11-06 DIAGNOSIS — Z00.00 ENCOUNTER FOR PREVENTIVE HEALTH EXAMINATION: Primary | ICD-10-CM

## 2024-11-06 PROBLEM — I27.9 CHRONIC PULMONARY HEART DISEASE: Status: ACTIVE | Noted: 2024-11-06

## 2024-11-06 PROBLEM — Z12.11 COLON CANCER SCREENING: Status: RESOLVED | Noted: 2019-05-20 | Resolved: 2024-11-06

## 2024-11-06 PROBLEM — I27.9 CHRONIC PULMONARY HEART DISEASE: Status: RESOLVED | Noted: 2024-11-06 | Resolved: 2024-11-06

## 2024-11-06 PROCEDURE — 99999 PR PBB SHADOW E&M-EST. PATIENT-LVL IV: CPT | Mod: PBBFAC,,, | Performed by: NURSE PRACTITIONER

## 2024-11-06 PROCEDURE — 99214 OFFICE O/P EST MOD 30 MIN: CPT | Mod: PBBFAC | Performed by: NURSE PRACTITIONER

## 2024-11-06 RX ORDER — MULTIVITAMIN
1 TABLET ORAL DAILY
COMMUNITY

## 2024-11-06 NOTE — PROGRESS NOTES
"  Anton Christiansen presented for a follow-up Medicare AWV today. The following components were reviewed and updated:    Medical history  Family History  Social history  Allergies and Current Medications  Health Risk Assessment  Health Maintenance  Care Team    **See Completed Assessments for Annual Wellness visit with in the encounter summary    The following assessments were completed:  Depression Screening  Cognitive function Screening    Timed Get Up Test  Whisper Test      Opioid documentation:      Patient does not have a current opioid prescription.          Vitals:    11/06/24 1002   BP: 130/70   BP Location: Right arm   Pulse: 60   SpO2: 98%   Weight: 83.5 kg (184 lb 1.4 oz)   Height: 5' 7" (1.702 m)     Body mass index is 28.83 kg/m².       Physical Exam  Vitals and nursing note reviewed.   Constitutional:       Appearance: He is well-developed.   HENT:      Head: Normocephalic.   Cardiovascular:      Rate and Rhythm: Normal rate and regular rhythm.      Heart sounds: Normal heart sounds. No murmur heard.  Pulmonary:      Effort: Pulmonary effort is normal.      Breath sounds: Normal breath sounds.   Abdominal:      General: Bowel sounds are normal.      Palpations: Abdomen is soft.   Musculoskeletal:         General: Normal range of motion.   Skin:     General: Skin is warm and dry.   Neurological:      Mental Status: He is alert and oriented to person, place, and time.      Motor: No abnormal muscle tone.   Psychiatric:         Mood and Affect: Mood normal.            Diagnoses and health risks identified today and associated recommendations/orders:    1. Encounter for preventive health examination  Here for Health Risk Assessment/Annual Wellness Visit.  Health maintenance reviewed and updated. Follow up in one year.     2. Primary hypertension  Chronic, at goal on current medications. Followed by PCP.     3. Aortic atherosclerosis  Chronic, stable on current medications. Noted CT Urogram 6/26/23. " Followed by PCP.     4. Hyperlipidemia, unspecified hyperlipidemia type  Chronic, at goal on current medication. Followed by PCP.     5. Meningioma  Chronic, stable. Followed by Neurosurgery, PCP.     6. Hyperparathyroidism  Chronic, stable on current medications. Followed by PCP.     7. Multiple thyroid nodules  Chronic, stable. TSH WNO. Followed by PCP.     8. ASHA (obstructive sleep apnea)  Chronic, stable with CPAP. Followed by PCP, Sleep Medicine.     9. Chronic prostatitis  Chronic, stable. Followed by Urology, PCP.     Provided Anton with a 5-10 year written screening schedule and personal prevention plan. Recommendations were developed using the USPSTF age appropriate recommendations. Education, counseling, and referrals were provided as needed.  After Visit Summary printed and given to patient which includes a list of additional screenings\tests needed.    Follow up in about 7 months (around 6/17/2025).with PCP      Ara Logan NP

## 2024-11-06 NOTE — PATIENT INSTRUCTIONS
Counseling and Referral of Other Preventative  (Italic type indicates deductible and co-insurance are waived)    Patient Name: Anton Christiansen  Today's Date: 11/6/2024    Health Maintenance       Date Due Completion Date    COVID-19 Vaccine (3 - Pfizer risk series) 02/11/2021 1/14/2021    RSV Vaccine (Age 60+ and Pregnant patients) (1 - 1-dose 75+ series) Never done ---    Lipid Panel 06/10/2029 6/10/2024    TETANUS VACCINE 04/04/2033 4/4/2023        No orders of the defined types were placed in this encounter.      The following information is provided to all patients.  This information is to help you find resources for any of the problems found today that may be affecting your health:                  Living healthy guide: www.[facility].[place].gov      Understanding Diabetes: www.diabetes.org      Eating healthy: www.cdc.gov/healthyweight      [facility] home safety checklist: www.cdc.gov/steadi/patient.html      Agency on Aging: www.goea.[place].gov      Alcoholics anonymous (AA): www.aa.org      Physical Activity: www.rowan.nih.gov/yj3lafv      Tobacco use: www.quitwithusla.org

## 2024-11-17 ENCOUNTER — PATIENT MESSAGE (OUTPATIENT)
Dept: GASTROENTEROLOGY | Facility: CLINIC | Age: 74
End: 2024-11-17
Payer: MEDICARE

## 2024-11-25 ENCOUNTER — OFFICE VISIT (OUTPATIENT)
Dept: DERMATOLOGY | Facility: CLINIC | Age: 74
End: 2024-11-25
Payer: MEDICARE

## 2024-11-25 DIAGNOSIS — D48.5 NEOPLASM OF UNCERTAIN BEHAVIOR OF SKIN: Primary | ICD-10-CM

## 2024-11-25 DIAGNOSIS — D18.01 CHERRY ANGIOMA: ICD-10-CM

## 2024-11-25 DIAGNOSIS — L73.8 SEBACEOUS HYPERPLASIA: ICD-10-CM

## 2024-11-25 DIAGNOSIS — L81.4 LENTIGO: ICD-10-CM

## 2024-11-25 DIAGNOSIS — L57.0 ACTINIC KERATOSIS: ICD-10-CM

## 2024-11-25 DIAGNOSIS — D22.9 MULTIPLE BENIGN NEVI: ICD-10-CM

## 2024-11-25 DIAGNOSIS — Z12.83 SCREENING EXAM FOR SKIN CANCER: ICD-10-CM

## 2024-11-25 DIAGNOSIS — L82.1 SEBORRHEIC KERATOSES: ICD-10-CM

## 2024-11-25 DIAGNOSIS — Z85.828 HISTORY OF NONMELANOMA SKIN CANCER: ICD-10-CM

## 2024-11-25 PROCEDURE — 11102 TANGNTL BX SKIN SINGLE LES: CPT | Mod: PBBFAC | Performed by: STUDENT IN AN ORGANIZED HEALTH CARE EDUCATION/TRAINING PROGRAM

## 2024-11-25 PROCEDURE — 17003 DESTRUCT PREMALG LES 2-14: CPT | Mod: 59,PBBFAC | Performed by: STUDENT IN AN ORGANIZED HEALTH CARE EDUCATION/TRAINING PROGRAM

## 2024-11-25 PROCEDURE — 17000 DESTRUCT PREMALG LESION: CPT | Mod: 59,PBBFAC | Performed by: STUDENT IN AN ORGANIZED HEALTH CARE EDUCATION/TRAINING PROGRAM

## 2024-11-25 PROCEDURE — 99999 PR PBB SHADOW E&M-EST. PATIENT-LVL III: CPT | Mod: PBBFAC,,, | Performed by: STUDENT IN AN ORGANIZED HEALTH CARE EDUCATION/TRAINING PROGRAM

## 2024-11-25 PROCEDURE — 99213 OFFICE O/P EST LOW 20 MIN: CPT | Mod: PBBFAC | Performed by: STUDENT IN AN ORGANIZED HEALTH CARE EDUCATION/TRAINING PROGRAM

## 2024-11-25 NOTE — PROGRESS NOTES
Subjective:      Patient ID:  Anton Christiansen is a 74 y.o. male who presents for No chief complaint on file.    Pt here for new spot of concern on face- not due for TBSE yet     Pt has h/o NMSC  - BCC on R dorsal hand, nasal tip, and L nasal ala     This is a high risk patient here to check for the development of new lesions.    Pt concerned about new bump on R side of nose. Pt also concerned about possible pre-cancer noted by Dr. Haynes on L side of forehead.       Problem List Items Addressed This Visit    None  Visit Diagnoses       Neoplasm of uncertain behavior of skin    -  Primary    Relevant Orders    Specimen to Pathology, Dermatology    Actinic keratosis        Lentigo        Sanabria angioma        Seborrheic keratoses        Multiple benign nevi        Sebaceous hyperplasia        History of nonmelanoma skin cancer        Screening exam for skin cancer                Review of Systems    Objective:   Physical Exam   Constitutional: He appears well-developed and well-nourished. No distress.   Neurological: He is alert and oriented to person, place, and time. He is not disoriented.   Psychiatric: He has a normal mood and affect. He is not agitated.   Skin:   Areas Examined (abnormalities noted in diagram):   Scalp / Hair Palpated and Inspected  Head / Face Inspection Performed  Neck Inspection Performed  Chest / Axilla Inspection Performed  Abdomen Inspection Performed  Back Inspection Performed  RUE Inspected  LUE Inspection Performed  Nails and Digits Inspection Performed                     Diagram Legend     Erythematous scaling macule/papule c/w actinic keratosis       Vascular papule c/w angioma      Pigmented verrucoid papule/plaque c/w seborrheic keratosis      Yellow umbilicated papule c/w sebaceous hyperplasia      Irregularly shaped tan macule c/w lentigo     1-2 mm smooth white papules consistent with Milia      Movable subcutaneous cyst with punctum c/w epidermal inclusion cyst       Subcutaneous movable cyst c/w pilar cyst      Firm pink to brown papule c/w dermatofibroma      Pedunculated fleshy papule(s) c/w skin tag(s)      Evenly pigmented macule c/w junctional nevus     Mildly variegated pigmented, slightly irregular-bordered macule c/w mildly atypical nevus      Flesh colored to evenly pigmented papule c/w intradermal nevus       Pink pearly papule/plaque c/w basal cell carcinoma      Erythematous hyperkeratotic cursted plaque c/w SCC      Surgical scar with no sign of skin cancer recurrence      Open and closed comedones      Inflammatory papules and pustules      Verrucoid papule consistent consistent with wart     Erythematous eczematous patches and plaques     Dystrophic onycholytic nail with subungual debris c/w onychomycosis     Umbilicated papule    Erythematous-base heme-crusted tan verrucoid plaque consistent with inflamed seborrheic keratosis     Erythematous Silvery Scaling Plaque c/w Psoriasis     See annotation            Assessment / Plan:      Pathology Orders:       Normal Orders This Visit    Specimen to Pathology, Dermatology     Questions:    Procedure Type: Dermatology and skin neoplasms    Number of Specimens: 1    ------------------------: -------------------------    Spec 1 Procedure: Biopsy    Spec 1 Clinical Impression: isk, angiofibroma, seb hyperplasia, nmsc    Spec 1 Source: right nasal sidewall    Release to patient: Immediate    Release to patient:           Neoplasm of uncertain behavior of skin  -     Specimen to Pathology, Dermatology    Shave biopsy procedure note:    Shave biopsy performed after verbal consent including risk of infection, scar, recurrence, need for additional treatment of site. Area prepped with alcohol, anesthetized with approximately 1.0cc of 1% lidocaine with epinephrine. Lesional tissue shaved with razor blade. Hemostasis achieved with application of aluminum chloride followed by hyfrecation. No complications. Dressing applied. Wound  care explained.      Actinic keratosis  Cryosurgery Procedure Note    Verbal consent from the patient is obtained including, but not limited to, risk of hypopigmentation/hyperpigmentation, scar, recurrence of lesion. The patient is aware of the precancerous quality and need for treatment of these lesions. Liquid nitrogen cryosurgery is applied to the 5 actinic keratoses, as detailed in the physical exam, to produce a freeze injury. The patient is aware that blisters may form and is instructed on wound care with gentle cleansing and use of vaseline ointment to keep moist until healed. The patient is supplied a handout on cryosurgery and is instructed to call if lesions do not completely resolve.    Lentigo  Sanabria angioma  Seborrheic keratoses  Multiple benign nevi  Sebaceous hyperplasia  Reassurance given to patient. No treatment is necessary.   Treatment of benign, asymptomatic lesions may be considered cosmetic.    History of nonmelanoma skin cancer  Screening exam for skin cancer  Area(s) of previous NMSC evaluated with no signs of recurrence.    Upper body skin examination performed today including at least 6 points as noted in physical examination. No lesions suspicious for malignancy noted.    Recommend daily sun protection/avoidance and use of at least SPF 30, broad spectrum sunscreen (OTC drug).              Follow up in about 6 months (around 5/25/2025) for TBSE.

## 2024-11-25 NOTE — PATIENT INSTRUCTIONS
Shave Biopsy Wound Care    Your doctor has performed a shave biopsy today.  A band aid and vaseline ointment has been placed over the site.  This should remain in place for NO LONGER THAN 48 hours.  It is fine to remove the bandaid after 24 hours, if the area is no longer bleeding. It is recommended that you keep the area dry (do not wet)) for the first 24 hours.  After 24 hours, wash the area with warm soap and water and apply Vaseline jelly.  Many patients prefer to use Neosporin or Bacitracin ointment.  This is acceptable; however, know that you can develop an allergy to this medication even if you have used it safely for years.  It is important to keep the area moist.  Letting it dry out and get air slows healing time, and will worsen the scar.        If you notice increasing redness, tenderness, pain, or yellow drainage at the biopsy site, please notify your doctor.  These are signs of an infection.    If your biopsy site is bleeding, apply firm pressure for 15 minutes straight.  Repeat for another 15 minutes, if it is still bleeding.   If the surgical site continues to bleed, then please contact your doctor.      For [facility] users:   You will receive your biopsy results in [facility] as soon as they are available. Please be assured that your physician/provider will review your results and will then determine what further treatment, evaluation, or planning is required. You should be contacted by your physician's/provider's office within 5 business days of receiving your results; If not, please reach out to directly. This is one more way [facility] [facility] is putting you first.     [facility] [place] [address]/ [phone] FAX/ [email or web address]         CRYOSURGERY      Your doctor has used a method called cryosurgery to treat your skin condition. Cryosurgery refers to the use of very cold substances to treat a variety of skin conditions such as warts, pre-skin cancers, molluscum  contagiosum, sun spots, and several benign growths. The substance we use in cryosurgery is liquid nitrogen and is so cold (-195 degrees Celsius) that is burns when administered.     Following treatment in the office, the skin may immediately burn and become red. You may find the area around the lesion is affected as well. It is sometimes necessary to treat not only the lesion, but a small area of the surrounding normal skin to achieve a good response.     A blister, and even a blood filled blister, may form after treatment.   This is a normal response. If the blister is painful, it is acceptable to sterilize a needle and with rubbing alcohol and gently pop the blister. It is important that you gently wash the area with soap and warm water as the blister fluid may contain wart virus if a wart was treated. Do no remove the roof of the blister.     The area treated can take anywhere from 1-3 weeks to heal. Healing time depends on the kind skin lesion treated, the location, and how aggressively the lesion was treated. It is recommended that the areas treated are covered with Vaseline or bacitracin ointment and a band-aid. If a band-aid is not practical, just ointment applied several times per day will do. Keeping these areas moist will speed the healing time.    Treatment with liquid nitrogen can leave a scar. In dark skin, it may be a light or dark scar, in light skin it may be a white or pink scar. These will generally fade with time, but may never go away completely.     If you have any concerns after your treatment, please feel free to call the office.       [facility] [place] [address]/ [phone] FAX/ [email or web address]

## 2024-12-05 ENCOUNTER — TELEPHONE (OUTPATIENT)
Dept: DERMATOLOGY | Facility: CLINIC | Age: 74
End: 2024-12-05
Payer: MEDICARE

## 2024-12-05 NOTE — TELEPHONE ENCOUNTER
Patient is now scheduled for Mohs surgery on 2/5/25 at 7:40 AM for SCC right nasal sidewall. Referral from Dr. Pelon Wynne. Patient confirmed date and time.

## 2024-12-09 ENCOUNTER — HOSPITAL ENCOUNTER (OUTPATIENT)
Dept: RADIOLOGY | Facility: HOSPITAL | Age: 74
Discharge: HOME | End: 2024-12-09
Attending: UROLOGY
Payer: MEDICARE

## 2024-12-09 DIAGNOSIS — N28.89 OTHER SPECIFIED DISORDERS OF KIDNEY AND URETER: ICD-10-CM

## 2024-12-09 PROCEDURE — 25500020 PHARM REV CODE 255: Performed by: UROLOGY

## 2024-12-09 PROCEDURE — 74183 MRI ABD W/O CNTR FLWD CNTR: CPT | Mod: TC

## 2024-12-09 PROCEDURE — A9585 GADOBUTROL INJECTION: HCPCS | Performed by: UROLOGY

## 2024-12-09 PROCEDURE — 74183 MRI ABD W/O CNTR FLWD CNTR: CPT | Mod: 26,,, | Performed by: RADIOLOGY

## 2024-12-09 RX ORDER — GADOBUTROL 604.72 MG/ML
10 INJECTION INTRAVENOUS
Status: COMPLETED | OUTPATIENT
Start: 2024-12-09 | End: 2024-12-09

## 2024-12-09 RX ADMIN — GADOBUTROL 10 ML: 604.72 INJECTION INTRAVENOUS at 11:12

## 2024-12-09 NOTE — PROGRESS NOTES
"  Subjective:       Patient ID: Anton Christiansen is a 74 y.o. male.    Chief Complaint:  renal mass, kidney stones      History of Present Illness  HPI  Patient is a 74 y.o. male who is established to our clinic and was initially referred by their PCP, Dr. Landis for evaluation of renal cysts.   He has seen Dr. Quinteros in the past for BPH and elevated PSA.   His pain that he originally saw me for has essentially resolved.  He has intermittent discomfort that he rates as "not even a 1/10".     He previously underwent a CT renal stone study on 05/03/2024 which was independently reviewed today and shared with the patient and reveals numerous hypodensities of the right kidney with Hounsfield units greater than simple fluid which have increased in size compared to prior imaging.  Bilateral kidney stones also seen.  No hydronephrosis.     Patient returns today to review follow-up imaging for complex renal cysts.  Denies any problems.   MRI of the  abdomen with and without contrast from 12/09/2024 was independently reviewed today and shared with the patient and shows  slight increase in size of right lower pole complex cystic lesion since 2021.  Currently measuring 2.3 cm compared to 1.6 cm in July of 2021.  Otherwise stable complex cysts of the  right kidney.       Of note, his most recent PSA on 12/9/24 was 3.5 which is down slightly from 6/10/2024 when it was 3.9.  Corrected for finasteride this is 7.  He has previously undergone an MRI of the prostate, 2018 was his last MRI of the prostate.  This showed a PI-RADS 4 lesion.  He subsequently underwent a prostate biopsy on 11/20/2018.  This revealed no prostate cancer.    He is happy with his voiding on flomax and dutasteride.     No fh of prostate cancer.     Lab Results   Component Value Date    PSA 6.0 (H) 05/20/2014    PSA 5.81 (H) 06/04/2013    PSA 10.78 (H) 05/28/2013    PSA 5.55 (H) 12/05/2012    PSA 6.48 (H) 05/28/2012    PSA 4.08 (H) 05/24/2012    PSA 6.29 (H) " 03/29/2012    PSA 5.42 (H) 11/17/2011    PSA 4.8 (H) 09/12/2011    PSA 5.5 (H) 05/17/2011    PSA 5.6 (H) 03/07/2011    PSA 5.1 (H) 07/12/2010    PSA 7.0 (H) 05/13/2010    PSA 4.4 (H) 08/11/2009    PSA 4.9 (H) 06/11/2009    PSA 2.7 06/10/2008    PSA 3.1 04/10/2008    PSA 3.4 02/09/2008    PSA 7.4 (H) 08/13/2007    PSA 4.6 (H) 05/29/2007    PSADIAG 3.5 12/09/2024    PSADIAG 3.9 06/10/2024    PSADIAG 3.3 03/21/2024    PSADIAG 4.8 (H) 08/22/2023    PSADIAG 6.3 (H) 04/04/2023    PSADIAG 3.9 03/29/2022    PSADIAG 3.7 05/04/2021    PSADIAG 6.2 (H) 03/17/2021    PSADIAG 3.8 03/10/2020    PSADIAG 6.9 (H) 11/26/2019    PSADIAG 4.2 (H) 04/11/2019    PSADIAG 6.8 (H) 10/12/2018    PSADIAG 10.2 (H) 06/12/2018    PSADIAG 11.7 (H) 04/10/2018    PSADIAG 11.7 (H) 04/10/2018    PSADIAG 5.8 (H) 04/10/2017    PSADIAG 5.6 (H) 06/14/2016    PSADIAG 5.7 (H) 03/23/2016    PSADIAG 5.4 (H) 06/15/2015    PSADIAG 10.2 (H) 04/08/2015           Review of Systems  Review of Systems  All other systems reviewed and negative except pertinent positives noted in HPI.       Objective:     Physical Exam  Constitutional:       General: He is not in acute distress.     Appearance: He is well-developed.   HENT:      Head: Normocephalic and atraumatic.   Eyes:      General: No scleral icterus.  Neck:      Trachea: No tracheal deviation.   Pulmonary:      Effort: Pulmonary effort is normal. No respiratory distress.   Neurological:      Mental Status: He is alert and oriented to person, place, and time.   Psychiatric:         Behavior: Behavior normal.         Thought Content: Thought content normal.         Judgment: Judgment normal.         Lab Review  Lab Results   Component Value Date    COLORU Teena 03/29/2022    SPECGRAV 1.025 03/29/2022    PHUR 5.0 03/29/2022    WBCUR NEG 11/19/2015    NITRITE Negative 03/29/2022    GLUCOSEUR NORMAL 11/19/2015    KETONESU Trace (A) 03/29/2022    UROBILINOGEN NORMAL 11/19/2015    RBCUR 250 11/19/2015         Assessment:         1. Renal cyst    2. BPH with urinary obstruction    3. Elevated PSA    4. Primary hypertension              Plan:     Renal cyst    BPH with urinary obstruction    Elevated PSA    Primary hypertension        --imaging reviewed as per HPI.   Left kidney---primarily one stone:   4.5mm, SSD 10.9cm, HU 1100.  Right kidney--multiple larger stones, no hydronephrosis.  Will discuss treatment options pending mri abd/mri prostate results.     -plan for CT abdomen/pelvis with and without contrast renal mass protocol.  -plan for contrasted renal US in 6 months.     - MRI of the prostate from 6/2024 showed no concerning lesions.     -continue Avodart; was recently prescribed flomax for medical expuslive therapy (MET); did not want to take---feels he voids sufficiently.     -BP reviewed today and elevated  -continue current medications  -encouraged f/u and discussion of BP with PCP.     - code applied: patient requires or will require a continuous, longitudinal, and active collaborative plan of care related to this patient's health condition, elevated psa, kidney stones, cystic renal mass --the management of which requires the direction of a practitioner with specialized clinical knowledge, skill, and expertise.

## 2024-12-10 ENCOUNTER — OFFICE VISIT (OUTPATIENT)
Dept: UROLOGY | Facility: CLINIC | Age: 74
End: 2024-12-10
Payer: MEDICARE

## 2024-12-10 VITALS
DIASTOLIC BLOOD PRESSURE: 68 MMHG | WEIGHT: 183.63 LBS | SYSTOLIC BLOOD PRESSURE: 115 MMHG | HEIGHT: 67 IN | BODY MASS INDEX: 28.82 KG/M2 | HEART RATE: 60 BPM

## 2024-12-10 DIAGNOSIS — N13.8 BPH WITH URINARY OBSTRUCTION: ICD-10-CM

## 2024-12-10 DIAGNOSIS — R97.20 ELEVATED PSA: ICD-10-CM

## 2024-12-10 DIAGNOSIS — I10 PRIMARY HYPERTENSION: ICD-10-CM

## 2024-12-10 DIAGNOSIS — N40.1 BPH WITH URINARY OBSTRUCTION: ICD-10-CM

## 2024-12-10 DIAGNOSIS — N28.1 RENAL CYST: Primary | ICD-10-CM

## 2024-12-10 PROCEDURE — G2211 COMPLEX E/M VISIT ADD ON: HCPCS | Mod: S$PBB,,, | Performed by: UROLOGY

## 2024-12-10 PROCEDURE — 99999 PR PBB SHADOW E&M-EST. PATIENT-LVL III: CPT | Mod: PBBFAC,,, | Performed by: UROLOGY

## 2024-12-10 PROCEDURE — 99214 OFFICE O/P EST MOD 30 MIN: CPT | Mod: S$PBB,,, | Performed by: UROLOGY

## 2024-12-10 PROCEDURE — 99213 OFFICE O/P EST LOW 20 MIN: CPT | Mod: PBBFAC | Performed by: UROLOGY

## 2025-01-20 ENCOUNTER — PATIENT MESSAGE (OUTPATIENT)
Dept: INTERNAL MEDICINE | Facility: CLINIC | Age: 75
End: 2025-01-20
Payer: MEDICARE

## 2025-01-20 DIAGNOSIS — K44.9 HIATAL HERNIA: ICD-10-CM

## 2025-01-20 RX ORDER — PANTOPRAZOLE SODIUM 40 MG/1
40 TABLET, DELAYED RELEASE ORAL
Qty: 90 TABLET | Refills: 0 | Status: SHIPPED | OUTPATIENT
Start: 2025-01-20 | End: 2025-01-24 | Stop reason: CLARIF

## 2025-01-21 ENCOUNTER — PATIENT MESSAGE (OUTPATIENT)
Dept: ADMINISTRATIVE | Facility: OTHER | Age: 75
End: 2025-01-21
Payer: MEDICARE

## 2025-01-21 DIAGNOSIS — E78.2 MIXED HYPERLIPIDEMIA: ICD-10-CM

## 2025-01-21 RX ORDER — ATORVASTATIN CALCIUM 40 MG/1
40 TABLET, FILM COATED ORAL DAILY
Qty: 90 TABLET | Refills: 0 | Status: SHIPPED | OUTPATIENT
Start: 2025-01-21 | End: 2026-01-21

## 2025-01-23 ENCOUNTER — PATIENT MESSAGE (OUTPATIENT)
Dept: GASTROENTEROLOGY | Facility: CLINIC | Age: 75
End: 2025-01-23
Payer: MEDICARE

## 2025-01-29 ENCOUNTER — PATIENT MESSAGE (OUTPATIENT)
Dept: DERMATOLOGY | Facility: CLINIC | Age: 75
End: 2025-01-29
Payer: MEDICARE

## 2025-02-03 ENCOUNTER — PATIENT MESSAGE (OUTPATIENT)
Dept: INTERNAL MEDICINE | Facility: CLINIC | Age: 75
End: 2025-02-03
Payer: MEDICARE

## 2025-02-03 DIAGNOSIS — I10 PRIMARY HYPERTENSION: ICD-10-CM

## 2025-02-03 RX ORDER — OLMESARTAN MEDOXOMIL AND HYDROCHLOROTHIAZIDE 20/12.5 20; 12.5 MG/1; MG/1
1 TABLET ORAL DAILY
Qty: 90 TABLET | Refills: 3 | Status: SHIPPED | OUTPATIENT
Start: 2025-02-03 | End: 2026-02-03

## 2025-02-05 ENCOUNTER — PROCEDURE VISIT (OUTPATIENT)
Dept: DERMATOLOGY | Facility: CLINIC | Age: 75
End: 2025-02-05
Payer: MEDICARE

## 2025-02-05 VITALS
SYSTOLIC BLOOD PRESSURE: 125 MMHG | HEIGHT: 67 IN | DIASTOLIC BLOOD PRESSURE: 73 MMHG | BODY MASS INDEX: 28.82 KG/M2 | WEIGHT: 183.63 LBS | HEART RATE: 52 BPM

## 2025-02-05 DIAGNOSIS — C44.321 SQUAMOUS CELL CARCINOMA OF NOSE: Primary | ICD-10-CM

## 2025-02-05 PROCEDURE — 17311 MOHS 1 STAGE H/N/HF/G: CPT | Mod: S$PBB,,, | Performed by: DERMATOLOGY

## 2025-02-05 PROCEDURE — 17312 MOHS ADDL STAGE: CPT | Mod: PBBFAC | Performed by: DERMATOLOGY

## 2025-02-05 PROCEDURE — 17312 MOHS ADDL STAGE: CPT | Mod: S$PBB,,, | Performed by: DERMATOLOGY

## 2025-02-05 PROCEDURE — 99499 UNLISTED E&M SERVICE: CPT | Mod: S$PBB,,, | Performed by: DERMATOLOGY

## 2025-02-05 PROCEDURE — 17311 MOHS 1 STAGE H/N/HF/G: CPT | Mod: PBBFAC | Performed by: DERMATOLOGY

## 2025-02-05 NOTE — PROGRESS NOTES
PROCEDURE: Mohs' Micrographic Surgery    INDICATION: Location in mask areas of face including central face, nose, eyelids, eyebrows, lips, chin, preauricular, temple, and ear. Biopsy-proven skin cancer of cosmetically and functionally important areas, including head, neck, genital, hand, foot, or areas known for having difficulty in healing, such as the lower anterior legs. Tumor with ill-defined borders.    REFERRING PROVIDER:  Pelon Wynne MD    CASE NUMBER:     ANESTHETIC: 3 cc 0.5% Lidocaine with Epi 1:200,000 mixed 1:1 with 0.5% Bupivacaine and 2 cc 1% Lidocaine with Epinephrine 1:100,000    SURGICAL PREP: Hibiclens    SURGEON: Kim Haynes MD    ASSISTANTS: Juani Krishnamurthy PA-C and Chinyere Siddiqui MA    PREOPERATIVE DIAGNOSIS: squamous cell carcinoma- superficially invasive    POSTOPERATIVE DIAGNOSIS: squamous cell carcinoma- superficial    PATHOLOGIC DIAGNOSIS: squamous cell carcinoma- superficially invasive    HISTOLOGY OF SPECIMENS IN FIRST STAGE:   Tumor Type: Tumor seen. Superficial squamous cell carcinoma: Proliferation of squamous cells exhibiting atypia and infiltrating within the superficial papillary dermis.  Invasive squamous cell carcinoma: Proliferation of squamous cells exhibiting atypia and infiltrating within the dermis.   Depth of Invasion: epidermis and dermis  Perineural Invasion: No    HISTOLOGY OF SPECIMENS IN SUBSEQUENT STAGES:  Tumor Type:  No tumor seen.    STAGES OF MOHS' SURGERY PERFORMED: 2    TUMOR-FREE PLANE ACHIEVED: Yes    HEMOSTASIS: electrocoagulation     SPECIMENS: 3 (2 in stage A and 1 in stage B)    LOCATION: right nasal sidewall (medial alar groove). Location verified with Dr. Wynne's clinical photograph. Patient also verified location with hand held mirror.    INITIAL LESION SIZE: 0.4 x 0.4 cm    FINAL DEFECT SIZE: 0.7 x 0.9 cm    WOUND REPAIR/DISPOSITION: When the tumor was completely removed, repair options were discussed with the patient, and it was decided to  "let the wound heal by second intention. The patient tolerated the procedure well and will consider delayed reconstruction or repair if necessary.    The area was cleaned and dressed appropriately, and the patient was given wound care instructions, as well as an appointment for follow-up evaluation in one month.    Vitals:    02/05/25 0734 02/05/25 1000   BP: (!) 144/75 125/73   BP Location: Left arm Left arm   Patient Position: Sitting Sitting   Pulse: 66 (!) 52   Weight: 83.3 kg (183 lb 10.3 oz)    Height: 5' 7" (1.702 m)          "

## 2025-02-21 ENCOUNTER — PATIENT MESSAGE (OUTPATIENT)
Dept: DERMATOLOGY | Facility: CLINIC | Age: 75
End: 2025-02-21
Payer: MEDICARE

## 2025-02-27 ENCOUNTER — PATIENT MESSAGE (OUTPATIENT)
Dept: DERMATOLOGY | Facility: CLINIC | Age: 75
End: 2025-02-27
Payer: MEDICARE

## 2025-03-06 ENCOUNTER — TELEPHONE (OUTPATIENT)
Dept: DERMATOLOGY | Facility: CLINIC | Age: 75
End: 2025-03-06
Payer: MEDICARE

## 2025-03-06 NOTE — TELEPHONE ENCOUNTER
Patient was rescheduled for a wound check on right nasal sidewall with 2nd intent 3/10/25 at 2:10 to 3/19/25 at 1:50. Patient confirmed date and time.

## 2025-03-19 ENCOUNTER — OFFICE VISIT (OUTPATIENT)
Dept: DERMATOLOGY | Facility: CLINIC | Age: 75
End: 2025-03-19
Payer: MEDICARE

## 2025-03-19 DIAGNOSIS — C44.321 SQUAMOUS CELL CARCINOMA OF NOSE: Primary | ICD-10-CM

## 2025-03-19 PROCEDURE — 99212 OFFICE O/P EST SF 10 MIN: CPT | Mod: PBBFAC | Performed by: DERMATOLOGY

## 2025-03-19 PROCEDURE — 99999 PR PBB SHADOW E&M-EST. PATIENT-LVL II: CPT | Mod: PBBFAC,,, | Performed by: DERMATOLOGY

## 2025-03-19 PROCEDURE — 99212 OFFICE O/P EST SF 10 MIN: CPT | Mod: S$PBB,,, | Performed by: DERMATOLOGY

## 2025-03-19 NOTE — PROGRESS NOTES
74 y.o. male patient is here for wound check after surgery.    Patient reports no problems.    WOUND PE:  The right nasal sidewall wound is well healed with 100% re-epithelization.     IMPRESSION:  Healing operative site from Mohs' surgery SCC right nasal sidewall s/p Mohs with 2nd intention healing, postop week #6, all healed.     PLAN:  D/c wound care  Keep moist with aquaphor x 1 more week, but no need to cover  Daily SPF.  Regular skin checks.    RTC:  In 3-6 months with  Pelon Wynne MD  for skin check or sooner if new concern arises.

## 2025-03-21 ENCOUNTER — PATIENT MESSAGE (OUTPATIENT)
Dept: INTERNAL MEDICINE | Facility: CLINIC | Age: 75
End: 2025-03-21
Payer: MEDICARE

## 2025-03-31 ENCOUNTER — PATIENT MESSAGE (OUTPATIENT)
Dept: UROLOGY | Facility: CLINIC | Age: 75
End: 2025-03-31
Payer: MEDICARE

## 2025-04-01 DIAGNOSIS — N40.1 BPH WITH URINARY OBSTRUCTION: ICD-10-CM

## 2025-04-01 DIAGNOSIS — N13.8 BPH WITH URINARY OBSTRUCTION: ICD-10-CM

## 2025-04-01 RX ORDER — DUTASTERIDE 0.5 MG/1
CAPSULE, LIQUID FILLED ORAL
Qty: 90 CAPSULE | Refills: 3 | Status: SHIPPED | OUTPATIENT
Start: 2025-04-01

## 2025-04-01 NOTE — TELEPHONE ENCOUNTER
BPH with urinary obstruction  -     dutasteride (AVODART) 0.5 mg capsule; TAKE ONE CAPSULE BY MOUTH EVERY DAY  Dispense: 90 capsule; Refill: 3

## 2025-04-17 DIAGNOSIS — K44.9 HIATAL HERNIA: ICD-10-CM

## 2025-04-17 RX ORDER — PANTOPRAZOLE SODIUM 40 MG/1
40 TABLET, DELAYED RELEASE ORAL
Qty: 90 TABLET | Refills: 0 | Status: SHIPPED | OUTPATIENT
Start: 2025-04-17

## 2025-04-22 ENCOUNTER — PATIENT MESSAGE (OUTPATIENT)
Dept: GASTROENTEROLOGY | Facility: CLINIC | Age: 75
End: 2025-04-22
Payer: MEDICARE

## 2025-04-22 ENCOUNTER — PATIENT MESSAGE (OUTPATIENT)
Dept: ADMINISTRATIVE | Facility: OTHER | Age: 75
End: 2025-04-22
Payer: MEDICARE

## 2025-04-24 DIAGNOSIS — E78.2 MIXED HYPERLIPIDEMIA: ICD-10-CM

## 2025-04-24 RX ORDER — ATORVASTATIN CALCIUM 40 MG/1
40 TABLET, FILM COATED ORAL
Qty: 90 TABLET | Refills: 0 | Status: SHIPPED | OUTPATIENT
Start: 2025-04-24

## 2025-04-24 NOTE — TELEPHONE ENCOUNTER
Provider Staff:  Action required for this patient    Requires labs      Please see care gap opportunities below in Care Due Message.    Thanks!  [facility]     Appointments      Date Provider   Last Visit   6/17/2024 Сергей Landis MD   Next Visit   5/30/2025 Сергей Landis MD     Refill Decision Note   Anton Елена  is requesting a refill authorization.  Brief Assessment and Rationale for Refill:  Approve     Medication Therapy Plan:         Comments:     Note composed:2:59 PM 04/24/2025

## 2025-04-24 NOTE — TELEPHONE ENCOUNTER
Care Due:                  Date            Visit Type   Department     Provider  --------------------------------------------------------------------------------                                EP -                              PRIMARY      [facility] INTERNAL  Last Visit: 06-      CARE ([facility])   MEDICINE       Сергей Landis                              MYCHART                              FOLLOWUP/OF  [facility] INTERNAL  Next Visit: 05-      FICE VISIT   MEDICINE       Сергей Landis                                                            Last  Test          Frequency    Reason                     Performed    Due Date  --------------------------------------------------------------------------------    Lipid Panel.  12 months..  atorvastatin.............  06-   06-    [facility] Embedded Care Due Messages. Reference number: [id number].   4/24/2025 2:30:47 PM CDT

## 2025-04-27 ENCOUNTER — PATIENT MESSAGE (OUTPATIENT)
Dept: UROLOGY | Facility: CLINIC | Age: 75
End: 2025-04-27
Payer: MEDICARE

## 2025-05-06 ENCOUNTER — OFFICE VISIT (OUTPATIENT)
Dept: DERMATOLOGY | Facility: CLINIC | Age: 75
End: 2025-05-06
Payer: MEDICARE

## 2025-05-06 DIAGNOSIS — L82.1 SEBORRHEIC KERATOSES: ICD-10-CM

## 2025-05-06 DIAGNOSIS — Z12.83 SCREENING EXAM FOR SKIN CANCER: ICD-10-CM

## 2025-05-06 DIAGNOSIS — Z85.828 HISTORY OF NONMELANOMA SKIN CANCER: ICD-10-CM

## 2025-05-06 DIAGNOSIS — L73.8 SEBACEOUS HYPERPLASIA: ICD-10-CM

## 2025-05-06 DIAGNOSIS — D22.9 MULTIPLE BENIGN NEVI: ICD-10-CM

## 2025-05-06 DIAGNOSIS — Z85.828 HISTORY OF SKIN CANCER: ICD-10-CM

## 2025-05-06 DIAGNOSIS — L57.0 ACTINIC KERATOSIS: ICD-10-CM

## 2025-05-06 DIAGNOSIS — D48.5 NEOPLASM OF UNCERTAIN BEHAVIOR OF SKIN: Primary | ICD-10-CM

## 2025-05-06 DIAGNOSIS — D23.9 DERMATOFIBROMA: ICD-10-CM

## 2025-05-06 DIAGNOSIS — L81.4 LENTIGO: ICD-10-CM

## 2025-05-06 DIAGNOSIS — D18.01 CHERRY ANGIOMA: ICD-10-CM

## 2025-05-06 PROCEDURE — 17003 DESTRUCT PREMALG LES 2-14: CPT | Mod: S$PBB,,, | Performed by: STUDENT IN AN ORGANIZED HEALTH CARE EDUCATION/TRAINING PROGRAM

## 2025-05-06 PROCEDURE — 99213 OFFICE O/P EST LOW 20 MIN: CPT | Mod: 25,S$PBB,, | Performed by: STUDENT IN AN ORGANIZED HEALTH CARE EDUCATION/TRAINING PROGRAM

## 2025-05-06 PROCEDURE — 88342 IMHCHEM/IMCYTCHM 1ST ANTB: CPT | Mod: TC,59 | Performed by: STUDENT IN AN ORGANIZED HEALTH CARE EDUCATION/TRAINING PROGRAM

## 2025-05-06 PROCEDURE — 11102 TANGNTL BX SKIN SINGLE LES: CPT | Mod: S$PBB,,, | Performed by: STUDENT IN AN ORGANIZED HEALTH CARE EDUCATION/TRAINING PROGRAM

## 2025-05-06 PROCEDURE — 17000 DESTRUCT PREMALG LESION: CPT | Mod: S$PBB,XS,, | Performed by: STUDENT IN AN ORGANIZED HEALTH CARE EDUCATION/TRAINING PROGRAM

## 2025-05-06 PROCEDURE — 17000 DESTRUCT PREMALG LESION: CPT | Mod: PBBFAC,XS | Performed by: STUDENT IN AN ORGANIZED HEALTH CARE EDUCATION/TRAINING PROGRAM

## 2025-05-06 PROCEDURE — 11102 TANGNTL BX SKIN SINGLE LES: CPT | Mod: PBBFAC | Performed by: STUDENT IN AN ORGANIZED HEALTH CARE EDUCATION/TRAINING PROGRAM

## 2025-05-06 PROCEDURE — 17003 DESTRUCT PREMALG LES 2-14: CPT | Mod: PBBFAC | Performed by: STUDENT IN AN ORGANIZED HEALTH CARE EDUCATION/TRAINING PROGRAM

## 2025-05-06 PROCEDURE — 99999 PR PBB SHADOW E&M-EST. PATIENT-LVL III: CPT | Mod: PBBFAC,,, | Performed by: STUDENT IN AN ORGANIZED HEALTH CARE EDUCATION/TRAINING PROGRAM

## 2025-05-06 PROCEDURE — 99213 OFFICE O/P EST LOW 20 MIN: CPT | Mod: PBBFAC,25 | Performed by: STUDENT IN AN ORGANIZED HEALTH CARE EDUCATION/TRAINING PROGRAM

## 2025-05-06 NOTE — PATIENT INSTRUCTIONS
Shave Biopsy Wound Care    Your doctor has performed a shave biopsy today.  A band aid and vaseline ointment has been placed over the site.  This should remain in place for NO LONGER THAN 48 hours.  It is fine to remove the bandaid after 24 hours, if the area is no longer bleeding. It is recommended that you keep the area dry (do not wet)) for the first 24 hours.  After 24 hours, wash the area with warm soap and water and apply Vaseline jelly.  Many patients prefer to use Neosporin or Bacitracin ointment.  This is acceptable; however, know that you can develop an allergy to this medication even if you have used it safely for years.  It is important to keep the area moist.  Letting it dry out and get air slows healing time, and will worsen the scar.        If you notice increasing redness, tenderness, pain, or yellow drainage at the biopsy site, please notify your doctor.  These are signs of an infection.    If your biopsy site is bleeding, apply firm pressure for 15 minutes straight.  Repeat for another 15 minutes, if it is still bleeding.   If the surgical site continues to bleed, then please contact your doctor.      For [facility] users:   You will receive your biopsy results in [facility] as soon as they are available. Please be assured that your physician/provider will review your results and will then determine what further treatment, evaluation, or planning is required. You should be contacted by your physician's/provider's office within 5 business days of receiving your results; If not, please reach out to directly. This is one more way [employer] is putting you first.     [facility] [place] [address]/ [phone] FAX/ [email or web address]         CRYOSURGERY      Your doctor has used a method called cryosurgery to treat your skin condition. Cryosurgery refers to the use of very cold substances to treat a variety of skin conditions such as warts, pre-skin cancers, molluscum  contagiosum, sun spots, and several benign growths. The substance we use in cryosurgery is liquid nitrogen and is so cold (-195 degrees Celsius) that is burns when administered.     Following treatment in the office, the skin may immediately burn and become red. You may find the area around the lesion is affected as well. It is sometimes necessary to treat not only the lesion, but a small area of the surrounding normal skin to achieve a good response.     A blister, and even a blood filled blister, may form after treatment.   This is a normal response. If the blister is painful, it is acceptable to sterilize a needle and with rubbing alcohol and gently pop the blister. It is important that you gently wash the area with soap and warm water as the blister fluid may contain wart virus if a wart was treated. Do no remove the roof of the blister.     The area treated can take anywhere from 1-3 weeks to heal. Healing time depends on the kind skin lesion treated, the location, and how aggressively the lesion was treated. It is recommended that the areas treated are covered with Vaseline or bacitracin ointment and a band-aid. If a band-aid is not practical, just ointment applied several times per day will do. Keeping these areas moist will speed the healing time.    Treatment with liquid nitrogen can leave a scar. In dark skin, it may be a light or dark scar, in light skin it may be a white or pink scar. These will generally fade with time, but may never go away completely.     If you have any concerns after your treatment, please feel free to call the office.       [address] [place] [address]/ [phone] FAX/ [email or web address]     Sunscreen Guidelines  Sunscreen protects your skin by absorbing and reflecting ultraviolet rays. All sunscreens have a sun protection factor (SPF) rating that indicates how long a sunscreen will remain effective on the skin.    Why protect your skin?  The sun's  rays are composed of many different wavelengths, including UVA, UVB, and visible light that each affect the skin differently.    UVB: sunburn, photoaging, skin cancer (melanoma, basal cell, and squamous cell carcinomas) and modulation of the skin's immune system.    UVA: similar to above but thought to contribute more to aging; at the same dose of UVB it is less powerful however the sun has 10-20 times the levels of UVA as compared with UVB.  Visible light: implicated in causing unwanted darkening of skin, such as melasma and post-inflammatory hyperpigmentation in darker skin types     If I have dark skin, do I need to worry about the sun?    More darkly pigmented skin is more protected against UV-induced skin cancer, sunburn, and photoaging, though may still suffer from sun-related conditions, including melasma, hyperpigmentation, and other dark spots.    Sun avoidance  As a general rule, stay out of the sun as much as possible between 10 a.m. - 4 p.m.    Download the EPA UV index dhara to track the UV index by hour in your zip code.      Which sunscreen should I choose?  The best sunscreen to use varies by individual. The one that feels best on your skin and fits your lifestyle will be the one you will likely use most regularly.   Active ingredients of sunscreen vary by , and may be a chemical (such as avobenzone or oxybenzone) or physical agent (such as zinc oxide or titanium dioxide). I recommend a physical agent.  A water-resistant sunscreen is one that maintains the SPF level after 40 minutes of water exposure. A very water-resistant sunscreen maintains the SPF level after 80 minutes of water exposure.    Sunscreen: this is the last layer in sun protection   Be generous: 1 shot glass of sunscreen for your body, ½ teaspoon for your face/neck  Reapply every 2 hours  Broad spectrum (provides UVA/UVB protection), SPF 50 or above  Avoid spray sunscreens: less effective and have been found to contain  "benzene (carcinogen)    Sun protective clothing  Although sunscreen helps minimize sun damage, no sunscreen completely blocks all wavelengths of UV light. Wearing sun protective clothing such as hats, rashguards or swim shirts, and long sleeves and/or pants, as well as avoiding sun exposure from 10 a.m. to 4 p.m. will help protect your skin from overexposure and minimize sun damage. Seek shade.  Long sleeved clothing, hats, and sunglasses: makes sun protection easier, more effective, and can even be more affordable, since sunscreen needs to be reapplied frequently.    [employer] (www.sunprectautions.com)  [employer] (www.Tripbod.Pathflow)  [employer] (www.New Leaf Paper)  [employer] (www.Anexon)  Hats from [employer] (www.helenkaminski.com)    My Favorite Sunscreens:  Physical blockers: Can have a "white case" but in general are more effective  - Face: CeraVe tinted mineral sunscreen, Bare Minerals complexion rescue (20 shades), Elta MD (UV elements, UV physical, UV restore, etc), Tizo ultra zinc tinted, Cetaphil Sheer Mineral Face Liquid Sunscreen  - Body: Blue Lizard, Neutrogena Sheer Zinc, Eucerin Daily Protection, Aveeno Baby   "

## 2025-05-06 NOTE — PROGRESS NOTES
Subjective:      Patient ID:  Anton Christiansen is a 74 y.o. male who presents for No chief complaint on file.    Pt here for new spot of concern on face- not due for TBSE yet     Pt has h/o NMSC  - BCC on R dorsal hand, nasal tip, and L nasal ala   - BCC R nasal sidewall s/p Mohs 2/2025    This is a high risk patient here to check for the development of new lesions.    Pt concerned about most recent Mohs site- feels small bump has started growing.         Problem Noted   History of Skin Cancer 11/25/2024    - 3/2024: BCC, left nasal ala s/p mohs  - BCC on R dorsal hand, nasal tip, and L nasal ala    - SCC, right nasal side wall, 11/2024, s/p mohs           Review of Systems    Objective:   Physical Exam   Constitutional: He appears well-developed and well-nourished. No distress.   Neurological: He is alert and oriented to person, place, and time. He is not disoriented.   Psychiatric: He has a normal mood and affect. He is not agitated.   Skin:   Areas Examined (abnormalities noted in diagram):   Scalp / Hair Palpated and Inspected  Head / Face Inspection Performed  Neck Inspection Performed  Chest / Axilla Inspection Performed  Abdomen Inspection Performed  Back Inspection Performed  RUE Inspected  LUE Inspection Performed  RLE Inspected  LLE Inspection Performed  Nails and Digits Inspection Performed                     Diagram Legend     Erythematous scaling macule/papule c/w actinic keratosis       Vascular papule c/w angioma      Pigmented verrucoid papule/plaque c/w seborrheic keratosis      Yellow umbilicated papule c/w sebaceous hyperplasia      Irregularly shaped tan macule c/w lentigo     1-2 mm smooth white papules consistent with Milia      Movable subcutaneous cyst with punctum c/w epidermal inclusion cyst      Subcutaneous movable cyst c/w pilar cyst      Firm pink to brown papule c/w dermatofibroma      Pedunculated fleshy papule(s) c/w skin tag(s)      Evenly pigmented macule c/w junctional  nevus     Mildly variegated pigmented, slightly irregular-bordered macule c/w mildly atypical nevus      Flesh colored to evenly pigmented papule c/w intradermal nevus       Pink pearly papule/plaque c/w basal cell carcinoma      Erythematous hyperkeratotic cursted plaque c/w SCC      Surgical scar with no sign of skin cancer recurrence      Open and closed comedones      Inflammatory papules and pustules      Verrucoid papule consistent consistent with wart     Erythematous eczematous patches and plaques     Dystrophic onycholytic nail with subungual debris c/w onychomycosis     Umbilicated papule    Erythematous-base heme-crusted tan verrucoid plaque consistent with inflamed seborrheic keratosis     Erythematous Silvery Scaling Plaque c/w Psoriasis     See annotation            Assessment / Plan:      Pathology Orders:       Normal Orders This Visit    Specimen to Pathology, Dermatology     Questions:    Procedure Type: Dermatology and skin neoplasms    Number of Specimens: 1    ------------------------: -------------------------    Spec 1 Procedure: Shave Biopsy    Spec 1 Clinical Impression: dysplastic nevus. R/o melanoma    Spec 1 Source: right buttock    Clinical Information: see EPIC    Clinical History: see EPIC    Specimen Source: Skin    Release to patient: Immediate    Send normal result to authorizing provider's In Basket if patient is active on MyChart: Yes          Neoplasm of uncertain behavior of skin  -     Specimen to Pathology, Dermatology    Shave biopsy procedure note:    Shave biopsy performed after verbal consent including risk of infection, scar, recurrence, need for additional treatment of site. Area prepped with alcohol, anesthetized with approximately 1.0cc of 1% lidocaine with epinephrine. Lesional tissue shaved with razor blade. Hemostasis achieved with application of aluminum chloride followed by hyfrecation. No complications. Dressing applied. Wound care explained.      Actinic  keratosis  Cryosurgery Procedure Note    Verbal consent from the patient is obtained including, but not limited to, risk of hypopigmentation/hyperpigmentation, scar, recurrence of lesion. The patient is aware of the precancerous quality and need for treatment of these lesions. Liquid nitrogen cryosurgery is applied to the 5 actinic keratoses, as detailed in the physical exam, to produce a freeze injury. The patient is aware that blisters may form and is instructed on wound care with gentle cleansing and use of vaseline ointment to keep moist until healed. The patient is supplied a handout on cryosurgery and is instructed to call if lesions do not completely resolve.    Lentigo  Sanabria angioma  Seborrheic keratoses  Dermatofibroma  Multiple benign nevi  Sebaceous hyperplasia  Reassurance given to patient. No treatment is necessary.   Treatment of benign, asymptomatic lesions may be considered cosmetic.    History of nonmelanoma skin cancer  Screening exam for skin cancer  Area of previous NMSC examined. Site well healed with no signs of recurrence.    Total body skin examination performed today including at least 12 points as noted in physical examination. Suspicious lesions noted.    Recommend daily sun protection/avoidance, use of at least SPF 30, broad spectrum sunscreen (OTC drug), skin self examinations, and routine physician surveillance to optimize early detection             Follow up in about 6 months (around 11/6/2025) for TBSE.

## 2025-05-09 ENCOUNTER — RESULTS FOLLOW-UP (OUTPATIENT)
Dept: DERMATOLOGY | Facility: CLINIC | Age: 75
End: 2025-05-09

## 2025-05-09 LAB
ESTROGEN SERPL-MCNC: NORMAL PG/ML
INSULIN SERPL-ACNC: NORMAL U[IU]/ML
LAB AP CLINICAL INFORMATION: NORMAL
LAB AP GROSS DESCRIPTION: NORMAL
LAB AP REPORT FOOTNOTES: NORMAL
T3RU NFR SERPL: NORMAL %

## 2025-05-12 ENCOUNTER — PATIENT MESSAGE (OUTPATIENT)
Dept: OTOLARYNGOLOGY | Facility: CLINIC | Age: 75
End: 2025-05-12
Payer: MEDICARE

## 2025-05-13 ENCOUNTER — OFFICE VISIT (OUTPATIENT)
Dept: OTOLARYNGOLOGY | Facility: CLINIC | Age: 75
End: 2025-05-13
Payer: MEDICARE

## 2025-05-13 DIAGNOSIS — Z97.4 WEARS HEARING AID IN BOTH EARS: ICD-10-CM

## 2025-05-13 DIAGNOSIS — H61.23 BILATERAL IMPACTED CERUMEN: Primary | ICD-10-CM

## 2025-05-13 DIAGNOSIS — T16.1XXA FOREIGN BODY OF RIGHT EAR, INITIAL ENCOUNTER: ICD-10-CM

## 2025-05-13 PROCEDURE — 99999 PR PBB SHADOW E&M-EST. PATIENT-LVL II: CPT | Mod: PBBFAC,,, | Performed by: NURSE PRACTITIONER

## 2025-05-13 PROCEDURE — 69200 CLEAR OUTER EAR CANAL: CPT | Mod: PBBFAC | Performed by: NURSE PRACTITIONER

## 2025-05-13 PROCEDURE — 99499 UNLISTED E&M SERVICE: CPT | Mod: S$PBB,,, | Performed by: NURSE PRACTITIONER

## 2025-05-13 PROCEDURE — 69200 CLEAR OUTER EAR CANAL: CPT | Mod: S$PBB,RT,, | Performed by: NURSE PRACTITIONER

## 2025-05-13 PROCEDURE — 69210 REMOVE IMPACTED EAR WAX UNI: CPT | Mod: PBBFAC | Performed by: NURSE PRACTITIONER

## 2025-05-13 PROCEDURE — 69210 REMOVE IMPACTED EAR WAX UNI: CPT | Mod: XS,S$PBB,, | Performed by: NURSE PRACTITIONER

## 2025-05-13 PROCEDURE — 99212 OFFICE O/P EST SF 10 MIN: CPT | Mod: PBBFAC | Performed by: NURSE PRACTITIONER

## 2025-05-13 NOTE — PROGRESS NOTES
Subjective:   Anton Christiansen is a 74 y.o. male who was self-referred for foreign body in the left ear. He reports removing his hearing aid from the right ear on Sunday and noting upon removal that there was no hearing aid dome on the aid. He denies any symptoms related to this and has continued to use his hearing aids (replaced the dome). Not sure if the hearing aid dome is still in the ear or fell on the floor. He last saw me about 1 year ago for routine cerumen impaction removal.    Past Medical History  He has a past medical history of Allergy, Arteriosclerotic coronary artery disease, Basal cell carcinoma, Basal cell carcinoma (BCC) of left ala nasi, BCC (basal cell carcinoma of skin), Cataract, Family history of colon cancer, Hyperlipidemia, Hypertension, Meningioma, Multiple thyroid nodules, Nephrolithiasis, ASHA on CPAP, Prostatitis, chronic, Special screening for malignant neoplasms, colon, and Vertigo.    Past Surgical History  He has a past surgical history that includes Tonsillectomy; Pilonoidal Cyst; Cataract extraction w/  intraocular lens implant (12/20/2012); Colonoscopy (317155); Eye surgery; Skin surgery (MOHS Repair); Division and Inset (04/29/2014); Colonoscopy (N/A, 05/20/2019); Colonoscopy (N/A, 9/23/2024); and Esophagogastroduodenoscopy (N/A, 10/22/2024).    Family History  His family history includes Cancer in his father; Cancer (age of onset: 70) in his mother; Depression in his mother; Hypertension in his mother; No Known Problems in his brother, maternal aunt, maternal grandfather, maternal grandmother, maternal uncle, paternal aunt, paternal grandfather, paternal grandmother, paternal uncle, and sister.    Social History  He reports that he has never smoked. He has never used smokeless tobacco. He reports current alcohol use. He reports that he does not use drugs.    Allergies  He is allergic to cephalexin, ace inhibitors, cialis [tadalafil], proscar [finasteride], and vicodin  [hydrocodone-acetaminophen].    Medications  He has a current medication list which includes the following prescription(s): ascorbic acid (vitamin c), atorvastatin, carvedilol, cholecalciferol (vitamin d3), dutasteride, fish oil-omega-3 fatty acids, multivitamin, olmesartan-hydrochlorothiazide, pantoprazole, and tamsulosin.  Review of Systems     Constitutional: Negative for appetite change, chills, fatigue, fever and unexpected weight loss.      HENT: Positive for ear pain.      Eyes:  Positive for eye itching.     Respiratory:  Negative for cough, shortness of breath, sleep apnea, snoring and wheezing.      Cardiovascular:  Negative for chest pain, foot swelling, irregular heartbeat and swollen veins.     Gastrointestinal:  Negative for abdominal pain, acid reflux, constipation, diarrhea, heartburn and vomiting.     Genitourinary: Negative for difficulty urinating, sexual problems and frequent urination.     Musc: Negative for aching joints, aching muscles, back pain and neck pain.     Skin: Negative for rash.     Allergy: Negative for food allergies and seasonal allergies.     Endocrine: Negative for cold intolerance and heat intolerance.      Neurological: Negative for dizziness, headaches, light-headedness, seizures and tremors.      Hematologic: Negative for bruises/bleeds easily and swollen glands.      Psychiatric: Negative for decreased concentration, depression, nervous/anxious and sleep disturbance.          Objective:     Constitutional:   He is oriented to person, place, and time. He appears well-developed and well-nourished. He appears alert. He is cooperative.  Non-toxic appearance. He does not have a sickly appearance. He does not appear ill. Normal speech.      Head:  Normocephalic and atraumatic. Not macrocephalic and not microcephalic. Head is without abrasion, without right periorbital erythema, without left periorbital erythema and without TMJ tenderness.     Ears:    Right Ear: No drainage,  swelling or tenderness. No mastoid tenderness. Tympanic membrane is not scarred, not perforated, not erythematous, not retracted and not bulging. No middle ear effusion.   Left Ear: No drainage, swelling or tenderness. No mastoid tenderness. Tympanic membrane is not scarred, not perforated, not erythematous, not retracted and not bulging.  No middle ear effusion.   Hearing aid dome removed from the right ear canal. No injury or trauma noted.     Ceruminous debris obstructing bilateral TMs removed under microscopy.    Pulmonary/Chest:   Effort normal.     Psychiatric:   He has a normal mood and affect. His speech is normal and behavior is normal.     Neurological:   He is alert and oriented to person, place, and time.     Procedure  Cerumen removal performed.  See procedure note.  Foreign Body removal. See procedure note.     Assessment:     1. Bilateral impacted cerumen    2. Wears hearing aid in both ears    3. Foreign body of right ear, initial encounter      Plan:     Bilateral impacted cerumen  -     Ear Cerumen Removal  Cerumen impaction removed under microscopy. Patient tolerated procedure well and noted improvement upon impaction removal.     Wears hearing aid in both ears    Foreign body of right ear, initial encounter  Hearing aid dome removed from the right ear. Tolerated well, no complications.   -     Foreign Body Removal

## 2025-05-13 NOTE — PROCEDURES
Ear Cerumen Removal    Date/Time: 5/13/2025 1:00 PM    Performed by: Gilma Farfan NP  Authorized by: Gilma Farfan NP      Local anesthetic:  None  Location details:  Both ears  Procedure type: curette    Cerumen  Removal Results:  Cerumen completely removed  Patient tolerance:  Patient tolerated the procedure well with no immediate complications     Procedure Note:    The patient was brought to the minor procedure room and placed under the operating microscope of the right ear canal which was cleaned of ceruminous debris. Using a combination of suction, curettes and cup forceps the patient's cerumen was removed. The patient tolerated the procedure well. There were no complications.    Procedure Note:    The patient was brought to the minor procedure room and placed under the operating microscope of the left ear canal which was cleaned of ceruminous debris. Using a combination of suction, curettes and cup forceps the patient's cerumen was removed.  The patient tolerated the procedure well. There were no complications.      Foreign Body Removal    Date/Time: 5/13/2025 1:00 PM    Performed by: Gilma Farfan NP  Authorized by: Gilma Farfan NP  Consent Done: Yes  Consent: Verbal consent obtained  Body area: ear  Location details: right ear  Complexity: simple  1 objects recovered.  Objects recovered: hearing aid dome  Post-procedure assessment: foreign body removed  Patient tolerance: Patient tolerated the procedure well with no immediate complications  Comments: Successful removed under Microscopy

## 2025-05-20 ENCOUNTER — HOSPITAL ENCOUNTER (OUTPATIENT)
Dept: RADIOLOGY | Facility: HOSPITAL | Age: 75
Discharge: HOME | End: 2025-05-20
Attending: UROLOGY
Payer: MEDICARE

## 2025-05-20 DIAGNOSIS — N28.1 RENAL CYST: ICD-10-CM

## 2025-05-20 PROCEDURE — 76978 US TRGT DYN MBUBB 1ST LES: CPT | Mod: 26,,, | Performed by: RADIOLOGY

## 2025-05-20 PROCEDURE — 76978 US TRGT DYN MBUBB 1ST LES: CPT | Mod: TC

## 2025-05-21 ENCOUNTER — RESULTS FOLLOW-UP (OUTPATIENT)
Dept: UROLOGY | Facility: HOSPITAL | Age: 75
End: 2025-05-21

## 2025-05-22 ENCOUNTER — PATIENT MESSAGE (OUTPATIENT)
Dept: INTERNAL MEDICINE | Facility: CLINIC | Age: 75
End: 2025-05-22
Payer: MEDICARE

## 2025-06-02 ENCOUNTER — HOSPITAL ENCOUNTER (OUTPATIENT)
Dept: RADIOLOGY | Facility: HOSPITAL | Age: 75
Discharge: HOME | End: 2025-06-02
Attending: UROLOGY
Payer: MEDICARE

## 2025-06-02 ENCOUNTER — TELEPHONE (OUTPATIENT)
Dept: GASTROENTEROLOGY | Facility: CLINIC | Age: 75
End: 2025-06-02
Payer: MEDICARE

## 2025-06-02 DIAGNOSIS — N28.1 RENAL CYST: ICD-10-CM

## 2025-06-02 LAB
CREAT SERPL-MCNC: 1.1 MG/DL (ref 0.5–1.4)
SAMPLE: NORMAL

## 2025-06-02 PROCEDURE — 25500020 PHARM REV CODE 255: Performed by: UROLOGY

## 2025-06-02 PROCEDURE — 74178 CT ABD&PLV WO CNTR FLWD CNTR: CPT | Mod: TC

## 2025-06-02 PROCEDURE — 74178 CT ABD&PLV WO CNTR FLWD CNTR: CPT | Mod: 26,,, | Performed by: RADIOLOGY

## 2025-06-02 RX ADMIN — IOHEXOL 125 ML: 350 INJECTION, SOLUTION INTRAVENOUS at 12:06

## 2025-06-04 ENCOUNTER — OFFICE VISIT (OUTPATIENT)
Dept: GASTROENTEROLOGY | Facility: CLINIC | Age: 75
End: 2025-06-04
Payer: MEDICARE

## 2025-06-04 ENCOUNTER — PATIENT MESSAGE (OUTPATIENT)
Dept: GASTROENTEROLOGY | Facility: CLINIC | Age: 75
End: 2025-06-04

## 2025-06-04 VITALS — HEIGHT: 67 IN | WEIGHT: 185 LBS | BODY MASS INDEX: 29.03 KG/M2

## 2025-06-04 DIAGNOSIS — Z80.0 FAMILY HISTORY OF STOMACH CANCER: Primary | ICD-10-CM

## 2025-06-04 DIAGNOSIS — Z80.0 FAMILY HISTORY OF COLON CANCER IN MOTHER: ICD-10-CM

## 2025-06-04 DIAGNOSIS — R17 ELEVATED BILIRUBIN: ICD-10-CM

## 2025-06-04 DIAGNOSIS — K44.9 HIATAL HERNIA: ICD-10-CM

## 2025-06-09 ENCOUNTER — LAB VISIT (OUTPATIENT)
Dept: LAB | Facility: HOSPITAL | Age: 75
End: 2025-06-09
Attending: INTERNAL MEDICINE
Payer: MEDICARE

## 2025-06-09 DIAGNOSIS — R17 ELEVATED BILIRUBIN: ICD-10-CM

## 2025-06-09 PROCEDURE — 36415 COLL VENOUS BLD VENIPUNCTURE: CPT

## 2025-06-10 ENCOUNTER — OFFICE VISIT (OUTPATIENT)
Dept: UROLOGY | Facility: CLINIC | Age: 75
End: 2025-06-10
Payer: MEDICARE

## 2025-06-10 VITALS
SYSTOLIC BLOOD PRESSURE: 132 MMHG | DIASTOLIC BLOOD PRESSURE: 80 MMHG | BODY MASS INDEX: 29.03 KG/M2 | WEIGHT: 184.94 LBS | HEIGHT: 67 IN | HEART RATE: 58 BPM

## 2025-06-10 DIAGNOSIS — I10 PRIMARY HYPERTENSION: ICD-10-CM

## 2025-06-10 DIAGNOSIS — R97.20 ELEVATED PSA: ICD-10-CM

## 2025-06-10 DIAGNOSIS — N28.89 RENAL MASS: Primary | ICD-10-CM

## 2025-06-10 DIAGNOSIS — N13.8 BPH WITH URINARY OBSTRUCTION: ICD-10-CM

## 2025-06-10 DIAGNOSIS — N40.1 BPH WITH URINARY OBSTRUCTION: ICD-10-CM

## 2025-06-10 PROCEDURE — 99215 OFFICE O/P EST HI 40 MIN: CPT | Mod: S$PBB,,, | Performed by: UROLOGY

## 2025-06-10 PROCEDURE — G2211 COMPLEX E/M VISIT ADD ON: HCPCS | Mod: ,,, | Performed by: UROLOGY

## 2025-06-10 PROCEDURE — 99213 OFFICE O/P EST LOW 20 MIN: CPT | Mod: PBBFAC | Performed by: UROLOGY

## 2025-06-10 PROCEDURE — 99999 PR PBB SHADOW E&M-EST. PATIENT-LVL III: CPT | Mod: PBBFAC,,, | Performed by: UROLOGY

## 2025-06-10 NOTE — PROGRESS NOTES
"  Subjective:       Patient ID: Anton Christiansen is a 74 y.o. male.    Chief Complaint:  renal mass, kidney stones    History of Present Illness  HPI  Patient is a 74 y.o. male who is established to our clinic and was initially referred by their PCP, Dr. Landis for evaluation of renal cysts.   He has seen Dr. Quinteros in the past for BPH and elevated PSA.   His pain that he originally saw me for has essentially resolved.  He has intermittent discomfort that he rates as "not even a 1/10".     He previously underwent a CT renal stone study on 05/03/2024 which was independently reviewed today and shared with the patient and reveals numerous hypodensities of the right kidney with Hounsfield units greater than simple fluid which have increased in size compared to prior imaging.  Bilateral kidney stones also seen.  No hydronephrosis.    Patient returns today to review follow-up imaging for complex renal cysts.  Denies any problems.   MRI of the  abdomen with and without contrast from 12/09/2024 was independently reviewed today and shared with the patient and shows  slight increase in size of right lower pole complex cystic lesion since 2021.  Currently measuring 2.3 cm compared to 1.6 cm in July of 2021.    CT urogram from 6/2/25 was independently reviewed today and reveals 2.3cm exophytic enhancing right lower pole renal mass.      Contrated Ultrasound of the kidney from 5/20/25 was independently reviewed today and reveals Bosniak 3 cystic lesion with enahancing thickened septa of the right lower pole.        Of note, his most recent PSA on 12/9/24 was 3.5 which is down slightly from 6/10/2024 when it was 3.9.  Corrected for dutasteride this is 7.  He has previously undergone an MRI of the prostate, 2018 was his last MRI of the prostate.  This showed a PI-RADS 4 lesion.  He subsequently underwent a prostate biopsy on 11/20/2018.  This revealed no prostate cancer.    He is happy with his voiding on flomax and dutasteride. "     No FH of prostate cancer.         Lab Results   Component Value Date    PSA 6.0 (H) 05/20/2014    PSA 5.81 (H) 06/04/2013    PSA 10.78 (H) 05/28/2013    PSA 5.55 (H) 12/05/2012    PSA 6.48 (H) 05/28/2012    PSA 4.08 (H) 05/24/2012    PSA 6.29 (H) 03/29/2012    PSA 5.42 (H) 11/17/2011    PSA 4.8 (H) 09/12/2011    PSA 5.5 (H) 05/17/2011    PSA 5.6 (H) 03/07/2011    PSA 5.1 (H) 07/12/2010    PSA 7.0 (H) 05/13/2010    PSA 4.4 (H) 08/11/2009    PSA 4.9 (H) 06/11/2009    PSA 2.7 06/10/2008    PSA 3.1 04/10/2008    PSA 3.4 02/09/2008    PSA 7.4 (H) 08/13/2007    PSA 4.6 (H) 05/29/2007    PSADIAG 3.5 12/09/2024    PSADIAG 3.9 06/10/2024    PSADIAG 3.3 03/21/2024    PSADIAG 4.8 (H) 08/22/2023    PSADIAG 6.3 (H) 04/04/2023    PSADIAG 3.9 03/29/2022    PSADIAG 3.7 05/04/2021    PSADIAG 6.2 (H) 03/17/2021    PSADIAG 3.8 03/10/2020    PSADIAG 6.9 (H) 11/26/2019    PSADIAG 4.2 (H) 04/11/2019    PSADIAG 6.8 (H) 10/12/2018    PSADIAG 10.2 (H) 06/12/2018    PSADIAG 11.7 (H) 04/10/2018    PSADIAG 11.7 (H) 04/10/2018    PSADIAG 5.8 (H) 04/10/2017    PSADIAG 5.6 (H) 06/14/2016    PSADIAG 5.7 (H) 03/23/2016    PSADIAG 5.4 (H) 06/15/2015    PSADIAG 10.2 (H) 04/08/2015           Review of Systems  Review of Systems  All other systems reviewed and negative except pertinent positives noted in HPI.       Objective:     Physical Exam  Constitutional:       General: He is not in acute distress.     Appearance: He is well-developed.   HENT:      Head: Normocephalic and atraumatic.   Eyes:      General: No scleral icterus.  Neck:      Trachea: No tracheal deviation.   Pulmonary:      Effort: Pulmonary effort is normal. No respiratory distress.   Neurological:      Mental Status: He is alert and oriented to person, place, and time.   Psychiatric:         Behavior: Behavior normal.         Thought Content: Thought content normal.         Judgment: Judgment normal.         Lab Review  Lab Results   Component Value Date    COLORU Teena  03/29/2022    SPECGRAV 1.025 03/29/2022    PHUR 5.0 03/29/2022    WBCUR NEG 11/19/2015    NITRITE Negative 03/29/2022    GLUCOSEUR NORMAL 11/19/2015    KETONESU Trace (A) 03/29/2022    UROBILINOGEN NORMAL 11/19/2015    RBCUR 250 11/19/2015         Assessment:        1. Renal mass    2. BPH with urinary obstruction    3. Primary hypertension    4. Elevated PSA                Plan:     Renal mass    BPH with urinary obstruction    Primary hypertension    Elevated PSA        Renal Mass:  --imaging reviewed as per HPI.   I have explained the risk, benefits, and alternatives of the procedure in detail.  The risks including but not limited to bleeding, pseudoaneurysm, AVM, injury to adjacent structures including the spleen, liver, lung, pancreas, colon and intestines, blood vessels in the abdomen including the renal artery or renal vein, ureter, loss of kidney, urinoma or urinary fistula, or need for conversion to open or radical nephrectomy were explained to the patient in depth. The patient voices understanding and all questions have been answered. The patient agrees to proceed as planned with a right robotic partial nephrectomy.    BPH:  On avodart.     Elevated psa:  - MRI of the prostate from 6/2024 showed no concerning lesions.   -prior biopsy negative.     HTN:  -BP reviewed today and normal  -continue current medications  -encouraged f/u and discussion of BP with PCP.       The patient was seen and examined with the resident physician.  All questions were answered.  The plan was discussed with the patient and I concur with the resident physician's documentation.    - code applied: patient requires or will require a continuous, longitudinal, and active collaborative plan of care related to this patient's health condition, renal mass, elevated psa, BPH --the management of which requires the direction of a practitioner with specialized clinical knowledge, skill, and expertise.

## 2025-06-13 ENCOUNTER — RESULTS FOLLOW-UP (OUTPATIENT)
Dept: GASTROENTEROLOGY | Facility: CLINIC | Age: 75
End: 2025-06-13

## 2025-06-13 ENCOUNTER — PATIENT MESSAGE (OUTPATIENT)
Dept: GASTROENTEROLOGY | Facility: CLINIC | Age: 75
End: 2025-06-13
Payer: MEDICARE

## 2025-06-13 DIAGNOSIS — N28.89 RENAL MASS: Primary | ICD-10-CM

## 2025-06-13 LAB
ANNOTATION COMMENT IMP: ABNORMAL
GENETIC VARIANT DETAILS BLD/T: ABNORMAL
GENETICIST REVIEW: ABNORMAL
LAB TEST METHOD: ABNORMAL
Lab: ABNORMAL
MOL DX INTERP BLD/T QL: ABNORMAL
PROVIDER SIGNING NAME: ABNORMAL
TEST PERFORMANCE INFO SPEC: ABNORMAL

## 2025-06-16 NOTE — ANESTHESIA PAT ROS NOTE
7/8/2025  Anton Christiansen is a 74 y.o., male scheduled for ROBOTIC PARTIAL NEPHRECTOMY r/t Renal Mass, arrives for optimization and preop anesthesia assessment.      Pre-op Assessment    I have reviewed the Patient Summary Reports.     I have reviewed the Nursing Notes. I have reviewed the NPO Status.   I have reviewed the Medications.     Review of Systems  Anesthesia Hx:    Post op nausea   History of prior surgery of interest to airway management or planning:  Previous anesthesia: General 10/22/2024 EGD with general anesthesia.  Procedure performed at an [facility].       Denies Family Hx of Anesthesia complications.   Personal Hx of Anesthesia complications, Post-Operative Nausea/Vomiting, in the past, but not with recent anesthetics / prophylaxis                    Social:  Non-Smoker, Social Alcohol Use       Hematology/Oncology:  Hematology Normal                                 Oncology Comments: Suspect Renal Carcinoma  Suspect Prostate Carcinoma    Hx of Basal and Squamous cell to face.     EENT/Dental:  chronic allergic rhinitis Tonsillectomy Eyes: Visual Impairment            Eye Disease:  Optic Neuropathy            Date Unknown  Eye surgery  8/22/2022  Central scotoma, left eye    8/2/2016  NAION (non-arteritic anterior ischemic optic neuropathy), left eye    12/20/2012  Cataract extraction w/  intraocular lens implant           Cardiovascular:  Exercise tolerance: good   Denies Pacemaker. Hypertension, well controlled   CAD       Denies Angina.     hyperlipidemia  Denies SPENCER.      Functional Capacity 4 METS, Exercises 3x week; volunteers at [facility]         Peripheral Arterial Disease     Aortic atherosclerosis             Hypertension         Pulmonary:       Denies Shortness of breath.  Sleep Apnea, CPAP                Renal/:  Chronic Renal Disease renal calculi BPH   Renal mass   BPH with urinary obstruction   Renal cysts  Renal stones  Elevated PSA     Chronic prostatitis         Neoplasm/Tumor, Renal Neoplasm, Suspected Renal Cell CA, Prostate Cancer.   Kidney Function/Disease, Single Kidney             Hepatic/GI:     GERD, well controlled Liver Disease,  Hx Stomach Polyps Not Taking GLP-1 Agonists         Liver Disease      Gilbert syndrome.   Musculoskeletal:  Arthritis   Experiences L hip pain when laying on side, alleviated with pillow under affected area            Neurological:  Neurology Normal Denies TIA.  Denies CVA.    Denies Seizures.             Brain Tumor, Meningioma     Followed by Dr. Nelson - last MRI 12/8/2021 - other findings, incl ventriculometry incidentally noted. Patient states he was advised to follow summer '23              Endocrine:  Endocrine Normal      Thyroid Disease    Current functional status of Unknown   Edited:Zelalem Haney MD      6/25/2021 7:47 AM  --patient with multiple thyroid nodules  --no risk factors for thyroid cancer  --no compressive symptoms  --TSH WNL  --Independently reviewed ultrasound image  --Right lobe nodule predominately cystic and underwent FNA and drainage in 2018 with unsatisfactory results, repeat FNA in 2020 was again unsatisfactory  --The right lobe nodule appears predominately cystic with a solid component that appears to be spongiform or have areas of microcystic change  --This is highly likely to be a benign nodule so I would not recommend repeat FNA  --Will plan to repeat thyroid ultrasound in one year   Parathyroid Disease, Hyperparathyroidism            Dermatological:  11/25/2024  RIGHT NASAL SIDEWALL  SQUAMOUS CELL    Mohs defect of ala nasi     3/12/2024  BASAL CELL NODULAR    AK (actinic keratosis)       04/29/2014  Division and Inset  Forehead Flap     Psych:  Psychiatric Normal    denies depression                Physical Exam  General: Well nourished, Cooperative, Alert and Oriented    Airway:  Mallampati: III   Mouth Opening: Normal  Tongue: Normal  Neck ROM: Normal  ROM    Dental:  Intact          Anesthesia Assessment: Preoperative EQUATION    Planned Procedure: Procedure(s) (LRB):  DV5 ROBOTIC NEPHRECTOMY, PARTIAL (Right)  Requested Anesthesia Type:General/Regional  Surgeon: Curry Lynn MD  Service: Urology  Known or anticipated Date of Surgery:7/24/2025    Surgeon notes: reviewed    Electronic QUestionnaire Assessment      Triage considerations:     Previous anesthesia records:GETA and No problems  10/22/2024 EGD  Airway:  Mallampati: II   Mouth Opening: Normal  TM Distance: Normal  Tongue: Normal  Neck ROM: Normal ROM    Last PCP note: 6-12 months ago , within [facility]   Subspecialty notes: Gastroenterology, Urology    Other important co-morbidities: HLD, HTN, ASHA, and CAD      Tests already available:  Available tests,  within 3 months , within [facility] .   6/2/2025 CMP    Optimization:  Anesthesia Preop Clinic Assessment  Indicated.    Medical Opinion Indicated.       Plan:    Testing:  EKG, Hematology Profile, and T&S   Pre-anesthesia  visit       Visit focus: concerns in complex and/or prolonged anesthesia, position other than supine     Consultation:IM Perioperative Hospitalist     Patient  has previously scheduled Medical Appointment: 7/14/2025    Navigation: Tests Scheduled.              Consults scheduled.             Results will be tracked by Preop Clinic.        7/7/2025 MEDICAL CLEARANCE  Patient is optimized     Patient/ care giver/ Family member was instructed to call and update me about any changes to health,  medication, office visits ,testing out side of the [facility] , hospitalizations between now and surgery       Fatuma Narvaez MD  Internal Medicine  [facility]   Cell Phone- [phone]     Checked for over-the-counter medication

## 2025-06-20 ENCOUNTER — OFFICE VISIT (OUTPATIENT)
Dept: INTERNAL MEDICINE | Facility: CLINIC | Age: 75
End: 2025-06-20
Payer: MEDICARE

## 2025-06-20 ENCOUNTER — LAB VISIT (OUTPATIENT)
Dept: LAB | Facility: HOSPITAL | Age: 75
End: 2025-06-20
Attending: INTERNAL MEDICINE
Payer: MEDICARE

## 2025-06-20 VITALS
SYSTOLIC BLOOD PRESSURE: 110 MMHG | DIASTOLIC BLOOD PRESSURE: 70 MMHG | OXYGEN SATURATION: 96 % | WEIGHT: 183.88 LBS | HEART RATE: 62 BPM | BODY MASS INDEX: 29.23 KG/M2

## 2025-06-20 DIAGNOSIS — E78.2 MIXED HYPERLIPIDEMIA: ICD-10-CM

## 2025-06-20 DIAGNOSIS — I10 PRIMARY HYPERTENSION: ICD-10-CM

## 2025-06-20 DIAGNOSIS — I70.0 AORTIC ATHEROSCLEROSIS: ICD-10-CM

## 2025-06-20 DIAGNOSIS — Z00.00 ANNUAL PHYSICAL EXAM: ICD-10-CM

## 2025-06-20 DIAGNOSIS — Z01.818 PREOPERATIVE TESTING: ICD-10-CM

## 2025-06-20 DIAGNOSIS — E21.3 HYPERPARATHYROIDISM: ICD-10-CM

## 2025-06-20 DIAGNOSIS — Z00.00 ANNUAL PHYSICAL EXAM: Primary | ICD-10-CM

## 2025-06-20 DIAGNOSIS — R79.9 ABNORMAL FINDING OF BLOOD CHEMISTRY, UNSPECIFIED: ICD-10-CM

## 2025-06-20 DIAGNOSIS — D32.9 MENINGIOMA: ICD-10-CM

## 2025-06-20 LAB
ABSOLUTE EOSINOPHIL (OHS): 0.22 K/UL
ABSOLUTE MONOCYTE (OHS): 0.87 K/UL (ref 0.3–1)
ABSOLUTE NEUTROPHIL COUNT (OHS): 4.98 K/UL (ref 1.8–7.7)
ALBUMIN SERPL BCP-MCNC: 4.2 G/DL (ref 3.5–5.2)
ALP SERPL-CCNC: 91 UNIT/L (ref 40–150)
ALT SERPL W/O P-5'-P-CCNC: 27 UNIT/L (ref 10–44)
ANION GAP (OHS): 13 MMOL/L (ref 8–16)
AST SERPL-CCNC: 21 UNIT/L (ref 11–45)
BASOPHILS # BLD AUTO: 0.05 K/UL
BASOPHILS NFR BLD AUTO: 0.6 %
BILIRUB SERPL-MCNC: 2.4 MG/DL (ref 0.1–1)
BUN SERPL-MCNC: 27 MG/DL (ref 8–23)
CALCIUM SERPL-MCNC: 10.7 MG/DL (ref 8.7–10.5)
CHLORIDE SERPL-SCNC: 104 MMOL/L (ref 95–110)
CHOLEST SERPL-MCNC: 148 MG/DL (ref 120–199)
CHOLEST/HDLC SERPL: 3.8 {RATIO} (ref 2–5)
CO2 SERPL-SCNC: 22 MMOL/L (ref 23–29)
CREAT SERPL-MCNC: 1.1 MG/DL (ref 0.5–1.4)
EAG (OHS): 117 MG/DL (ref 68–131)
ERYTHROCYTE [DISTWIDTH] IN BLOOD BY AUTOMATED COUNT: 14 % (ref 11.5–14.5)
GFR SERPLBLD CREATININE-BSD FMLA CKD-EPI: >60 ML/MIN/1.73/M2
GLUCOSE SERPL-MCNC: 89 MG/DL (ref 70–110)
HBA1C MFR BLD: 5.7 % (ref 4–5.6)
HCT VFR BLD AUTO: 45.9 % (ref 40–54)
HDLC SERPL-MCNC: 39 MG/DL (ref 40–75)
HDLC SERPL: 26.4 % (ref 20–50)
HGB BLD-MCNC: 14.8 GM/DL (ref 14–18)
IMM GRANULOCYTES # BLD AUTO: 0.04 K/UL (ref 0–0.04)
IMM GRANULOCYTES NFR BLD AUTO: 0.5 % (ref 0–0.5)
LDLC SERPL CALC-MCNC: 74.4 MG/DL (ref 63–159)
LYMPHOCYTES # BLD AUTO: 1.56 K/UL (ref 1–4.8)
MCH RBC QN AUTO: 30.6 PG (ref 27–31)
MCHC RBC AUTO-ENTMCNC: 32.2 G/DL (ref 32–36)
MCV RBC AUTO: 95 FL (ref 82–98)
NONHDLC SERPL-MCNC: 109 MG/DL
NUCLEATED RBC (/100WBC) (OHS): 0 /100 WBC
PLATELET # BLD AUTO: 294 K/UL (ref 150–450)
PMV BLD AUTO: 10.2 FL (ref 9.2–12.9)
POTASSIUM SERPL-SCNC: 5.3 MMOL/L (ref 3.5–5.1)
PROT SERPL-MCNC: 7.6 GM/DL (ref 6–8.4)
PTH-INTACT SERPL-MCNC: 160.7 PG/ML (ref 9–77)
RBC # BLD AUTO: 4.84 M/UL (ref 4.6–6.2)
RELATIVE EOSINOPHIL (OHS): 2.8 %
RELATIVE LYMPHOCYTE (OHS): 20.2 % (ref 18–48)
RELATIVE MONOCYTE (OHS): 11.3 % (ref 4–15)
RELATIVE NEUTROPHIL (OHS): 64.6 % (ref 38–73)
SODIUM SERPL-SCNC: 139 MMOL/L (ref 136–145)
TRIGL SERPL-MCNC: 173 MG/DL (ref 30–150)
WBC # BLD AUTO: 7.72 K/UL (ref 3.9–12.7)

## 2025-06-20 PROCEDURE — 36415 COLL VENOUS BLD VENIPUNCTURE: CPT

## 2025-06-20 PROCEDURE — 85025 COMPLETE CBC W/AUTO DIFF WBC: CPT

## 2025-06-20 PROCEDURE — 80061 LIPID PANEL: CPT

## 2025-06-20 PROCEDURE — 83036 HEMOGLOBIN GLYCOSYLATED A1C: CPT

## 2025-06-20 PROCEDURE — 80053 COMPREHEN METABOLIC PANEL: CPT

## 2025-06-20 PROCEDURE — 99214 OFFICE O/P EST MOD 30 MIN: CPT | Mod: PBBFAC | Performed by: INTERNAL MEDICINE

## 2025-06-20 PROCEDURE — 99999 PR PBB SHADOW E&M-EST. PATIENT-LVL IV: CPT | Mod: PBBFAC,,, | Performed by: INTERNAL MEDICINE

## 2025-06-20 PROCEDURE — 83970 ASSAY OF PARATHORMONE: CPT

## 2025-06-20 NOTE — PROGRESS NOTES
CHIEF COMPLAINT     Chief Complaint   Patient presents with    Annual Exam       HPI     Anton Christiansen is a 74 y.o. male HTN, HLD, ASHA, BPH, hx of CVA meningioma here today for annual exam    No significant changes in day to day life. Exercising 3x weekly. Volunteer at [facility] few times week.    Planning partial nephrectomy for complex renal cyst at the end of next month    Blood pressures been well controlled on his Benicar HCT 20-12.5 and carvedilol 12.5 b.i.d.    Reports taking atorvastatin without issues    Personally Reviewed Patient's Medical, surgical, family and social hx. Changes updated in Psychiatric.  Care Team updated in Epic    Review of Systems:  Review of Systems   Neurological:         Balance worse       Health Maintenance:   Reviewed with patient  Due for the following:      PHYSICAL EXAM     /70   Pulse 62   Wt 83.4 kg (183 lb 13.8 oz)   SpO2 96%   BMI 29.23 kg/m²     Gen: Well Appearing, NAD  HEENT: PERR, EOMI  Neck: FROM, no thyromegaly, no cervical adenopathy  CVD: RRR, no M/R/G  Pulm: Normal work of breathing, CTAB, no wheezing  Abd:  Soft, NT, ND non TTP, no mass  MSK: no LE edema call external rotation of feet  Neuro: A&Ox3, gait normal, speech normal  Mood; Mood normal, behavior normal, thought process linear       LABS     Labs reviewed; ordered  ASSESSMENT     1. Annual physical exam        2. Meningioma  MRI Brain W WO Contrast      3. Hyperparathyroidism        4. Aortic atherosclerosis        5. Primary hypertension        6. Mixed hyperlipidemia                Plan     Anton Christiansen is a 74 y.o. male with HTN, HLD, ASHA, BPH, meningioma here today for annual exam.  1. Annual physical exam  Updated problem list, medical history, care team and discussed HM.   Maintaining functional status, exercising 3 times a week and volunteering multiple days a week  Scheduled for partial nephrectomy to remove cyst next month    2. Meningioma  Overdue for surveillance MRI.  Will  order today and have him follow up with Neurosurgery  - MRI Brain W WO Contrast; Future    3. Hyperparathyroidism  Recheck PTH today    4. Aortic atherosclerosis  Radiological diagnosis.  Treating comorbid hypertension hyperlipidemia    5. Primary hypertension  At goal continue Benicar HCT 20-12.5 and Coreg 12.5 b.i.d.    6. Mixed hyperlipidemia  Continue statin recheck lipid panel    Сергей Landis MD

## 2025-06-23 ENCOUNTER — TELEPHONE (OUTPATIENT)
Dept: INTERNAL MEDICINE | Facility: CLINIC | Age: 75
End: 2025-06-23
Payer: MEDICARE

## 2025-06-23 ENCOUNTER — RESULTS FOLLOW-UP (OUTPATIENT)
Dept: INTERNAL MEDICINE | Facility: CLINIC | Age: 75
End: 2025-06-23

## 2025-06-23 DIAGNOSIS — E87.5 SERUM POTASSIUM ELEVATED: Primary | ICD-10-CM

## 2025-06-23 NOTE — TELEPHONE ENCOUNTER
----- Message from Сергей Landis MD sent at 6/23/2025 11:56 AM CDT -----  Potassium slightly elevated.  Would like to repeat BMP to confirm elevation before making medication change.  Let me know if it is okay with this and I will place repeat lab test  ----- Message -----  From: Lab, Background User  Sent: 6/20/2025   3:52 PM CDT  To: Сергей Landis MD

## 2025-06-25 ENCOUNTER — RESULTS FOLLOW-UP (OUTPATIENT)
Dept: INTERNAL MEDICINE | Facility: CLINIC | Age: 75
End: 2025-06-25

## 2025-06-25 ENCOUNTER — LAB VISIT (OUTPATIENT)
Dept: LAB | Facility: HOSPITAL | Age: 75
End: 2025-06-25
Payer: MEDICARE

## 2025-06-25 DIAGNOSIS — E87.5 SERUM POTASSIUM ELEVATED: ICD-10-CM

## 2025-06-25 DIAGNOSIS — Z01.818 PREOPERATIVE TESTING: ICD-10-CM

## 2025-06-25 LAB
ANION GAP (OHS): 8 MMOL/L (ref 8–16)
BUN SERPL-MCNC: 24 MG/DL (ref 8–23)
CALCIUM SERPL-MCNC: 10.8 MG/DL (ref 8.7–10.5)
CHLORIDE SERPL-SCNC: 104 MMOL/L (ref 95–110)
CO2 SERPL-SCNC: 25 MMOL/L (ref 23–29)
CREAT SERPL-MCNC: 1 MG/DL (ref 0.5–1.4)
GFR SERPLBLD CREATININE-BSD FMLA CKD-EPI: >60 ML/MIN/1.73/M2
GLUCOSE SERPL-MCNC: 91 MG/DL (ref 70–110)
INDIRECT COOMBS: NORMAL
POTASSIUM SERPL-SCNC: 4.4 MMOL/L (ref 3.5–5.1)
RH BLD: NORMAL
SODIUM SERPL-SCNC: 137 MMOL/L (ref 136–145)
SPECIMEN OUTDATE: NORMAL

## 2025-06-25 PROCEDURE — 36415 COLL VENOUS BLD VENIPUNCTURE: CPT

## 2025-06-25 PROCEDURE — 80051 ELECTROLYTE PANEL: CPT

## 2025-06-25 PROCEDURE — 86901 BLOOD TYPING SEROLOGIC RH(D): CPT | Performed by: ANESTHESIOLOGY

## 2025-07-07 ENCOUNTER — TELEPHONE (OUTPATIENT)
Facility: CLINIC | Age: 75
End: 2025-07-07
Payer: MEDICARE

## 2025-07-07 ENCOUNTER — OFFICE VISIT (OUTPATIENT)
Facility: CLINIC | Age: 75
End: 2025-07-07
Payer: MEDICARE

## 2025-07-07 ENCOUNTER — TELEPHONE (OUTPATIENT)
Dept: UROLOGY | Facility: CLINIC | Age: 75
End: 2025-07-07
Payer: MEDICARE

## 2025-07-07 ENCOUNTER — HOSPITAL ENCOUNTER (OUTPATIENT)
Dept: CARDIOLOGY | Facility: CLINIC | Age: 75
Discharge: HOME | End: 2025-07-07
Payer: MEDICARE

## 2025-07-07 VITALS
HEART RATE: 54 BPM | RESPIRATION RATE: 16 BRPM | SYSTOLIC BLOOD PRESSURE: 147 MMHG | TEMPERATURE: 98 F | HEIGHT: 67 IN | DIASTOLIC BLOOD PRESSURE: 70 MMHG | BODY MASS INDEX: 29.03 KG/M2 | WEIGHT: 185 LBS | OXYGEN SATURATION: 98 %

## 2025-07-07 DIAGNOSIS — G47.33 OSA (OBSTRUCTIVE SLEEP APNEA): ICD-10-CM

## 2025-07-07 DIAGNOSIS — Z86.16 HISTORY OF COVID-19: ICD-10-CM

## 2025-07-07 DIAGNOSIS — N40.1 BPH WITH URINARY OBSTRUCTION: ICD-10-CM

## 2025-07-07 DIAGNOSIS — H53.412 CENTRAL SCOTOMA, LEFT EYE: Primary | ICD-10-CM

## 2025-07-07 DIAGNOSIS — R11.0 NAUSEA: ICD-10-CM

## 2025-07-07 DIAGNOSIS — I10 PRIMARY HYPERTENSION: Chronic | ICD-10-CM

## 2025-07-07 DIAGNOSIS — I70.0 AORTIC ATHEROSCLEROSIS: ICD-10-CM

## 2025-07-07 DIAGNOSIS — N22 CALCULUS OF URINARY TRACT IN DISEASES CLASSIFIED ELSEWHERE: Primary | ICD-10-CM

## 2025-07-07 DIAGNOSIS — N20.0 KIDNEY STONE: Chronic | ICD-10-CM

## 2025-07-07 DIAGNOSIS — E04.2 MULTIPLE THYROID NODULES: ICD-10-CM

## 2025-07-07 DIAGNOSIS — Z01.818 PREOPERATIVE TESTING: ICD-10-CM

## 2025-07-07 DIAGNOSIS — D32.9 MENINGIOMA: ICD-10-CM

## 2025-07-07 DIAGNOSIS — R17 ELEVATED BILIRUBIN: ICD-10-CM

## 2025-07-07 DIAGNOSIS — K21.9 GASTROESOPHAGEAL REFLUX DISEASE, UNSPECIFIED WHETHER ESOPHAGITIS PRESENT: ICD-10-CM

## 2025-07-07 DIAGNOSIS — N13.8 BPH WITH URINARY OBSTRUCTION: ICD-10-CM

## 2025-07-07 DIAGNOSIS — Z85.828 HISTORY OF SKIN CANCER: ICD-10-CM

## 2025-07-07 LAB
OHS QRS DURATION: 84 MS
OHS QTC CALCULATION: 397 MS

## 2025-07-07 PROCEDURE — 99214 OFFICE O/P EST MOD 30 MIN: CPT | Mod: PBBFAC,25 | Performed by: HOSPITALIST

## 2025-07-07 PROCEDURE — 93005 ELECTROCARDIOGRAM TRACING: CPT | Mod: PBBFAC | Performed by: STUDENT IN AN ORGANIZED HEALTH CARE EDUCATION/TRAINING PROGRAM

## 2025-07-07 PROCEDURE — 93010 ELECTROCARDIOGRAM REPORT: CPT | Mod: S$PBB,,, | Performed by: STUDENT IN AN ORGANIZED HEALTH CARE EDUCATION/TRAINING PROGRAM

## 2025-07-07 PROCEDURE — 99999 PR PBB SHADOW E&M-EST. PATIENT-LVL IV: CPT | Mod: PBBFAC,,, | Performed by: HOSPITALIST

## 2025-07-07 RX ORDER — IBUPROFEN 200 MG
200 TABLET ORAL
COMMUNITY

## 2025-07-07 NOTE — ASSESSMENT & PLAN NOTE
History of COVID-twice-none recently  He had COVID vaccine twice    Did not require hospitalization, intubation or supplemental oxygen use   Had been vaccinated   Recovered from COVID    COVID screening     No fever   No cough   No SOB  No sore throat   No loss of taste or smell   No muscle aches   No nausea, vomiting , diarrhea

## 2025-07-07 NOTE — TELEPHONE ENCOUNTER
Patient currently having flank pain per Dr. Narvaez.  Needs CT RSS asap to rule out obstructing kidney stone prior to right robotic partial nephrectomy.  Janet, please reach out to arrange this.  Thank you.       ----- Message from Fatuma Narvaez MD sent at 7/7/2025 10:40 AM CDT -----  Curry    I met this pleasant gentleman for perioperative care this morning prior to his nephrectomy surgery on July 24th  He is doing well      He had single-point chest discomfort about 3 weeks ago while he was exercising which he points towards the lower chest  No radiation  No associated sweating short of breath nausea vomiting or diarrhea  It resolved and had since being  exercises on the treadmill since with no recurrence  This does not sound cardiac in nature    He has a longstanding discomfort which is located superficially underneath the left breast and he had evaluation done for that  He is putting a topical medication which is helping  No radiation  No associated sweating short of breath nausea vomiting or diarrhea  It is related to bending down and not related to physical activity  He stays physically active and does not come on when he is physically active    I have offered cardiac evaluation but he was comfortable to move on without further cardiac evaluation  I feel comfortable to let him proceed with the surgery    He has some minimal pain on the right loin area from the kidney stone  He, if possible wants to address it at the same time of the surgery    Wanted to keep you informed    Brandt

## 2025-07-07 NOTE — ASSESSMENT & PLAN NOTE
Sleep apnea- CPAP  Sleep apnea   Uses CPAP/BIPAP .  Suggested bringing for hospital use .   Informed the risk of worsening sleep apnea in the perioperative period and suggest using CPAP use any time in 24 hrs ( day or night )for planned sleep     I suggest  caution with usage of medication that can cause respiratory suppression in the perioperative period  Care with driving    Avoidance of  supine sleep, weight gain , alcoholic beverages , care with , sedative , CNS depressant use indicated  since all of these can worsen ASHA

## 2025-07-07 NOTE — PROGRESS NOTES
[facility]-PreOp Consults  Progress Note    Patient Name: Anton Christiansen  MRN: [id number]  Date of Evaluation- 07/07/2025  PCP- Сергей Landis MD    Future cases for Anton Christiansen [[id number]]       Case ID Status Date Time Steve Procedure Provider Location    [id number] [facility] 7/24/2025  7:00  DV5 ROBOTIC NEPHRECTOMY, PARTIAL Curry Lynn MD [[id number]] [facility] OR 2ND FLR            HPI:  History of present illness- I had the pleasure of meeting this pleasant 74 y.o. gentleman in the pre op clinic prior to his elective Urological surgery. The patient is new to me .   Offered to have family to be on the phone during the consultation      I have obtained the history by speaking to the patient and by reviewing the electronic health records.    Events leading up to surgery / History of presenting illness -    He is planning on having a partial nephrectomy on the right kidney for a renal mass on July the 24th and has come in for perioperative care evaluation  He has had history of kidney stones in the past and had the pain from 1 on the kidney stones on the right side that led to the finding of kidney mass  He is not troubled with pain anymore on the right side  He has some minimal pain on the right loin area from the kidney stone  He, if possible wants to address it at the same time   No blood in the urine  He has had no longstanding chemical exposure  No tobacco smoking history    Relevant health conditions of significance for the perioperative period/ History of presenting illness -      Atorvastatin-Hyperlipidemia  Carvedilol-hypertension  Avodart-enlarged prostate  Olmesartan-hydrochlorothiazide -hypertension  Kidney stone  BMI  Sleep apnea- CPAP    Lives with wife in a single-story house and both of them are retired  He is volunteering about 9 hours a week at [employer]-none  Children -2 grown daughters and 1 grown son  Has help post op    Not known to have , heart problem , Prediabetes , Diabetes Mellitus,  Lung problem, Thyroid problem,  deep vein thrombosis, pulmonary embolism,   fatty liver , blood vessels stent , tobacco smoking, edema, mental health problems , gout               Subjective/ Objective:     Chief complaint-Preoperative evaluation, Perioperative Medical management, complication reduction plan     Active cardiac conditions- none    Revised cardiac risk index predictors- none    He had single-point chest discomfort about 3 weeks ago while he was exercising which he points towards the lower chest  No radiation  No associated sweating short of breath nausea vomiting or diarrhea  It resolved and had a been do exercises on the treadmill since with no recurrence  This does not sound cardiac in nature    Functional capacity -Examples of physical activity -he walks 3 times a week at about 5000 steps each time, he walks on the treadmill at [place], he can take a flight of stairs ----- He can undertake all the above activities without  chest pain,chest tightness, Shortness of breath ,dizziness,lightheadedness making his exercise tolerance more,  than 4 Mets.       Review of Systems   Constitutional:  Negative for chills and fever.        No unusual weight changes      HENT:          Sleep apnea   Eyes:         No unusual vision changes   Respiratory:          No cough , phlegm    No Hemoptysis   Cardiovascular:         As noted   Gastrointestinal:         Bowels- Regular    No overt GI/ blood losses   Endocrine:        Prednisone use > 20 mg daily for 3 weeks- None   Genitourinary:  Negative for dysuria.            Musculoskeletal:         He has discomfort on left hip area upon laying on that side  None new  Suggested follow up   Skin:  Negative for rash.   Neurological:  Negative for syncope.        No unilateral weakness   Hematological:         Current use of Anticoagulants  None  Suggested not to take ibuprofen 1 week prior to surgery   Psychiatric/Behavioral:          No Depression,Anxiety    "              No   bleeding, cardiac problems with previous surgeries/procedures.  Medications and Allergies reviewed in epic.   FH- No anesthesia,bleeding / venous thrombosis ,   in family    Physical Exam  Blood pressure (!) 147/70, pulse (!) 54, temperature 97.8 °F (36.6 °C), temperature source Oral, resp. rate 16, height 5' 7" (1.702 m), weight 83.9 kg (185 lb), SpO2 98%.      Physical Exam  Constitutional- Vitals - Body mass index is 28.98 kg/m².,   Vitals:    07/07/25 0919   BP: (!) 147/70   Pulse: (!) 54   Resp: 16   Temp: 97.8 °F (36.6 °C)     General appearance-Conscious,Coherent  Eyes- No conjunctival icterus,pupils  round  and reactive to light   ENT-Oral cavity- moist    , Hearing grossly normal   Neck- No thyromegaly ,Trachea -central, No jugular venous distension,   No Carotid Bruit   Cardiovascular -Heart Sounds- Normal  and  no murmur   , No gallop rhythm   Respiratory - Normal Respiratory Effort, Normal breath sounds,  CrepitationsLeft base,  no wheeze , and  no forced expiratory wheeze    Peripheral pitting pedal edema-- none , no calf pain   Gastrointestinal -Soft abdomen, No palpable masses, Non Tender,Liver,Spleen not palpable. No-- free fluid and shifting dullness  Musculoskeletal- No finger Clubbing. Strength grossly normal   Lymphatic-No Palpable cervical, axillary,Inguinal lymphadenopathy   Psychiatric - normal effect,Orientation  Rt Dorsalis pedis pulses-palpable    Lt Dorsalis pedis pulses- palpable   Rt Posterior tibial pulses -palpable   Left posterior tibial pulses -palpable   Miscellaneous -  no asterixis and  no renal bruit   Investigations  Lab and Imaging have been reviewed in [facility].    Review of Medicine tests    EKG- I had independently reviewed the EKG from--today showed no acute appearing changes  I have compared his EKG from today with a 1 in the past which showed no acute appearing changes    He has bradycardia likely from carvedilol use  No suggestions of obstructive " jaundice    No overt hypo thyroid symptoms    Review of clinical lab tests:  Lab Results   Component Value Date    CREATININE 1.0 06/25/2025    HGB 14.8 06/20/2025     06/20/2025                 Review of old records- Was done and information gathered regards to events leading to surgery and health conditions of significance in the perioperative period.        Preoperative cardiac risk assessment-  The patient does not have any active cardiac conditions . Revised cardiac risk index predictors- -0--.Functional capacity is more than 4 Mets. He will be undergoing a Urological procedure that carries a Moderate Risk risk     Risk of a major Cardiac event ( Defined as death, myocardial infarction, or cardiac arrest at 30 days after noncardiac surgery), based on RCRI score      -3.9%          No further cardiac work up is indicated prior to proceeding with the surgery          American Society of Anesthesiologists Physical status classification ( ASA ) class: 2     Postoperative pulmonary complication risk assessment:      ARISCAT ( [place]) risk index- risk class -  Low, if duration of surgery is under 3 hours, intermediate, if duration of surgery is over 3 hours          Assessment/Plan:     Central scotoma, left eye  He has vision problems on the left eye and is under eye doctor care   No new problems  Not known to have circulatory problems  He has noticed that over the years his left eye vision is slightly improving    Aortic atherosclerosis  He is on atorvastatin  HLD-I  suggest continuation of statin during the entire perioperative period.     Hypertension  He is currently taking olmesartan hydrochlorothiazide for hypertension  I suggested not to take the blood pressure medication on the morning of surgery      His blood pressure usually is about 120s over 70s  130s over 80s to 75  Hypertension-  Blood pressure is acceptable .  I suggest holding ----olmesartan hydrochlorothiazide---- on the morning of the  surgery and can continue that  post operatively under blood pressure, electrolyte and renal function monitoring as long as they are acceptable.I suggest addressing pain control as uncontrolled pain can increased blood pressure    Suggested continuation of carvedilol through the surgery    BPH with urinary obstruction  Avodart-enlarged prostate  Urinating well  Emptying well  Increased risk of post operative urinary retention  I suggest monitoring the  bladder volume with a view to decompress the bladder to avoid bladder stretching and resultant urinary retention   To minimize the risk of postoperative urinary retention address constipation( if applicable), increased ambulation, minimize opioid and anticholinergic use   No prostate infection    He is not known to have pituitary problems    Kidney stone  He is known to have calcium stones  Not known to have parathyroid problem    Discussed that reduced hydration and hydrochlorothiazide can cause elevated calcium    I suggested hydration , follow up   I suggest freddy operative hydration ,calcium and Cardiac  monitoring due to the potential for Cardiac arrhythmia from hypercalcemia     No excessive urination, thirst   No nausea, vomiting, constipation or ulcer  No muscle weakness  No confusion     History of skin cancer  He had sun exposure with the golf   He is aware of sun protection    Meningioma  Due to follow up  No headaches or seizures or one-sided weakness  He wants to address this after recovering from surgery    Multiple thyroid nodules  No difficulty swallowing  Most recent TSH was normal    ASHA (obstructive sleep apnea)  Sleep apnea- CPAP  Sleep apnea   Uses CPAP/BIPAP .  Suggested bringing for hospital use .   Informed the risk of worsening sleep apnea in the perioperative period and suggest using CPAP use any time in 24 hrs ( day or night )for planned sleep     I suggest  caution with usage of medication that can cause respiratory suppression in the  perioperative period  Care with driving    Avoidance of  supine sleep, weight gain , alcoholic beverages , care with , sedative , CNS depressant use indicated  since all of these can worsen ASHA         History of COVID-19  History of COVID-twice-none recently  He had COVID vaccine twice    Did not require hospitalization, intubation or supplemental oxygen use   Had been vaccinated   Recovered from COVID    COVID screening     No fever   No cough   No SOB  No sore throat   No loss of taste or smell   No muscle aches   No nausea, vomiting , diarrhea       BMI 28.0-28.9,adult  Body mass index 28.98    Weight related conditions     Known to have     HTN  Hyperlipidemia   Sleep apnea   Acid reflux        Not troubled with / Not known to have        Type 2 Diabetes   Prediabetes   Gout    Sleep apnea   Fatty liver        Encouraged maintaining healthy weight for improved health     Acid reflux  Has usually no problem with reflux      Does not sound Cardiac    I suggest aspiration precautions    I suggest watching fatty and spicy food  Citrus and tomato based  Alcohol, caffeine, soda  Chocolate, peppermint spearmint  Not to lay down within 3 hours after eating  Not to snack before going to bed  Sleep elevated  Watching weight  Watching anti-inflammatory medication    Contributing factors for Reflux     Chocolate    Nausea  He had an episode of nausea after surgery that has suggested the perioperative care team be aware of this  No vomiting    Elevated bilirubin  Known to have Gilbert's      No history  of cirrhosis of liver or suggestions of Liver  decompensation   No Jaundice , dark urine , pale stool         Preventive perioperative care    Thromboembolic prophylaxis:  His risk factors for thrombosis include surgical procedure and age.I suggest  thromboembolic prophylaxis ( mechanical/pharmacological, weighing the risk benefits of pharmacological agent use considering freddy procedural bleeding )  during the perioperative  period.I suggested being active in the post operative period.      Postoperative pulmonary complication prophylaxis-Risk factors for post operative pulmonary complications include age over 65 years and proximity of the surgical site to the lungs- I suggest incentive spirometry use, early ambulation, end tidal carbon dioxide monitoring, and pain control so as to avoid diaphragmatic splinting  , oral care , head end of bed elevation      Renal complication prophylaxis-Risk factors for renal complications include hypertension . I suggest keeping him well hydrated    in the perioperative period.    Surgical site Infection Prophylaxis-I  suggest appropriate antibiotic for Prophylaxis against Surgical site infections  No reported Staph infection  Skin antibacterial discussed       Delirium prophylaxis-Risk factors - Advanced Age - I suggest avoidance / minimizing the use of  Benzodiazepines ( unless the patient has been taking it on a regular basis ),Anticholinergic medication,Antihistamines ( like  Benadryl).I suggest minimizing the use of opioid medication and use of IV tylenol,if it is appropriate. I suggest using the lowest possible dose of opioids for the shortest duration possible in the perioperative period. I suggest to Keep shades/blinds open during the day, lights off and shades closed at night to encourage normal sleep/wake cycle.I encourage the presence of the family member with the patient at all times, if at all possible as mental status changes can be picked up early by the family members and they help with reorientation. I encouraged the presence of family to help with orientation in the perioperative period. Benadryl avoidance suggested      In view of urological procedure the patient  is at risk of postoperative urinary retention.  I suggest avoidance / minimizing the of  Benzodiazepines,Anticholinergic medication,antihistamines ( Benadryl) , if possible in the perioperative period. I suggest using the  minimum possible use of opioids for the minimum period of time in the perioperative period. Benadryl avoidance suggested      This visit was focused on Preoperative evaluation, Perioperative Medical management, complication reduction plans. I suggest that the patient follows up with primary care or relevant sub specialists for ongoing health care.    I appreciate the opportunity to be involved in this patients care. Please feel free to contact me if there were any questions about this consultation.    Patient is optimized    Patient/ care giver/ Family member was instructed to call and update me about any changes to health,  medication, office visits ,testing out side of the [facility] , hospitalizations between now and surgery      Fatuma Narvaez MD  Internal Medicine  [facility]   Cell Phone- [phone]    Checked for over-the-counter medication      He has some longstanding discomfort at the back of his neck  He has no suggestions of cervical myelopathy      He had single-point chest discomfort about 3 weeks ago while he was exercising which he points towards the lower chest  No radiation  No associated sweating short of breath nausea vomiting or diarrhea  It resolved and had since being  exercises on the treadmill since with no recurrence  This does not sound cardiac in nature    He has a longstanding discomfort which is located superficially underneath the left breast and he had evaluation done for that  He is putting a topical medication which is helping  No radiation  No associated sweating short of breath nausea vomiting or diarrhea  It is related to bending down and not related to physical activity  He stays physically active and does not come on when he is physically active    I have offered cardiac evaluation but he was comfortable to move on without further cardiac evaluation      He has some minimal pain on the right loin area from the kidney stone  He, if  possible wants to address it at the same time of the surgery    Message sent to the surgeon    I have spent -66----- minutes of time which includes, time spent to prepare to see the patient , obtaining history ,performing examination, counseling/Educating the patient , Documenting clinical information in the record    --    7/7- 1436    EKG report  Sinus bradycardia   Otherwise normal ECG   When compared with ECG of 29-Mar-2022 08:30,   No significant change was found     Corresponded with the surgeon  No plan to address  his kidney stone at the same time as his surgery---that adds risk   Plan for CT RSS to ensure his non-obstructing stones seen on his June ct scan have not become obstructing, which would require attention prior to his surgery.   -    7/9- 1648    CT result pending

## 2025-07-07 NOTE — ASSESSMENT & PLAN NOTE
Due to follow up  No headaches or seizures or one-sided weakness  He wants to address this after recovering from surgery

## 2025-07-07 NOTE — ASSESSMENT & PLAN NOTE
Known to have Gilbert's      No history  of cirrhosis of liver or suggestions of Liver  decompensation   No Jaundice , dark urine , pale stool

## 2025-07-07 NOTE — ASSESSMENT & PLAN NOTE
He had an episode of nausea after surgery that has suggested the perioperative care team be aware of this  No vomiting

## 2025-07-07 NOTE — ASSESSMENT & PLAN NOTE
He is currently taking olmesartan hydrochlorothiazide for hypertension  I suggested not to take the blood pressure medication on the morning of surgery      His blood pressure usually is about 120s over 70s  130s over 80s to 75  Hypertension-  Blood pressure is acceptable .  I suggest holding ----olmesartan hydrochlorothiazide---- on the morning of the surgery and can continue that  post operatively under blood pressure, electrolyte and renal function monitoring as long as they are acceptable.I suggest addressing pain control as uncontrolled pain can increased blood pressure    Suggested continuation of carvedilol through the surgery

## 2025-07-07 NOTE — ASSESSMENT & PLAN NOTE
Avodart-enlarged prostate  Urinating well  Emptying well  Increased risk of post operative urinary retention  I suggest monitoring the  bladder volume with a view to decompress the bladder to avoid bladder stretching and resultant urinary retention   To minimize the risk of postoperative urinary retention address constipation( if applicable), increased ambulation, minimize opioid and anticholinergic use   No prostate infection    He is not known to have pituitary problems

## 2025-07-07 NOTE — ASSESSMENT & PLAN NOTE
Body mass index 28.98    Weight related conditions     Known to have     HTN  Hyperlipidemia   Sleep apnea   Acid reflux        Not troubled with / Not known to have        Type 2 Diabetes   Prediabetes   Gout    Sleep apnea   Fatty liver        Encouraged maintaining healthy weight for improved health    ,[email or web address]

## 2025-07-07 NOTE — ASSESSMENT & PLAN NOTE
Has usually no problem with reflux      Does not sound Cardiac    I suggest aspiration precautions    I suggest watching fatty and spicy food  Citrus and tomato based  Alcohol, caffeine, soda  Chocolate, peppermint spearmint  Not to lay down within 3 hours after eating  Not to snack before going to bed  Sleep elevated  Watching weight  Watching anti-inflammatory medication    Contributing factors for Reflux     Chocolate

## 2025-07-07 NOTE — ASSESSMENT & PLAN NOTE
He is known to have calcium stones  Not known to have parathyroid problem    Discussed that reduced hydration and hydrochlorothiazide can cause elevated calcium    I suggested hydration , follow up   I suggest freddy operative hydration ,calcium and Cardiac  monitoring due to the potential for Cardiac arrhythmia from hypercalcemia     No excessive urination, thirst   No nausea, vomiting, constipation or ulcer  No muscle weakness  No confusion

## 2025-07-07 NOTE — OUTPATIENT SUBJECTIVE & OBJECTIVE
Outpatient Subjective & Objective     Chief complaint-Preoperative evaluation, Perioperative Medical management, complication reduction plan     Active cardiac conditions- none    Revised cardiac risk index predictors- none    He had single-point chest discomfort about 3 weeks ago while he was exercising which he points towards the lower chest  No radiation  No associated sweating short of breath nausea vomiting or diarrhea  It resolved and had a been do exercises on the treadmill since with no recurrence  This does not sound cardiac in nature    Functional capacity -Examples of physical activity -he walks 3 times a week at about 5000 steps each time, he walks on the treadmill at [place], he can take a flight of stairs ----- He can undertake all the above activities without  chest pain,chest tightness, Shortness of breath ,dizziness,lightheadedness making his exercise tolerance more,  than 4 Mets.       Review of Systems   Constitutional:  Negative for chills and fever.        No unusual weight changes      HENT:          Sleep apnea   Eyes:         No unusual vision changes   Respiratory:          No cough , phlegm    No Hemoptysis   Cardiovascular:         As noted   Gastrointestinal:         Bowels- Regular    No overt GI/ blood losses   Endocrine:        Prednisone use > 20 mg daily for 3 weeks- None   Genitourinary:  Negative for dysuria.            Musculoskeletal:         He has discomfort on left hip area upon laying on that side  None new  Suggested follow up   Skin:  Negative for rash.   Neurological:  Negative for syncope.        No unilateral weakness   Hematological:         Current use of Anticoagulants  None  Suggested not to take ibuprofen 1 week prior to surgery   Psychiatric/Behavioral:          No Depression,Anxiety                 No   bleeding, cardiac problems with previous surgeries/procedures.  Medications and Allergies reviewed in epic.   FH- No anesthesia,bleeding / venous thrombosis ,    "in family    Physical Exam  Blood pressure (!) 147/70, pulse (!) 54, temperature 97.8 °F (36.6 °C), temperature source Oral, resp. rate 16, height 5' 7" (1.702 m), weight 83.9 kg (185 lb), SpO2 98%.      Physical Exam  Constitutional- Vitals - Body mass index is 28.98 kg/m².,   Vitals:    07/07/25 0919   BP: (!) 147/70   Pulse: (!) 54   Resp: 16   Temp: 97.8 °F (36.6 °C)     General appearance-Conscious,Coherent  Eyes- No conjunctival icterus,pupils  round  and reactive to light   ENT-Oral cavity- moist    , Hearing grossly normal   Neck- No thyromegaly ,Trachea -central, No jugular venous distension,   No Carotid Bruit   Cardiovascular -Heart Sounds- Normal  and  no murmur   , No gallop rhythm   Respiratory - Normal Respiratory Effort, Normal breath sounds,  CrepitationsLeft base,  no wheeze , and  no forced expiratory wheeze    Peripheral pitting pedal edema-- none , no calf pain   Gastrointestinal -Soft abdomen, No palpable masses, Non Tender,Liver,Spleen not palpable. No-- free fluid and shifting dullness  Musculoskeletal- No finger Clubbing. Strength grossly normal   Lymphatic-No Palpable cervical, axillary,Inguinal lymphadenopathy   Psychiatric - normal effect,Orientation  Rt Dorsalis pedis pulses-palpable    Lt Dorsalis pedis pulses- palpable   Rt Posterior tibial pulses -palpable   Left posterior tibial pulses -palpable   Miscellaneous -  no asterixis and  no renal bruit   Investigations  Lab and Imaging have been reviewed in epic.    Review of Medicine tests    EKG- I had independently reviewed the EKG from--today showed no acute appearing changes  I have compared his EKG from today with a 1 in the past which showed no acute appearing changes    He has bradycardia likely from carvedilol use  No suggestions of obstructive jaundice    No overt hypo thyroid symptoms    Review of clinical lab tests:  Lab Results   Component Value Date    CREATININE 1.0 06/25/2025    HGB 14.8 06/20/2025     06/20/2025 "                 Review of old records- Was done and information gathered regards to events leading to surgery and health conditions of significance in the perioperative period.    Outpatient Subjective & Objective

## 2025-07-07 NOTE — HPI
History of present illness- I had the pleasure of meeting this pleasant 74 y.o. gentleman in the pre op clinic prior to his elective Urological surgery. The patient is new to me .   Offered to have family to be on the phone during the consultation      I have obtained the history by speaking to the patient and by reviewing the electronic health records.    Events leading up to surgery / History of presenting illness -    He is planning on having a partial nephrectomy on the right kidney for a renal mass on July the 24th and has come in for perioperative care evaluation  He has had history of kidney stones in the past and had the pain from 1 on the kidney stones on the right side that led to the finding of kidney mass  He is not troubled with pain anymore on the right side  He has some minimal pain on the right loin area from the kidney stone  He, if possible wants to address it at the same time   No blood in the urine  He has had no longstanding chemical exposure  No tobacco smoking history    Relevant health conditions of significance for the perioperative period/ History of presenting illness -      Atorvastatin-Hyperlipidemia  Carvedilol-hypertension  Avodart-enlarged prostate  Olmesartan-hydrochlorothiazide -hypertension  Kidney stone  BMI  Sleep apnea- CPAP    Lives with wife in a single-story house and both of them are retired  He is volunteering about 9 hours a week at [employer]-none  Children -2 grown daughters and 1 grown son  Has help post op    Not known to have , heart problem , Prediabetes , Diabetes Mellitus, Lung problem, Thyroid problem,  deep vein thrombosis, pulmonary embolism,   fatty liver , blood vessels stent , tobacco smoking, edema, mental health problems , gout

## 2025-07-07 NOTE — ASSESSMENT & PLAN NOTE
He is on atorvastatin  HLD-I  suggest continuation of statin during the entire perioperative period.

## 2025-07-07 NOTE — ASSESSMENT & PLAN NOTE
He has vision problems on the left eye and is under eye doctor care   No new problems  Not known to have circulatory problems  He has noticed that over the years his left eye vision is slightly improving

## 2025-07-09 ENCOUNTER — HOSPITAL ENCOUNTER (OUTPATIENT)
Dept: RADIOLOGY | Facility: HOSPITAL | Age: 75
Discharge: HOME | End: 2025-07-09
Attending: UROLOGY
Payer: MEDICARE

## 2025-07-09 DIAGNOSIS — N22 CALCULUS OF URINARY TRACT IN DISEASES CLASSIFIED ELSEWHERE: ICD-10-CM

## 2025-07-09 DIAGNOSIS — I10 PRIMARY HYPERTENSION: ICD-10-CM

## 2025-07-09 PROCEDURE — 74176 CT ABD & PELVIS W/O CONTRAST: CPT | Mod: TC

## 2025-07-10 ENCOUNTER — LAB VISIT (OUTPATIENT)
Dept: LAB | Facility: HOSPITAL | Age: 75
End: 2025-07-10
Attending: UROLOGY
Payer: MEDICARE

## 2025-07-10 DIAGNOSIS — N20.1 LEFT URETERAL STONE: ICD-10-CM

## 2025-07-10 LAB
BILIRUB UR QL STRIP.AUTO: NEGATIVE
CLARITY UR: CLEAR
COLOR UR AUTO: YELLOW
GLUCOSE UR QL STRIP: NEGATIVE
HGB UR QL STRIP: NEGATIVE
KETONES UR QL STRIP: NEGATIVE
LEUKOCYTE ESTERASE UR QL STRIP: NEGATIVE
NITRITE UR QL STRIP: NEGATIVE
PH UR STRIP: 5 [PH]
PROT UR QL STRIP: NEGATIVE
SP GR UR STRIP: 1.02
UROBILINOGEN UR STRIP-ACNC: NEGATIVE EU/DL

## 2025-07-10 PROCEDURE — 81003 URINALYSIS AUTO W/O SCOPE: CPT

## 2025-07-10 PROCEDURE — 87086 URINE CULTURE/COLONY COUNT: CPT

## 2025-07-10 RX ORDER — CARVEDILOL 12.5 MG/1
12.5 TABLET ORAL
Qty: 90 TABLET | Refills: 0 | Status: SHIPPED | OUTPATIENT
Start: 2025-07-10

## 2025-07-10 NOTE — TELEPHONE ENCOUNTER
Refill Routing Note   Medication(s) are not appropriate for processing by [facility] for the following reason(s):        Required vitals abnormal  07/07/25 (!) 147/70       OR action(s):  Defer        Medication Therapy Plan: 07/07/25 (!) 147/70      Appointments  past 12m or future 3m with PCP    Date Provider   Last Visit   6/20/2025 Сергей Landis MD   Next Visit   Visit date not found Сергей Landis MD   ED visits in past 90 days: 0        Note composed:8:48 AM 07/10/2025

## 2025-07-10 NOTE — TELEPHONE ENCOUNTER
No care due was identified.  [facility] Embedded Care Due Messages. Reference number: [id number].   7/09/2025 10:23:30 PM CDT

## 2025-07-12 LAB — BACTERIA UR CULT: NORMAL

## 2025-07-13 NOTE — PROGRESS NOTES
Urology ([facility]) H&P for upcoming procedure  Staff:  Curry Lynn MD    CC: Right renal mass    HPI:  Anton Christiansen is a 74 y.o. male who is established to our clinic and was initially referred by their PCP, Dr. Landis for evaluation of renal cysts.   He has seen Dr. Quinteros in the past for BPH and elevated PSA.   His pain that he originally saw me for has essentially resolved.    He previously underwent a CT renal stone study on 05/03/2024 which reveals numerous hypodensities of the right kidney with Hounsfield units greater than simple fluid which have increased in size compared to prior imaging.  Bilateral kidney stones also seen.  No hydronephrosis. MRI of the  abdomen with and without contrast from 12/09/2024 shows  slight increase in size of right lower pole complex cystic lesion since 2021.  Currently measuring 2.3 cm compared to 1.6 cm in July of 2021.      CT urogram from 6/2/25  reveals 2.3 cm exophytic enhancing right lower pole renal mass. Contrasted Ultrasound of the kidney from 5/20/25 reveals Bosniak 3 cystic lesion with enahancing thickened septa of the right lower pole.    He was experiencing flank pain when he presented for his medical clearance with Dr. Narvaez. CTRSS obtained on 7/9/2025 revealed a 0.6cm left renal stone in the distal ureter without any upstream hydronephrosis. He is planning to undergo URS on 7/17.     ROS: Negative except for as stated above    Past Medical History:   Diagnosis Date    Acid reflux 7/7/2025    Allergy     seasonal    Arteriosclerotic coronary artery disease 04/10/2017    Basal cell carcinoma 07/14/2015    right dorsal hand    Basal cell carcinoma (BCC) of left ala nasi 05/06/2024    L nasal ala    BCC (basal cell carcinoma of skin) 04/2014    nasal tip s/p Mohs with forehead flap    Cataract     Family history of colon cancer     Hyperlipidemia     Hypertension     Meningioma     Multiple thyroid nodules 04/24/2018    Nephrolithiasis     ASHA on CPAP      Prostatitis, chronic     Special screening for malignant neoplasms, colon 06/19/2014    Vertigo 09/2020    Likely BPPV       Past Surgical History:   Procedure Laterality Date    CATARACT EXTRACTION W/  INTRAOCULAR LENS IMPLANT  12/20/2012    OD    COLONOSCOPY  [id number]    COLONOSCOPY N/A 05/20/2019    Procedure: COLONOSCOPY;  Surgeon: Artemio Greer MD;  Location: [facility] (4TH FLR);  Service: Endoscopy;  Laterality: N/A;    COLONOSCOPY N/A 9/23/2024    Procedure: COLONOSCOPY;  Surgeon: Johnny Garrett MD;  Location: [facility] (4TH FLR);  Service: Endoscopy;  Laterality: N/A;  6/28/2024 ref Dr. Сергей Landis, nausea with anesthesia, PEG instr via portal- R[facility]  9/17 precall complete.  Pt aware MD and time of procedure will be determined the day of.  olu  9/20-pt moved from extra screeing to Dr. Garrett-Kpvt    Division and Inset  04/29/2014    D&I Forehead Flap    ESOPHAGOGASTRODUODENOSCOPY N/A 10/22/2024    Procedure: EGD (ESOPHAGOGASTRODUODENOSCOPY);  Surgeon: Johnny Garrett MD;  [facility]  Service: Endoscopy;  Laterality: N/A;  prep inst sent to pt via portal / axel pt /  10/16-lvm for precall-Kpvt  10/18-precall complete-Kpvt  10/21 pt aware of earlier arrival time of 1pm-ml    EYE SURGERY      Pilonoidal Cyst      SKIN SURGERY  MOHS Repair    nose    TONSILLECTOMY         Social History     Socioeconomic History    Marital status:    Occupational History    Occupation: Retired    Occupation: [employer]-   Tobacco Use    Smoking status: Never    Smokeless tobacco: Never    Tobacco comments:     Never smoked   Substance and Sexual Activity    Alcohol use: Yes     Comment: maybe 1 every 2-3 months    Drug use: Never    Sexual activity: Not Currently     Partners: Female     Birth control/protection: Abstinence   Social History Narrative    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  Lives with wife and no pets.      Social Drivers of Health      Financial Resource Strain: Low Risk  (3/4/2025)    Overall Financial Resource Strain (CARDIA)     Difficulty of Paying Living Expenses: Not hard at all   Food Insecurity: No Food Insecurity (3/4/2025)    Hunger Vital Sign     Worried About Running Out of Food in the Last Year: Never true     Ran Out of Food in the Last Year: Never true   Transportation Needs: No Transportation Needs (3/4/2025)    PRAPARE - Transportation     Lack of Transportation (Medical): No     Lack of Transportation (Non-Medical): No   Physical Activity: Sufficiently Active (3/4/2025)    Exercise Vital Sign     Days of Exercise per Week: 4 days     Minutes of Exercise per Session: 60 min   Stress: No Stress Concern Present (3/4/2025)    [employer] - Occupational Stress Questionnaire     Feeling of Stress : Not at all   Housing Stability: Low Risk  (3/4/2025)    Housing Stability Vital Sign     Unable to Pay for Housing in the Last Year: No     Homeless in the Last Year: No       Family History   Problem Relation Name Age of Onset    Cancer Mother Magdalena 70        Colon cancer    Colon cancer Mother Magdalena 70    Hypertension Mother Magdalena     Depression Mother Magdalena     Stomach cancer Father Anton M. 65    Cancer Father Anton M. 65        Stomach cancer    No Known Problems Sister      No Known Problems Brother      No Known Problems Maternal Aunt      No Known Problems Maternal Uncle      No Known Problems Paternal Aunt      No Known Problems Paternal Uncle      No Known Problems Maternal Grandmother      No Known Problems Maternal Grandfather      No Known Problems Paternal Grandmother      No Known Problems Paternal Grandfather      Glaucoma Neg Hx      Macular degeneration Neg Hx      Amblyopia Neg Hx      Blindness Neg Hx      Cataracts Neg Hx      Diabetes Neg Hx      Retinal detachment Neg Hx      Strabismus Neg Hx      Stroke Neg Hx      Thyroid disease Neg Hx      Melanoma Neg Hx      Psoriasis Neg Hx      Lupus  "Neg Hx      Eczema Neg Hx      Acne Neg Hx      Thyroid cancer Neg Hx      Cirrhosis Neg Hx      Colon polyps Neg Hx      Crohn's disease Neg Hx      Esophageal cancer Neg Hx      Pancreatic cancer Neg Hx      Kidney cancer Neg Hx      Bladder Cancer Neg Hx      Uterine cancer Neg Hx      Ovarian cancer Neg Hx      Celiac disease Neg Hx      Inflammatory bowel disease Neg Hx      Liver disease Neg Hx      Liver cancer Neg Hx      Rectal cancer Neg Hx      Ulcerative colitis Neg Hx         Review of patient's allergies indicates:   Allergen Reactions    Cephalexin Diarrhea    Ace inhibitors Other (See Comments)     Cough    Cialis [tadalafil] Other (See Comments)     Muscle pain      Proscar [finasteride] Other (See Comments)     Decreased libido     Vicodin [hydrocodone-acetaminophen] Nausea And Vomiting     Can tolerate acetaminophen       Medications Ordered Prior to Encounter[1]    Anticoagulation:  No     Physical Exam:  Estimated body mass index is 28.98 kg/m² as calculated from the following:    Height as of 7/7/25: 5' 7" (1.702 m).    Weight as of 7/7/25: 83.9 kg (185 lb).     General: No acute distress, well developed. AAOx3  Head: Normocephalic, Atraumatic  Eyes: Extra-occular movements intact, No discharge  Neck: supple, symmetrical, trachea midline  Lungs: normal respiratory effort, no respiratory distress, no wheezes  CV: regular rate, 2+ pulses  Abdomen: soft, non-tender,  no organomegaly. No scars noted  MSK: no edema, no deformities, normal ROM  Skin: skin color, texture, turgor normal.  Neurologic: no focal deficits, sensation intact     Labs:    Urine dipstick on 7/10/24. Negative for leuks, nitrites or RBCs    Lab Results   Component Value Date    WBC 7.72 06/20/2025    HGB 14.8 06/20/2025    HCT 45.9 06/20/2025    MCV 95 06/20/2025     06/20/2025           BMP  Lab Results   Component Value Date     06/25/2025    K 4.4 06/25/2025     06/25/2025    CO2 25 06/25/2025    BUN 24 (H) " 06/25/2025    CREATININE 1.0 06/25/2025    CALCIUM 10.8 (H) 06/25/2025    ANIONGAP 8 06/25/2025    EGFRNORACEVR >60 06/25/2025       Imaging:   CTU 6/2/25 - Impression:     Exophytic lesion inferior pole right kidney demonstrating enhancement and concerning for neoplasm, similar in size compared to prior MRI 12/09/2024.     Nodular enhancing focus in a subcentimeter hypodensity in the superior pole of the right kidney, too small to adequately characterize though is stable from 2021.     Questioned enhancement within a subcentimeter lesion in the inferior pole of left kidney noting this is too small to adequately characterize.     Simple to mildly complex right renal cysts.  Additional bilateral subcentimeter cortical hypodensities which are too small to characterize.    Renal U/s 5/20/25 - Impression    Cystic lesion in the inferior pole of the right kidney demonstrates mild enhancement of thickened septations consistent with a Bosniak type 3 cystic lesion.     The adjacent 2.3 cm exophytic lesion in the inferior pole of the right kidney seen on prior exam demonstrates no definitive enhancement.    Assessment: Anton Christiansen is a 74 y.o. male with right renal mass, suspicious for renal cell carcinoma    Plan:     1. Left URS scheduled 7/17 for definitive distal ureteral stone management  2. To OR on 7/24/25 for Right Partial Nephrectomy  3. Urine culture from 7/10/25 showing no growth   4. Type and screen ordered preoperatively. The risks, benefits, and indications of a blood transfusion were discussed. The patient was given a chance to ask questions and all questions answered to his satisfaction. Consent obtained.    5.  He was instructed to stop taking all Vitamins at least 1 week prior to surgery  5. Saw Dr. Narvaez on 7/7/25. Pt is optimized for surgery.    Constantino Ledezma MD          [1]   Current Outpatient Medications on File Prior to Visit   Medication Sig Dispense Refill    ascorbic acid, vitamin C,  (VITAMIN C) 500 MG tablet Take 500 mg by mouth once daily.      atorvastatin (LIPITOR) 40 MG tablet TAKE 1 TABLET(40 MG) BY MOUTH DAILY 90 tablet 0    carvediloL (COREG) 12.5 MG tablet Take 1 tablet (12.5 mg total) by mouth daily with breakfast. 90 tablet 0    cholecalciferol, vitamin D3, 3,000 unit Tab Take 1 tablet by mouth once daily.      docosahexaenoic acid/epa (FISH OIL ORAL) Take by mouth. He takes 2 capsules also fish oil at nighttime      dutasteride (AVODART) 0.5 mg capsule TAKE ONE CAPSULE BY MOUTH EVERY DAY 90 capsule 3    ibuprofen (ADVIL,MOTRIN) 200 MG tablet Take 200 mg by mouth as needed for Pain.      multivitamin (THERAGRAN) per tablet Take 1 tablet by mouth once daily.      olmesartan-hydrochlorothiazide (BENICAR HCT) 20-12.5 mg per tablet Take 1 tablet by mouth once daily. 90 tablet 3     No current facility-administered medications on file prior to visit.

## 2025-07-13 NOTE — H&P (VIEW-ONLY)
Urology ([facility]) H&P for upcoming procedure  Staff:  Curry Lynn MD    CC: Right renal mass    HPI:  Anton Christiansen is a 74 y.o. male who is established to our clinic and was initially referred by their PCP, Dr. Landis for evaluation of renal cysts.   He has seen Dr. Quinteros in the past for BPH and elevated PSA.   His pain that he originally saw me for has essentially resolved.    He previously underwent a CT renal stone study on 05/03/2024 which reveals numerous hypodensities of the right kidney with Hounsfield units greater than simple fluid which have increased in size compared to prior imaging.  Bilateral kidney stones also seen.  No hydronephrosis. MRI of the  abdomen with and without contrast from 12/09/2024 shows  slight increase in size of right lower pole complex cystic lesion since 2021.  Currently measuring 2.3 cm compared to 1.6 cm in July of 2021.      CT urogram from 6/2/25  reveals 2.3 cm exophytic enhancing right lower pole renal mass. Contrasted Ultrasound of the kidney from 5/20/25 reveals Bosniak 3 cystic lesion with enahancing thickened septa of the right lower pole.    He was experiencing flank pain when he presented for his medical clearance with Dr. Narvaez. CTRSS obtained on 7/9/2025 revealed a 0.6cm left renal stone in the distal ureter without any upstream hydronephrosis. He is planning to undergo URS on 7/17.     ROS: Negative except for as stated above    Past Medical History:   Diagnosis Date    Acid reflux 7/7/2025    Allergy     seasonal    Arteriosclerotic coronary artery disease 04/10/2017    Basal cell carcinoma 07/14/2015    right dorsal hand    Basal cell carcinoma (BCC) of left ala nasi 05/06/2024    L nasal ala    BCC (basal cell carcinoma of skin) 04/2014    nasal tip s/p Mohs with forehead flap    Cataract     Family history of colon cancer     Hyperlipidemia     Hypertension     Meningioma     Multiple thyroid nodules 04/24/2018    Nephrolithiasis     ASHA on CPAP      Prostatitis, chronic     Special screening for malignant neoplasms, colon 06/19/2014    Vertigo 09/2020    Likely BPPV       Past Surgical History:   Procedure Laterality Date    CATARACT EXTRACTION W/  INTRAOCULAR LENS IMPLANT  12/20/2012    OD    COLONOSCOPY  060509    COLONOSCOPY N/A 05/20/2019    Procedure: COLONOSCOPY;  Surgeon: Artemio Greer MD;  Location: [facility] (4TH FLR);  Service: Endoscopy;  Laterality: N/A;    COLONOSCOPY N/A 9/23/2024    Procedure: COLONOSCOPY;  Surgeon: Johnny Garrett MD;  Location: [facility] (4TH FLR);  Service: Endoscopy;  Laterality: N/A;  6/28/2024 ref Dr. Сергей Landis, nausea with anesthesia, PEG instr via portal- RMB  9/17 precall complete.  Pt aware MD and time of procedure will be determined the day of.  olu  9/20-pt moved from extra screeing to Dr. Garrett-Kpvt    Division and Inset  04/29/2014    D&I Forehead Flap    ESOPHAGOGASTRODUODENOSCOPY N/A 10/22/2024    Procedure: EGD (ESOPHAGOGASTRODUODENOSCOPY);  Surgeon: Johnny Garrett MD;  [facility]  Service: Endoscopy;  Laterality: N/A;  prep inst sent to pt via portal / axel pt /  10/16-lvm for precall-Kpvt  10/18-precall complete-Kpvt  10/21 pt aware of earlier arrival time of 1pm-ml    EYE SURGERY      Pilonoidal Cyst      SKIN SURGERY  MOHS Repair    nose    TONSILLECTOMY         Social History     Socioeconomic History    Marital status:    Occupational History    Occupation: Retired    Occupation: [employer]-   Tobacco Use    Smoking status: Never    Smokeless tobacco: Never    Tobacco comments:     Never smoked   Substance and Sexual Activity    Alcohol use: Yes     Comment: maybe 1 every 2-3 months    Drug use: Never    Sexual activity: Not Currently     Partners: Female     Birth control/protection: Abstinence   Social History Narrative    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  Lives with wife and no pets.      Social Drivers of Health      Financial Resource Strain: Low Risk  (3/4/2025)    Overall Financial Resource Strain (CARDIA)     Difficulty of Paying Living Expenses: Not hard at all   Food Insecurity: No Food Insecurity (3/4/2025)    Hunger Vital Sign     Worried About Running Out of Food in the Last Year: Never true     Ran Out of Food in the Last Year: Never true   Transportation Needs: No Transportation Needs (3/4/2025)    PRAPARE - Transportation     Lack of Transportation (Medical): No     Lack of Transportation (Non-Medical): No   Physical Activity: Sufficiently Active (3/4/2025)    Exercise Vital Sign     Days of Exercise per Week: 4 days     Minutes of Exercise per Session: 60 min   Stress: No Stress Concern Present (3/4/2025)    [employer] - Occupational Stress Questionnaire     Feeling of Stress : Not at all   Housing Stability: Low Risk  (3/4/2025)    Housing Stability Vital Sign     Unable to Pay for Housing in the Last Year: No     Homeless in the Last Year: No       Family History   Problem Relation Name Age of Onset    Cancer Mother Magdalena 70        Colon cancer    Colon cancer Mother Magdalena 70    Hypertension Mother Magdalena     Depression Mother Magdalena     Stomach cancer Father Anton M. 65    Cancer Father Anton M. 65        Stomach cancer    No Known Problems Sister      No Known Problems Brother      No Known Problems Maternal Aunt      No Known Problems Maternal Uncle      No Known Problems Paternal Aunt      No Known Problems Paternal Uncle      No Known Problems Maternal Grandmother      No Known Problems Maternal Grandfather      No Known Problems Paternal Grandmother      No Known Problems Paternal Grandfather      Glaucoma Neg Hx      Macular degeneration Neg Hx      Amblyopia Neg Hx      Blindness Neg Hx      Cataracts Neg Hx      Diabetes Neg Hx      Retinal detachment Neg Hx      Strabismus Neg Hx      Stroke Neg Hx      Thyroid disease Neg Hx      Melanoma Neg Hx      Psoriasis Neg Hx      Lupus  "Neg Hx      Eczema Neg Hx      Acne Neg Hx      Thyroid cancer Neg Hx      Cirrhosis Neg Hx      Colon polyps Neg Hx      Crohn's disease Neg Hx      Esophageal cancer Neg Hx      Pancreatic cancer Neg Hx      Kidney cancer Neg Hx      Bladder Cancer Neg Hx      Uterine cancer Neg Hx      Ovarian cancer Neg Hx      Celiac disease Neg Hx      Inflammatory bowel disease Neg Hx      Liver disease Neg Hx      Liver cancer Neg Hx      Rectal cancer Neg Hx      Ulcerative colitis Neg Hx         Review of patient's allergies indicates:   Allergen Reactions    Cephalexin Diarrhea    Ace inhibitors Other (See Comments)     Cough    Cialis [tadalafil] Other (See Comments)     Muscle pain      Proscar [finasteride] Other (See Comments)     Decreased libido     Vicodin [hydrocodone-acetaminophen] Nausea And Vomiting     Can tolerate acetaminophen       Medications Ordered Prior to Encounter[1]    Anticoagulation:  No     Physical Exam:  Estimated body mass index is 28.98 kg/m² as calculated from the following:    Height as of 7/7/25: 5' 7" (1.702 m).    Weight as of 7/7/25: 83.9 kg (185 lb).     General: No acute distress, well developed. AAOx3  Head: Normocephalic, Atraumatic  Eyes: Extra-occular movements intact, No discharge  Neck: supple, symmetrical, trachea midline  Lungs: normal respiratory effort, no respiratory distress, no wheezes  CV: regular rate, 2+ pulses  Abdomen: soft, non-tender,  no organomegaly. No scars noted  MSK: no edema, no deformities, normal ROM  Skin: skin color, texture, turgor normal.  Neurologic: no focal deficits, sensation intact     Labs:    Urine dipstick on 7/10/24. Negative for leuks, nitrites or RBCs    Lab Results   Component Value Date    WBC 7.72 06/20/2025    HGB 14.8 06/20/2025    HCT 45.9 06/20/2025    MCV 95 06/20/2025     06/20/2025           BMP  Lab Results   Component Value Date     06/25/2025    K 4.4 06/25/2025     06/25/2025    CO2 25 06/25/2025    BUN 24 (H) " 06/25/2025    CREATININE 1.0 06/25/2025    CALCIUM 10.8 (H) 06/25/2025    ANIONGAP 8 06/25/2025    EGFRNORACEVR >60 06/25/2025       Imaging:   CTU 6/2/25 - Impression:     Exophytic lesion inferior pole right kidney demonstrating enhancement and concerning for neoplasm, similar in size compared to prior MRI 12/09/2024.     Nodular enhancing focus in a subcentimeter hypodensity in the superior pole of the right kidney, too small to adequately characterize though is stable from 2021.     Questioned enhancement within a subcentimeter lesion in the inferior pole of left kidney noting this is too small to adequately characterize.     Simple to mildly complex right renal cysts.  Additional bilateral subcentimeter cortical hypodensities which are too small to characterize.    Renal U/s 5/20/25 - Impression    Cystic lesion in the inferior pole of the right kidney demonstrates mild enhancement of thickened septations consistent with a Bosniak type 3 cystic lesion.     The adjacent 2.3 cm exophytic lesion in the inferior pole of the right kidney seen on prior exam demonstrates no definitive enhancement.    Assessment: Anton Christiansen is a 74 y.o. male with right renal mass, suspicious for renal cell carcinoma    Plan:     1. Left URS scheduled 7/17 for definitive distal ureteral stone management  2. To OR on 7/24/25 for Right Partial Nephrectomy  3. Urine culture from 7/10/25 showing no growth   4. Type and screen ordered preoperatively. The risks, benefits, and indications of a blood transfusion were discussed. The patient was given a chance to ask questions and all questions answered to his satisfaction. Consent obtained.    5.  He was instructed to stop taking all Vitamins at least 1 week prior to surgery  5. Saw Dr. Narvaez on 7/7/25. Pt is optimized for surgery.    Constantino Ledezma MD          [1]   Current Outpatient Medications on File Prior to Visit   Medication Sig Dispense Refill    ascorbic acid, vitamin C,  (VITAMIN C) 500 MG tablet Take 500 mg by mouth once daily.      atorvastatin (LIPITOR) 40 MG tablet TAKE 1 TABLET(40 MG) BY MOUTH DAILY 90 tablet 0    carvediloL (COREG) 12.5 MG tablet Take 1 tablet (12.5 mg total) by mouth daily with breakfast. 90 tablet 0    cholecalciferol, vitamin D3, 3,000 unit Tab Take 1 tablet by mouth once daily.      docosahexaenoic acid/epa (FISH OIL ORAL) Take by mouth. He takes 2 capsules also fish oil at nighttime      dutasteride (AVODART) 0.5 mg capsule TAKE ONE CAPSULE BY MOUTH EVERY DAY 90 capsule 3    ibuprofen (ADVIL,MOTRIN) 200 MG tablet Take 200 mg by mouth as needed for Pain.      multivitamin (THERAGRAN) per tablet Take 1 tablet by mouth once daily.      olmesartan-hydrochlorothiazide (BENICAR HCT) 20-12.5 mg per tablet Take 1 tablet by mouth once daily. 90 tablet 3     No current facility-administered medications on file prior to visit.

## 2025-07-13 NOTE — H&P (VIEW-ONLY)
Urology ([facility]) H&P for upcoming procedure  Staff:  Curry Lynn MD    CC: Right renal mass    HPI:  Anton Christiansen is a 74 y.o. male who is established to our clinic and was initially referred by their PCP, Dr. Landis for evaluation of renal cysts.   He has seen Dr. Quinteros in the past for BPH and elevated PSA.   His pain that he originally saw me for has essentially resolved.    He previously underwent a CT renal stone study on 05/03/2024 which reveals numerous hypodensities of the right kidney with Hounsfield units greater than simple fluid which have increased in size compared to prior imaging.  Bilateral kidney stones also seen.  No hydronephrosis. MRI of the  abdomen with and without contrast from 12/09/2024 shows  slight increase in size of right lower pole complex cystic lesion since 2021.  Currently measuring 2.3 cm compared to 1.6 cm in July of 2021.      CT urogram from 6/2/25  reveals 2.3 cm exophytic enhancing right lower pole renal mass. Contrasted Ultrasound of the kidney from 5/20/25 reveals Bosniak 3 cystic lesion with enahancing thickened septa of the right lower pole.    He was experiencing flank pain when he presented for his medical clearance with Dr. Narvaez. CTRSS obtained on 7/9/2025 revealed a 0.6cm left renal stone in the distal ureter without any upstream hydronephrosis. He is planning to undergo URS on 7/17.     ROS: Negative except for as stated above    Past Medical History:   Diagnosis Date    Acid reflux 7/7/2025    Allergy     seasonal    Arteriosclerotic coronary artery disease 04/10/2017    Basal cell carcinoma 07/14/2015    right dorsal hand    Basal cell carcinoma (BCC) of left ala nasi 05/06/2024    L nasal ala    BCC (basal cell carcinoma of skin) 04/2014    nasal tip s/p Mohs with forehead flap    Cataract     Family history of colon cancer     Hyperlipidemia     Hypertension     Meningioma     Multiple thyroid nodules 04/24/2018    Nephrolithiasis     ASHA on CPAP      Prostatitis, chronic     Special screening for malignant neoplasms, colon 06/19/2014    Vertigo 09/2020    Likely BPPV       Past Surgical History:   Procedure Laterality Date    CATARACT EXTRACTION W/  INTRAOCULAR LENS IMPLANT  12/20/2012    OD    COLONOSCOPY  060509    COLONOSCOPY N/A 05/20/2019    Procedure: COLONOSCOPY;  Surgeon: Artemio Greer MD;  Location: [facility] (4TH FLR);  Service: Endoscopy;  Laterality: N/A;    COLONOSCOPY N/A 9/23/2024    Procedure: COLONOSCOPY;  Surgeon: Johnny Garrett MD;  Location: [facility] (4TH FLR);  Service: Endoscopy;  Laterality: N/A;  6/28/2024 ref Dr. Сергей Landis, nausea with anesthesia, PEG instr via portal- RMB  9/17 precall complete.  Pt aware MD and time of procedure will be determined the day of.  olu  9/20-pt moved from extra screeing to Dr. Garrett-Kpvt    Division and Inset  04/29/2014    D&I Forehead Flap    ESOPHAGOGASTRODUODENOSCOPY N/A 10/22/2024    Procedure: EGD (ESOPHAGOGASTRODUODENOSCOPY);  Surgeon: Johnny Garrett MD;  [facility]  Service: Endoscopy;  Laterality: N/A;  prep inst sent to pt via portal / axel pt /  10/16-lvm for precall-Kpvt  10/18-precall complete-Kpvt  10/21 pt aware of earlier arrival time of 1pm-ml    EYE SURGERY      Pilonoidal Cyst      SKIN SURGERY  MOHS Repair    nose    TONSILLECTOMY         Social History     Socioeconomic History    Marital status:    Occupational History    Occupation: Retired    Occupation: [employer]-   Tobacco Use    Smoking status: Never    Smokeless tobacco: Never    Tobacco comments:     Never smoked   Substance and Sexual Activity    Alcohol use: Yes     Comment: maybe 1 every 2-3 months    Drug use: Never    Sexual activity: Not Currently     Partners: Female     Birth control/protection: Abstinence   Social History Narrative    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  Lives with wife and no pets.      Social Drivers of Health      Financial Resource Strain: Low Risk  (3/4/2025)    Overall Financial Resource Strain (CARDIA)     Difficulty of Paying Living Expenses: Not hard at all   Food Insecurity: No Food Insecurity (3/4/2025)    Hunger Vital Sign     Worried About Running Out of Food in the Last Year: Never true     Ran Out of Food in the Last Year: Never true   Transportation Needs: No Transportation Needs (3/4/2025)    PRAPARE - Transportation     Lack of Transportation (Medical): No     Lack of Transportation (Non-Medical): No   Physical Activity: Sufficiently Active (3/4/2025)    Exercise Vital Sign     Days of Exercise per Week: 4 days     Minutes of Exercise per Session: 60 min   Stress: No Stress Concern Present (3/4/2025)    [employer] - Occupational Stress Questionnaire     Feeling of Stress : Not at all   Housing Stability: Low Risk  (3/4/2025)    Housing Stability Vital Sign     Unable to Pay for Housing in the Last Year: No     Homeless in the Last Year: No       Family History   Problem Relation Name Age of Onset    Cancer Mother Magdalena 70        Colon cancer    Colon cancer Mother Magdalena 70    Hypertension Mother Magdalena     Depression Mother Magdalena     Stomach cancer Father Anton M. 65    Cancer Father Anton M. 65        Stomach cancer    No Known Problems Sister      No Known Problems Brother      No Known Problems Maternal Aunt      No Known Problems Maternal Uncle      No Known Problems Paternal Aunt      No Known Problems Paternal Uncle      No Known Problems Maternal Grandmother      No Known Problems Maternal Grandfather      No Known Problems Paternal Grandmother      No Known Problems Paternal Grandfather      Glaucoma Neg Hx      Macular degeneration Neg Hx      Amblyopia Neg Hx      Blindness Neg Hx      Cataracts Neg Hx      Diabetes Neg Hx      Retinal detachment Neg Hx      Strabismus Neg Hx      Stroke Neg Hx      Thyroid disease Neg Hx      Melanoma Neg Hx      Psoriasis Neg Hx      Lupus  "Neg Hx      Eczema Neg Hx      Acne Neg Hx      Thyroid cancer Neg Hx      Cirrhosis Neg Hx      Colon polyps Neg Hx      Crohn's disease Neg Hx      Esophageal cancer Neg Hx      Pancreatic cancer Neg Hx      Kidney cancer Neg Hx      Bladder Cancer Neg Hx      Uterine cancer Neg Hx      Ovarian cancer Neg Hx      Celiac disease Neg Hx      Inflammatory bowel disease Neg Hx      Liver disease Neg Hx      Liver cancer Neg Hx      Rectal cancer Neg Hx      Ulcerative colitis Neg Hx         Review of patient's allergies indicates:   Allergen Reactions    Cephalexin Diarrhea    Ace inhibitors Other (See Comments)     Cough    Cialis [tadalafil] Other (See Comments)     Muscle pain      Proscar [finasteride] Other (See Comments)     Decreased libido     Vicodin [hydrocodone-acetaminophen] Nausea And Vomiting     Can tolerate acetaminophen       Medications Ordered Prior to Encounter[1]    Anticoagulation:  No     Physical Exam:  Estimated body mass index is 28.98 kg/m² as calculated from the following:    Height as of 7/7/25: 5' 7" (1.702 m).    Weight as of 7/7/25: 83.9 kg (185 lb).     General: No acute distress, well developed. AAOx3  Head: Normocephalic, Atraumatic  Eyes: Extra-occular movements intact, No discharge  Neck: supple, symmetrical, trachea midline  Lungs: normal respiratory effort, no respiratory distress, no wheezes  CV: regular rate, 2+ pulses  Abdomen: soft, non-tender,  no organomegaly. No scars noted  MSK: no edema, no deformities, normal ROM  Skin: skin color, texture, turgor normal.  Neurologic: no focal deficits, sensation intact     Labs:    Urine dipstick on 7/10/24. Negative for leuks, nitrites or RBCs    Lab Results   Component Value Date    WBC 7.72 06/20/2025    HGB 14.8 06/20/2025    HCT 45.9 06/20/2025    MCV 95 06/20/2025     06/20/2025           BMP  Lab Results   Component Value Date     06/25/2025    K 4.4 06/25/2025     06/25/2025    CO2 25 06/25/2025    BUN 24 (H) " 06/25/2025    CREATININE 1.0 06/25/2025    CALCIUM 10.8 (H) 06/25/2025    ANIONGAP 8 06/25/2025    EGFRNORACEVR >60 06/25/2025       Imaging:   CTU 6/2/25 - Impression:     Exophytic lesion inferior pole right kidney demonstrating enhancement and concerning for neoplasm, similar in size compared to prior MRI 12/09/2024.     Nodular enhancing focus in a subcentimeter hypodensity in the superior pole of the right kidney, too small to adequately characterize though is stable from 2021.     Questioned enhancement within a subcentimeter lesion in the inferior pole of left kidney noting this is too small to adequately characterize.     Simple to mildly complex right renal cysts.  Additional bilateral subcentimeter cortical hypodensities which are too small to characterize.    Renal U/s 5/20/25 - Impression    Cystic lesion in the inferior pole of the right kidney demonstrates mild enhancement of thickened septations consistent with a Bosniak type 3 cystic lesion.     The adjacent 2.3 cm exophytic lesion in the inferior pole of the right kidney seen on prior exam demonstrates no definitive enhancement.    Assessment: Anton Christiansen is a 74 y.o. male with right renal mass, suspicious for renal cell carcinoma    Plan:     1. Left URS scheduled 7/17 for definitive distal ureteral stone management  2. To OR on 7/24/25 for Right Partial Nephrectomy  3. Urine culture from 7/10/25 showing no growth   4. Type and screen ordered preoperatively. The risks, benefits, and indications of a blood transfusion were discussed. The patient was given a chance to ask questions and all questions answered to his satisfaction. Consent obtained.    5.  He was instructed to stop taking all Vitamins at least 1 week prior to surgery  5. Saw Dr. Narvaez on 7/7/25. Pt is optimized for surgery.    Constantino Ledezma MD          [1]   Current Outpatient Medications on File Prior to Visit   Medication Sig Dispense Refill    ascorbic acid, vitamin C,  (VITAMIN C) 500 MG tablet Take 500 mg by mouth once daily.      atorvastatin (LIPITOR) 40 MG tablet TAKE 1 TABLET(40 MG) BY MOUTH DAILY 90 tablet 0    carvediloL (COREG) 12.5 MG tablet Take 1 tablet (12.5 mg total) by mouth daily with breakfast. 90 tablet 0    cholecalciferol, vitamin D3, 3,000 unit Tab Take 1 tablet by mouth once daily.      docosahexaenoic acid/epa (FISH OIL ORAL) Take by mouth. He takes 2 capsules also fish oil at nighttime      dutasteride (AVODART) 0.5 mg capsule TAKE ONE CAPSULE BY MOUTH EVERY DAY 90 capsule 3    ibuprofen (ADVIL,MOTRIN) 200 MG tablet Take 200 mg by mouth as needed for Pain.      multivitamin (THERAGRAN) per tablet Take 1 tablet by mouth once daily.      olmesartan-hydrochlorothiazide (BENICAR HCT) 20-12.5 mg per tablet Take 1 tablet by mouth once daily. 90 tablet 3     No current facility-administered medications on file prior to visit.

## 2025-07-14 ENCOUNTER — OFFICE VISIT (OUTPATIENT)
Dept: UROLOGY | Facility: CLINIC | Age: 75
End: 2025-07-14
Payer: MEDICARE

## 2025-07-14 DIAGNOSIS — N28.89 RENAL MASS: Primary | ICD-10-CM

## 2025-07-14 PROCEDURE — 99499 UNLISTED E&M SERVICE: CPT | Mod: S$PBB,,, | Performed by: UROLOGY

## 2025-07-15 ENCOUNTER — TELEPHONE (OUTPATIENT)
Dept: UROLOGY | Facility: CLINIC | Age: 75
End: 2025-07-15
Payer: MEDICARE

## 2025-07-15 ENCOUNTER — PATIENT MESSAGE (OUTPATIENT)
Dept: UROLOGY | Facility: CLINIC | Age: 75
End: 2025-07-15
Payer: MEDICARE

## 2025-07-17 ENCOUNTER — HOSPITAL ENCOUNTER (OUTPATIENT)
Facility: HOSPITAL | Age: 75
Discharge: HOME | End: 2025-07-17
Attending: UROLOGY | Admitting: UROLOGY
Payer: MEDICARE

## 2025-07-17 ENCOUNTER — NURSE TRIAGE (OUTPATIENT)
Dept: ADMINISTRATIVE | Facility: CLINIC | Age: 75
End: 2025-07-17
Payer: MEDICARE

## 2025-07-17 ENCOUNTER — ANESTHESIA EVENT (OUTPATIENT)
Dept: SURGERY | Facility: HOSPITAL | Age: 75
End: 2025-07-17
Payer: MEDICARE

## 2025-07-17 ENCOUNTER — PATIENT MESSAGE (OUTPATIENT)
Dept: UROLOGY | Facility: HOSPITAL | Age: 75
End: 2025-07-17
Payer: MEDICARE

## 2025-07-17 ENCOUNTER — ANESTHESIA (OUTPATIENT)
Dept: SURGERY | Facility: HOSPITAL | Age: 75
End: 2025-07-17
Payer: MEDICARE

## 2025-07-17 VITALS
WEIGHT: 184.94 LBS | TEMPERATURE: 98 F | OXYGEN SATURATION: 93 % | HEART RATE: 55 BPM | BODY MASS INDEX: 29.03 KG/M2 | DIASTOLIC BLOOD PRESSURE: 66 MMHG | HEIGHT: 67 IN | SYSTOLIC BLOOD PRESSURE: 136 MMHG | RESPIRATION RATE: 18 BRPM

## 2025-07-17 DIAGNOSIS — N20.0 NEPHROLITHIASIS: ICD-10-CM

## 2025-07-17 DIAGNOSIS — N20.1 LEFT URETERAL STONE: Primary | ICD-10-CM

## 2025-07-17 PROCEDURE — C1769 GUIDE WIRE: HCPCS | Performed by: UROLOGY

## 2025-07-17 PROCEDURE — 52356 CYSTO/URETERO W/LITHOTRIPSY: CPT | Mod: LT,,, | Performed by: UROLOGY

## 2025-07-17 PROCEDURE — C2617 STENT, NON-COR, TEM W/O DEL: HCPCS | Performed by: UROLOGY

## 2025-07-17 PROCEDURE — 25000003 PHARM REV CODE 250: Performed by: STUDENT IN AN ORGANIZED HEALTH CARE EDUCATION/TRAINING PROGRAM

## 2025-07-17 PROCEDURE — 37000008 HC ANESTHESIA 1ST 15 MINUTES: Performed by: UROLOGY

## 2025-07-17 PROCEDURE — 25000003 PHARM REV CODE 250: Performed by: NURSE ANESTHETIST, CERTIFIED REGISTERED

## 2025-07-17 PROCEDURE — 71000015 HC POSTOP RECOV 1ST HR: Performed by: UROLOGY

## 2025-07-17 PROCEDURE — 63600175 PHARM REV CODE 636 W HCPCS: Performed by: NURSE ANESTHETIST, CERTIFIED REGISTERED

## 2025-07-17 PROCEDURE — 37000009 HC ANESTHESIA EA ADD 15 MINS: Performed by: UROLOGY

## 2025-07-17 PROCEDURE — 71000016 HC POSTOP RECOV ADDL HR: Performed by: UROLOGY

## 2025-07-17 PROCEDURE — 27201423 OPTIME MED/SURG SUP & DEVICES STERILE SUPPLY: Performed by: UROLOGY

## 2025-07-17 PROCEDURE — 63600175 PHARM REV CODE 636 W HCPCS

## 2025-07-17 PROCEDURE — 82365 CALCULUS SPECTROSCOPY: CPT | Performed by: UROLOGY

## 2025-07-17 PROCEDURE — 71000044 HC DOSC ROUTINE RECOVERY FIRST HOUR: Performed by: UROLOGY

## 2025-07-17 PROCEDURE — 36000708 HC OR TIME LEV III 1ST 15 MIN: Performed by: UROLOGY

## 2025-07-17 PROCEDURE — 25000003 PHARM REV CODE 250: Performed by: UROLOGY

## 2025-07-17 PROCEDURE — 36000709 HC OR TIME LEV III EA ADD 15 MIN: Performed by: UROLOGY

## 2025-07-17 DEVICE — STENT URETERAL UNIV 6FR 24CM
Type: IMPLANTABLE DEVICE | Site: URETER | Status: NON-FUNCTIONAL
Removed: 2025-07-24

## 2025-07-17 RX ORDER — GLUCAGON 1 MG
1 KIT INJECTION
Status: DISCONTINUED | OUTPATIENT
Start: 2025-07-17 | End: 2025-07-17 | Stop reason: HOSPADM

## 2025-07-17 RX ORDER — FENTANYL CITRATE 50 UG/ML
INJECTION, SOLUTION INTRAMUSCULAR; INTRAVENOUS
Status: DISCONTINUED | OUTPATIENT
Start: 2025-07-17 | End: 2025-07-17

## 2025-07-17 RX ORDER — DEXAMETHASONE SODIUM PHOSPHATE 4 MG/ML
INJECTION, SOLUTION INTRA-ARTICULAR; INTRALESIONAL; INTRAMUSCULAR; INTRAVENOUS; SOFT TISSUE
Status: DISCONTINUED | OUTPATIENT
Start: 2025-07-17 | End: 2025-07-17

## 2025-07-17 RX ORDER — CEFAZOLIN 2 G/1
2 INJECTION, POWDER, FOR SOLUTION INTRAMUSCULAR; INTRAVENOUS
Status: COMPLETED | OUTPATIENT
Start: 2025-07-17 | End: 2025-07-17

## 2025-07-17 RX ORDER — SODIUM CHLORIDE 0.9 % (FLUSH) 0.9 %
10 SYRINGE (ML) INJECTION
Status: DISCONTINUED | OUTPATIENT
Start: 2025-07-17 | End: 2025-07-17 | Stop reason: HOSPADM

## 2025-07-17 RX ORDER — PHENAZOPYRIDINE HYDROCHLORIDE 200 MG/1
200 TABLET, FILM COATED ORAL 3 TIMES DAILY PRN
Qty: 9 TABLET | Refills: 0 | Status: SHIPPED | OUTPATIENT
Start: 2025-07-17 | End: 2025-07-27

## 2025-07-17 RX ORDER — PROPOFOL 10 MG/ML
VIAL (ML) INTRAVENOUS
Status: DISCONTINUED | OUTPATIENT
Start: 2025-07-17 | End: 2025-07-17

## 2025-07-17 RX ORDER — TAMSULOSIN HYDROCHLORIDE 0.4 MG/1
0.4 CAPSULE ORAL DAILY
Qty: 21 CAPSULE | Refills: 0 | Status: SHIPPED | OUTPATIENT
Start: 2025-07-17 | End: 2025-07-17

## 2025-07-17 RX ORDER — PHENAZOPYRIDINE HYDROCHLORIDE 100 MG/1
100 TABLET, FILM COATED ORAL ONCE
Status: COMPLETED | OUTPATIENT
Start: 2025-07-17 | End: 2025-07-17

## 2025-07-17 RX ORDER — OXYBUTYNIN CHLORIDE 5 MG/1
5 TABLET ORAL 3 TIMES DAILY PRN
Qty: 63 TABLET | Refills: 0 | Status: SHIPPED | OUTPATIENT
Start: 2025-07-17 | End: 2025-08-07

## 2025-07-17 RX ORDER — OXYBUTYNIN CHLORIDE 5 MG/1
5 TABLET ORAL ONCE
Status: COMPLETED | OUTPATIENT
Start: 2025-07-17 | End: 2025-07-17

## 2025-07-17 RX ORDER — ROCURONIUM BROMIDE 10 MG/ML
INJECTION, SOLUTION INTRAVENOUS
Status: DISCONTINUED | OUTPATIENT
Start: 2025-07-17 | End: 2025-07-17

## 2025-07-17 RX ORDER — MIDAZOLAM HYDROCHLORIDE 1 MG/ML
INJECTION, SOLUTION INTRAMUSCULAR; INTRAVENOUS
Status: DISCONTINUED | OUTPATIENT
Start: 2025-07-17 | End: 2025-07-17

## 2025-07-17 RX ORDER — PHENYLEPHRINE HYDROCHLORIDE 10 MG/ML
INJECTION INTRAVENOUS
Status: DISCONTINUED | OUTPATIENT
Start: 2025-07-17 | End: 2025-07-17

## 2025-07-17 RX ORDER — LIDOCAINE HYDROCHLORIDE 20 MG/ML
INJECTION INTRAVENOUS
Status: DISCONTINUED | OUTPATIENT
Start: 2025-07-17 | End: 2025-07-17

## 2025-07-17 RX ORDER — KETOROLAC TROMETHAMINE 10 MG/1
10 TABLET, FILM COATED ORAL EVERY 6 HOURS
Qty: 12 TABLET | Refills: 0 | Status: SHIPPED | OUTPATIENT
Start: 2025-07-17 | End: 2025-07-17 | Stop reason: HOSPADM

## 2025-07-17 RX ORDER — TAMSULOSIN HYDROCHLORIDE 0.4 MG/1
0.4 CAPSULE ORAL DAILY
Qty: 21 CAPSULE | Refills: 0 | Status: SHIPPED | OUTPATIENT
Start: 2025-07-17 | End: 2025-08-07

## 2025-07-17 RX ORDER — OXYBUTYNIN CHLORIDE 5 MG/1
5 TABLET ORAL 3 TIMES DAILY PRN
Qty: 63 TABLET | Refills: 0 | Status: SHIPPED | OUTPATIENT
Start: 2025-07-17 | End: 2025-07-17

## 2025-07-17 RX ORDER — ONDANSETRON HYDROCHLORIDE 2 MG/ML
INJECTION, SOLUTION INTRAVENOUS
Status: DISCONTINUED | OUTPATIENT
Start: 2025-07-17 | End: 2025-07-17

## 2025-07-17 RX ADMIN — LIDOCAINE HYDROCHLORIDE 80 MG: 20 INJECTION INTRAVENOUS at 10:07

## 2025-07-17 RX ADMIN — CEFAZOLIN 2 G: 2 INJECTION, POWDER, FOR SOLUTION INTRAMUSCULAR; INTRAVENOUS at 10:07

## 2025-07-17 RX ADMIN — DEXAMETHASONE SODIUM PHOSPHATE 8 MG: 4 INJECTION, SOLUTION INTRAMUSCULAR; INTRAVENOUS at 10:07

## 2025-07-17 RX ADMIN — SODIUM CHLORIDE: 9 INJECTION, SOLUTION INTRAVENOUS at 11:07

## 2025-07-17 RX ADMIN — ROCURONIUM BROMIDE 50 MG: 10 INJECTION, SOLUTION INTRAVENOUS at 10:07

## 2025-07-17 RX ADMIN — SUGAMMADEX 200 MG: 100 INJECTION, SOLUTION INTRAVENOUS at 11:07

## 2025-07-17 RX ADMIN — ONDANSETRON 4 MG: 2 INJECTION INTRAMUSCULAR; INTRAVENOUS at 11:07

## 2025-07-17 RX ADMIN — FENTANYL CITRATE 50 MCG: 50 INJECTION, SOLUTION INTRAMUSCULAR; INTRAVENOUS at 10:07

## 2025-07-17 RX ADMIN — ROCURONIUM BROMIDE 20 MG: 10 INJECTION, SOLUTION INTRAVENOUS at 11:07

## 2025-07-17 RX ADMIN — PHENYLEPHRINE HYDROCHLORIDE 100 MCG: 10 INJECTION INTRAVENOUS at 10:07

## 2025-07-17 RX ADMIN — OXYBUTYNIN CHLORIDE 5 MG: 5 TABLET ORAL at 12:07

## 2025-07-17 RX ADMIN — MIDAZOLAM HYDROCHLORIDE 1 MG: 2 INJECTION, SOLUTION INTRAMUSCULAR; INTRAVENOUS at 10:07

## 2025-07-17 RX ADMIN — FENTANYL CITRATE 25 MCG: 50 INJECTION, SOLUTION INTRAMUSCULAR; INTRAVENOUS at 10:07

## 2025-07-17 RX ADMIN — PROPOFOL 130 MG: 10 INJECTION, EMULSION INTRAVENOUS at 10:07

## 2025-07-17 RX ADMIN — PHENAZOPYRIDINE 100 MG: 100 TABLET ORAL at 12:07

## 2025-07-17 RX ADMIN — FENTANYL CITRATE 25 MCG: 50 INJECTION, SOLUTION INTRAMUSCULAR; INTRAVENOUS at 11:07

## 2025-07-17 RX ADMIN — SODIUM CHLORIDE: 9 INJECTION, SOLUTION INTRAVENOUS at 10:07

## 2025-07-17 NOTE — BRIEF OP NOTE
[facility] - Surgery ([facility])  Brief Operative Note    Surgery Date: 7/17/2025     Surgeons and Role:     * Curry Lynn MD - Primary     * Gen Sood MD - Resident - Assisting     * Kathleen Santa MD - Resident - Assisting    Pre-op Diagnosis:  Left ureteral stone [N20.1]    Post-op Diagnosis:  Post-Op Diagnosis Codes:     * Left ureteral stone [N20.1]    Procedure(s) (LRB):  CYSTOURETEROSCOPY, W/ HOLMIUM LASER LITHOTRIPSY OF URETERAL CALCULUS & STENT INSERTION (Left)  PLACEMENT-STENT (Left)  NEPHROSCOPY (Left)  REMOVAL, CALCULUS, URETER, URETEROSCOPIC (Left)    Anesthesia: General    Operative Findings: procedures above performed without complication     Estimated Blood Loss: 0 mL         Specimens:   Specimen (24h ago, onward)      None            ID Type Source Tests Collected by Time Destination   A :  Stone Kidney, Left URINARY STONE ANALYSIS Kathleen Santa MD 7/17/2025 [id number]            Discharge Note    OUTCOME: Patient tolerated treatment/procedure well without complication and is now ready for discharge.    DISPOSITION: Home or Self Care    FINAL DIAGNOSIS:  nephrolithiasis     FOLLOWUP: In clinic

## 2025-07-17 NOTE — PLAN OF CARE
Discharge instructions reviewed with pt and pt's bedside. Verbalized understanding. Packet given. Meds to be delivered to bedside.

## 2025-07-17 NOTE — INTERVAL H&P NOTE
The patient has been examined and the H&P has been reviewed:    I concur with the findings and no changes have occurred since H&P was written.    Surgery risks, benefits and alternative options discussed and understood by patient/family.    I have explained the indication, risks, benefits, and alternatives of the procedure in detail.  Risks including but not limited to bleeding, infection, injury to the urethra, bladder, ureter, need for additional treatments, inability or incomplete removal of kidney stones, pain, and discomfort related to the stent were discussed in depth with the patient.  The patient voices understanding and all questions have been answered.  The patient agrees to proceed as planned with left ureteroscopy, laser lithotripsy, and ureteral stent placement.        There are no hospital problems to display for this patient.

## 2025-07-17 NOTE — ANESTHESIA PROCEDURE NOTES
Intubation    Date/Time: 7/17/2025 10:41 AM    Performed by: Janeen Sheffield CRNA  Authorized by: Anton Smith MD    Intubation:     Induction:  Intravenous    Intubated:  Postinduction    Mask Ventilation:  Easy mask    Attempts:  1    Attempted By:  CRNA    Method of Intubation:  Video laryngoscopy    Blade:  Simon 3    Laryngeal View Grade: Grade I - full view of cords      Difficult Airway Encountered?: No      Complications:  None    Airway Device:  Oral endotracheal tube    Airway Device Size:  7.5    Tube secured:  23    Secured at:  The lips    Placement Verified By:  Capnometry and Revisualization with laryngoscopy    Complicating Factors:  None    Findings Post-Intubation:  BS equal bilateral and atraumatic/condition of teeth unchanged  Notes:      Head and neck maintained in neutral position throughout.

## 2025-07-17 NOTE — ANESTHESIA POSTPROCEDURE EVALUATION
Anesthesia Post Evaluation    Patient: Anton Christiansen    Procedure(s) Performed: Procedure(s) (LRB):  CYSTOURETEROSCOPY, W/ HOLMIUM LASER LITHOTRIPSY OF URETERAL CALCULUS & STENT INSERTION (Left)  PLACEMENT-STENT (Left)  NEPHROSCOPY (Left)  REMOVAL, CALCULUS, URETER, URETEROSCOPIC (Left)    Final Anesthesia Type: general      Patient location during evaluation: PACU  Patient participation: Yes- Able to Participate  Level of consciousness: awake and alert  Post-procedure vital signs: reviewed and stable  Pain management: adequate  Airway patency: patent    PONV status at discharge: No PONV  Anesthetic complications: no      Cardiovascular status: blood pressure returned to baseline and hemodynamically stable  Respiratory status: spontaneous ventilation  Hydration status: euvolemic  Follow-up not needed.              Vitals Value Taken Time   /66 07/17/25 13:02   Temp 36.4 °C (97.5 °F) 07/17/25 11:50   Pulse 55 07/17/25 13:15   Resp 18 07/17/25 13:15   SpO2 93 % 07/17/25 13:15         No case tracking events are documented in the log.      Pain/Pura Score: Pura Score: 9 (7/17/2025 12:15 PM)

## 2025-07-17 NOTE — ANESTHESIA PREPROCEDURE EVALUATION
07/17/2025  Anton Christiansen is a 74 y.o., male with a PMHx that includes ASHA, HTN, HLD, GERD, BPH, meningioma, and kidney stones who presents for cystoureteroscopy with laser lithotripsy       Pre-op Assessment    I have reviewed the Patient Summary Reports.     I have reviewed the Nursing Notes. I have reviewed the NPO Status.   I have reviewed the Medications.     Review of Systems  Anesthesia Hx:               Denies Personal Hx of Anesthesia complications.                    Social:  Non-Smoker, No Alcohol Use       Hematology/Oncology:  Hematology Normal   Oncology Normal                                   Cardiovascular:     Hypertension   CAD           hyperlipidemia                               Pulmonary:        Sleep Apnea                Renal/:  Chronic Renal Disease renal calculi               Hepatic/GI:     GERD                Musculoskeletal:  Musculoskeletal Normal                Neurological:  Neurology Normal                                      Psych:  Psychiatric Normal                    Physical Exam  General: Well nourished, Cooperative, Alert and Oriented    Airway:  Mallampati: I   Mouth Opening: Normal  TM Distance: Normal  Tongue: Normal  Neck ROM: Normal ROM    Dental:  Intact    Chest/Lungs:  Clear to auscultation, Normal Respiratory Rate    Heart:  Rate: Normal  Rhythm: Regular Rhythm        Anesthesia Plan  Type of Anesthesia, risks & benefits discussed:    Anesthesia Type: Gen ETT  Intra-op Monitoring Plan: Standard ASA Monitors  Post Op Pain Control Plan: multimodal analgesia and IV/PO Opioids PRN  Induction:  IV  Airway Plan: Direct, Post-Induction  Informed Consent: Informed consent signed with the Patient and all parties understand the risks and agree with anesthesia plan.  All questions answered.   ASA Score: 3  Day of Surgery Review of History & Physical: H&P Update  referred to the surgeon/provider.    Ready For Surgery From Anesthesia Perspective.     .

## 2025-07-17 NOTE — TRANSFER OF CARE
"Anesthesia Transfer of Care Note    Patient: Anton Christiansen    Procedure(s) Performed: Procedure(s) (LRB):  CYSTOURETEROSCOPY, W/ HOLMIUM LASER LITHOTRIPSY OF URETERAL CALCULUS & STENT INSERTION (Left)  PLACEMENT-STENT (Left)  NEPHROSCOPY (Left)  REMOVAL, CALCULUS, URETER, URETEROSCOPIC (Left)    Patient location: PACU    Anesthesia Type: general    Transport from OR: Transported from OR on 6-10 L/min O2 by face mask with adequate spontaneous ventilation    Post pain: adequate analgesia    Post assessment: no apparent anesthetic complications and tolerated procedure well    Post vital signs: stable    Level of consciousness: awake and alert    Nausea/Vomiting: no nausea/vomiting    Complications: none    Transfer of care protocol was followedComments: Report given to recovery, RN. All questions answered.      Last vitals: Visit Vitals  BP (!) 136/74 (BP Location: Left arm, Patient Position: Lying)   Pulse 68   Temp 36.9 °C (98.4 °F) (Temporal)   Resp 16   Ht 5' 7" (1.702 m)   Wt 83.9 kg (184 lb 15.5 oz)   SpO2 99%   BMI 28.97 kg/m²     "

## 2025-07-17 NOTE — OP NOTE
[facility]  Operative Note    Date: 07/17/2025    Pre-Op Diagnosis: left ureteral stone, left renal stone     Patient Active Problem List    Diagnosis Date Noted    History of COVID-19 07/07/2025    BMI 28.0-28.9,adult 07/07/2025    Acid reflux 07/07/2025    Nausea 07/07/2025    Elevated bilirubin 07/07/2025    History of skin cancer 11/25/2024    Aortic atherosclerosis 04/04/2023    Hyperparathyroidism 03/29/2022    BPH with urinary obstruction 06/14/2019    ASHA (obstructive sleep apnea)     Multiple thyroid nodules 04/24/2018    IC (interstitial cystitis) 05/11/2017    Meningioma 11/16/2016    NAION (non-arteritic anterior ischemic optic neuropathy), 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 Left ureteroscopy with laser lithotripsy and stone basket extraction  2. Cystoscopy  3. Left pyeloscopy  4. Left ureteral stent placement   5. Fluoro < 1 hr    Specimen(s): stone sent for analysis     Staff Surgeon: Curry Lynn MD    Assistant Surgeon: Kathleen Santa MD; Gen Sood MD     Anesthesia: General endotracheal anesthesia    Indications: Anton Christiansen is a 74 y.o. male with a left ureteral stone presenting for definitive stone management. He is not pre-stented.    Findings:  1.  Tight left UO, but able to navigate with semi-rigid ureteroscope.  Left distal ureteral stone lasered and basket extracted to completion.    2. Small mid-upper pole left renal stone basket extracted.      Estimated Blood Loss: min    Drains:   1. Left 6 Fr x 24 cm JJ ureteral stent without  strings    Procedure in detail:  After risks, benefits, and possible complications were explained, the patient elected to undergo the procedure and informed consent was obtained. All questions were answered in the freddy-operative area. The patient was transferred to the cystoscopy suite and placed in the supine position. SCDs were applied and working. Anesthesia was administered. The patient was then placed in the dorsal lithotomy position and prepped and draped in the usual sterile fashion. Time out was performed, and freddy-procedural antibiotics were confirmed.     A rigid cystoscope in a 22 Fr sheath was introduced into the patient's urethra. This passed easily. The entire urethra was visualized which showed no strictures or masses. Cystoscopy revealed the ureteral orifices in the normal anatomic location bilaterally.    A motiob wire was passed up the left ureteral orifice and up into the kidney. This passed easily and placement was confirmed fluoroscopically. The cystoscope was removed keeping the wire in place.    An 8 Fr rigid ureteroscope was passed into the patient's bladder under direct vision. It was then passed through the left ureteral orifice between two wires. A stone was encountered at the level of the distal ureter.  A 200 micron Holmium laser fiber was passed through the ureteroscope. The stone was fragmented using the laser. The laser fiber was removed and a basket was introduced through the ureteroscope. Stone fragments were removed and placed in the bladder. The ureteroscope was reinserted and the entire length of the ureter was surveyed and no additional stone fragments were visualized.     A stiff glide wire was inserted through the ureteroscope and into the kidney with fluoroscopic confirmation. The ureteroscope was removed keeping both wires in place.    An 8 Fr flexible ureteroscope was advanced over the stiff glide wire to the level of the renal pelvis under continuous fluoroscopy. This  passed easily. The glide wire was removed.    There was one small stone in a mid-upper pole calyx.  A basket was inserted per the scope and the stone was removed and passed off the field for analysis. Systematic pyeloscopy was performed and no additional stones were visualized. The scope was removed keeping the motion wire in place.    The cystoscope was reinserted and the bladder was irrigated to remove the stone fragments. The bladder was drained and the cystoscope removed keeping the wire in place.    The cystoscope was backloaded over the wire and advanced into the bladder. We then passed a 6 Fr x 24 cm JJ ureteral stent without strings over the wire to the level of the renal pelvis under direct vision as well as flouroscopy. The wire was removed. A 180 degree coil was observed in the renal pelvis as well as the bladder using fluoroscopy. A 180 degree coil was also seen using direct visualization in the bladder.    The patient tolerated the procedure well and was transferred to the recovery room in stable condition.    Disposition:  The patient will be discharged home from PACU. He follow up with Dr. Lynn for R partial nephrectomy on 7/24.      Kathleen Santa MD     I have reviewed the operative note performed by Dr. Santa, and I concur with her/his documentation of Anton Christiansen. I was present for the critical or key portions of the procedure.

## 2025-07-17 NOTE — TELEPHONE ENCOUNTER
Patient states he is s/p Laser Lithtripsy of Ureteral Calculus and Stent Insertion on today, 7/17/25. Patient states c/o severe left lower back pain rated at a level of 8-9/10 and pain when urinating. Patient's wife states patient has not taken any OTC pain medications at this time and is calling to inquire if patient can take a dose of Aleve.     Triage RN reviewed the following Post-Procedure instructions provided per patient's AVS (After Visit Summary):    3. Pain Management:  - You may experience mild to moderate discomfort or pain after the ureteroscopy procedure. This is usually  manageable with over-the-counter pain relievers such as acetaminophen or nonsteroidal anti-inflammatory drugs (NSAIDs.    Patient's wife advised that Aleve is an NSAID that patient can take for management of post-op pain.     Patient requested to speak with Dr. Curry Lynn to discuss his symptoms. Patient advised that Dr. Lynn is currently in a procedure and unable to take calls, but the On Call Provider for Urology will be paged to provide additional care advice.     On Call Provider, Dr. Kevin Gibbons stated he will submit a prescription for Pain (Toradol) to patient's Pharmacy for management of patient's c/o post-procedure discomfort/pain.     Patient's wife advised that a prescription for Toradol will be sent to Patient's Pharmacy for  today. Wife also advised to contact the [facility] for any worsening symptoms. Patient's wife states understanding of care advice.     Reason for Disposition   SEVERE post-op pain (e.g., excruciating, pain scale 8-10) that is not controlled with pain medications    Additional Information   Negative: Intentional drug overdose and suicidal thoughts or ideas   Negative: Drug overdose and triager unable to answer question   Negative: Caller requesting a renewal or refill of a medicine patient is currently taking   Negative: Caller requesting information unrelated to medicine    Negative: Caller requesting information about COVID-19 Vaccine   Negative: Caller requesting information about Emergency Contraception   Negative: Caller requesting information about Combined Birth Control Pills   Negative: Caller requesting information about Progestin Birth Control Pills   Caller requesting information about post-op pain or medicines   Negative: Sounds like a life-threatening emergency to the triager   Negative: Chest pain   Negative: Difficulty breathing   Negative: Acting confused (e.g., disoriented, slurred speech) or excessively sleepy   Negative: Surgical incision symptoms and questions   Negative: Pain or burning with passing urine (urination) and male   Negative: Pain or burning with passing urine (urination) and female   Negative: Constipation   Negative: Leg (calf, thigh) pain of new-onset or getting worse   Negative: Leg swelling of new-onset or getting worse   Negative: Dizziness is severe, or persists > 24 hours after surgery   Negative: Pain, redness, swelling, or pus at IV site (current or recent)   Negative: Symptoms arising from use of a urinary catheter (Vaughan or Coude)   Negative: Cast problems or questions   Negative: Medication question   Negative: Caller has URGENT question and triager unable to answer question    Protocols used: Medication Question Call-A-OH, Post-Op Symptoms and [id number]

## 2025-07-17 NOTE — DISCHARGE INSTRUCTIONS
Postoperative Instructions for Stone Surgery    1. Ureteral Stent:     - You have a ureteral stent in place. You will be contacted for a separate office appointment to have this stent removed. It is typically removed in a short office-based procedure by placing a small camera into the bladder and grasping the stent to remove it. This procedure typically lasts 1-2 minutes.    2. Hydration and Fluid Intake:     - It is essential to drink plenty of fluids following the ureteroscopy procedure to promote flushing of the urinary system and prevent dehydration.     - Aim to drink at least 8 to 10 glasses of water or other non-caffeinated, non-alcoholic beverages daily, unless instructed otherwise by your healthcare provider.    3. Pain Management:     - You may experience mild to moderate discomfort or pain after the ureteroscopy procedure. This is usually manageable with over-the-counter pain relievers such as acetaminophen or nonsteroidal anti-inflammatory drugs (NSAIDs).     - You may experience mild bladder and flank discomfort especially when voiding due to your ureteral stent. This may be alleviated with medications that were prescribed to you such as oxybutynin and/or pyridium. You may take these as needed for any urinary discomfort while a stent. Please note this discomfort is temporary and should subside once the stent is removed.     - If prescribed pain medication, take it as instructed, following the prescribed dosage and frequency.    4. Urinary Symptoms:     - It is common to experience some urinary symptoms after the procedure, such as urgency, frequency, burning sensation, or blood in the urine (hematuria). These symptoms should gradually improve within a few days.     - If the symptoms worsen or if you notice significant blood clots or inability to urinate, contact your healthcare provider.    5. Activity and Rest:     - It is advisable to take it easy and get plenty of rest for the first few days  following the procedure. Avoid strenuous activities, heavy lifting, or vigorous exercise for 1-2 days.     - Gradually resume normal activities as you feel comfortable, but listen to your body and avoid overexertion.    6. Diet and Nutrition:     - Follow any dietary recommendations provided by your healthcare provider. In general, maintain a balanced diet with adequate fiber intake to prevent constipation.     - Avoid excessive consumption of salt, spicy foods, and caffeinated or carbonated beverages, as these may irritate the urinary system.    7. Follow-up Appointments:     - Attend all scheduled follow-up appointments with your healthcare provider to monitor your recovery and assess the success of the stone removal.     - You will be contacted via phone or the patient portal about any follow up appointments and tests/imaging in about 1-2 weeks.     - Additional imaging or laboratory tests may be ordered to evaluate the effectiveness of the procedure.    8. Signs of Complications:     - Be aware of potential complications and contact your healthcare provider if you experience any of the following: persistent or worsening pain, fever, chills, excessive fatigue, severe bleeding, inability to pass urine, or signs of infection.    9. Medication Compliance:     - If prescribed any medications, such as antibiotics or medications to prevent stone recurrence, take them as instructed by your healthcare provider. Follow the recommended dosage and complete the full course of medication.    If you have any additional questions, call [phone] and ask for your provider. If after hours (night or weekends) ask for urology on call and you will be directed to the appropriate provider.

## 2025-07-18 RX ORDER — OXYCODONE HYDROCHLORIDE 5 MG/1
5 TABLET ORAL EVERY 4 HOURS PRN
Qty: 10 TABLET | Refills: 0 | Status: SHIPPED | OUTPATIENT
Start: 2025-07-18

## 2025-07-19 ENCOUNTER — NURSE TRIAGE (OUTPATIENT)
Dept: ADMINISTRATIVE | Facility: CLINIC | Age: 75
End: 2025-07-19
Payer: MEDICARE

## 2025-07-20 NOTE — TELEPHONE ENCOUNTER
Pt had a procedure on his kidney on 7/17 with dr. Lynn. He has not had a BM sincelast BM on Thursday morning. He is taking colace once per day. He states he feels like there is stool that is there but will not come out. No abd pain.    Dispo- call provider when office is open. Care advice and reassurance provided.  Reason for Disposition   Taking new prescription medication   [1] Rectal pain or fullness from fecal impaction (rectum full of stool) AND [2] has not tried SITZ bath, suppository or enema    Additional Information   Negative: [1] Vomiting AND [2] contains bile (green color)   Negative: Patient sounds very sick or weak to the triager   Negative: [1] Rectal pain or fullness from fecal impaction (rectum full of stool) AND [2] NOT better after SITZ bath, suppository or enema   Negative: [1] Vomiting AND [2] abdomen looks much more swollen than usual   Negative: [1] Constant abdominal pain AND [2] present > 2 hours   Negative: [1] MILD abdominal pain (e.g., does not interfere with normal activities) AND [2] pain comes and goes (cramps) AND [3] fever   Negative: Last bowel movement (BM) > 4 days ago   Negative: Leaking stool   Negative: Unable to have a bowel movement (BM) without manually removing stool (using finger to pull out stool or perform disimpaction)   Negative: Unable to have a bowel movement (BM) without laxative or enema   Negative: [1] Significant weight loss (more than 10 pounds [4.5 kg]; 5% or more) AND [2] not dieting   Negative: [1] Mild swelling of abdomen (e.g., looks distended or swollen) AND [2] new-onset or getting worse   Negative: Pencil-like, narrow stools    Protocols used: Constipation-A-AH

## 2025-07-23 ENCOUNTER — TELEPHONE (OUTPATIENT)
Dept: UROLOGY | Facility: CLINIC | Age: 75
End: 2025-07-23
Payer: MEDICARE

## 2025-07-23 ENCOUNTER — ANESTHESIA EVENT (OUTPATIENT)
Dept: SURGERY | Facility: HOSPITAL | Age: 75
End: 2025-07-23
Payer: MEDICARE

## 2025-07-23 NOTE — TELEPHONE ENCOUNTER
Instructions for Day of Surgery      Report To:    [facility], 2nd floor of [facility]    Arrival time: 5am    Night Before Surgery     Nothing to eat or drink after midnight the night before your surgery  Take medications as instructed the morning of surgery  No alcoholic beverages 24 hours prior to surgery

## 2025-07-23 NOTE — ANESTHESIA PREPROCEDURE EVALUATION
[facility]  Anesthesia Pre-Operative Evaluation         Patient Name: Anton Christiansen  YOB: 1950  MRN: [id number]    SUBJECTIVE:     Pre-operative Evaluation for Procedure(s) (LRB):  DV5 ROBOTIC NEPHRECTOMY, PARTIAL (Right)     07/23/2025    Anton Christiansen is a 74 y.o. male with a PMHx significant for ASHA, HTN, HLD, GERD, BPH, meningioma, kidney stones, and R renal mass presenting for above procedure.      Previous Airway :     Intubation     Date/Time: 7/17/2025 10:41 AM     Performed by: Janeen Sheffield CRNA  Authorized by: Anton Smith MD    Intubation:     Induction:  Intravenous    Intubated:  Postinduction    Mask Ventilation:  Easy mask    Attempts:  1    Attempted By:  CRNA    Method of Intubation:  Video laryngoscopy    Blade:  Simon 3    Laryngeal View Grade: Grade I - full view of cords      Difficult Airway Encountered?: No      Complications:  None    Airway Device:  Oral endotracheal tube    Airway Device Size:  7.5    Tube secured:  23    Secured at:  The lips    Placement Verified By:  Capnometry and Revisualization with laryngoscopy    Complicating Factors:  None    Findings Post-Intubation:  BS equal bilateral and atraumatic/condition of teeth unchanged  Notes:      Head and neck maintained in neutral position throughout.                Problem List[1]    Review of patient's allergies indicates:   Allergen Reactions    Cephalexin Diarrhea    Ace inhibitors Other (See Comments)     Cough    Cialis [tadalafil] Other (See Comments)     Muscle pain      Proscar [finasteride] Other (See Comments)     Decreased libido     Vicodin [hydrocodone-acetaminophen] Nausea And Vomiting     Can tolerate acetaminophen       Current Inpatient Medications:      Current Outpatient Medications   Medication Instructions    acetaminophen (TYLENOL) 500 mg, Oral, Every 6 hours PRN    ascorbic acid (vitamin C) (VITAMIN C) 500 mg, Daily    atorvastatin (LIPITOR) 40 mg,  Oral    carvediloL (COREG) 12.5 mg, Oral, With breakfast    cholecalciferol, vitamin D3, 3,000 unit Tab 1 tablet, Daily    docosahexaenoic acid/epa (FISH OIL ORAL) Take by mouth. He takes 2 capsules also fish oil at nighttime    dutasteride (AVODART) 0.5 mg capsule TAKE ONE CAPSULE BY MOUTH EVERY DAY    ibuprofen (ADVIL,MOTRIN) 200 mg, Oral, As needed (PRN)    multivitamin (THERAGRAN) per tablet 1 tablet, Daily    olmesartan-hydrochlorothiazide (BENICAR HCT) 20-12.5 mg per tablet 1 tablet, Oral, Daily    oxybutynin (DITROPAN) 5 mg, Oral, 3 times daily PRN    oxyCODONE (ROXICODONE) 5 mg, Oral, Every 4 hours PRN    tamsulosin (FLOMAX) 0.4 mg, Oral, Daily       Past Surgical History:   Procedure Laterality Date    CATARACT EXTRACTION W/  INTRAOCULAR LENS IMPLANT  12/20/2012    OD    COLONOSCOPY  [id number]    COLONOSCOPY N/A 05/20/2019    Procedure: COLONOSCOPY;  Surgeon: Artemio Greer MD;  Location: [address]);  Service: Endoscopy;  Laterality: N/A;    COLONOSCOPY N/A 9/23/2024    Procedure: COLONOSCOPY;  Surgeon: Johnny Garrett MD;  [facility]: [facility] ([address] [place] [address]);  Service: Endoscopy;  Laterality: N/A;  6/28/2024 ref Dr. Сергей Landis, nausea with anesthesia, PEG instr via portal- B  9/17 precall complete.  Pt aware MD and time of procedure will be determined the day of.  olu  9/20-pt moved from extra screeing to Dr. Garrett-\[facility]\""    CYSTOURETEROSCOPY, WITH HOLMIUM LASER LITHOTRIPSY OF URETERAL CALCULUS AND STENT INSERTION Left 7/17/2025    Procedure: CYSTOURETEROSCOPY, W/ HOLMIUM LASER LITHOTRIPSY OF URETERAL CALCULUS & STENT INSERTION;  Surgeon: Curry Lynn MD;  Location: [address];  Service: Urology;  Laterality: Left;    Division and Inset  04/29/2014    D&I Forehead Flap    ESOPHAGOGASTRODUODENOSCOPY N/A 10/22/2024    Procedure: EGD (ESOPHAGOGASTRODUODENOSCOPY);  Surgeon: Johnny Garrett MD;  Location: [facility] ([address] [place] [address]);  Service: Endoscopy;  Laterality: N/A;  prep inst sent to pt via  "portal / reyna pt /  10/16-lvm for precall-Kpvt  10/18-precall complete-Kpvt  10/21 pt aware of earlier arrival time of 1pm-ml    EYE SURGERY      NEPHROSCOPY Left 7/17/2025    Procedure: NEPHROSCOPY;  Surgeon: Curry Lynn MD;  Location: [facility] OR [address] [place] [address];  Service: Urology;  Laterality: Left;    Pilonoidal Cyst      PLACEMENT-STENT Left 7/17/2025    Procedure: PLACEMENT-STENT;  Surgeon: Curry Lynn MD;  Location: [facility] OR [address] [place] [address];  Service: Urology;  Laterality: Left;    SKIN SURGERY  MOHS Repair    nose    TONSILLECTOMY      URETEROSCOPIC REMOVAL OF URETERIC CALCULUS Left 7/17/2025    Procedure: REMOVAL, CALCULUS, URETER, URETEROSCOPIC;  Surgeon: Curry Lynn MD;  Location: [facility] OR [address] [place] [address];  Service: Urology;  Laterality: Left;       Social History     Substance and Sexual Activity   Drug Use Never     Tobacco Use: Low Risk  (7/17/2025)    Patient History     Smoking Tobacco Use: Never     Smokeless Tobacco Use: Never     Passive Exposure: Not on file     Alcohol Use: Not At Risk (3/4/2025)    AUDIT-C     Frequency of Alcohol Consumption: Monthly or less     Average Number of Drinks: 1 or 2     Frequency of Binge Drinking: Never       OBJECTIVE:     Vital Signs Range (Last 24H):         Significant Labs    Heme Profile  Lab Results   Component Value Date    WBC 7.72 06/20/2025    HGB 14.8 06/20/2025    HCT 45.9 06/20/2025     06/20/2025       Coagulation Studies  No results found for: "LABPROT", "INR", "APTT"    Metabolic Profile  Lab Results   Component Value Date     06/25/2025    K 4.4 06/25/2025     06/25/2025    CO2 25 06/25/2025    BUN 24 (H) 06/25/2025    CREATININE 1.0 06/25/2025    MG 1.7 09/22/2015    PHOS 3.0 09/22/2015       Liver Function Tests  Lab Results   Component Value Date    AST 21 06/20/2025    ALT 27 06/20/2025    ALKPHOS 91 06/20/2025    BILITOT 2.4 (H) 06/20/2025    PROT 7.6 06/20/2025    ALBUMIN 4.2 06/20/2025       Lipid Profile  Lab Results "   Component Value Date    CHOL 148 06/20/2025    HDL 39 (L) 06/20/2025    TRIG 173 (H) 06/20/2025       Endocrine Profile  Lab Results   Component Value Date    HGBA1C 5.7 (H) 06/20/2025    TSH 2.408 06/10/2024           Cardiac Studies    EKG:   Results for orders placed or performed during the hospital encounter of 07/07/25   EKG 12-lead    Collection Time: 07/07/25  8:39 AM   Result Value Ref Range    QRS Duration 84 ms    [facility] QTC Calculation 397 ms    Narrative    Test Reason : Z01.818,    Vent. Rate :  55 BPM     Atrial Rate :  55 BPM     P-R Int : 180 ms          QRS Dur :  84 ms      QT Int : 416 ms       P-R-T Axes :  54   7  45 degrees    QTcB Int : 397 ms    Sinus bradycardia  Otherwise normal ECG  When compared with ECG of 29-Mar-2022 08:30,  No significant change was found  Confirmed by Sukhjinder Niño ([id number]) on 7/7/2025 10:45:05 AM    Referred By: ARNULFO CEDILLO           Confirmed By: Sukhjinder Niño             ASSESSMENT/PLAN:       Pre-op Assessment    I have reviewed the Patient Summary Reports.     I have reviewed the Nursing Notes. I have reviewed the NPO Status.   I have reviewed the Medications.     Review of Systems  Anesthesia Hx:  No problems with previous Anesthesia               Denies Personal Hx of Anesthesia complications.                    Social:  Non-Smoker, No Alcohol Use       Hematology/Oncology:  Hematology Normal                                     Cardiovascular:  Exercise tolerance: good   Hypertension           hyperlipidemia   ECG has been reviewed.                            Pulmonary:        Sleep Apnea                Renal/:  Chronic Renal Disease renal calculi               Hepatic/GI:     GERD                Neurological:    Denies CVA.    Denies Seizures.                                Endocrine:  Endocrine Normal            Psych:  Psychiatric Normal                    Physical Exam  General: Cooperative, Alert, Oriented and Well  nourished    Airway:  Mallampati: I   Mouth Opening: Normal  Tongue: Normal  Neck ROM: Normal ROM    Dental:  Intact    Chest/Lungs:  Normal Respiratory Rate    Heart:  Rate: Normal        Anesthesia Plan  Type of Anesthesia, risks & benefits discussed:    Anesthesia Type: Gen ETT  Intra-op Monitoring Plan: Standard ASA Monitors and Art Line  Post Op Pain Control Plan: multimodal analgesia and IV/PO Opioids PRN  Induction:  IV  Airway Plan: Direct and Video, Post-Induction  Informed Consent: Informed consent signed with the Patient and all parties understand the risks and agree with anesthesia plan.  All questions answered.   ASA Score: 3  Day of Surgery Review of History & Physical: H&P Update referred to the surgeon/provider.    Ready For Surgery From Anesthesia Perspective.     .           [1]   Patient Active Problem List  Diagnosis    Hypertension    Hyperlipidemia    Kidney stone    Chronic prostatitis    Elevated PSA    Status post cataract extraction    Mohs defect of ala nasi    ED (erectile dysfunction) of organic origin    Urinary retention    AK (actinic keratosis)    NAION (non-arteritic anterior ischemic optic neuropathy), left eye    Central scotoma, left eye    Meningioma    IC (interstitial cystitis)    Multiple thyroid nodules    ASHA (obstructive sleep apnea)    BPH with urinary obstruction    Hyperparathyroidism    Aortic atherosclerosis    History of skin cancer    History of COVID-19    BMI 28.0-28.9,adult    Acid reflux    Nausea    Elevated bilirubin

## 2025-07-24 ENCOUNTER — HOSPITAL ENCOUNTER (INPATIENT)
Facility: HOSPITAL | Age: 75
LOS: 1 days | Discharge: HOME | DRG: 657 | End: 2025-07-25
Attending: UROLOGY | Admitting: UROLOGY
Payer: MEDICARE

## 2025-07-24 ENCOUNTER — ANESTHESIA (OUTPATIENT)
Dept: SURGERY | Facility: HOSPITAL | Age: 75
End: 2025-07-24
Payer: MEDICARE

## 2025-07-24 DIAGNOSIS — Z01.818 PREOPERATIVE TESTING: Primary | ICD-10-CM

## 2025-07-24 DIAGNOSIS — N28.89 RENAL MASS: ICD-10-CM

## 2025-07-24 LAB
ABSOLUTE EOSINOPHIL (OHS): 0.15 K/UL
ABSOLUTE MONOCYTE (OHS): 0.25 K/UL (ref 0.3–1)
ABSOLUTE NEUTROPHIL COUNT (OHS): 7.77 K/UL (ref 1.8–7.7)
ANION GAP (OHS): 7 MMOL/L (ref 8–16)
BASOPHILS # BLD AUTO: 0.04 K/UL
BASOPHILS NFR BLD AUTO: 0.4 %
BUN SERPL-MCNC: 22 MG/DL (ref 8–23)
CALCIUM SERPL-MCNC: 8.7 MG/DL (ref 8.7–10.5)
CHLORIDE SERPL-SCNC: 108 MMOL/L (ref 95–110)
CO2 SERPL-SCNC: 23 MMOL/L (ref 23–29)
CREAT SERPL-MCNC: 1.1 MG/DL (ref 0.5–1.4)
ERYTHROCYTE [DISTWIDTH] IN BLOOD BY AUTOMATED COUNT: 13.5 % (ref 11.5–14.5)
GFR SERPLBLD CREATININE-BSD FMLA CKD-EPI: >60 ML/MIN/1.73/M2
GLUCOSE SERPL-MCNC: 113 MG/DL (ref 70–110)
HCT VFR BLD AUTO: 38.2 % (ref 40–54)
HGB BLD-MCNC: 12.7 GM/DL (ref 14–18)
IMM GRANULOCYTES # BLD AUTO: 0.07 K/UL (ref 0–0.04)
IMM GRANULOCYTES NFR BLD AUTO: 0.7 % (ref 0–0.5)
INDIRECT COOMBS: NORMAL
LYMPHOCYTES # BLD AUTO: 1.3 K/UL (ref 1–4.8)
MCH RBC QN AUTO: 31.4 PG (ref 27–31)
MCHC RBC AUTO-ENTMCNC: 33.2 G/DL (ref 32–36)
MCV RBC AUTO: 95 FL (ref 82–98)
NUCLEATED RBC (/100WBC) (OHS): 0 /100 WBC
PLATELET # BLD AUTO: 247 K/UL (ref 150–450)
PMV BLD AUTO: 10.6 FL (ref 9.2–12.9)
POTASSIUM SERPL-SCNC: 4.4 MMOL/L (ref 3.5–5.1)
RBC # BLD AUTO: 4.04 M/UL (ref 4.6–6.2)
RELATIVE EOSINOPHIL (OHS): 1.6 %
RELATIVE LYMPHOCYTE (OHS): 13.6 % (ref 18–48)
RELATIVE MONOCYTE (OHS): 2.6 % (ref 4–15)
RELATIVE NEUTROPHIL (OHS): 81.1 % (ref 38–73)
RH BLD: NORMAL
SODIUM SERPL-SCNC: 138 MMOL/L (ref 136–145)
SPECIMEN OUTDATE: NORMAL
WBC # BLD AUTO: 9.58 K/UL (ref 3.9–12.7)

## 2025-07-24 PROCEDURE — 25000003 PHARM REV CODE 250

## 2025-07-24 PROCEDURE — 63600175 PHARM REV CODE 636 W HCPCS: Performed by: STUDENT IN AN ORGANIZED HEALTH CARE EDUCATION/TRAINING PROGRAM

## 2025-07-24 PROCEDURE — 50543 LAPARO PARTIAL NEPHRECTOMY: CPT | Mod: RT,,, | Performed by: UROLOGY

## 2025-07-24 PROCEDURE — 63600175 PHARM REV CODE 636 W HCPCS: Performed by: UROLOGY

## 2025-07-24 PROCEDURE — 0TB04ZZ EXCISION OF RIGHT KIDNEY, PERCUTANEOUS ENDOSCOPIC APPROACH: ICD-10-PCS | Performed by: UROLOGY

## 2025-07-24 PROCEDURE — 71000015 HC POSTOP RECOV 1ST HR: Performed by: UROLOGY

## 2025-07-24 PROCEDURE — 63600175 PHARM REV CODE 636 W HCPCS

## 2025-07-24 PROCEDURE — 25000003 PHARM REV CODE 250: Performed by: UROLOGY

## 2025-07-24 PROCEDURE — 71000016 HC POSTOP RECOV ADDL HR: Performed by: UROLOGY

## 2025-07-24 PROCEDURE — 76942 ECHO GUIDE FOR BIOPSY: CPT

## 2025-07-24 PROCEDURE — 0TP98DZ REMOVAL OF INTRALUMINAL DEVICE FROM URETER, VIA NATURAL OR ARTIFICIAL OPENING ENDOSCOPIC: ICD-10-PCS | Performed by: UROLOGY

## 2025-07-24 PROCEDURE — C1727 CATH, BAL TIS DIS, NON-VAS: HCPCS | Performed by: UROLOGY

## 2025-07-24 PROCEDURE — 71000039 HC RECOVERY, EACH ADD'L HOUR: Performed by: UROLOGY

## 2025-07-24 PROCEDURE — 71000033 HC RECOVERY, INTIAL HOUR: Performed by: UROLOGY

## 2025-07-24 PROCEDURE — 85025 COMPLETE CBC W/AUTO DIFF WBC: CPT

## 2025-07-24 PROCEDURE — 52310 CYSTOSCOPY AND TREATMENT: CPT | Mod: XS,51,, | Performed by: UROLOGY

## 2025-07-24 PROCEDURE — 36000712 HC OR TIME LEV V 1ST 15 MIN: Performed by: UROLOGY

## 2025-07-24 PROCEDURE — 27201423 OPTIME MED/SURG SUP & DEVICES STERILE SUPPLY: Performed by: UROLOGY

## 2025-07-24 PROCEDURE — 88341 IMHCHEM/IMCYTCHM EA ADD ANTB: CPT | Mod: TC | Performed by: UROLOGY

## 2025-07-24 PROCEDURE — 99900035 HC TECH TIME PER 15 MIN (STAT)

## 2025-07-24 PROCEDURE — 27000221 HC OXYGEN, UP TO 24 HOURS

## 2025-07-24 PROCEDURE — 94761 N-INVAS EAR/PLS OXIMETRY MLT: CPT

## 2025-07-24 PROCEDURE — 11000001 HC ACUTE MED/SURG PRIVATE ROOM

## 2025-07-24 PROCEDURE — 8E0W4CZ ROBOTIC ASSISTED PROCEDURE OF TRUNK REGION, PERCUTANEOUS ENDOSCOPIC APPROACH: ICD-10-PCS | Performed by: UROLOGY

## 2025-07-24 PROCEDURE — 86901 BLOOD TYPING SEROLOGIC RH(D): CPT

## 2025-07-24 PROCEDURE — 37000009 HC ANESTHESIA EA ADD 15 MINS: Performed by: UROLOGY

## 2025-07-24 PROCEDURE — 63600175 PHARM REV CODE 636 W HCPCS: Mod: JZ,TB

## 2025-07-24 PROCEDURE — 37000008 HC ANESTHESIA 1ST 15 MINUTES: Performed by: UROLOGY

## 2025-07-24 PROCEDURE — 80048 BASIC METABOLIC PNL TOTAL CA: CPT

## 2025-07-24 PROCEDURE — 36000713 HC OR TIME LEV V EA ADD 15 MIN: Performed by: UROLOGY

## 2025-07-24 RX ORDER — METHOCARBAMOL 500 MG/1
500 TABLET, FILM COATED ORAL 3 TIMES DAILY
Status: DISCONTINUED | OUTPATIENT
Start: 2025-07-24 | End: 2025-07-25 | Stop reason: HOSPADM

## 2025-07-24 RX ORDER — HEPARIN SODIUM 5000 [USP'U]/ML
5000 INJECTION, SOLUTION INTRAVENOUS; SUBCUTANEOUS EVERY 8 HOURS
Status: DISCONTINUED | OUTPATIENT
Start: 2025-07-24 | End: 2025-07-24

## 2025-07-24 RX ORDER — HEPARIN SODIUM 5000 [USP'U]/ML
5000 INJECTION, SOLUTION INTRAVENOUS; SUBCUTANEOUS EVERY 8 HOURS
Status: DISCONTINUED | OUTPATIENT
Start: 2025-07-24 | End: 2025-07-25 | Stop reason: HOSPADM

## 2025-07-24 RX ORDER — PHENYLEPHRINE HCL IN 0.9% NACL 1 MG/10 ML
SYRINGE (ML) INTRAVENOUS
Status: DISCONTINUED | OUTPATIENT
Start: 2025-07-24 | End: 2025-07-24

## 2025-07-24 RX ORDER — TRAMADOL HYDROCHLORIDE 50 MG/1
50 TABLET, FILM COATED ORAL EVERY 4 HOURS PRN
Status: DISCONTINUED | OUTPATIENT
Start: 2025-07-24 | End: 2025-07-25 | Stop reason: HOSPADM

## 2025-07-24 RX ORDER — FENTANYL CITRATE 50 UG/ML
25-200 INJECTION, SOLUTION INTRAMUSCULAR; INTRAVENOUS
Status: DISCONTINUED | OUTPATIENT
Start: 2025-07-24 | End: 2025-07-24 | Stop reason: HOSPADM

## 2025-07-24 RX ORDER — ACETAMINOPHEN 500 MG
1000 TABLET ORAL EVERY 6 HOURS
Status: DISCONTINUED | OUTPATIENT
Start: 2025-07-24 | End: 2025-07-25 | Stop reason: HOSPADM

## 2025-07-24 RX ORDER — LIDOCAINE HYDROCHLORIDE 20 MG/ML
INJECTION, SOLUTION EPIDURAL; INFILTRATION; INTRACAUDAL; PERINEURAL
Status: DISCONTINUED | OUTPATIENT
Start: 2025-07-24 | End: 2025-07-24

## 2025-07-24 RX ORDER — PROCHLORPERAZINE EDISYLATE 5 MG/ML
5 INJECTION INTRAMUSCULAR; INTRAVENOUS EVERY 30 MIN PRN
Status: DISCONTINUED | OUTPATIENT
Start: 2025-07-24 | End: 2025-07-24 | Stop reason: HOSPADM

## 2025-07-24 RX ORDER — ONDANSETRON HYDROCHLORIDE 2 MG/ML
INJECTION, SOLUTION INTRAVENOUS
Status: DISCONTINUED | OUTPATIENT
Start: 2025-07-24 | End: 2025-07-24

## 2025-07-24 RX ORDER — SODIUM CHLORIDE, SODIUM LACTATE, POTASSIUM CHLORIDE, CALCIUM CHLORIDE 600; 310; 30; 20 MG/100ML; MG/100ML; MG/100ML; MG/100ML
INJECTION, SOLUTION INTRAVENOUS CONTINUOUS
Status: DISCONTINUED | OUTPATIENT
Start: 2025-07-24 | End: 2025-07-25

## 2025-07-24 RX ORDER — TAMSULOSIN HYDROCHLORIDE 0.4 MG/1
0.4 CAPSULE ORAL DAILY
Status: DISCONTINUED | OUTPATIENT
Start: 2025-07-24 | End: 2025-07-25

## 2025-07-24 RX ORDER — CEFAZOLIN 2 G/1
2 INJECTION, POWDER, FOR SOLUTION INTRAMUSCULAR; INTRAVENOUS
Status: COMPLETED | OUTPATIENT
Start: 2025-07-24 | End: 2025-07-24

## 2025-07-24 RX ORDER — ONDANSETRON HYDROCHLORIDE 2 MG/ML
4 INJECTION, SOLUTION INTRAVENOUS DAILY PRN
Status: DISCONTINUED | OUTPATIENT
Start: 2025-07-24 | End: 2025-07-24 | Stop reason: HOSPADM

## 2025-07-24 RX ORDER — PREGABALIN 75 MG/1
150 CAPSULE ORAL
Status: COMPLETED | OUTPATIENT
Start: 2025-07-24 | End: 2025-07-24

## 2025-07-24 RX ORDER — SODIUM CHLORIDE 0.9 % (FLUSH) 0.9 %
10 SYRINGE (ML) INJECTION
Status: DISCONTINUED | OUTPATIENT
Start: 2025-07-24 | End: 2025-07-24 | Stop reason: HOSPADM

## 2025-07-24 RX ORDER — MUPIROCIN 20 MG/G
OINTMENT TOPICAL 2 TIMES DAILY
Status: DISCONTINUED | OUTPATIENT
Start: 2025-07-24 | End: 2025-07-25 | Stop reason: HOSPADM

## 2025-07-24 RX ORDER — ONDANSETRON HYDROCHLORIDE 2 MG/ML
4 INJECTION, SOLUTION INTRAVENOUS EVERY 6 HOURS PRN
Status: DISCONTINUED | OUTPATIENT
Start: 2025-07-24 | End: 2025-07-25 | Stop reason: HOSPADM

## 2025-07-24 RX ORDER — ATORVASTATIN CALCIUM 40 MG/1
40 TABLET, FILM COATED ORAL DAILY
Status: DISCONTINUED | OUTPATIENT
Start: 2025-07-25 | End: 2025-07-25 | Stop reason: HOSPADM

## 2025-07-24 RX ORDER — FENTANYL CITRATE 50 UG/ML
25 INJECTION, SOLUTION INTRAMUSCULAR; INTRAVENOUS EVERY 5 MIN PRN
Status: DISCONTINUED | OUTPATIENT
Start: 2025-07-24 | End: 2025-07-24 | Stop reason: HOSPADM

## 2025-07-24 RX ORDER — LOSARTAN POTASSIUM AND HYDROCHLOROTHIAZIDE 12.5; 5 MG/1; MG/1
1 TABLET ORAL DAILY
Status: DISCONTINUED | OUTPATIENT
Start: 2025-07-25 | End: 2025-07-25 | Stop reason: HOSPADM

## 2025-07-24 RX ORDER — SODIUM CHLORIDE 9 MG/ML
INJECTION, SOLUTION INTRAVENOUS CONTINUOUS
Status: DISCONTINUED | OUTPATIENT
Start: 2025-07-24 | End: 2025-07-25

## 2025-07-24 RX ORDER — CARVEDILOL 12.5 MG/1
12.5 TABLET ORAL
Status: DISCONTINUED | OUTPATIENT
Start: 2025-07-25 | End: 2025-07-25 | Stop reason: HOSPADM

## 2025-07-24 RX ORDER — ACETAMINOPHEN 500 MG
1000 TABLET ORAL
Status: COMPLETED | OUTPATIENT
Start: 2025-07-24 | End: 2025-07-24

## 2025-07-24 RX ORDER — OLMESARTAN MEDOXOMIL AND HYDROCHLOROTHIAZIDE 20/12.5 20; 12.5 MG/1; MG/1
1 TABLET ORAL DAILY
Status: DISCONTINUED | OUTPATIENT
Start: 2025-07-24 | End: 2025-07-24

## 2025-07-24 RX ORDER — HYDROMORPHONE HYDROCHLORIDE 1 MG/ML
0.2 INJECTION, SOLUTION INTRAMUSCULAR; INTRAVENOUS; SUBCUTANEOUS EVERY 5 MIN PRN
Status: DISCONTINUED | OUTPATIENT
Start: 2025-07-24 | End: 2025-07-24 | Stop reason: HOSPADM

## 2025-07-24 RX ORDER — KETOROLAC TROMETHAMINE 15 MG/ML
15 INJECTION, SOLUTION INTRAMUSCULAR; INTRAVENOUS
Status: DISCONTINUED | OUTPATIENT
Start: 2025-07-24 | End: 2025-07-24 | Stop reason: HOSPADM

## 2025-07-24 RX ORDER — MIDAZOLAM HYDROCHLORIDE 1 MG/ML
.5-4 INJECTION, SOLUTION INTRAMUSCULAR; INTRAVENOUS
Status: DISCONTINUED | OUTPATIENT
Start: 2025-07-24 | End: 2025-07-24 | Stop reason: HOSPADM

## 2025-07-24 RX ORDER — PROPOFOL 10 MG/ML
VIAL (ML) INTRAVENOUS
Status: DISCONTINUED | OUTPATIENT
Start: 2025-07-24 | End: 2025-07-24

## 2025-07-24 RX ORDER — FENTANYL CITRATE 50 UG/ML
INJECTION, SOLUTION INTRAMUSCULAR; INTRAVENOUS
Status: DISCONTINUED | OUTPATIENT
Start: 2025-07-24 | End: 2025-07-24

## 2025-07-24 RX ORDER — GLUCAGON 1 MG
1 KIT INJECTION
Status: DISCONTINUED | OUTPATIENT
Start: 2025-07-24 | End: 2025-07-24 | Stop reason: HOSPADM

## 2025-07-24 RX ORDER — ROCURONIUM BROMIDE 10 MG/ML
INJECTION, SOLUTION INTRAVENOUS
Status: DISCONTINUED | OUTPATIENT
Start: 2025-07-24 | End: 2025-07-24

## 2025-07-24 RX ORDER — DUTASTERIDE 0.5 MG/1
0.5 CAPSULE, LIQUID FILLED ORAL DAILY
Status: DISCONTINUED | OUTPATIENT
Start: 2025-07-25 | End: 2025-07-25 | Stop reason: HOSPADM

## 2025-07-24 RX ORDER — PROCHLORPERAZINE EDISYLATE 5 MG/ML
5 INJECTION INTRAMUSCULAR; INTRAVENOUS EVERY 6 HOURS PRN
Status: DISCONTINUED | OUTPATIENT
Start: 2025-07-24 | End: 2025-07-25 | Stop reason: HOSPADM

## 2025-07-24 RX ORDER — BUPIVACAINE HYDROCHLORIDE 7.5 MG/ML
INJECTION, SOLUTION EPIDURAL; RETROBULBAR
Status: COMPLETED | OUTPATIENT
Start: 2025-07-24 | End: 2025-07-24

## 2025-07-24 RX ORDER — OXYCODONE HYDROCHLORIDE 5 MG/1
5 TABLET ORAL EVERY 4 HOURS PRN
Refills: 0 | Status: DISCONTINUED | OUTPATIENT
Start: 2025-07-24 | End: 2025-07-25 | Stop reason: HOSPADM

## 2025-07-24 RX ORDER — DEXAMETHASONE SODIUM PHOSPHATE 4 MG/ML
INJECTION, SOLUTION INTRA-ARTICULAR; INTRALESIONAL; INTRAMUSCULAR; INTRAVENOUS; SOFT TISSUE
Status: DISCONTINUED | OUTPATIENT
Start: 2025-07-24 | End: 2025-07-24

## 2025-07-24 RX ADMIN — Medication 100 MCG: at 10:07

## 2025-07-24 RX ADMIN — CEFAZOLIN 2 G: 2 INJECTION, POWDER, FOR SOLUTION INTRAMUSCULAR; INTRAVENOUS at 05:07

## 2025-07-24 RX ADMIN — PHENYLEPHRINE HYDROCHLORIDE 0.4 MCG/KG/MIN: 10 INJECTION INTRAVENOUS at 07:07

## 2025-07-24 RX ADMIN — ROCURONIUM BROMIDE 10 MG: 10 INJECTION INTRAVENOUS at 09:07

## 2025-07-24 RX ADMIN — SODIUM CHLORIDE, POTASSIUM CHLORIDE, SODIUM LACTATE AND CALCIUM CHLORIDE: 600; 310; 30; 20 INJECTION, SOLUTION INTRAVENOUS at 11:07

## 2025-07-24 RX ADMIN — ONDANSETRON 4 MG: 2 INJECTION INTRAMUSCULAR; INTRAVENOUS at 10:07

## 2025-07-24 RX ADMIN — TRAMADOL HYDROCHLORIDE 50 MG: 50 TABLET, COATED ORAL at 12:07

## 2025-07-24 RX ADMIN — TRAMADOL HYDROCHLORIDE 50 MG: 50 TABLET, COATED ORAL at 07:07

## 2025-07-24 RX ADMIN — ROCURONIUM BROMIDE 10 MG: 10 INJECTION INTRAVENOUS at 07:07

## 2025-07-24 RX ADMIN — FENTANYL CITRATE 50 MCG: 50 INJECTION INTRAMUSCULAR; INTRAVENOUS at 07:07

## 2025-07-24 RX ADMIN — PROPOFOL 20 MG: 10 INJECTION, EMULSION INTRAVENOUS at 10:07

## 2025-07-24 RX ADMIN — KETOROLAC TROMETHAMINE 15 MG: 15 INJECTION INTRAMUSCULAR at 06:07

## 2025-07-24 RX ADMIN — ACETAMINOPHEN 1000 MG: 500 TABLET ORAL at 12:07

## 2025-07-24 RX ADMIN — DEXAMETHASONE SODIUM PHOSPHATE 12 MG: 4 INJECTION INTRA-ARTICULAR; INTRALESIONAL; INTRAMUSCULAR; INTRAVENOUS; SOFT TISSUE at 07:07

## 2025-07-24 RX ADMIN — FENTANYL CITRATE 25 MCG: 50 INJECTION INTRAMUSCULAR; INTRAVENOUS at 09:07

## 2025-07-24 RX ADMIN — SUGAMMADEX 200 MG: 100 INJECTION, SOLUTION INTRAVENOUS at 10:07

## 2025-07-24 RX ADMIN — METHOCARBAMOL 500 MG: 500 TABLET ORAL at 09:07

## 2025-07-24 RX ADMIN — PROPOFOL 50 MG: 10 INJECTION, EMULSION INTRAVENOUS at 07:07

## 2025-07-24 RX ADMIN — CEFAZOLIN 2 G: 2 INJECTION, POWDER, FOR SOLUTION INTRAMUSCULAR; INTRAVENOUS at 11:07

## 2025-07-24 RX ADMIN — ROCURONIUM BROMIDE 60 MG: 10 INJECTION INTRAVENOUS at 07:07

## 2025-07-24 RX ADMIN — TAMSULOSIN HYDROCHLORIDE 0.4 MG: 0.4 CAPSULE ORAL at 12:07

## 2025-07-24 RX ADMIN — Medication 200 MCG: at 07:07

## 2025-07-24 RX ADMIN — GLYCOPYRROLATE 0.2 MG: 0.2 INJECTION, SOLUTION INTRAMUSCULAR; INTRAVENOUS at 07:07

## 2025-07-24 RX ADMIN — BUPIVACAINE HYDROCHLORIDE 30 ML: 7.5 INJECTION, SOLUTION EPIDURAL; RETROBULBAR at 07:07

## 2025-07-24 RX ADMIN — MIDAZOLAM 1 MG: 1 INJECTION INTRAMUSCULAR; INTRAVENOUS at 06:07

## 2025-07-24 RX ADMIN — FENTANYL CITRATE 50 MCG: 50 INJECTION INTRAMUSCULAR; INTRAVENOUS at 06:07

## 2025-07-24 RX ADMIN — LIDOCAINE HYDROCHLORIDE 80 MG: 20 INJECTION, SOLUTION EPIDURAL; INFILTRATION; INTRACAUDAL at 07:07

## 2025-07-24 RX ADMIN — SODIUM CHLORIDE, SODIUM GLUCONATE, SODIUM ACETATE, POTASSIUM CHLORIDE, MAGNESIUM CHLORIDE, SODIUM PHOSPHATE, DIBASIC, AND POTASSIUM PHOSPHATE: .53; .5; .37; .037; .03; .012; .00082 INJECTION, SOLUTION INTRAVENOUS at 07:07

## 2025-07-24 RX ADMIN — PROPOFOL 150 MG: 10 INJECTION, EMULSION INTRAVENOUS at 07:07

## 2025-07-24 RX ADMIN — FENTANYL CITRATE 25 MCG: 50 INJECTION, SOLUTION INTRAMUSCULAR; INTRAVENOUS at 02:07

## 2025-07-24 RX ADMIN — FENTANYL CITRATE 25 MCG: 50 INJECTION, SOLUTION INTRAMUSCULAR; INTRAVENOUS at 03:07

## 2025-07-24 RX ADMIN — SODIUM CHLORIDE: 0.9 INJECTION, SOLUTION INTRAVENOUS at 07:07

## 2025-07-24 RX ADMIN — ACETAMINOPHEN 1000 MG: 500 TABLET ORAL at 06:07

## 2025-07-24 RX ADMIN — CEFAZOLIN 2 G: 2 INJECTION, POWDER, FOR SOLUTION INTRAMUSCULAR; INTRAVENOUS at 07:07

## 2025-07-24 RX ADMIN — SODIUM CHLORIDE: 9 INJECTION, SOLUTION INTRAVENOUS at 06:07

## 2025-07-24 RX ADMIN — Medication 100 MCG: at 07:07

## 2025-07-24 RX ADMIN — MUPIROCIN: 20 OINTMENT TOPICAL at 09:07

## 2025-07-24 RX ADMIN — METHOCARBAMOL 500 MG: 500 TABLET ORAL at 02:07

## 2025-07-24 RX ADMIN — HEPARIN SODIUM 5000 UNITS: 5000 INJECTION INTRAVENOUS; SUBCUTANEOUS at 06:07

## 2025-07-24 RX ADMIN — Medication 200 MCG: at 10:07

## 2025-07-24 RX ADMIN — SODIUM CHLORIDE, POTASSIUM CHLORIDE, SODIUM LACTATE AND CALCIUM CHLORIDE: 600; 310; 30; 20 INJECTION, SOLUTION INTRAVENOUS at 06:07

## 2025-07-24 RX ADMIN — PREGABALIN 150 MG: 75 CAPSULE ORAL at 06:07

## 2025-07-24 RX ADMIN — ROCURONIUM BROMIDE 10 MG: 10 INJECTION INTRAVENOUS at 08:07

## 2025-07-24 RX ADMIN — ROCURONIUM BROMIDE 20 MG: 10 INJECTION INTRAVENOUS at 09:07

## 2025-07-24 RX ADMIN — HEPARIN SODIUM 5000 UNITS: 5000 INJECTION INTRAVENOUS; SUBCUTANEOUS at 09:07

## 2025-07-24 NOTE — ANESTHESIA PROCEDURE NOTES
BL MYRTLE SS    Patient location during procedure: pre-op   Block not for primary anesthetic.  Reason for block: at surgeon's request and post-op pain management   Post-op Pain Location: abdominal pain   Start time: 7/24/2025 6:57 AM  Timeout: 7/24/2025 6:56 AM   End time: 7/24/2025 7:00 AM    Staffing  Authorizing Provider: Sarina Jennings MD  Performing Provider: Hilario Frederick DO    Staffing  Performed by: Hilario Frederick DO  Authorized by: Sarina Jennings MD    Preanesthetic Checklist  Completed: patient identified, IV checked, site marked, risks and benefits discussed, surgical consent, monitors and equipment checked, pre-op evaluation and timeout performed  Peripheral Block  Patient position: sitting  Prep: ChloraPrep  Patient monitoring: heart rate, cardiac monitor, continuous pulse ox, continuous capnometry and frequent blood pressure checks  Block type: erector spinae plane  Laterality: bilateral  Injection technique: single shot  Interspace: T8-9    Needle  Needle type: Stimuplex   Needle gauge: 20 G  Needle length: 4 in  Needle localization: anatomical landmarks and ultrasound guidance   -ultrasound image captured on disc.  Assessment  Injection assessment: negative aspiration, negative parasthesia and local visualized surrounding nerve  Paresthesia pain: none  Heart rate change: no  Slow fractionated injection: yes  Pain Tolerance: comfortable throughout block and no complaints  Medications:    Medications: bupivacaine (pf) (MARCAINE) injection 0.75% - Perineural, Other   30 mL - 7/24/2025 7:00:00 AM    Additional Notes  Patient tolerated well.  See [facility] RN record for vitals. Administered 30cc of 0.375% bupivacaine with 100mcg epi, 50mcg clonidine, and 1mg dexamethasone bilaterally.

## 2025-07-24 NOTE — TRANSFER OF CARE
"Anesthesia Transfer of Care Note    Patient: Anton Christiansen    Procedure(s) Performed: Procedure(s) (LRB):  DV5 ROBOTIC NEPHRECTOMY, PARTIAL (Right)    Patient location: PACU    Anesthesia Type: general    Transport from OR: Transported from OR on 6-10 L/min O2 by face mask with adequate spontaneous ventilation    Post pain: adequate analgesia    Post assessment: no apparent anesthetic complications and tolerated procedure well    Post vital signs: stable    Level of consciousness: awake, alert and oriented    Nausea/Vomiting: no nausea/vomiting    Complications: none    Transfer of care protocol was followed      Last vitals: Visit Vitals  BP (!) 151/75 (BP Location: Right arm, Patient Position: Lying)   Pulse 62   Temp 36.6 °C (97.8 °F) (Skin)   Resp 18   Ht 5' 7" (1.702 m)   Wt 81.5 kg (179 lb 11.5 oz)   SpO2 98%   BMI 28.15 kg/m²     "

## 2025-07-24 NOTE — NURSING
"Patient ambulated to the door . Unsteady on his feet . Patient stated, " I'm not sure how I feel ". Patient return back to bed . Instructed on I.S.  "

## 2025-07-24 NOTE — INTERVAL H&P NOTE
The patient has been examined and the H&P has been reviewed:    I concur with the findings and no changes have occurred since H&P was written.    Surgery risks, benefits and alternative options discussed and understood by patient/family.          Active Problem List with Overview Notes    Diagnosis Date Noted    History of COVID-19 07/07/2025    BMI 28.0-28.9,adult 07/07/2025    Acid reflux 07/07/2025    Nausea 07/07/2025    Elevated bilirubin 07/07/2025    History of skin cancer 11/25/2024     - 3/2024: BCC, left nasal ala s/p mohs  - BCC on R dorsal hand, nasal tip, and L nasal ala    - SCC, right nasal side wall, 11/2024, s/p mohs      Aortic atherosclerosis 04/04/2023    Hyperparathyroidism 03/29/2022    BPH with urinary obstruction 06/14/2019    ASHA (obstructive sleep apnea)     Multiple thyroid nodules 04/24/2018    IC (interstitial cystitis) 05/11/2017    Meningioma 11/16/2016    NAION (non-arteritic anterior ischemic optic neuropathy), 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    Central scotoma, 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    AK (actinic keratosis) 08/21/2015    ED (erectile dysfunction) of organic origin 06/17/2014    Urinary retention 06/17/2014    Mohs defect of ala nasi 04/29/2014    Status post cataract extraction 12/21/2012    Elevated PSA 12/11/2012    Hypertension 09/20/2012    Hyperlipidemia 09/20/2012    Kidney stone 09/20/2012    Chronic prostatitis 09/20/2012

## 2025-07-24 NOTE — OP NOTE
[facility]  Operative Note     Date: 07/24/2025      Pre-Op Diagnosis: Right renal mass suspicious for renal cell carcinoma  Patient Active Problem List    Diagnosis Date Noted    Renal mass 07/24/2025    History of COVID-19 07/07/2025    BMI 28.0-28.9,adult 07/07/2025    Acid reflux 07/07/2025    Nausea 07/07/2025    Elevated bilirubin 07/07/2025    History of skin cancer 11/25/2024    Aortic atherosclerosis 04/04/2023    Hyperparathyroidism 03/29/2022    BPH with urinary obstruction 06/14/2019    ASHA (obstructive sleep apnea)     Multiple thyroid nodules 04/24/2018    IC (interstitial cystitis) 05/11/2017    Meningioma 11/16/2016    NAION (non-arteritic anterior ischemic optic neuropathy), 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    Central scotoma, 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    AK (actinic keratosis) 08/21/2015    ED (erectile dysfunction) of organic origin 06/17/2014    Urinary retention 06/17/2014    Mohs defect of ala nasi 04/29/2014    Status post cataract extraction 12/21/2012    Elevated PSA 12/11/2012    Hypertension 09/20/2012    Hyperlipidemia 09/20/2012    Kidney stone 09/20/2012    Chronic prostatitis 09/20/2012         Post-Op Diagnosis: same     Procedure(s) Performed:   1.  Right robotic retroperitoneal partial nephrectomy  2.  Cystoscopy with left ureteral stent removal     Specimen(s):    Right renal mass     Surgeon: Curry Lynn MD     Bedside assistant: Torsten GELLER     Assistant: Chavo Jacob MD      Anesthesia: General endotracheal anesthesia     Indications: Anton Christiansen is a 74 y.o. male  with a right renal mass suspicious for renal cell carcinoma. After discussion of management options and risks and benefits associated with each the patient has elected to pursue surgical management.     Findings:   - right solid mass identified with intra-op US, excised, grossly negative margins, excellent hemostasis     EBL: 30 mL     Drains:   1.  16 Fr dorsey catheter     Anesthesia:  General endotracheal anesthesia     Complications:  none     Procedure in Detail: After discussion of risks and benefits of the procedure, informed consent was obtained.  The patient was brought to the operating room and placed supine on the operating table.  SCDs were applied and working prior to induction of anesthesia.  General anesthesia was administered. Timeout was performed and preoperative antibiotics were confirmed.  We started the case with the patient in supine position. The field was prepped with chloraprep and a flexible cystoscope was introduced into the patient's urethra. We then advanced to the bladder where a left stent was identified and grasped with stent graspers and then removed intact.     A 16 Fr Vaughan catheter was placed in the standard fashion with 10 ml sterile water used to inflate the balloon. An OG tube was placed. The patient was then moved into the full flank position with the right side up. The patient was appropriately padded and secured to the table. The patient was then prepped and draped in the usual sterile fashion.      The location of the camera port was marked one finger breath above the ASIS in the mid axillary line. This was incised sharply and carried down through the subcutaneous tissue. The lumbodorsal fascia was entered using a tonsil. The retroperitoneum was palpated and dissected bluntly to ensure there was adequate room for the balloon dilator. The retroperitoneal balloon dilator was inserted through the incision and into the retroperitoneum. An 8 mm trocar was inserted through the device, and the balloon was inflated under direct vision. The psoas was identified and the edge of peritoneum was identified. Once the space was adequately dilated the balloon dilator was deflated and removed. The robotic Tanesha port was inserted and the retroperitoneum was insufflated.     Using direct vision the posterior lateral most port site was positioned 6 cm lateral to the camera  port. This was inserted under direct vision. The peritoneum was blunted dissected off of the transversalis muscle on the anterior abdominal wall medially using a laparoscopic Kitner. This created space for the two additional robotic ports medially. Two 8 mm robotic ports were placed medially in a modified arch cephalad ensuring 6 cm of space between ports. A 12 mm assistant port was placed 8 cm inferior to the most medial port. Each trocar was introduced under direct vision ensuring no injury. The robot was docked and the ports were optimized.    The paranephric fat was mobilized to improve visualization. The Psoas was identified. Intraoperative ultrasound was utilized for orientation, the kidney was visualized using the ultrasound. Gerota's fascia was entered. Dissection was directed posteriorly, and we identified the renal hilum. 2 arteries were identified and dissected circumferentially. The ureter was identified.     The perinephric fat overlying the tumor and overlying the kidney was then dissected to identify the mass. The ultrasound probe was introduced and was used to delineate margins which were marked with hot scissors. The needle drivers were then tested adequate.    The renal arteries were then clamped with the bulldog clamp.  The mass was then excised using the robotic scissors. Grossly negative margins were achieved. There was no obvious entry into the collecting system. A V lock suture was then used to perform a running stitch of the renorrhaphy bed. Several 0 Vicryls were then used to perform a sliding Hem O Lock renorrhaphy in standard fashion.  Warm ischemia time was 21 minutes.  There was adequate hemostasis at the end of the renorrhaphy.  The specimen was then placed in an Endo-Catch bag.  FloSeal was then applied to the resection bed.     A drain was not placed. The robot was undocked and all trocars were removed.  The camera port incision was extended slightly to allow removal of the specimen.  This was inspected and found to be in tact.  This was passed off the field for pathologic analysis. The extraction site fascia was closed using 0 Vicryl. All skin incisions were closed using 4-0 monocryl. Dermabond was applied to the incisions.     The patient tolerated the procedure well and was transferred to the recovery room in stable condition.    Disposition: The patient will remain on the urology service overnight for observation.     Chavo Jacob MD      I have reviewed the operative note performed by Dr. Jacob, and I concur with her/his documentation of Anton Christiansen. I was present for the critical or key portions of the procedure.

## 2025-07-24 NOTE — ANESTHESIA PROCEDURE NOTES
Intubation    Date/Time: 7/24/2025 7:19 AM    Performed by: Justice Woods MD  Authorized by: Scott Turner MD    Intubation:     Induction:  Intravenous    Intubated:  Postinduction    Mask Ventilation:  Easy with oral airway    Attempts:  3    Attempted By:  Student    Method of Intubation:  Direct    Blade:  Nnamdi 3    Laryngeal View Grade: Grade III - only epiglottis visible      Attempted By (2nd Attempt):  Student    Method of Intubation (2nd Attempt):  Bougie    Blade (2nd Attempt):  Nnamdi 3    Laryngeal View Grade (2nd Attempt): Grade IIa - cords partially seen      Attempted By (3rd Attempt):  Staff anesthesiologist    Method of Intubation (3rd Attempt):  Video laryngoscopy    Blade (3rd Attempt):  Simon 3    Laryngeal View Grade (3rd Attempt): Grade I - full view of cords      Difficult Airway Encountered?: No      Complications:  None    Airway Device:  Oral endotracheal tube    Airway Device Size:  7.5    Style/Cuff Inflation:  Cuffed    Tube secured:  22    Secured at:  The lips    Placement Verified By:  Capnometry and Revisualization with laryngoscopy    Complicating Factors:  Anterior larynx (Larynx anterior and deep)    Findings Post-Intubation:  BS equal bilateral and atraumatic/condition of teeth unchanged

## 2025-07-25 VITALS
DIASTOLIC BLOOD PRESSURE: 60 MMHG | WEIGHT: 181.88 LBS | HEIGHT: 67 IN | BODY MASS INDEX: 28.55 KG/M2 | TEMPERATURE: 97 F | HEART RATE: 58 BPM | SYSTOLIC BLOOD PRESSURE: 124 MMHG | RESPIRATION RATE: 17 BRPM | OXYGEN SATURATION: 96 %

## 2025-07-25 LAB
ABSOLUTE EOSINOPHIL (OHS): 0 K/UL
ABSOLUTE MONOCYTE (OHS): 1.17 K/UL (ref 0.3–1)
ABSOLUTE NEUTROPHIL COUNT (OHS): 9.25 K/UL (ref 1.8–7.7)
ANION GAP (OHS): 7 MMOL/L (ref 8–16)
BASOPHILS # BLD AUTO: 0.01 K/UL
BASOPHILS NFR BLD AUTO: 0.1 %
BUN SERPL-MCNC: 27 MG/DL (ref 8–23)
CALCIUM SERPL-MCNC: 9 MG/DL (ref 8.7–10.5)
CELL MATERIAL STONE EST-MCNT: NORMAL %
CHLORIDE SERPL-SCNC: 106 MMOL/L (ref 95–110)
CO2 SERPL-SCNC: 20 MMOL/L (ref 23–29)
CREAT SERPL-MCNC: 1.3 MG/DL (ref 0.5–1.4)
ERYTHROCYTE [DISTWIDTH] IN BLOOD BY AUTOMATED COUNT: 13.2 % (ref 11.5–14.5)
GFR SERPLBLD CREATININE-BSD FMLA CKD-EPI: 57 ML/MIN/1.73/M2
GLUCOSE SERPL-MCNC: 114 MG/DL (ref 70–110)
HCT VFR BLD AUTO: 36 % (ref 40–54)
HGB BLD-MCNC: 11.9 GM/DL (ref 14–18)
IMM GRANULOCYTES # BLD AUTO: 0.06 K/UL (ref 0–0.04)
IMM GRANULOCYTES NFR BLD AUTO: 0.5 % (ref 0–0.5)
LABORATORY COMMENT REPORT: NORMAL
LYMPHOCYTES # BLD AUTO: 0.83 K/UL (ref 1–4.8)
MCH RBC QN AUTO: 30.8 PG (ref 27–31)
MCHC RBC AUTO-ENTMCNC: 33.1 G/DL (ref 32–36)
MCV RBC AUTO: 93 FL (ref 82–98)
NUCLEATED RBC (/100WBC) (OHS): 0 /100 WBC
PLATELET # BLD AUTO: 242 K/UL (ref 150–450)
PMV BLD AUTO: 10.7 FL (ref 9.2–12.9)
POTASSIUM SERPL-SCNC: 4.8 MMOL/L (ref 3.5–5.1)
RBC # BLD AUTO: 3.86 M/UL (ref 4.6–6.2)
RELATIVE EOSINOPHIL (OHS): 0 %
RELATIVE LYMPHOCYTE (OHS): 7.3 % (ref 18–48)
RELATIVE MONOCYTE (OHS): 10.3 % (ref 4–15)
RELATIVE NEUTROPHIL (OHS): 81.8 % (ref 38–73)
SODIUM SERPL-SCNC: 133 MMOL/L (ref 136–145)
SPECIMEN SOURCE: NORMAL
WBC # BLD AUTO: 11.32 K/UL (ref 3.9–12.7)

## 2025-07-25 PROCEDURE — 25000003 PHARM REV CODE 250

## 2025-07-25 PROCEDURE — 82310 ASSAY OF CALCIUM: CPT | Performed by: UROLOGY

## 2025-07-25 PROCEDURE — 63600175 PHARM REV CODE 636 W HCPCS

## 2025-07-25 PROCEDURE — 36415 COLL VENOUS BLD VENIPUNCTURE: CPT | Performed by: UROLOGY

## 2025-07-25 PROCEDURE — 25000003 PHARM REV CODE 250: Performed by: UROLOGY

## 2025-07-25 PROCEDURE — 85025 COMPLETE CBC W/AUTO DIFF WBC: CPT | Performed by: UROLOGY

## 2025-07-25 RX ORDER — IBUPROFEN 800 MG/1
800 TABLET, FILM COATED ORAL EVERY 6 HOURS
Qty: 40 TABLET | Refills: 0 | Status: SHIPPED | OUTPATIENT
Start: 2025-07-25 | End: 2025-08-04

## 2025-07-25 RX ORDER — POLYETHYLENE GLYCOL 3350 17 G/17G
17 POWDER, FOR SOLUTION ORAL DAILY
Qty: 238 G | Refills: 0 | Status: SHIPPED | OUTPATIENT
Start: 2025-07-25 | End: 2025-08-08

## 2025-07-25 RX ORDER — OXYCODONE HYDROCHLORIDE 5 MG/1
5 TABLET ORAL EVERY 4 HOURS PRN
Qty: 10 TABLET | Refills: 0 | Status: SHIPPED | OUTPATIENT
Start: 2025-07-25

## 2025-07-25 RX ORDER — TAMSULOSIN HYDROCHLORIDE 0.4 MG/1
0.4 CAPSULE ORAL DAILY
Status: DISCONTINUED | OUTPATIENT
Start: 2025-07-26 | End: 2025-07-25 | Stop reason: HOSPADM

## 2025-07-25 RX ORDER — ACETAMINOPHEN 500 MG
1000 TABLET ORAL EVERY 8 HOURS
Qty: 60 TABLET | Refills: 0 | Status: SHIPPED | OUTPATIENT
Start: 2025-07-25 | End: 2025-08-04

## 2025-07-25 RX ADMIN — ATORVASTATIN CALCIUM 40 MG: 40 TABLET, FILM COATED ORAL at 09:07

## 2025-07-25 RX ADMIN — HEPARIN SODIUM 5000 UNITS: 5000 INJECTION INTRAVENOUS; SUBCUTANEOUS at 02:07

## 2025-07-25 RX ADMIN — TAMSULOSIN HYDROCHLORIDE 0.4 MG: 0.4 CAPSULE ORAL at 08:07

## 2025-07-25 RX ADMIN — METHOCARBAMOL 500 MG: 500 TABLET ORAL at 02:07

## 2025-07-25 RX ADMIN — ACETAMINOPHEN 1000 MG: 500 TABLET ORAL at 12:07

## 2025-07-25 RX ADMIN — ACETAMINOPHEN 1000 MG: 500 TABLET ORAL at 06:07

## 2025-07-25 RX ADMIN — TRAMADOL HYDROCHLORIDE 50 MG: 50 TABLET, COATED ORAL at 04:07

## 2025-07-25 RX ADMIN — METHOCARBAMOL 500 MG: 500 TABLET ORAL at 08:07

## 2025-07-25 RX ADMIN — MUPIROCIN: 20 OINTMENT TOPICAL at 08:07

## 2025-07-25 RX ADMIN — HEPARIN SODIUM 5000 UNITS: 5000 INJECTION INTRAVENOUS; SUBCUTANEOUS at 06:07

## 2025-07-25 RX ADMIN — DUTASTERIDE 0.5 MG: 0.5 CAPSULE, LIQUID FILLED ORAL at 09:07

## 2025-07-25 RX ADMIN — SODIUM CHLORIDE, POTASSIUM CHLORIDE, SODIUM LACTATE AND CALCIUM CHLORIDE: 600; 310; 30; 20 INJECTION, SOLUTION INTRAVENOUS at 12:07

## 2025-07-25 NOTE — PLAN OF CARE
[facility]  Initial Discharge Assessment       Primary Care Provider: Сергей Landis MD    Admission Diagnosis: Renal mass [N28.89]    Admission Date: 7/24/2025  Expected Discharge Date: 7/25/2025         Payor: MEDICARE / Plan: MEDICARE PART A & B / Product Type: Government /     Extended Emergency Contact Information  Primary Emergency Contact: Dora Christiansen  Address: [address] [place] [address] [facility]  Home Phone: [phone]  Mobile Phone: [phone]  Relation: Spouse  Secondary Emergency Contact: Jenny Jarvis  Address: [address]           [address] [place] [address] [facility]  Home Phone: [phone]  Relation: Daughter    Discharge Plan A: Home with family         [id number] - [place], [place] - [address] AT [facility] [address]  [address]  [facility] [address]  Phone: [phone] Fax: [phone]      Initial Assessment (most recent)       Adult Discharge Assessment - 07/25/25 1330          Discharge Assessment    Assessment Type Discharge Planning Assessment     Confirmed/corrected address, phone number and insurance Yes     Source of Information patient     People in Home spouse     Name(s) of People in Home Wife-Dora     Facility Arrived From: Home     Do you expect to return to your current living situation? Yes     Do you have help at home or someone to help you manage your care at home? Yes     Who are your caregiver(s) and their phone number(s)? Dora- wife     Prior to hospitilization cognitive status: Alert/Oriented     Current cognitive status: Alert/Oriented     Equipment Currently Used at Home CPAP     Readmission within 30 days? No     Patient currently being followed by outpatient case management? No     Do you currently have service(s) that help you manage your care at home? No     Do you take prescription medications? Yes     Do  you have prescription coverage? Yes     Do you have any problems affording any of your prescribed medications? No     Is the patient taking medications as prescribed? yes     Who is going to help you get home at discharge? Spouse-Dora     How do you get to doctors appointments? family or friend will provide     Are you on dialysis? No     Do you take coumadin? No     Discharge Plan A Home with family        Physical Activity    On average, how many days per week do you engage in moderate to strenuous exercise (like a brisk walk)? 3 days     On average, how many minutes do you engage in exercise at this level? 30 min        Financial Resource Strain    How hard is it for you to pay for the very basics like food, housing, medical care, and heating? Not hard at all        Housing Stability    In the last 12 months, was there a time when you were not able to pay the mortgage or rent on time? No     At any time in the past 12 months, were you homeless or living in a shelter (including now)? No        Transportation Needs    In the past 12 months, has lack of transportation kept you from medical appointments or from getting medications? No     In the past 12 months, has lack of transportation kept you from meetings, work, or from getting things needed for daily living? No        Food Insecurity    Within the past 12 months, you worried that your food would run out before you got the money to buy more. Never true     Within the past 12 months, the food you bought just didn't last and you didn't have money to get more. Never true        Stress    Do you feel stress - tense, restless, nervous, or anxious, or unable to sleep at night because your mind is troubled all the time - these days? Not at all        Social Isolation    How often do you feel lonely or isolated from those around you?  Never        Alcohol Use    Q1: How often do you have a drink containing alcohol? Never     Q2: How many drinks containing alcohol do  you have on a typical day when you are drinking? Patient does not drink     Q3: How often do you have six or more drinks on one occasion? Never        Utilities    In the past 12 months has the electric, gas, oil, or water company threatened to shut off services in your home? No        Health Literacy    How often do you need to have someone help you when you read instructions, pamphlets, or other written material from your doctor or pharmacy? Never                      Spoke with patient and spouse at bedside to complete d/c planning assessment. Patient lives with spouse in a single story home without steps. Independent with ADL's. Only DME in home is patient's CPAP. Verified PCP, Pharmacy and health insurance. Patient awaiting delivery of bedside meds. Just paid co-pay to pharmacy so meds expected to bedside within the next 45mins. Patient and wife to update nursing when meds at bedside and d/c teaching can be completed,. Discharge Plan A and Plan B have been determined by review of patient's clinical status, future medical and therapeutic needs, and coverage/benefits for post-acute care in coordination with multidisciplinary team members.      Mally FRAGOSO  Case Management  [facility]  [phone]

## 2025-07-25 NOTE — DISCHARGE SUMMARY
[facility]  Urology  Discharge Summary      Patient Name: Anton Christiansen  MRN: [id number]  Admission Date: 7/24/2025  Hospital Length of Stay: 1 days  Discharge Date and Time: 07/25/2025 8:03 AM  Attending Physician: Curry Lynn MD   Discharging Provider: Constantino Ledezma MD  Primary Care Physician: Сергей Landis MD    HPI:   Anton Christiansen is a 74 y.o. male. Evaluated for renal cysts. He has seen Dr. Quinteros in the past for BPH and elevated PSA.   His pain that he originally saw me for has essentially resolved.     He previously underwent a CT renal stone study on 05/03/2024 which reveals numerous hypodensities of the right kidney with Hounsfield units greater than simple fluid which have increased in size compared to prior imaging.  Bilateral kidney stones also seen.  No hydronephrosis. MRI of the  abdomen with and without contrast from 12/09/2024 shows  slight increase in size of right lower pole complex cystic lesion since 2021.  Currently measuring 2.3 cm compared to 1.6 cm in July of 2021.       CT urogram from 6/2/25  reveals 2.3 cm exophytic enhancing right lower pole renal mass. Contrasted Ultrasound of the kidney from 5/20/25 reveals Bosniak 3 cystic lesion with enahancing thickened septa of the right lower pole. He is now s/p R partial nephrectomy on 7/24/25       Procedure(s) (LRB):  DV5 ROBOTIC NEPHRECTOMY, PARTIAL (Right)     Indwelling Lines/Drains at time of discharge:   Lines/Drains/Airways       None                   Hospital Course (synopsis of major diagnoses, care, treatment, and services provided during the course of the hospital stay): The patient was admitted to [facility] for the above procedure. Patient tolerated the procedure well in its entirety without issue. For more details, please refer to the complete operative note. he was transferred to recovery post-op and then to the floor. Once on the floor his diet was advanced and he ambulated the night of surgery. On POD 1  the patient was tolerating a regular diet, ambulating without difficulty.his pain was well controlled.    Physical exam was appropriate for post operative state. The incisions were clean, dry and intact. The dorsey was draining clear yellow urine. The dorsey was removed and he was unable to void. PVR >500ccs on day of discharge. Another dorsey was placed before discharge and will follow up on 7/29/25 for a voiding trial.The patient was deemed stable for discharge on 07/25/2025.  .     Goals of Care Treatment Preferences:  Code Status: Full Code      Consults:     Significant Diagnostic Studies: CTU    Pending Diagnostic Studies:       None            Final Active Diagnoses:    Diagnosis Date Noted POA    PRINCIPAL PROBLEM:  Renal mass [N28.89] 07/24/2025 Yes      Problems Resolved During this Admission:         Discharged Condition: good    Disposition: Home or Self Care    Follow Up:    Follow up on 7/29/25 with Dr. Lynn for a voiding trial     Follow-up Information       Curry Lynn MD. Schedule an appointment as soon as possible for a visit on 8/26/2025.    Specialty: Urology  Contact information:  [facility] [address]  [phone]                           Patient Instructions:      CBC Without Differential   Standing Status: Future Number of Occurrences: 1 Standing Exp. Date: 08/15/26     Order Specific Question Answer Comments   Send normal result to authorizing provider's In Basket if patient is active on MyChart: Yes      Basic metabolic panel   Standing Status: Future Standing Exp. Date: 10/25/26   Order Comments: Please complete prior to post-op follow up.     Order Specific Question Answer Comments   Send normal result to authorizing provider's In Basket if patient is active on MyChart: Yes      Diet Adult Regular     Lifting restrictions   Order Comments: No lifting more than 10 lbs for 6 weeks post op.     No driving until:   Order Comments: No driving until no longer taking  narcotic pain medications.     Notify your health care provider if you experience any of the following:  temperature >100.4     Notify your health care provider if you experience any of the following:  persistent nausea and vomiting or diarrhea     Notify your health care provider if you experience any of the following:  severe uncontrolled pain     Notify your health care provider if you experience any of the following:  redness, tenderness, or signs of infection (pain, swelling, redness, odor or green/yellow discharge around incision site)     Notify your health care provider if you experience any of the following:  increased confusion or weakness     Notify your health care provider if you experience any of the following:  temperature >100.4     Notify your health care provider if you experience any of the following:  persistent nausea and vomiting or diarrhea     Notify your health care provider if you experience any of the following:  severe uncontrolled pain     Notify your health care provider if you experience any of the following:  redness, tenderness, or signs of infection (pain, swelling, redness, odor or green/yellow discharge around incision site)     EKG 12-lead   Standing Status: Future Number of Occurrences: 1 Standing Exp. Date: 06/16/26     Type & Screen   Standing Status: Future Number of Occurrences: 1 Standing Exp. Date: 08/15/26     Type & Screen   Standing Status: Future Number of Occurrences: 1 Standing Exp. Date: 08/24/26     Activity as tolerated     Medications:  Reconciled Home Medications:      Medication List        START taking these medications      polyethylene glycol 17 gram/dose powder  Commonly known as: GLYCOLAX  Use to cap to measure 17g, mix with liquid, and take by mouth once daily. for 10 days            CHANGE how you take these medications      * acetaminophen 500 MG tablet  Commonly known as: TYLENOL  Take 500 mg by mouth every 6 (six) hours as needed for Pain.  What  changed: Another medication with the same name was added. Make sure you understand how and when to take each.     * acetaminophen 500 MG tablet  Commonly known as: TYLENOL  Take 2 tablets (1,000 mg total) by mouth every 8 (eight) hours. for 10 days  What changed: You were already taking a medication with the same name, and this prescription was added. Make sure you understand how and when to take each.     * ibuprofen 200 MG tablet  Commonly known as: ADVIL,MOTRIN  Take 200 mg by mouth as needed for Pain.  What changed: Another medication with the same name was added. Make sure you understand how and when to take each.     * ibuprofen 800 MG tablet  Commonly known as: ADVIL,MOTRIN  Take 1 tablet (800 mg total) by mouth every 6 (six) hours. for 10 days  What changed: You were already taking a medication with the same name, and this prescription was added. Make sure you understand how and when to take each.     * oxyCODONE 5 MG immediate release tablet  Commonly known as: ROXICODONE  Take 1 tablet (5 mg total) by mouth every 4 (four) hours as needed for Pain.  What changed: Another medication with the same name was added. Make sure you understand how and when to take each.     * oxyCODONE 5 MG immediate release tablet  Commonly known as: ROXICODONE  Take 1 tablet (5 mg total) by mouth every 4 (four) hours as needed for Pain.  What changed: You were already taking a medication with the same name, and this prescription was added. Make sure you understand how and when to take each.           * This list has 6 medication(s) that are the same as other medications prescribed for you. Read the directions carefully, and ask your doctor or other care provider to review them with you.                CONTINUE taking these medications      ascorbic acid (vitamin C) 500 MG tablet  Commonly known as: VITAMIN C  Take 500 mg by mouth once daily.     atorvastatin 40 MG tablet  Commonly known as: LIPITOR  TAKE 1 TABLET(40 MG) BY MOUTH  DAILY     carvediloL 12.5 MG tablet  Commonly known as: COREG  Take 1 tablet (12.5 mg total) by mouth daily with breakfast.     cholecalciferol (vitamin D3) 75 mcg (3,000 unit) Tab  Take 1 tablet by mouth once daily.     dutasteride 0.5 mg capsule  Commonly known as: AVODART  TAKE ONE CAPSULE BY MOUTH EVERY DAY     FISH OIL ORAL  Take by mouth. He takes 2 capsules also fish oil at nighttime     multivitamin per tablet  Commonly known as: THERAGRAN  Take 1 tablet by mouth once daily.     olmesartan-hydrochlorothiazide 20-12.5 mg per tablet  Commonly known as: BENICAR HCT  Take 1 tablet by mouth once daily.     oxybutynin 5 MG Tab  Commonly known as: DITROPAN  Take 1 tablet (5 mg total) by mouth 3 (three) times daily as needed (for bladder spasms and stent pain).     tamsulosin 0.4 mg Cap  Commonly known as: FLOMAX  Take 1 capsule (0.4 mg total) by mouth once daily. for 21 days              Time spent on the discharge of patient: 25 minutes    Constantino Ledezma MD  Urology  [facility] - Surgery

## 2025-07-25 NOTE — SUBJECTIVE & OBJECTIVE
Interval History: NAEON. AFVSS. He reports he tolerated PO diet yesterday without any nausea or vomiting. Ambulated x2 yesterday without any complications. Denies any fevers, chills, SOB. Vaughan drained over 1L/24hrs CYU. H/H 11.9/36 from 12.7/38.     Review of Systems Per HPI  Objective:     Temp:  [96.8 °F (36 °C)-98.1 °F (36.7 °C)] 98.1 °F (36.7 °C)  Pulse:  [54-73] 54  Resp:  [12-23] 16  SpO2:  [92 %-100 %] 93 %  BP: ()/(51-86) 128/60     Body mass index is 28.49 kg/m².           Drains       Drain  Duration                  Urethral Catheter 07/24/25 0749 Silicone 16 Fr. <1 day                     Physical Exam  HENT:      Head: Normocephalic.      Nose: Nose normal.      Mouth/Throat:      Mouth: Mucous membranes are moist.   Eyes:      Extraocular Movements: Extraocular movements intact.   Cardiovascular:      Rate and Rhythm: Normal rate.   Pulmonary:      Effort: Pulmonary effort is normal. No respiratory distress.   Abdominal:      General: Abdomen is flat. There is no distension.      Comments: Appropriately tender   Genitourinary:     Comments: Vaughan draining CYU  Musculoskeletal:         General: Normal range of motion.      Cervical back: Normal range of motion.   Skin:     General: Skin is warm.   Neurological:      General: No focal deficit present.      Mental Status: He is alert.            Significant Labs:    BMP:  Recent Labs   Lab 07/24/25  1146 07/25/25  0218    133*   K 4.4 4.8    106   CO2 23 20*   BUN 22 27*   CREATININE 1.1 1.3   CALCIUM 8.7 9.0       CBC:   Recent Labs   Lab 07/24/25  1114 07/25/25  0218   WBC 9.58 11.32   HGB 12.7* 11.9*   HCT 38.2* 36.0*    242       All pertinent labs results from the past 24 hours have been reviewed.    Significant Imaging:  All pertinent imaging results/findings from the past 24 hours have been reviewed.

## 2025-07-25 NOTE — NURSING
Patient ambulated in hallway with stand by assist. Patient tolerated walk well. No complaints of increased pain, dizziness or SOB. Educated patient on importance of frequent ambulation. Patient verbalized understanding and states he will walk again after breakfast. Patient back in bed. Bed locked and in lowest position call light within reach.

## 2025-07-25 NOTE — PLAN OF CARE
[facility]  Discharge Final Note    Primary Care Provider: Сергей Landis MD    Expected Discharge Date: 7/25/2025    Final Discharge Note (most recent)       Final Note - 07/25/25 1341          Final Note    Assessment Type Final Discharge Note     Anticipated Discharge Disposition Home or Self Care     What phone number can be called within the next 1-3 days to see how you are doing after discharge? --   [phone]                    Important Message from Medicare             Contact Info       Curry Lynn MD   Specialty: Urology   Relationship: Consulting Physician    [address]  [facility] [address]   Phone: [phone]       Next Steps: Schedule an appointment as soon as possible for a visit on 8/26/2025          Future Appointments   Date Time Provider Department Center   8/4/2025  2:00 PM Regino Dias OD [facility] OPT[facility]   8/26/2025  8:00 AM Curry Lynn MD [facility] UROLOG [facility]   9/23/2025 11:30 AM Satinder Croft [facility] SLEEP [facility]     Patient discharged home to care of family on 7/25/25.    Mally Mon RNCM  Case Management  [facility]  [phone]

## 2025-07-25 NOTE — PROGRESS NOTES
[facility]  Urology  Progress Note    Patient Name: Anton Christiansen  MRN: [id number]  Admission Date: 7/24/2025  Hospital Length of Stay: 1 days  Code Status: Full Code   Attending Provider: Curry Lynn MD   Primary Care Physician: Сергей Landis MD    Subjective:     HPI:  Anton Christiansen is a 74 y.o. male. Evaluated for renal cysts. He has seen Dr. Quinteros in the past for BPH and elevated PSA.   His pain that he originally saw me for has essentially resolved.     He previously underwent a CT renal stone study on 05/03/2024 which reveals numerous hypodensities of the right kidney with Hounsfield units greater than simple fluid which have increased in size compared to prior imaging.  Bilateral kidney stones also seen.  No hydronephrosis. MRI of the  abdomen with and without contrast from 12/09/2024 shows  slight increase in size of right lower pole complex cystic lesion since 2021.  Currently measuring 2.3 cm compared to 1.6 cm in July of 2021.       CT urogram from 6/2/25  reveals 2.3 cm exophytic enhancing right lower pole renal mass. Contrasted Ultrasound of the kidney from 5/20/25 reveals Bosniak 3 cystic lesion with enahancing thickened septa of the right lower pole. He is now s/p R partial nephrectomy on 7/24/25       Interval History: NAEON. AFVSS. He reports he tolerated PO diet yesterday without any nausea or vomiting. Ambulated x2 yesterday without any complications. Denies any fevers, chills, SOB. Vaughan drained over 1L/24hrs CYU. H/H 11.9/36 from 12.7/38.     Review of Systems Per HPI  Objective:     Temp:  [96.8 °F (36 °C)-98.1 °F (36.7 °C)] 98.1 °F (36.7 °C)  Pulse:  [54-73] 54  Resp:  [12-23] 16  SpO2:  [92 %-100 %] 93 %  BP: ()/(51-86) 128/60     Body mass index is 28.49 kg/m².           Drains       Drain  Duration                  Urethral Catheter 07/24/25 0749 Silicone 16 Fr. <1 day                     Physical Exam  HENT:      Head: Normocephalic.      Nose: Nose normal.       Mouth/Throat:      Mouth: Mucous membranes are moist.   Eyes:      Extraocular Movements: Extraocular movements intact.   Cardiovascular:      Rate and Rhythm: Normal rate.   Pulmonary:      Effort: Pulmonary effort is normal. No respiratory distress.   Abdominal:      General: Abdomen is flat. There is no distension.      Comments: Appropriately tender   Genitourinary:     Comments: Vaughan draining CYU  Musculoskeletal:         General: Normal range of motion.      Cervical back: Normal range of motion.   Skin:     General: Skin is warm.   Neurological:      General: No focal deficit present.      Mental Status: He is alert.            Significant Labs:    BMP:  Recent Labs   Lab 07/24/25  1146 07/25/25  0218    133*   K 4.4 4.8    106   CO2 23 20*   BUN 22 27*   CREATININE 1.1 1.3   CALCIUM 8.7 9.0       CBC:   Recent Labs   Lab 07/24/25  1114 07/25/25  0218   WBC 9.58 11.32   HGB 12.7* 11.9*   HCT 38.2* 36.0*    242       All pertinent labs results from the past 24 hours have been reviewed.    Significant Imaging:  All pertinent imaging results/findings from the past 24 hours have been reviewed.                  Assessment/Plan:     * Renal mass  Anton Christiansen 74 year-old man who underwent a robotic right partial nephrectomy on 7/24/25    -- Vaughan removed this AM  -- Voiding trial today  -- Encourage ambulation  -- Regular diet  -- Discussed wound care instructions     Dispo: discharge pending voiding trial         VTE Risk Mitigation (From admission, onward)           Ordered     heparin (porcine) injection 5,000 Units  Every 8 hours         07/24/25 1657     Place ESHA hose  Until discontinued         07/24/25 0516     Place sequential compression device  Until discontinued         07/24/25 0516                    Constantino Ledezma MD  Urology  [facility] - Surgery

## 2025-07-25 NOTE — ASSESSMENT & PLAN NOTE
Anton Christiansen 74 year-old man who underwent a robotic right partial nephrectomy on 7/24/25    -- Clement removed this AM  -- Voiding trial today  -- Encourage ambulation  -- Regular diet  -- Discussed wound care instructions     Dispo: discharge pending voiding trial

## 2025-07-25 NOTE — DISCHARGE SUMMARY
[facility] - [facility]  Urology  Discharge Summary      Patient Name: Anton Christiansen  MRN: [id number]  Admission Date: 7/24/2025  Hospital Length of Stay: 1 days  Discharge Date and Time: 07/25/2025 6:43 AM  Attending Physician: Curry Lynn MD   Discharging Provider: Constantino Ledezma MD  Primary Care Physician: Сергей Landis MD    HPI:   Anton Christiansen is a 74 y.o. male. Evaluated for renal cysts. He has seen Dr. Quinteros in the past for BPH and elevated PSA.   His pain that he originally saw me for has essentially resolved.     He previously underwent a CT renal stone study on 05/03/2024 which reveals numerous hypodensities of the right kidney with Hounsfield units greater than simple fluid which have increased in size compared to prior imaging.  Bilateral kidney stones also seen.  No hydronephrosis. MRI of the  abdomen with and without contrast from 12/09/2024 shows  slight increase in size of right lower pole complex cystic lesion since 2021.  Currently measuring 2.3 cm compared to 1.6 cm in July of 2021.       CT urogram from 6/2/25  reveals 2.3 cm exophytic enhancing right lower pole renal mass. Contrasted Ultrasound of the kidney from 5/20/25 reveals Bosniak 3 cystic lesion with enahancing thickened septa of the right lower pole. He is now s/p R partial nephrectomy on 7/24/25       Procedure(s) (LRB):  DV5 ROBOTIC NEPHRECTOMY, PARTIAL (Right)     Indwelling Lines/Drains at time of discharge:   Lines/Drains/Airways       Drain  Duration                  Urethral Catheter 07/24/25 0749 Silicone 16 Fr. <1 day                    Hospital Course (synopsis of major diagnoses, care, treatment, and services provided during the course of the hospital stay): The patient was admitted to [facility] for the above procedure. Patient tolerated the procedure well in its entirety without issue. For more details, please refer to the complete operative note. he was transferred to recovery post-op and then to the floor. Once  on the floor his diet was advanced and he ambulated the night of surgery. On POD 1 the patient was tolerating a regular diet, ambulating without difficulty.his pain was well controlled.    Physical exam was appropriate for post operative state. The incisions were clean, dry and intact. The dorsey was draining clear yellow urine. The dorsey was removed and he was able to void without difficulty. The patient was deemed stable for discharge on 07/25/2025.  .     Goals of Care Treatment Preferences:  Code Status: Full Code      Consults:     Significant Diagnostic Studies: CTU    Pending Diagnostic Studies:       None            Final Active Diagnoses:    Diagnosis Date Noted POA    PRINCIPAL PROBLEM:  Renal mass [N28.89] 07/24/2025 Yes      Problems Resolved During this Admission:         Discharged Condition: good    Disposition:     Follow Up:   Follow-up Information       Curry Lynn MD. Schedule an appointment as soon as possible for a visit on 8/26/2025.    Specialty: Urology  Contact information:  [address]  [facility] [address]  [phone]                           Patient Instructions:      CBC Without Differential   Standing Status: Future Number of Occurrences: 1 Standing Exp. Date: 08/15/26     Order Specific Question Answer Comments   Send normal result to authorizing provider's In Basket if patient is active on MyChart: Yes      Basic metabolic panel   Standing Status: Future Standing Exp. Date: 10/25/26   Order Comments: Please complete prior to post-op follow up.     Order Specific Question Answer Comments   Send normal result to authorizing provider's In Basket if patient is active on MyChart: Yes      Lifting restrictions   Order Comments: No lifting more than 10 lbs for 6 weeks post op.     No driving until:   Order Comments: No driving until no longer taking narcotic pain medications.     Notify your health care provider if you experience any of the following:  temperature >100.4      Notify your health care provider if you experience any of the following:  persistent nausea and vomiting or diarrhea     Notify your health care provider if you experience any of the following:  severe uncontrolled pain     Notify your health care provider if you experience any of the following:  redness, tenderness, or signs of infection (pain, swelling, redness, odor or green/yellow discharge around incision site)     Notify your health care provider if you experience any of the following:  increased confusion or weakness     EKG 12-lead   Standing Status: Future Number of Occurrences: 1 Standing Exp. Date: 06/16/26     Type & Screen   Standing Status: Future Number of Occurrences: 1 Standing Exp. Date: 08/15/26     Type & Screen   Standing Status: Future Number of Occurrences: 1 Standing Exp. Date: 08/24/26     Medications:  Reconciled Home Medications:      Medication List        CONTINUE taking these medications      acetaminophen 500 MG tablet  Commonly known as: TYLENOL  Take 500 mg by mouth every 6 (six) hours as needed for Pain.     ascorbic acid (vitamin C) 500 MG tablet  Commonly known as: VITAMIN C  Take 500 mg by mouth once daily.     atorvastatin 40 MG tablet  Commonly known as: LIPITOR  TAKE 1 TABLET(40 MG) BY MOUTH DAILY     carvediloL 12.5 MG tablet  Commonly known as: COREG  Take 1 tablet (12.5 mg total) by mouth daily with breakfast.     cholecalciferol (vitamin D3) 75 mcg (3,000 unit) Tab  Take 1 tablet by mouth once daily.     dutasteride 0.5 mg capsule  Commonly known as: AVODART  TAKE ONE CAPSULE BY MOUTH EVERY DAY     FISH OIL ORAL  Take by mouth. He takes 2 capsules also fish oil at nighttime     ibuprofen 200 MG tablet  Commonly known as: ADVIL,MOTRIN  Take 200 mg by mouth as needed for Pain.     multivitamin per tablet  Commonly known as: THERAGRAN  Take 1 tablet by mouth once daily.     olmesartan-hydrochlorothiazide 20-12.5 mg per tablet  Commonly known as: BENICAR HCT  Take 1 tablet  by mouth once daily.     oxybutynin 5 MG Tab  Commonly known as: DITROPAN  Take 1 tablet (5 mg total) by mouth 3 (three) times daily as needed (for bladder spasms and stent pain).     tamsulosin 0.4 mg Cap  Commonly known as: FLOMAX  Take 1 capsule (0.4 mg total) by mouth once daily. for 21 days            ASK your doctor about these medications      oxyCODONE 5 MG immediate release tablet  Commonly known as: ROXICODONE  Take 1 tablet (5 mg total) by mouth every 4 (four) hours as needed for Pain.              Time spent on the discharge of patient: 20 minutes    Constantino Ledezma MD  Urology  [facility] - Surgery

## 2025-07-25 NOTE — NURSING
" Patient received bedside medications with instructions  and copy of discharged papers instructions. Pt denies pain at this time. Pt educated on signs and symptoms of when to call the doctor. All belongings with the patient . Pt verbalized understanding. Patient voided 100 cc yellow urine . Urology notified . Patient waiting for transport.patient c/o of pressure to penile area urology called . Resident bladder scan 500 cc dorsey catheter inserted per resident . Instructed patient on leg bag . Patient stated, "I can sleep with leg bag all night .instructed urine could possible back flow into the bladder. Noted 200 cc in leg bag at this time . Leg bag is 500 cc bag. Instructed on changing to  bag at night . Patient confused stating "I don't think I can do it . Request Gu bag on and leg bag off. Gu bag on patient at this time leg bag removed . Written Instruction on taking care of dorsey catheter and s/s of complication giving to patient urology notified.    "

## 2025-07-25 NOTE — ANESTHESIA POSTPROCEDURE EVALUATION
Anesthesia Post Evaluation    Patient: Anton Christiansen    Procedure(s) Performed: Procedure(s) (LRB):  DV5 ROBOTIC NEPHRECTOMY, PARTIAL (Right)    Final Anesthesia Type: general      Patient location during evaluation: PACU  Patient participation: Yes- Able to Participate  Level of consciousness: awake and alert  Post-procedure vital signs: reviewed and stable  Pain management: adequate  Airway patency: patent  ASHA mitigation strategies: Multimodal analgesia, Preoperative use of mandibular advancement devices or oral appliances and Intraoperative administration of CPAP, nasopharyngeal airway, or oral appliance during sedation  PONV status at discharge: No PONV  Anesthetic complications: no      Cardiovascular status: blood pressure returned to baseline, hemodynamically stable and stable  Respiratory status: unassisted and spontaneous ventilation  Hydration status: euvolemic  Follow-up not needed.              Vitals Value Taken Time   /61 07/25/25 07:07   Temp 36.9 °C (98.5 °F) 07/25/25 07:07   Pulse 53 07/25/25 07:07   Resp 16 07/25/25 07:07   SpO2 94 % 07/25/25 07:07         Event Time   Out of Recovery 12:15:00         Pain/Pura Score: Pain Rating Prior to Med Admin: 2 (7/25/2025  6:40 AM)  Pain Rating Post Med Admin: 0 (7/25/2025  1:27 AM)  Pura Score: 9 (7/24/2025 12:15 PM)

## 2025-07-25 NOTE — DISCHARGE SUMMARY
[facility]  Urology  Discharge Summary      Patient Name: Anton Christiansen  MRN: [id number]  Admission Date: 7/24/2025  Hospital Length of Stay: 1 days  Discharge Date and Time: 07/25/2025 6:43 AM  Attending Physician: Curry Lynn MD   Discharging Provider: Gabino Santa MD  Primary Care Physician: Сергей Landis MD    HPI:   Anton Christiansen is a 74 y.o. male. Evaluated for renal cysts. He has seen Dr. Quinteros in the past for BPH and elevated PSA.   His pain that he originally saw me for has essentially resolved.     He previously underwent a CT renal stone study on 05/03/2024 which reveals numerous hypodensities of the right kidney with Hounsfield units greater than simple fluid which have increased in size compared to prior imaging.  Bilateral kidney stones also seen.  No hydronephrosis. MRI of the  abdomen with and without contrast from 12/09/2024 shows  slight increase in size of right lower pole complex cystic lesion since 2021.  Currently measuring 2.3 cm compared to 1.6 cm in July of 2021.       CT urogram from 6/2/25  reveals 2.3 cm exophytic enhancing right lower pole renal mass. Contrasted Ultrasound of the kidney from 5/20/25 reveals Bosniak 3 cystic lesion with enahancing thickened septa of the right lower pole. He is now s/p R partial nephrectomy on 7/24/25       Procedure(s) (LRB):  DV5 ROBOTIC NEPHRECTOMY, PARTIAL (Right)     Indwelling Lines/Drains at time of discharge:   Lines/Drains/Airways       Drain  Duration                  Urethral Catheter 07/24/25 0749 Silicone 16 Fr. <1 day                    Hospital Course (synopsis of major diagnoses, care, treatment, and services provided during the course of the hospital stay): The [place] [facility] [place]. Patient tolerated the procedure well in its entirety without issue. For more details, please refer to the complete operative note. he was transferred to recovery post-op and then to the floor. Once on  the floor his diet was advanced and he ambulated the night of surgery. On POD 1 the patient was tolerating a regular diet, ambulating without difficulty.his pain was well controlled.    Physical exam was appropriate for post operative state. The incisions were clean, dry and intact. The dorsey was draining clear yellow urine. The dorsey was removed and he was able to void without difficulty. The patient was deemed stable for discharge on 07/25/2025.  .     Goals of Care Treatment Preferences:  Code Status: Full Code      Consults:     Significant Diagnostic Studies: CTU    Pending Diagnostic Studies:       None            Final Active Diagnoses:    Diagnosis Date Noted POA    PRINCIPAL PROBLEM:  Renal mass [N28.89] 07/24/2025 Yes      Problems Resolved During this Admission:         Discharged Condition: good    Disposition: Home or Self Care    Follow Up:   Follow-up Information       Curry Lynn MD. Schedule an appointment as soon as possible for a visit on 8/26/2025.    Specialty: Urology  [facility]  [facility] [address]  [phone]                           Patient Instructions:      CBC Without Differential   Standing Status: Future Number of Occurrences: 1 Standing Exp. Date: 08/15/26     Order Specific Question Answer Comments   Send normal result to authorizing provider's In Basket if patient is active on MyChart: Yes      Basic metabolic panel   Standing Status: Future Standing Exp. Date: 10/25/26   Order Comments: Please complete prior to post-op follow up.     Order Specific Question Answer Comments   Send normal result to authorizing provider's In Basket if patient is active on MyChart: Yes      Diet Adult Regular     Lifting restrictions   Order Comments: No lifting more than 10 lbs for 6 weeks post op.     No driving until:   Order Comments: No driving until no longer taking narcotic pain medications.     Notify your health care provider if you experience any of  the following:  temperature >100.4     Notify your health care provider if you experience any of the following:  persistent nausea and vomiting or diarrhea     Notify your health care provider if you experience any of the following:  severe uncontrolled pain     Notify your health care provider if you experience any of the following:  redness, tenderness, or signs of infection (pain, swelling, redness, odor or green/yellow discharge around incision site)     Notify your health care provider if you experience any of the following:  increased confusion or weakness     Notify your health care provider if you experience any of the following:  temperature >100.4     Notify your health care provider if you experience any of the following:  persistent nausea and vomiting or diarrhea     Notify your health care provider if you experience any of the following:  severe uncontrolled pain     Notify your health care provider if you experience any of the following:  redness, tenderness, or signs of infection (pain, swelling, redness, odor or green/yellow discharge around incision site)     EKG 12-lead   Standing Status: Future Number of Occurrences: 1 Standing Exp. Date: 06/16/26     Type & Screen   Standing Status: Future Number of Occurrences: 1 Standing Exp. Date: 08/15/26     Type & Screen   Standing Status: Future Number of Occurrences: 1 Standing Exp. Date: 08/24/26     Activity as tolerated     Medications:  Reconciled Home Medications:      Medication List        START taking these medications      polyethylene glycol 17 gram/dose powder  Commonly known as: GLYCOLAX  Use to cap to measure 17g, mix with liquid, and take by mouth once daily. for 10 days            CHANGE how you take these medications      * acetaminophen 500 MG tablet  Commonly known as: TYLENOL  Take 500 mg by mouth every 6 (six) hours as needed for Pain.  What changed: Another medication with the same name was added. Make sure you understand how and  when to take each.     * acetaminophen 500 MG tablet  Commonly known as: TYLENOL  Take 2 tablets (1,000 mg total) by mouth every 8 (eight) hours. for 10 days  What changed: You were already taking a medication with the same name, and this prescription was added. Make sure you understand how and when to take each.     * ibuprofen 200 MG tablet  Commonly known as: ADVIL,MOTRIN  Take 200 mg by mouth as needed for Pain.  What changed: Another medication with the same name was added. Make sure you understand how and when to take each.     * ibuprofen 800 MG tablet  Commonly known as: ADVIL,MOTRIN  Take 1 tablet (800 mg total) by mouth every 6 (six) hours. for 10 days  What changed: You were already taking a medication with the same name, and this prescription was added. Make sure you understand how and when to take each.     * oxyCODONE 5 MG immediate release tablet  Commonly known as: ROXICODONE  Take 1 tablet (5 mg total) by mouth every 4 (four) hours as needed for Pain.  What changed: Another medication with the same name was added. Make sure you understand how and when to take each.     * oxyCODONE 5 MG immediate release tablet  Commonly known as: ROXICODONE  Take 1 tablet (5 mg total) by mouth every 4 (four) hours as needed for Pain.  What changed: You were already taking a medication with the same name, and this prescription was added. Make sure you understand how and when to take each.           * This list has 6 medication(s) that are the same as other medications prescribed for you. Read the directions carefully, and ask your doctor or other care provider to review them with you.                CONTINUE taking these medications      ascorbic acid (vitamin C) 500 MG tablet  Commonly known as: VITAMIN C  Take 500 mg by mouth once daily.     atorvastatin 40 MG tablet  Commonly known as: LIPITOR  TAKE 1 TABLET(40 MG) BY MOUTH DAILY     carvediloL 12.5 MG tablet  Commonly known as: COREG  Take 1 tablet (12.5 mg  total) by mouth daily with breakfast.     cholecalciferol (vitamin D3) 75 mcg (3,000 unit) Tab  Take 1 tablet by mouth once daily.     dutasteride 0.5 mg capsule  Commonly known as: AVODART  TAKE ONE CAPSULE BY MOUTH EVERY DAY     FISH OIL ORAL  Take by mouth. He takes 2 capsules also fish oil at nighttime     multivitamin per tablet  Commonly known as: THERAGRAN  Take 1 tablet by mouth once daily.     olmesartan-hydrochlorothiazide 20-12.5 mg per tablet  Commonly known as: BENICAR HCT  Take 1 tablet by mouth once daily.     oxybutynin 5 MG Tab  Commonly known as: DITROPAN  Take 1 tablet (5 mg total) by mouth 3 (three) times daily as needed (for bladder spasms and stent pain).     tamsulosin 0.4 mg Cap  Commonly known as: FLOMAX  Take 1 capsule (0.4 mg total) by mouth once daily. for 21 days              Time spent on the discharge of patient: 20 minutes    Gabino Santa MD  Urology  [facility] - Surgery

## 2025-07-25 NOTE — PLAN OF CARE
Problem: Adult Inpatient Plan of Care  Goal: Plan of Care Review  Outcome: Progressing  Goal: Patient-Specific Goal (Individualized)  Outcome: Progressing  Goal: Absence of Hospital-Acquired Illness or Injury  Outcome: Progressing  Goal: Optimal Comfort and Wellbeing  Outcome: Progressing  Goal: Readiness for Transition of Care  Outcome: Progressing     Problem: Hospitalized Older Adult  Goal: Optimal Cognitive Function  Outcome: Progressing     Patient's pain well controlled overnight with prn and multimodal pain medication. Patient ambulated 1x in hallway with stand by assist and states he would like to ambulate again in AM. Surgical incision is clean dry and intact. Vaughan output recorded in flowsheet. Educated patient on plan of care, patient verbalized understanding. Bed locked and in lowest position call light within reach.

## 2025-07-25 NOTE — HPI
Anton Christiansen is a 74 y.o. male. Evaluated for renal cysts. He has seen Dr. Quinteros in the past for BPH and elevated PSA.   His pain that he originally saw me for has essentially resolved.     He previously underwent a CT renal stone study on 05/03/2024 which reveals numerous hypodensities of the right kidney with Hounsfield units greater than simple fluid which have increased in size compared to prior imaging.  Bilateral kidney stones also seen.  No hydronephrosis. MRI of the  abdomen with and without contrast from 12/09/2024 shows  slight increase in size of right lower pole complex cystic lesion since 2021.  Currently measuring 2.3 cm compared to 1.6 cm in July of 2021.       CT urogram from 6/2/25  reveals 2.3 cm exophytic enhancing right lower pole renal mass. Contrasted Ultrasound of the kidney from 5/20/25 reveals Bosniak 3 cystic lesion with enahancing thickened septa of the right lower pole. He is now s/p R partial nephrectomy on 7/24/25

## 2025-07-26 ENCOUNTER — NURSE TRIAGE (OUTPATIENT)
Dept: ADMINISTRATIVE | Facility: CLINIC | Age: 75
End: 2025-07-26
Payer: MEDICARE

## 2025-07-26 NOTE — TELEPHONE ENCOUNTER
Called pt after responding to discharger tracker text message. Pt stated he just wanted to make sure he has an appt scheduled to have catheter removed on Tuesday. Pt stated one of the residents told his he would make sure he gets him an appt, but pt just wanted to make sure he didn't forget because he wants the dorsey removed. Informed pt there is no appt in the chart. Pt requesting call back from doctors office on Monday.  Reason for Disposition   General information question, no triage required and triager able to answer question    Protocols used: Information Only Call - No Triage-A-

## 2025-07-28 ENCOUNTER — PATIENT OUTREACH (OUTPATIENT)
Dept: ADMINISTRATIVE | Facility: CLINIC | Age: 75
End: 2025-07-28
Payer: MEDICARE

## 2025-07-28 ENCOUNTER — TELEPHONE (OUTPATIENT)
Dept: UROLOGY | Facility: CLINIC | Age: 75
End: 2025-07-28
Payer: MEDICARE

## 2025-07-28 DIAGNOSIS — E78.2 MIXED HYPERLIPIDEMIA: ICD-10-CM

## 2025-07-28 DIAGNOSIS — Z90.5 S/P NEPHRECTOMY: Primary | ICD-10-CM

## 2025-07-28 DIAGNOSIS — N28.89 RENAL MASS: Primary | ICD-10-CM

## 2025-07-28 DIAGNOSIS — N28.89 RENAL MASS: ICD-10-CM

## 2025-07-28 LAB
ESTROGEN SERPL-MCNC: ABNORMAL PG/ML
INSULIN SERPL-ACNC: ABNORMAL U[IU]/ML
LAB AP CLINICAL INFORMATION: ABNORMAL
LAB AP DIAGNOSIS CATEGORY: ABNORMAL
LAB AP GROSS DESCRIPTION: ABNORMAL
LAB AP PERFORMING LOCATION(S): ABNORMAL
LAB AP REPORT FOOTNOTES: ABNORMAL
LAB AP SYNOPTIC CHECKLIST: ABNORMAL
T3RU NFR SERPL: ABNORMAL %

## 2025-07-28 RX ORDER — ATORVASTATIN CALCIUM 40 MG/1
40 TABLET, FILM COATED ORAL DAILY
Qty: 90 TABLET | Refills: 3 | Status: SHIPPED | OUTPATIENT
Start: 2025-07-28

## 2025-07-28 NOTE — TELEPHONE ENCOUNTER
No care due was identified.  [facility] Embedded Care Due Messages. Reference number: [id number].   7/28/2025 10:10:56 AM CDT

## 2025-07-28 NOTE — TELEPHONE ENCOUNTER
Refill Decision Note   Anton Christiansen  is requesting a refill authorization.  Brief Assessment and Rationale for Refill:  Approve     Medication Therapy Plan:         Extended chart review required: Yes   Comments:     Note composed:10:13 AM 07/28/2025

## 2025-07-28 NOTE — PROGRESS NOTES
C3 nurse spoke with Anton Christiansen for a TCC post hospital discharge follow up call. C3 nurse was unable to schedule HOSPFU with [facility] PCP. Per pt. consent C3 nurse sched. HOSFU visit per Home NP visit in EPIC with  on . Pt.  understands that the Provider/staff will call the day prior to sched. HOSPFU to confirm date/time.

## 2025-07-28 NOTE — TELEPHONE ENCOUNTER
----- Message from Nurse Ria sent at 7/28/2025  9:58 AM CDT -----  Regarding: Refill for Lipitor  The pt. Is in need of a refill on his Lipitor.  Can the refill be send this to his pharmacy ([facility])?     Thank you.

## 2025-07-29 ENCOUNTER — CLINICAL SUPPORT (OUTPATIENT)
Dept: UROLOGY | Facility: CLINIC | Age: 75
End: 2025-07-29
Payer: MEDICARE

## 2025-07-29 DIAGNOSIS — Z46.6 ENCOUNTER FOR FOLEY CATHETER REMOVAL: Primary | ICD-10-CM

## 2025-07-29 NOTE — PROGRESS NOTES
Patient presents to clinic with dorsey catheter in place to gravity.  Approximately 100mL of clear yellow urine in dorsey bag.  The patient had approximately 150mL of sterile water instilled into his bladder.  The dorsey catheter was taken down with a 10mL syringe x2 to ensure all fluid was removed.  The dorsey catheter was removed atraumatically.  The patient was able to void 200mL into a urinal without difficulty.  Patient was instructed to call the clinic or go the the emergency room if he is unable to urinate again within 6 hours.  Patient verbalized understanding.

## 2025-07-30 ENCOUNTER — PATIENT MESSAGE (OUTPATIENT)
Dept: HOME HEALTH SERVICES | Facility: CLINIC | Age: 75
End: 2025-07-30
Payer: MEDICARE

## 2025-07-30 ENCOUNTER — TELEPHONE (OUTPATIENT)
Dept: HOME HEALTH SERVICES | Facility: CLINIC | Age: 75
End: 2025-07-30
Payer: MEDICARE

## 2025-07-31 ENCOUNTER — TELEPHONE (OUTPATIENT)
Dept: UROLOGY | Facility: CLINIC | Age: 75
End: 2025-07-31
Payer: MEDICARE

## 2025-07-31 ENCOUNTER — PATIENT MESSAGE (OUTPATIENT)
Dept: HOME HEALTH SERVICES | Facility: CLINIC | Age: 75
End: 2025-07-31
Payer: MEDICARE

## 2025-07-31 NOTE — TELEPHONE ENCOUNTER
Called patient to discuss stone analysis, no answer.  Left detailed message and mailed diet handout to patient.

## 2025-08-02 ENCOUNTER — PATIENT MESSAGE (OUTPATIENT)
Dept: ADMINISTRATIVE | Facility: OTHER | Age: 75
End: 2025-08-02
Payer: MEDICARE

## 2025-08-04 ENCOUNTER — OFFICE VISIT (OUTPATIENT)
Dept: OPTOMETRY | Facility: CLINIC | Age: 75
End: 2025-08-04
Payer: MEDICARE

## 2025-08-04 DIAGNOSIS — H52.13 MYOPIC ASTIGMATISM OF BOTH EYES: ICD-10-CM

## 2025-08-04 DIAGNOSIS — Z13.5 GLAUCOMA SCREENING: ICD-10-CM

## 2025-08-04 DIAGNOSIS — H02.88A MEIBOMIAN GLAND DYSFUNCTION (MGD), BILATERAL, BOTH UPPER AND LOWER LIDS: ICD-10-CM

## 2025-08-04 DIAGNOSIS — H52.203 MYOPIC ASTIGMATISM OF BOTH EYES: ICD-10-CM

## 2025-08-04 DIAGNOSIS — H02.88B MEIBOMIAN GLAND DYSFUNCTION (MGD), BILATERAL, BOTH UPPER AND LOWER LIDS: ICD-10-CM

## 2025-08-04 DIAGNOSIS — H47.012 NAION (NON-ARTERITIC ANTERIOR ISCHEMIC OPTIC NEUROPATHY), LEFT EYE: Primary | ICD-10-CM

## 2025-08-04 DIAGNOSIS — Z96.1 PSEUDOPHAKIA OF BOTH EYES: ICD-10-CM

## 2025-08-04 PROCEDURE — 99213 OFFICE O/P EST LOW 20 MIN: CPT | Mod: PBBFAC | Performed by: OPTOMETRIST

## 2025-08-04 PROCEDURE — 99999 PR PBB SHADOW E&M-EST. PATIENT-LVL III: CPT | Mod: PBBFAC,,, | Performed by: OPTOMETRIST

## 2025-08-04 NOTE — PROGRESS NOTES
HPI    Annual Eye Exam  Pt reports no visual or ocular concerning symptoms. Wears PALS.   (+rarely) Floaters (+only upon waking up) Tearing   (+only right eye soreness) Eye Pain/discomfort    Gtts: none       Last edited by Zachary Arnold MA on 8/4/2025  2:08 PM.            Assessment /Plan     For exam results, see Encounter Report.    NAION (non-arteritic anterior ischemic optic neuropathy), left eye  -Long standing, stable    Meibomian gland dysfunction (MGD), bilateral, both upper and lower lids  -ATs PRN    Glaucoma screening  -Monitor with annual eye exam and IOP check    Pseudophakia of both eyes  -clear, centered    Myopic astigmatism of both eyes  Eyeglass Final Rx       Eyeglass Final Rx         Sphere Cylinder Axis Dist VA Add    Right -0.25 +0.75 180 20/20 +2.50    Left -0.25 +0.75 180 20/150 +2.50      Type: PAL    Expiration Date: 8/4/2026                      RTC 1 yr

## 2025-08-06 ENCOUNTER — OFFICE VISIT (OUTPATIENT)
Dept: HOME HEALTH SERVICES | Facility: CLINIC | Age: 75
End: 2025-08-06
Payer: MEDICARE

## 2025-08-06 VITALS — SYSTOLIC BLOOD PRESSURE: 118 MMHG | DIASTOLIC BLOOD PRESSURE: 80 MMHG | HEART RATE: 64 BPM | OXYGEN SATURATION: 99 %

## 2025-08-06 DIAGNOSIS — E78.2 MIXED HYPERLIPIDEMIA: Chronic | ICD-10-CM

## 2025-08-06 DIAGNOSIS — I10 PRIMARY HYPERTENSION: Chronic | ICD-10-CM

## 2025-08-06 DIAGNOSIS — Z90.5 S/P NEPHRECTOMY: ICD-10-CM

## 2025-08-06 DIAGNOSIS — N28.89 RENAL MASS: Primary | ICD-10-CM

## 2025-08-06 NOTE — PROGRESS NOTES
[facility]  Transitional Care Management (TCM) Home Visit    Encounter Provider: KENDRA ASCENCIO,   PCP: Сергей Landis MD  Consult Requested By: Dr. Mana Mondragon  Admit Date: 7/24/25   IP Discharge Date: 7/25/25  Hospital Length of Stay: 1 days  Days since discharge (from IP or SNF): 12 days  [facility] Contact Note: 7/28  Hospital Diagnosis: Renal mass [N28.89];S/p nephrectomy [Z90.5]     HISTORY OF PRESENT ILLNESS      Patient ID: Anton Christiansen is a 74 y.o. male was recently admitted to the hospital, this is their TCM encounter.    Hospital Course Synopsis:  s/p R partial nephrectomy on 7/24/25 for malignant mass.     Developments since hospitalization and current needs:   Doing well since discharge. Has some slight discomfort to surgical region if he exerts himself too much. Encouraged to rest and and avoid overly strenuous activities like pulling weeds and gardening.     He was previously taking ibuprofen on a scheduled basis but he noted a BP elevation, so he stopped taking it and BP has since returned to normal. Discussed rotating tylenol and ibuprofen but only on as needed bases. Does not need to take anything on scheduled basis if he does not feel he needs it.     He is also concerned today for dysphoric mood since discharge. He feels this is related to not being able to do his normal activities like volunteering at hospital. Discussed healing process and time frames for getting back to normal activities. Encouraged to spend sometime outdoors getting fresh air and sunlight, but avoiding excess heat. He and wife agreeable to plan.    Current ADL functionality -   lives juan wife  Mobility- ambulatory   Appetite is improving to baseline  Elimination - no problems, continue miralaxo    Sleep- positional take ibuprofen   Medication Management per wife.   Medication needs at this time - none  Transportation to and from appointments via self or wife     VSS. Reviewed medications and  upcoming appointments. Care at home information provided and left inside home.    DECISION MAKING TODAY       Assessment & Plan: SEE HPI    1. Renal mass  Overview:  s/p R partial nephrectomy on 7/24/25.    Biopsy report:  Kidney, right, partial nephrectomy:  - Positive for malignancy  - Papillary renal cell carcinoma  - Margins are negative for tumor  - Pathologic stage pT1a  - See synoptic report below            Orders:  -     Ambulatory referral/consult to [facility]    2. S/p nephrectomy  Assessment & Plan:  Healing well. No SS of infection  Limit over exertion.  Discussed SS to report.   Follow up visits reviewed.    Orders:  -     Ambulatory referral/consult to [facility]    3. Primary hypertension  Assessment & Plan:  Chronic and stable.  Noted increase in BP with regular ibuprofen use. Encouraged to rotate tylenol and ibuprofen use on PRN basis.   Continue current BP management.    See med list below.   Recommend low sodium diet. Avoid frozen dinners, canned goods, if possible.  Follow up with PCP.      Hypertension Medications              carvediloL (COREG) 12.5 MG tablet Take 1 tablet (12.5 mg total) by mouth daily with breakfast.    olmesartan-hydrochlorothiazide (BENICAR HCT) 20-12.5 mg per tablet Take 1 tablet by mouth once daily.                   4. Mixed hyperlipidemia  Assessment & Plan:  Chronic and stable.  Continue current medication regimen. See below.   Heart healthy diet.   F/u with pcp.     Hyperlipidemia Medications              atorvastatin (LIPITOR) 40 MG tablet Take 1 tablet (40 mg total) by mouth once daily.    docosahexaenoic acid/epa (FISH OIL ORAL) Take 2 tablets by mouth every evening.             Lab Results   Component Value Date    CHOL 148 06/20/2025    CHOL 134 06/10/2024    CHOL 134 02/12/2024     Lab Results   Component Value Date    HDL 39 (L) 06/20/2025    HDL 36 (L) 06/10/2024    HDL 34 (L) 02/12/2024     Lab Results   Component Value Date     LDLCALC 74.4 06/20/2025    LDLCALC 73.2 06/10/2024    LDLCALC 65.6 02/12/2024     Lab Results   Component Value Date    TRIG 173 (H) 06/20/2025    TRIG 124 06/10/2024    TRIG 172 (H) 02/12/2024       Lab Results   Component Value Date    CHOLHDL 26.4 06/20/2025    CHOLHDL 26.9 06/10/2024    CHOLHDL 25.4 02/12/2024                     ENVIRONMENT OF CARE      Family and/or Caregiver present at visit?  Yes  Name of Caregiver: wife  History provided by: patient and caregiver    Advance Care Planning   Advanced Care Planning Status:  Patient has had an ACP conversation  Living Will: No  Power of : No  [place]: No    Does Caregiver have HCPoA: n/a  Changes today: n/a       Impression upon entering the home:  Physical Dwelling: single family home   Appearance of home environment: cleanliness: clean, walking pathways: clear, lighting: adequate, and home structure: sound structure  Functional Status: minimal assistance  Mobility: ambulatory  Nutritional access: adequate intake and access  Home Health: No, and does not need it at this time   DME/Supplies: none     Diagnostic tests reviewed/disposition: No diagnosic tests pending after this hospitalization.  Disease/illness education: s/p partial nephrectomy, wound care  Establishment or re-establishment of referral orders for community resources: No other necessary community resources.   Discussion with other health care providers: No discussion with other health care providers necessary.   Does patient have a PCP at [facility]? Yes   Repatriation plan with PCP? follow-up with PCP within 90d   Does patient have an ostomy (ileostomy, colostomy, suprapubic catheter, nephrostomy tube, tracheostomy, PEG tube, pleurex catheter, cholecystostomy, etc)? No  Were BPAs reviewed? 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  Lives with wife and no pets.      Social Drivers of Health     Financial Resource Strain: Low Risk  (7/25/2025)    Overall Financial Resource Strain (CARDIA)     Difficulty of Paying Living Expenses: Not hard at all   Food Insecurity: No Food Insecurity (7/25/2025)    Hunger Vital Sign     Worried About Running Out of Food in the Last Year: Never true     Ran Out of Food in the Last Year: Never true   Transportation Needs: No Transportation Needs (7/25/2025)    PRAPARE - Transportation     Lack of Transportation (Medical): No     Lack of Transportation (Non-Medical): No   Physical Activity: Insufficiently Active (7/25/2025)    Exercise Vital Sign     Days of Exercise per Week: 3 days     Minutes of Exercise per Session: 30 min   Stress: No Stress Concern Present (7/25/2025)    [employer] - Occupational Stress Questionnaire     Feeling of Stress : Not at all   Housing Stability: Low Risk  (7/25/2025)    Housing Stability Vital Sign     Unable to Pay for Housing in the Last Year: No     Number of Times Moved in the Last Year: 0     Homeless in the Last Year: No         OBJECTIVE:     Vital Signs:  Vital Signs  Pulse: 64  SpO2: 99 %  BP: 118/80    Review of Systems   Constitutional:  Positive for activity change. Negative for appetite change and fever.   Respiratory:  Negative for cough, chest tightness, shortness of breath and wheezing.    Cardiovascular:  Negative for chest pain, palpitations and leg swelling.   Gastrointestinal:  Negative for abdominal pain, constipation and diarrhea.   Genitourinary:  Negative for difficulty  urinating and hematuria.   Musculoskeletal:  Negative for gait problem and joint swelling.   Skin:  Positive for wound. Negative for color change.   Neurological:  Negative for tremors, seizures, weakness and numbness.   Psychiatric/Behavioral:  Positive for dysphoric mood.        Physical Exam  Constitutional:       Appearance: Normal appearance.   HENT:      Head: Normocephalic and atraumatic.   Cardiovascular:      Rate and Rhythm: Normal rate and regular rhythm.      Pulses: Normal pulses.      Heart sounds: Normal heart sounds.   Pulmonary:      Effort: Pulmonary effort is normal.      Breath sounds: Normal breath sounds.   Abdominal:      General: Bowel sounds are normal.      Palpations: Abdomen is soft.   Musculoskeletal:         General: Normal range of motion.   Skin:     General: Skin is warm and dry.      Capillary Refill: Capillary refill takes less than 2 seconds.      Comments: Multiple surgical incisions to right abdomen. Closed with surgical glue. Healing well. No SS of infection.    Neurological:      General: No focal deficit present.      Mental Status: He is alert and oriented to person, place, and time. Mental status is at baseline.   Psychiatric:         Mood and Affect: Mood normal.         Thought Content: Thought content normal.             INSTRUCTIONS FOR PATIENT:   Medication List on Discharge:     Medication List            Accurate as of August 6, 2025 11:59 PM. If you have any questions, ask your nurse or doctor.                CONTINUE taking these medications      acetaminophen 500 MG tablet  Commonly known as: TYLENOL  Take 500 mg by mouth every 6 (six) hours as needed for Pain.     atorvastatin 40 MG tablet  Commonly known as: LIPITOR  Take 1 tablet (40 mg total) by mouth once daily.     carvediloL 12.5 MG tablet  Commonly known as: COREG  Take 1 tablet (12.5 mg total) by mouth daily with breakfast.     cholecalciferol (vitamin D3) 75 mcg (3,000 unit) Tab  Take 1 tablet by mouth  once daily.     dutasteride 0.5 mg capsule  Commonly known as: AVODART  TAKE ONE CAPSULE BY MOUTH EVERY DAY     FISH OIL ORAL  Take 2 tablets by mouth every evening.     ibuprofen 200 MG tablet  Commonly known as: ADVIL,MOTRIN  Take 200 mg by mouth as needed for Pain.     multivitamin per tablet  Commonly known as: THERAGRAN  Take 1 tablet by mouth once daily.     olmesartan-hydrochlorothiazide 20-12.5 mg per tablet  Commonly known as: BENICAR HCT  Take 1 tablet by mouth once daily.     oxybutynin 5 MG Tab  Commonly known as: DITROPAN  Take 1 tablet (5 mg total) by mouth 3 (three) times daily as needed (for bladder spasms and stent pain).     * oxyCODONE 5 MG immediate release tablet  Commonly known as: ROXICODONE  Take 1 tablet (5 mg total) by mouth every 4 (four) hours as needed for Pain.     * oxyCODONE 5 MG immediate release tablet  Commonly known as: ROXICODONE  Take 1 tablet (5 mg total) by mouth every 4 (four) hours as needed for Pain.     polyethylene glycol 17 gram/dose powder  Commonly known as: GLYCOLAX  Use to cap to measure 17g, mix with liquid, and take by mouth once daily. for 10 days     tamsulosin 0.4 mg Cap  Commonly known as: FLOMAX  Take 1 capsule (0.4 mg total) by mouth once daily. for 21 days           * This list has 2 medication(s) that are the same as other medications prescribed for you. Read the directions carefully, and ask your doctor or other care provider to review them with you.                  Medication Reconciliation:  Were medications changed on discharge? Yes  Were medications in the home? Yes  Is the patient taking the medications as directed? Yes  Does the patient/caregiver understand the medications and changes? Yes  Does updated med list accurately reflects meds patient is currently taking? Yes      Scheduled Follow-up, Appts Reviewed with Modifications if Needed: Yes  Future Appointments   Date Time Provider Department Center   8/26/2025  8:00 AM Curry Lynn MD [facility]  [facility] [place]   9/23/2025 11:30 AM Satinder Croft PA-C [facility]         Signature:      KENDRA ASCENCIO,, NP    Transition of Care Visit:  I have reviewed and updated the history and problem list.  I have reconciled the medication list.  I have discussed the hospitalization and current medical issues, prognosis and plans with the patient/family.

## 2025-08-08 PROBLEM — Z90.5 S/P NEPHRECTOMY: Status: ACTIVE | Noted: 2025-08-08

## 2025-08-08 NOTE — ASSESSMENT & PLAN NOTE
Chronic and stable.  Noted increase in BP with regular ibuprofen use. Encouraged to rotate tylenol and ibuprofen use on PRN basis.   Continue current BP management.    See med list below.   Recommend low sodium diet. Avoid frozen dinners, canned goods, if possible.  Follow up with PCP.      Hypertension Medications              carvediloL (COREG) 12.5 MG tablet Take 1 tablet (12.5 mg total) by mouth daily with breakfast.    olmesartan-hydrochlorothiazide (BENICAR HCT) 20-12.5 mg per tablet Take 1 tablet by mouth once daily.

## 2025-08-08 NOTE — ASSESSMENT & PLAN NOTE
Healing well. No SS of infection  Limit over exertion.  Discussed SS to report.   Follow up visits reviewed.

## 2025-08-08 NOTE — ASSESSMENT & PLAN NOTE
Chronic and stable.  Continue current medication regimen. See below.   Heart healthy diet.   F/u with pcp.     Hyperlipidemia Medications              atorvastatin (LIPITOR) 40 MG tablet Take 1 tablet (40 mg total) by mouth once daily.    docosahexaenoic acid/epa (FISH OIL ORAL) Take 2 tablets by mouth every evening.             Lab Results   Component Value Date    CHOL 148 06/20/2025    CHOL 134 06/10/2024    CHOL 134 02/12/2024     Lab Results   Component Value Date    HDL 39 (L) 06/20/2025    HDL 36 (L) 06/10/2024    HDL 34 (L) 02/12/2024     Lab Results   Component Value Date    LDLCALC 74.4 06/20/2025    LDLCALC 73.2 06/10/2024    LDLCALC 65.6 02/12/2024     Lab Results   Component Value Date    TRIG 173 (H) 06/20/2025    TRIG 124 06/10/2024    TRIG 172 (H) 02/12/2024       Lab Results   Component Value Date    CHOLHDL 26.4 06/20/2025    CHOLHDL 26.9 06/10/2024    CHOLHDL 25.4 02/12/2024

## 2025-08-26 ENCOUNTER — OFFICE VISIT (OUTPATIENT)
Dept: UROLOGY | Facility: CLINIC | Age: 75
End: 2025-08-26
Payer: MEDICARE

## 2025-08-26 VITALS — SYSTOLIC BLOOD PRESSURE: 130 MMHG | HEART RATE: 59 BPM | DIASTOLIC BLOOD PRESSURE: 75 MMHG

## 2025-08-26 DIAGNOSIS — R97.20 ELEVATED PSA: ICD-10-CM

## 2025-08-26 DIAGNOSIS — N40.1 BPH WITH URINARY OBSTRUCTION: ICD-10-CM

## 2025-08-26 DIAGNOSIS — C64.1 RENAL CELL CARCINOMA OF RIGHT KIDNEY: Primary | ICD-10-CM

## 2025-08-26 DIAGNOSIS — I10 PRIMARY HYPERTENSION: ICD-10-CM

## 2025-08-26 DIAGNOSIS — N13.8 BPH WITH URINARY OBSTRUCTION: ICD-10-CM

## 2025-08-26 PROCEDURE — 99024 POSTOP FOLLOW-UP VISIT: CPT | Mod: POP,,, | Performed by: UROLOGY

## 2025-08-26 PROCEDURE — 99213 OFFICE O/P EST LOW 20 MIN: CPT | Mod: PBBFAC | Performed by: UROLOGY

## 2025-08-26 PROCEDURE — 99999 PR PBB SHADOW E&M-EST. PATIENT-LVL III: CPT | Mod: PBBFAC,,, | Performed by: UROLOGY

## 2025-08-27 ENCOUNTER — PATIENT MESSAGE (OUTPATIENT)
Dept: NEUROSURGERY | Facility: CLINIC | Age: 75
End: 2025-08-27
Payer: MEDICARE

## (undated) DEVICE — ELECTRODE MEGADYNE RETURN DUAL

## (undated) DEVICE — TOWEL OR DISP STRL BLUE 4/PK

## (undated) DEVICE — SET TRI-LUMEN FILTERED TUBE

## (undated) DEVICE — GUIDEWIRE STR TIP HIWIRE 150CM

## (undated) DEVICE — SUT CTD VICRYL 0 UND BR CT

## (undated) DEVICE — NDL INSUF ULTRA VERESS 120MM

## (undated) DEVICE — DRAPE COLUMN DAVINCI XI

## (undated) DEVICE — SYR 30CC LUER LOCK

## (undated) DEVICE — SUT EASE CROSSBOW CLSR SYS

## (undated) DEVICE — UNDERGLOVE BIOGEL PI SZ 6.5 LF

## (undated) DEVICE — DRAPE SCOPE PILLOW WARMER

## (undated) DEVICE — SUT PROLENE 4-0 RB-1 BL MO

## (undated) DEVICE — TRAY CYSTO BASIN OMC

## (undated) DEVICE — TRAY CATH 1-LYR URIMTR 16FR

## (undated) DEVICE — SEAL CANN UNIVERSAL 5-12MM

## (undated) DEVICE — EXTRACTOR TIPLESS 2.4FRX1115CM

## (undated) DEVICE — COVER TIP CURVED SCISSORS XI

## (undated) DEVICE — SUT MCRYL PLUS 4-0 PS2 27IN

## (undated) DEVICE — BLADE SURG CARBON STEEL SZ11

## (undated) DEVICE — DRAPE INCISE IOBAN 2 23X17IN

## (undated) DEVICE — GUIDE WIRE MOTION .035 X 150CM

## (undated) DEVICE — EVACUATOR WOUND BULB 100CC

## (undated) DEVICE — SYR 10CC LUER LOCK

## (undated) DEVICE — IRRIGATOR ENDOSCOPY DISP.

## (undated) DEVICE — DRAPE ARM DAVINCI XI

## (undated) DEVICE — TAPE SILK 3IN

## (undated) DEVICE — PENCIL ROCKER SWITCH 10FT CORD

## (undated) DEVICE — SOL ELECTROLUBE ANTI-STIC

## (undated) DEVICE — SYR SLIP TIP 20CC

## (undated) DEVICE — FIBER LASER HOLMIUM 200MH

## (undated) DEVICE — SOL IRRI STRL WATER 1000ML

## (undated) DEVICE — SUT V-LOC 90 GS22 2-0 VIO 23CM

## (undated) DEVICE — PORT AIRSEAL 12/120MM LPI

## (undated) DEVICE — TROCAR ENDOPATH XCEL 5X100MM

## (undated) DEVICE — DISSECTOR SPACEMAKER + 10-12MM

## (undated) DEVICE — SUT ETHILON 2-0 PSLX 30IN

## (undated) DEVICE — ELECTRODE REM PLYHSV RETURN 9

## (undated) DEVICE — SUT 1 36IN PDS II VIO MONO

## (undated) DEVICE — TRAY MINOR GEN SURG OMC

## (undated) DEVICE — PENCIL SMK EVAC CONNECTOR 10FT

## (undated) DEVICE — SUT ABS CLIP LAPRA-TY CTD

## (undated) DEVICE — CLIP HEMO-LOK MLX LARGE LF

## (undated) DEVICE — DRAPE ABDOMINAL TIBURON 14X11

## (undated) DEVICE — GOWN SURGICAL X-LARGE

## (undated) DEVICE — ADHESIVE DERMABOND ADVANCED

## (undated) DEVICE — UNDERGLOVES BIOGEL PI SIZE 7.5

## (undated) DEVICE — Device

## (undated) DEVICE — SOL NACL IRR 3000ML

## (undated) DEVICE — PACK CYSTOSCOPY III SIRUS

## (undated) DEVICE — UNDERGLOVES BIOGEL PI SZ 7 LF